# Patient Record
Sex: MALE | Race: WHITE | NOT HISPANIC OR LATINO | URBAN - METROPOLITAN AREA
[De-identification: names, ages, dates, MRNs, and addresses within clinical notes are randomized per-mention and may not be internally consistent; named-entity substitution may affect disease eponyms.]

---

## 2018-07-24 ENCOUNTER — INPATIENT (INPATIENT)
Facility: HOSPITAL | Age: 68
LOS: 1 days | Discharge: HOME | DRG: 303 | End: 2018-07-26
Attending: INTERNAL MEDICINE | Admitting: INTERNAL MEDICINE
Payer: MEDICARE

## 2018-07-24 VITALS
RESPIRATION RATE: 20 BRPM | TEMPERATURE: 99 F | SYSTOLIC BLOOD PRESSURE: 143 MMHG | OXYGEN SATURATION: 95 % | DIASTOLIC BLOOD PRESSURE: 55 MMHG | WEIGHT: 210.1 LBS | HEART RATE: 60 BPM

## 2018-07-24 LAB
ALBUMIN SERPL ELPH-MCNC: 4.3 G/DL — SIGNIFICANT CHANGE UP (ref 3.3–5)
ALP SERPL-CCNC: 50 U/L — SIGNIFICANT CHANGE UP (ref 40–120)
ALT FLD-CCNC: 14 U/L — SIGNIFICANT CHANGE UP (ref 10–45)
ANION GAP SERPL CALC-SCNC: 11 MMOL/L — SIGNIFICANT CHANGE UP (ref 5–17)
APTT BLD: 33.8 SEC — SIGNIFICANT CHANGE UP (ref 27.5–37.4)
AST SERPL-CCNC: 22 U/L — SIGNIFICANT CHANGE UP (ref 10–40)
BASOPHILS NFR BLD AUTO: 0.3 % — SIGNIFICANT CHANGE UP (ref 0–2)
BILIRUB SERPL-MCNC: 0.2 MG/DL — SIGNIFICANT CHANGE UP (ref 0.2–1.2)
BUN SERPL-MCNC: 13 MG/DL — SIGNIFICANT CHANGE UP (ref 7–23)
CALCIUM SERPL-MCNC: 9.2 MG/DL — SIGNIFICANT CHANGE UP (ref 8.4–10.5)
CHLORIDE SERPL-SCNC: 102 MMOL/L — SIGNIFICANT CHANGE UP (ref 96–108)
CK MB CFR SERPL CALC: 1.8 NG/ML — SIGNIFICANT CHANGE UP (ref 0–6.7)
CK SERPL-CCNC: 91 U/L — SIGNIFICANT CHANGE UP (ref 30–200)
CO2 SERPL-SCNC: 27 MMOL/L — SIGNIFICANT CHANGE UP (ref 22–31)
CREAT SERPL-MCNC: 0.87 MG/DL — SIGNIFICANT CHANGE UP (ref 0.5–1.3)
EOSINOPHIL NFR BLD AUTO: 2 % — SIGNIFICANT CHANGE UP (ref 0–6)
GLUCOSE SERPL-MCNC: 173 MG/DL — HIGH (ref 70–99)
HCT VFR BLD CALC: 39.5 % — SIGNIFICANT CHANGE UP (ref 39–50)
HGB BLD-MCNC: 12.8 G/DL — LOW (ref 13–17)
INR BLD: 0.97 — SIGNIFICANT CHANGE UP (ref 0.88–1.16)
LIDOCAIN IGE QN: 32 U/L — SIGNIFICANT CHANGE UP (ref 7–60)
LYMPHOCYTES # BLD AUTO: 45.3 % — HIGH (ref 13–44)
MCHC RBC-ENTMCNC: 29.8 PG — SIGNIFICANT CHANGE UP (ref 27–34)
MCHC RBC-ENTMCNC: 32.4 G/DL — SIGNIFICANT CHANGE UP (ref 32–36)
MCV RBC AUTO: 91.9 FL — SIGNIFICANT CHANGE UP (ref 80–100)
MONOCYTES NFR BLD AUTO: 7.3 % — SIGNIFICANT CHANGE UP (ref 2–14)
NEUTROPHILS NFR BLD AUTO: 45.1 % — SIGNIFICANT CHANGE UP (ref 43–77)
NT-PROBNP SERPL-SCNC: 73 PG/ML — SIGNIFICANT CHANGE UP (ref 0–300)
PLATELET # BLD AUTO: 217 K/UL — SIGNIFICANT CHANGE UP (ref 150–400)
POTASSIUM SERPL-MCNC: 4.1 MMOL/L — SIGNIFICANT CHANGE UP (ref 3.5–5.3)
POTASSIUM SERPL-SCNC: 4.1 MMOL/L — SIGNIFICANT CHANGE UP (ref 3.5–5.3)
PROT SERPL-MCNC: 6.6 G/DL — SIGNIFICANT CHANGE UP (ref 6–8.3)
PROTHROM AB SERPL-ACNC: 10.8 SEC — SIGNIFICANT CHANGE UP (ref 9.8–12.7)
RBC # BLD: 4.3 M/UL — SIGNIFICANT CHANGE UP (ref 4.2–5.8)
RBC # FLD: 13.2 % — SIGNIFICANT CHANGE UP (ref 10.3–16.9)
SODIUM SERPL-SCNC: 140 MMOL/L — SIGNIFICANT CHANGE UP (ref 135–145)
TROPONIN T SERPL-MCNC: <0.01 NG/ML — SIGNIFICANT CHANGE UP (ref 0–0.01)
WBC # BLD: 7.9 K/UL — SIGNIFICANT CHANGE UP (ref 3.8–10.5)
WBC # FLD AUTO: 7.9 K/UL — SIGNIFICANT CHANGE UP (ref 3.8–10.5)

## 2018-07-24 PROCEDURE — 99291 CRITICAL CARE FIRST HOUR: CPT

## 2018-07-24 PROCEDURE — 99222 1ST HOSP IP/OBS MODERATE 55: CPT | Mod: AI

## 2018-07-24 PROCEDURE — 93010 ELECTROCARDIOGRAM REPORT: CPT

## 2018-07-24 PROCEDURE — 71045 X-RAY EXAM CHEST 1 VIEW: CPT | Mod: 26

## 2018-07-24 RX ORDER — ASPIRIN/CALCIUM CARB/MAGNESIUM 324 MG
162 TABLET ORAL DAILY
Qty: 0 | Refills: 0 | Status: DISCONTINUED | OUTPATIENT
Start: 2018-07-24 | End: 2018-07-26

## 2018-07-24 RX ORDER — CLONAZEPAM 1 MG
1 TABLET ORAL ONCE
Qty: 0 | Refills: 0 | Status: DISCONTINUED | OUTPATIENT
Start: 2018-07-24 | End: 2018-07-24

## 2018-07-24 RX ORDER — CLONAZEPAM 1 MG
0.5 TABLET ORAL ONCE
Qty: 0 | Refills: 0 | Status: DISCONTINUED | OUTPATIENT
Start: 2018-07-24 | End: 2018-07-24

## 2018-07-24 RX ORDER — SODIUM CHLORIDE 9 MG/ML
3 INJECTION INTRAMUSCULAR; INTRAVENOUS; SUBCUTANEOUS ONCE
Qty: 0 | Refills: 0 | Status: COMPLETED | OUTPATIENT
Start: 2018-07-24 | End: 2018-07-24

## 2018-07-24 RX ADMIN — Medication 0.5 MILLIGRAM(S): at 22:27

## 2018-07-24 RX ADMIN — SODIUM CHLORIDE 3 MILLILITER(S): 9 INJECTION INTRAMUSCULAR; INTRAVENOUS; SUBCUTANEOUS at 22:17

## 2018-07-24 NOTE — ED PROVIDER NOTE - OBJECTIVE STATEMENT
68M with DM, HTN presenting with chest pressure, anxiety (on klonopin) presenting with chest pressure, some shortness of breath. no radiation of pain, no nausea, no vomiting, no diaphoresis. Pt prefers family member for translation. No leg swelling, no pe/dvt risk factors. Last stress test in [place] 3 years ago. Pt does not recall results.

## 2018-07-24 NOTE — ED ADULT NURSE NOTE - OBJECTIVE STATEMENT
69 y/o male was BIBA for chest pain since 3 pm today. Pt verbalized that he took 4 aspirin and 0.5mg of Klonopin prior to arriving to ER. Upon assessment, 18G IV heplock noted to left ac, abdomen soft, lung fields WNL, breathing is equal and unlabored, pulses palpable, no visible injuries noted. Pt denies dizziness, blurred vision, slurred speech, nausea, vomiting, diarrhea, fever, chills, LOC, SOB, weakness, fatigue, recent travel, recent injury, and palpitations. EKG performed. Care in progress

## 2018-07-24 NOTE — ED PROVIDER NOTE - MEDICAL DECISION MAKING DETAILS
68M with DM, HtN, presenting with chest pain started today substernal pressure nonradiating, no n/v/diaphoresis. Some sob. no pe/dvt risk factors, pt has pmd in [place], last stress test 3 years ago. doubt dissection no cp to back, doubt ptx equal bs bl. doubt pna no fever/cough, no pe/dvt risk factors. plan to admit to cards given ekg changes, no old for comparison.

## 2018-07-24 NOTE — ED PROVIDER NOTE - PHYSICAL EXAMINATION
gen: no acute distress, comfortable, conversant  HEENT: oropharynx clear  Neck: supple, no meningismus, no bruit noted bl carotid  CV: rrr no m/r/g 2+ radial pulse  Resp: ctab, no w/c/r  Abd: nontender, no rebound/guarding  Ext: no edema, pedal pulses 2+  Neuro: alert and oriented, cn grossly intact, strength equal in all 4 ext, sensation intact to light touch f/a/l, nl coordination, gait steady

## 2018-07-25 DIAGNOSIS — D64.9 ANEMIA, UNSPECIFIED: ICD-10-CM

## 2018-07-25 DIAGNOSIS — R63.8 OTHER SYMPTOMS AND SIGNS CONCERNING FOOD AND FLUID INTAKE: ICD-10-CM

## 2018-07-25 DIAGNOSIS — Z71.89 OTHER SPECIFIED COUNSELING: ICD-10-CM

## 2018-07-25 DIAGNOSIS — Z29.9 ENCOUNTER FOR PROPHYLACTIC MEASURES, UNSPECIFIED: ICD-10-CM

## 2018-07-25 DIAGNOSIS — F32.9 MAJOR DEPRESSIVE DISORDER, SINGLE EPISODE, UNSPECIFIED: ICD-10-CM

## 2018-07-25 DIAGNOSIS — E11.9 TYPE 2 DIABETES MELLITUS WITHOUT COMPLICATIONS: ICD-10-CM

## 2018-07-25 LAB
ANION GAP SERPL CALC-SCNC: 10 MMOL/L — SIGNIFICANT CHANGE UP (ref 5–17)
APTT BLD: 33.3 SEC — SIGNIFICANT CHANGE UP (ref 27.5–37.4)
BUN SERPL-MCNC: 15 MG/DL — SIGNIFICANT CHANGE UP (ref 7–23)
CALCIUM SERPL-MCNC: 9 MG/DL — SIGNIFICANT CHANGE UP (ref 8.4–10.5)
CHLORIDE SERPL-SCNC: 102 MMOL/L — SIGNIFICANT CHANGE UP (ref 96–108)
CHOLEST SERPL-MCNC: 148 MG/DL — SIGNIFICANT CHANGE UP (ref 10–199)
CO2 SERPL-SCNC: 28 MMOL/L — SIGNIFICANT CHANGE UP (ref 22–31)
CREAT SERPL-MCNC: 0.78 MG/DL — SIGNIFICANT CHANGE UP (ref 0.5–1.3)
GLUCOSE BLDC GLUCOMTR-MCNC: 112 MG/DL — HIGH (ref 70–99)
GLUCOSE BLDC GLUCOMTR-MCNC: 123 MG/DL — HIGH (ref 70–99)
GLUCOSE BLDC GLUCOMTR-MCNC: 156 MG/DL — HIGH (ref 70–99)
GLUCOSE BLDC GLUCOMTR-MCNC: 164 MG/DL — HIGH (ref 70–99)
GLUCOSE SERPL-MCNC: 187 MG/DL — HIGH (ref 70–99)
HBA1C BLD-MCNC: 7 % — HIGH (ref 4–5.6)
HCT VFR BLD CALC: 37.8 % — LOW (ref 39–50)
HDLC SERPL-MCNC: 48 MG/DL — SIGNIFICANT CHANGE UP (ref 40–125)
HGB BLD-MCNC: 12.4 G/DL — LOW (ref 13–17)
INR BLD: 1 — SIGNIFICANT CHANGE UP (ref 0.88–1.16)
LIPID PNL WITH DIRECT LDL SERPL: 86 MG/DL — SIGNIFICANT CHANGE UP
MAGNESIUM SERPL-MCNC: 2 MG/DL — SIGNIFICANT CHANGE UP (ref 1.6–2.6)
MCHC RBC-ENTMCNC: 30.1 PG — SIGNIFICANT CHANGE UP (ref 27–34)
MCHC RBC-ENTMCNC: 32.8 G/DL — SIGNIFICANT CHANGE UP (ref 32–36)
MCV RBC AUTO: 91.7 FL — SIGNIFICANT CHANGE UP (ref 80–100)
PLATELET # BLD AUTO: 214 K/UL — SIGNIFICANT CHANGE UP (ref 150–400)
POTASSIUM SERPL-MCNC: 4.1 MMOL/L — SIGNIFICANT CHANGE UP (ref 3.5–5.3)
POTASSIUM SERPL-SCNC: 4.1 MMOL/L — SIGNIFICANT CHANGE UP (ref 3.5–5.3)
PROTHROM AB SERPL-ACNC: 11.1 SEC — SIGNIFICANT CHANGE UP (ref 9.8–12.7)
RBC # BLD: 4.12 M/UL — LOW (ref 4.2–5.8)
RBC # FLD: 13.1 % — SIGNIFICANT CHANGE UP (ref 10.3–16.9)
SODIUM SERPL-SCNC: 140 MMOL/L — SIGNIFICANT CHANGE UP (ref 135–145)
TOTAL CHOLESTEROL/HDL RATIO MEASUREMENT: 3.1 RATIO — LOW (ref 3.4–9.6)
TRIGL SERPL-MCNC: 72 MG/DL — SIGNIFICANT CHANGE UP (ref 10–149)
TROPONIN T SERPL-MCNC: <0.01 NG/ML — SIGNIFICANT CHANGE UP (ref 0–0.01)
WBC # BLD: 8.6 K/UL — SIGNIFICANT CHANGE UP (ref 3.8–10.5)
WBC # FLD AUTO: 8.6 K/UL — SIGNIFICANT CHANGE UP (ref 3.8–10.5)

## 2018-07-25 PROCEDURE — 93306 TTE W/DOPPLER COMPLETE: CPT | Mod: 26

## 2018-07-25 PROCEDURE — 99232 SBSQ HOSP IP/OBS MODERATE 35: CPT

## 2018-07-25 PROCEDURE — 93010 ELECTROCARDIOGRAM REPORT: CPT

## 2018-07-25 PROCEDURE — 75574 CT ANGIO HRT W/3D IMAGE: CPT | Mod: 26

## 2018-07-25 RX ORDER — DIVALPROEX SODIUM 500 MG/1
2 TABLET, DELAYED RELEASE ORAL
Qty: 0 | Refills: 0 | COMMUNITY

## 2018-07-25 RX ORDER — DEXTROSE 50 % IN WATER 50 %
25 SYRINGE (ML) INTRAVENOUS ONCE
Qty: 0 | Refills: 0 | Status: DISCONTINUED | OUTPATIENT
Start: 2018-07-25 | End: 2018-07-26

## 2018-07-25 RX ORDER — DEXTROSE 50 % IN WATER 50 %
12.5 SYRINGE (ML) INTRAVENOUS ONCE
Qty: 0 | Refills: 0 | Status: DISCONTINUED | OUTPATIENT
Start: 2018-07-25 | End: 2018-07-26

## 2018-07-25 RX ORDER — LITHIUM CARBONATE 300 MG/1
1 TABLET, EXTENDED RELEASE ORAL
Qty: 0 | Refills: 0 | COMMUNITY

## 2018-07-25 RX ORDER — SERTRALINE 25 MG/1
25 TABLET, FILM COATED ORAL DAILY
Qty: 0 | Refills: 0 | Status: DISCONTINUED | OUTPATIENT
Start: 2018-07-25 | End: 2018-07-26

## 2018-07-25 RX ORDER — SERTRALINE 25 MG/1
1 TABLET, FILM COATED ORAL
Qty: 0 | Refills: 0 | COMMUNITY

## 2018-07-25 RX ORDER — LAMOTRIGINE 25 MG/1
250 TABLET, ORALLY DISINTEGRATING ORAL
Qty: 0 | Refills: 0 | COMMUNITY

## 2018-07-25 RX ORDER — SITAGLIPTIN 50 MG/1
1 TABLET, FILM COATED ORAL
Qty: 0 | Refills: 0 | COMMUNITY

## 2018-07-25 RX ORDER — LAMOTRIGINE 25 MG/1
250 TABLET, ORALLY DISINTEGRATING ORAL ONCE
Qty: 0 | Refills: 0 | Status: COMPLETED | OUTPATIENT
Start: 2018-07-25 | End: 2018-07-25

## 2018-07-25 RX ORDER — GLUCAGON INJECTION, SOLUTION 0.5 MG/.1ML
1 INJECTION, SOLUTION SUBCUTANEOUS ONCE
Qty: 0 | Refills: 0 | Status: DISCONTINUED | OUTPATIENT
Start: 2018-07-25 | End: 2018-07-26

## 2018-07-25 RX ORDER — SODIUM CHLORIDE 9 MG/ML
1000 INJECTION, SOLUTION INTRAVENOUS
Qty: 0 | Refills: 0 | Status: DISCONTINUED | OUTPATIENT
Start: 2018-07-25 | End: 2018-07-26

## 2018-07-25 RX ORDER — DEXTROSE 50 % IN WATER 50 %
15 SYRINGE (ML) INTRAVENOUS ONCE
Qty: 0 | Refills: 0 | Status: DISCONTINUED | OUTPATIENT
Start: 2018-07-25 | End: 2018-07-26

## 2018-07-25 RX ORDER — DIVALPROEX SODIUM 500 MG/1
1000 TABLET, DELAYED RELEASE ORAL DAILY
Qty: 0 | Refills: 0 | Status: DISCONTINUED | OUTPATIENT
Start: 2018-07-25 | End: 2018-07-26

## 2018-07-25 RX ORDER — INSULIN LISPRO 100/ML
VIAL (ML) SUBCUTANEOUS
Qty: 0 | Refills: 0 | Status: DISCONTINUED | OUTPATIENT
Start: 2018-07-25 | End: 2018-07-26

## 2018-07-25 RX ORDER — METFORMIN HYDROCHLORIDE 850 MG/1
1 TABLET ORAL
Qty: 0 | Refills: 0 | COMMUNITY

## 2018-07-25 RX ORDER — HEPARIN SODIUM 5000 [USP'U]/ML
5000 INJECTION INTRAVENOUS; SUBCUTANEOUS EVERY 8 HOURS
Qty: 0 | Refills: 0 | Status: DISCONTINUED | OUTPATIENT
Start: 2018-07-25 | End: 2018-07-26

## 2018-07-25 RX ORDER — CLONAZEPAM 1 MG
1 TABLET ORAL
Qty: 0 | Refills: 0 | COMMUNITY

## 2018-07-25 RX ORDER — LITHIUM CARBONATE 300 MG/1
600 TABLET, EXTENDED RELEASE ORAL DAILY
Qty: 0 | Refills: 0 | Status: DISCONTINUED | OUTPATIENT
Start: 2018-07-25 | End: 2018-07-26

## 2018-07-25 RX ORDER — ASPIRIN/CALCIUM CARB/MAGNESIUM 324 MG
1 TABLET ORAL
Qty: 0 | Refills: 0 | COMMUNITY

## 2018-07-25 RX ADMIN — HEPARIN SODIUM 5000 UNIT(S): 5000 INJECTION INTRAVENOUS; SUBCUTANEOUS at 06:46

## 2018-07-25 RX ADMIN — Medication 2: at 21:59

## 2018-07-25 RX ADMIN — Medication 2: at 07:52

## 2018-07-25 RX ADMIN — LITHIUM CARBONATE 600 MILLIGRAM(S): 300 TABLET, EXTENDED RELEASE ORAL at 11:20

## 2018-07-25 RX ADMIN — LAMOTRIGINE 250 MILLIGRAM(S): 25 TABLET, ORALLY DISINTEGRATING ORAL at 06:46

## 2018-07-25 RX ADMIN — Medication 162 MILLIGRAM(S): at 11:02

## 2018-07-25 RX ADMIN — SERTRALINE 25 MILLIGRAM(S): 25 TABLET, FILM COATED ORAL at 11:02

## 2018-07-25 RX ADMIN — DIVALPROEX SODIUM 1000 MILLIGRAM(S): 500 TABLET, DELAYED RELEASE ORAL at 15:19

## 2018-07-25 NOTE — H&P ADULT - PMH
Depression    Type 2 diabetes mellitus without complication, without long-term current use of insulin

## 2018-07-25 NOTE — PROGRESS NOTE ADULT - PROBLEM SELECTOR PLAN 3
Hx of depression and reports being on depakote 1000mg qHS, Lithium 600mg qHS, Zoloft 25mg qHS, Lamictal 250mg qD. Patient also reports use of klonopin 0.5mg BID PRN for anxiety- reports all these medications are for mood stabilization.   -Will likely need further reconciliation/confirmation of doses, but have continued reported home medications at this time.

## 2018-07-25 NOTE — H&P ADULT - PROBLEM SELECTOR PLAN 3
Hx of depression and reports being on depakote 1000mg qHS, Lithium 600mg qHS, Zoloft 25mg qHS, Lamictal 250mg qD. Patient also reports use of klonopin 0.5mg BID PRN for anxiety- reports all these medications are for mood stabilization.   -Will likely need further reconciliation and will continue. Hx of depression and reports being on depakote 1000mg qHS, Lithium 600mg qHS, Zoloft 25mg qHS, Lamictal 250mg qD. Patient also reports use of klonopin 0.5mg BID PRN for anxiety- reports all these medications are for mood stabilization.   -Will likely need further reconciliation/confirmation of doses, but have continued reported home medications at this time.

## 2018-07-25 NOTE — H&P ADULT - PROBLEM SELECTOR PLAN 2
Hx of DM2 reports Metformin 1000mg qD (reports that his primary doctor told him to increase to BID but has not been taking it twice daily) and Januvia 100mg qD.   - A1c in AM  - Mod ISS

## 2018-07-25 NOTE — H&P ADULT - NSHPPHYSICALEXAM_GEN_ALL_CORE
Patient resistant to a significant portion of exam.   General: Lying comfortably in bed, no acute distress or respiratory accessory muscle use.   HEENT: No pallor noted. Oral mucosa moist.   Resp: Clear to auscultation bilaterally  Cardiac: S1, S2 appreciated, No murmurs, rubs or gallops. Regular Rate and Rhythm.  GI: Soft, nontender, mild distension.   Neuro: AAOx3, following commands, Moving all extremities.   Extremities: Trace edema. DP present.

## 2018-07-25 NOTE — H&P ADULT - NSHPLABSRESULTS_GEN_ALL_CORE
LABS:                        12.8   7.9   )-----------( 217      ( 24 Jul 2018 22:23 )             39.5     07-24    140  |  102  |  13  ----------------------------<  173<H>  4.1   |  27  |  0.87    Ca    9.2      24 Jul 2018 22:23    TPro  6.6  /  Alb  4.3  /  TBili  0.2  /  DBili  x   /  AST  22  /  ALT  14  /  AlkPhos  50  07-24    PT/INR - ( 24 Jul 2018 22:23 )   PT: 10.8 sec;   INR: 0.97          PTT - ( 24 Jul 2018 22:23 )  PTT:33.8 sec

## 2018-07-25 NOTE — H&P ADULT - ATTENDING COMMENTS
Assessment: Patient personally seen and examined myself during rounds with the Physician Assistant/House Staff/Nurse Practitioner   ON DATE 7/25/18  Physician Assistant/House Staff/Nurse Practitioner note read, including vitals, physical findings, laboratory data, and radiological reports.   Revisions included below.   Direct personal management at bed side and extensive interpretation of the data.    Plan was outlined and discussed in details with the Physician Assistant/House Staff/Nurse Practitioner.    Decision making of high complexity   Risk high of complications, morbidity, and/or mortality  Assessment and Action taken for acute disease activity to reflect the level of care provided:  -Hemodynamic evaluation and support  -ACS assessment and treatment as applicable  -Heart failure assessment and treatment as applicable  -Cardiac Telemetry reviewed  -Medication reconciliation  -Review laboratory data  -EKG reviewed - no stemi  -Echo ordered  -Interdisciplinary discussion with IC / EP / HF / CTS teams as needed  TIME SPENT in evaluation and management, reassessments, review and interpretation of labs and x-rays, and hemodynamic management, formulating a plan and coordinating care: ___70____ MIN.  Time does not include procedural time.    Keven Agudelo MD  Cardiology    Mobile: [phone]  Office: [phone]  [address] [place], [place] [address]

## 2018-07-25 NOTE — PROGRESS NOTE ADULT - SUBJECTIVE AND OBJECTIVE BOX
Overnight: NAEO, patient resting comfortably.   Denies chest pain, sob, nausea, vomiting, diarrhea.    Vital Signs Last 24 Hrs  T(C): 36.7 (25 Jul 2018 14:10), Max: 37 (24 Jul 2018 21:50)  T(F): 98 (25 Jul 2018 14:10), Max: 98.6 (24 Jul 2018 21:50)  HR: 56 (25 Jul 2018 13:56) (56 - 60)  BP: 110/54 (25 Jul 2018 13:56) (101/57 - 143/55)  BP(mean): 73 (25 Jul 2018 13:56) (73 - 87)  RR: 18 (25 Jul 2018 13:56) (18 - 20)  SpO2: 97% (25 Jul 2018 13:56) (95% - 98%)    Physical Exam: Patient resistant to a significant portion of exam.   	General: Lying comfortably in bed, no acute distress or respiratory accessory muscle use.   	HEENT: No pallor noted. Oral mucosa moist.   	Resp: Clear to auscultation bilaterally  	Cardiac: S1, S2 appreciated, No murmurs, rubs or gallops. Regular Rate and Rhythm.  	GI: Soft, nontender, mild distension.   	Neuro: AAOx3, following commands, Moving all extremities.   Extremities: Trace edema. DP present.      .  LABS:                         12.4   8.6   )-----------( 214      ( 25 Jul 2018 06:21 )             37.8     07-25    140  |  102  |  15  ----------------------------<  187<H>  4.1   |  28  |  0.78    Ca    9.0      25 Jul 2018 06:21  Mg     2.0     07-25    TPro  6.6  /  Alb  4.3  /  TBili  0.2  /  DBili  x   /  AST  22  /  ALT  14  /  AlkPhos  50  07-24    PT/INR - ( 25 Jul 2018 06:21 )   PT: 11.1 sec;   INR: 1.00          PTT - ( 25 Jul 2018 06:21 )  PTT:33.3 sec    CARDIAC MARKERS ( 25 Jul 2018 06:21 )  x     / <0.01 ng/mL / x     / x     / x      CARDIAC MARKERS ( 24 Jul 2018 22:23 )  x     / <0.01 ng/mL / 91 U/L / x     / 1.8 ng/mL            RADIOLOGY, EKG & ADDITIONAL TESTS: Reviewed.

## 2018-07-25 NOTE — H&P ADULT - PROBLEM SELECTOR PLAN 4
Mild Anemia- Hgb 12.8. Reports no bloody output. No hx of anemia but patient also poorly compliant with exam/hx. Otherwise asymptomatic.   - C/w monitoring H/H, Hgb goal>7.   - Monitor for signs and symptoms of bleeding  - If persistently low can work up with Fe studies.

## 2018-07-25 NOTE — H&P ADULT - PROBLEM SELECTOR PLAN 1
p/w 3 episodes of chest pain. Trop negative x1. EKG noted to have NSR, poor R wave progression, TW flattening of precordial leads. s/p aspirin  -c/w aspirin p/w 3 episodes of chest pain. Trop negative x1. EKG noted to have NSR, poor R wave progression, TW flattening of V5-6. s/p aspirin in ambulance  -c/w aspirin  -echocardiogram  -Repeat Trop in AM

## 2018-07-25 NOTE — H&P ADULT - HISTORY OF PRESENT ILLNESS
68M with PMH of depression, anxiety, and Diabetes presenting for chest pain. Patient states that he had an episode of chest pain with sudden onset that lasted <1minute. Patient states he was having an emotional conversation at the time that he had this episode. Patient states he had 2 additional episodes of chest pain today that were similar to the initial episode. Patient states he felt stressed during both additional episodes (both lasting only a few minutes) prior to resolving. Patient states initial episode the worst episode- rating 10/10- located substernal without radiation of pain. 68M with PMH of depression, anxiety, and Diabetes presenting for chest pain. Patient states that he had 3 episodes of chest pain. Patient states initial episode at 3PM today lasted <1 min and was worst of all 3 episodes. States episode was during an emotional conversation. Patient states the 2 additional episodes of chest pain today that were similar to the initial episode but less severe. Initial episode was 10/10 pain in mid to right sided chest pain that he describes as "someone pushing a rock into his chest". Patient denies any radiation to neck/jaw, back or b/l arms. Patient denies associated nausea, vomiting or diaphoresis. Patient states first episode lasted <1 min. The second 2 episodes were 2/10 pain of similar quality lasting 1-2 minutes- also while stressed/emotional conversations. Patient unable to appreciate any palliative or provoking factors- as pain is self resolving.     In ED, VS notable for Temp 98.6F, HR 60, /55, RR 20, O2 sat 95% on RA. EKG with some TW flattening in precordial leads and poor R wave progression. Trop neg x1. Mild anemia Hgb at 12.8.  Patient without current complaints- no active chest pain or additional episodes- last being in the ambulance- given aspirin and klonopin 0.5mg. Denies shortness of breath. Denies orthopnea.    Past work up of stress test in past- 3 years ago- unsure of results.   ROS: Denies headaches, significant unexplained weight changes, no dizziness, no fevers, chills or cough. Denies shortness of breath, denies dyspnea or orthopnea. Denies abdominal pain, n/v. No changes in bowel habits. No blood in stool or urine. 68M with PMH of depression, anxiety, and Diabetes presenting for chest pain. Patient states that he had 3 episodes of chest pain. Patient states initial episode at 3PM today lasted <1 min and was worst of all 3 episodes. States episode was during an emotional conversation. Patient states the 2 additional episodes of chest pain today that were similar to the initial episode but less severe. Initial episode was 10/10 pain in mid to right sided chest pain that he describes as "someone pushing a rock into his chest". Patient denies any radiation to neck/jaw, back or b/l arms. Patient denies associated nausea, vomiting or diaphoresis. Patient states first episode lasted <1 min. The second 2 episodes were 2/10 pain of similar quality lasting 1-2 minutes- also while stressed/emotional conversations. Patient unable to appreciate any palliative or provoking factors- as pain is self resolving.     In ED, VS notable for Temp 98.6F, HR 60, /55, RR 20, O2 sat 95% on RA. EKG with some TW flattening in V5-6 and poor R wave progression. Trop neg x1. Mild anemia Hgb at 12.8.  Patient without current complaints- no active chest pain or additional episodes- last being in the ambulance- given aspirin and klonopin 0.5mg. Denies shortness of breath. Denies orthopnea.    Past work up of stress test in past- 3 years ago- unsure of results.   ROS: Denies headaches, significant unexplained weight changes, no dizziness, no fevers, chills or cough. Denies shortness of breath, denies dyspnea or orthopnea. Denies abdominal pain, n/v. No changes in bowel habits. No blood in stool or urine.

## 2018-07-25 NOTE — H&P ADULT - ASSESSMENT
68M with PMH of depression, DM presenting with episode of chest pain today and admitted to [facility] for ACS rule out.

## 2018-07-25 NOTE — PROGRESS NOTE ADULT - PROBLEM SELECTOR PLAN 1
p/w 3 episodes of chest pain. Trop negative x2. EKG noted to have NSR, poor R wave progression, TW flattening of V5-6. s/p aspirin in ambulance  -c/w aspirin 81 mg  -echocardiogram  55-60% with no obvious other issues with ventricle and atrium size.  -f/u with CT chest angiogram

## 2018-07-25 NOTE — PROGRESS NOTE ADULT - ASSESSMENT
68M with PMH of depression, DM, and HTN presenting with episode of chest pain today and admitted to [facility] for ACS rule out.

## 2018-07-25 NOTE — H&P ADULT - FAMILY HISTORY
No pertinent family history in first degree relatives No pertinent family history in first degree relatives, cad

## 2018-07-25 NOTE — PROGRESS NOTE ADULT - ATTENDING COMMENTS
Assessment: Patient personally seen and examined myself during rounds with the Physician Assistant/House Staff/Nurse Practitioner  ON DATE  7/26/18  Physician Assistant/House Staff/Nurse Practitioner note read, including vitals, physical findings, laboratory data, and radiological reports.   Revisions included below.   Direct personal management at bed side and extensive interpretation of the data.    Plan was outlined and discussed in details with the Physician Assistant/House Staff/Nurse Practitioner.    Decision making of high complexity   Risk high of complications, morbidity, and/or mortality  Assessment and Action taken for acute disease activity to reflect the level of care provided:  -Hemodynamic evaluation and support  -Medication reconciliation  -Review laboratory data  -EKG reviewed   -    TIME SPENT in evaluation and management, reassessments, review and interpretation of labs and x-rays, and hemodynamic management, formulating a plan and coordinating care: ___25____ MIN.  Time does not include procedural time.    Keven Agudelo MD  Cardiology    Mobile: [phone]  Office: [phone]  [address] [place] [address], [id number]

## 2018-07-26 ENCOUNTER — TRANSCRIPTION ENCOUNTER (OUTPATIENT)
Age: 68
End: 2018-07-26

## 2018-07-26 ENCOUNTER — INBOUND DOCUMENT (OUTPATIENT)
Age: 68
End: 2018-07-26

## 2018-07-26 VITALS
DIASTOLIC BLOOD PRESSURE: 76 MMHG | SYSTOLIC BLOOD PRESSURE: 125 MMHG | RESPIRATION RATE: 18 BRPM | OXYGEN SATURATION: 98 %

## 2018-07-26 PROBLEM — F32.9 MAJOR DEPRESSIVE DISORDER, SINGLE EPISODE, UNSPECIFIED: Chronic | Status: ACTIVE | Noted: 2018-07-25

## 2018-07-26 PROBLEM — E11.9 TYPE 2 DIABETES MELLITUS WITHOUT COMPLICATIONS: Chronic | Status: ACTIVE | Noted: 2018-07-25

## 2018-07-26 PROBLEM — Z00.00 ENCOUNTER FOR PREVENTIVE HEALTH EXAMINATION: Status: ACTIVE | Noted: 2018-07-26

## 2018-07-26 LAB
ANION GAP SERPL CALC-SCNC: 11 MMOL/L — SIGNIFICANT CHANGE UP (ref 5–17)
BUN SERPL-MCNC: 13 MG/DL — SIGNIFICANT CHANGE UP (ref 7–23)
CALCIUM SERPL-MCNC: 8.7 MG/DL — SIGNIFICANT CHANGE UP (ref 8.4–10.5)
CHLORIDE SERPL-SCNC: 103 MMOL/L — SIGNIFICANT CHANGE UP (ref 96–108)
CO2 SERPL-SCNC: 26 MMOL/L — SIGNIFICANT CHANGE UP (ref 22–31)
CREAT SERPL-MCNC: 0.81 MG/DL — SIGNIFICANT CHANGE UP (ref 0.5–1.3)
GLUCOSE BLDC GLUCOMTR-MCNC: 139 MG/DL — HIGH (ref 70–99)
GLUCOSE BLDC GLUCOMTR-MCNC: 204 MG/DL — HIGH (ref 70–99)
GLUCOSE SERPL-MCNC: 219 MG/DL — HIGH (ref 70–99)
HCT VFR BLD CALC: 38.9 % — LOW (ref 39–50)
HGB BLD-MCNC: 12.5 G/DL — LOW (ref 13–17)
MAGNESIUM SERPL-MCNC: 2.1 MG/DL — SIGNIFICANT CHANGE UP (ref 1.6–2.6)
MCHC RBC-ENTMCNC: 30 PG — SIGNIFICANT CHANGE UP (ref 27–34)
MCHC RBC-ENTMCNC: 32.1 G/DL — SIGNIFICANT CHANGE UP (ref 32–36)
MCV RBC AUTO: 93.5 FL — SIGNIFICANT CHANGE UP (ref 80–100)
PLATELET # BLD AUTO: 214 K/UL — SIGNIFICANT CHANGE UP (ref 150–400)
POTASSIUM SERPL-MCNC: 4.2 MMOL/L — SIGNIFICANT CHANGE UP (ref 3.5–5.3)
POTASSIUM SERPL-SCNC: 4.2 MMOL/L — SIGNIFICANT CHANGE UP (ref 3.5–5.3)
RBC # BLD: 4.16 M/UL — LOW (ref 4.2–5.8)
RBC # FLD: 13.5 % — SIGNIFICANT CHANGE UP (ref 10.3–16.9)
SODIUM SERPL-SCNC: 140 MMOL/L — SIGNIFICANT CHANGE UP (ref 135–145)
WBC # BLD: 7.5 K/UL — SIGNIFICANT CHANGE UP (ref 3.8–10.5)
WBC # FLD AUTO: 7.5 K/UL — SIGNIFICANT CHANGE UP (ref 3.8–10.5)

## 2018-07-26 PROCEDURE — 84484 ASSAY OF TROPONIN QUANT: CPT

## 2018-07-26 PROCEDURE — 82550 ASSAY OF CK (CPK): CPT

## 2018-07-26 PROCEDURE — 80061 LIPID PANEL: CPT

## 2018-07-26 PROCEDURE — 83880 ASSAY OF NATRIURETIC PEPTIDE: CPT

## 2018-07-26 PROCEDURE — 82962 GLUCOSE BLOOD TEST: CPT

## 2018-07-26 PROCEDURE — 85730 THROMBOPLASTIN TIME PARTIAL: CPT

## 2018-07-26 PROCEDURE — 99238 HOSP IP/OBS DSCHRG MGMT 30/<: CPT

## 2018-07-26 PROCEDURE — 83735 ASSAY OF MAGNESIUM: CPT

## 2018-07-26 PROCEDURE — 93010 ELECTROCARDIOGRAM REPORT: CPT

## 2018-07-26 PROCEDURE — 36415 COLL VENOUS BLD VENIPUNCTURE: CPT

## 2018-07-26 PROCEDURE — 99285 EMERGENCY DEPT VISIT HI MDM: CPT | Mod: 25

## 2018-07-26 PROCEDURE — 83690 ASSAY OF LIPASE: CPT

## 2018-07-26 PROCEDURE — C8929: CPT

## 2018-07-26 PROCEDURE — 82553 CREATINE MB FRACTION: CPT

## 2018-07-26 PROCEDURE — 85025 COMPLETE CBC W/AUTO DIFF WBC: CPT

## 2018-07-26 PROCEDURE — 71045 X-RAY EXAM CHEST 1 VIEW: CPT

## 2018-07-26 PROCEDURE — 80048 BASIC METABOLIC PNL TOTAL CA: CPT

## 2018-07-26 PROCEDURE — 80053 COMPREHEN METABOLIC PANEL: CPT

## 2018-07-26 PROCEDURE — 75574 CT ANGIO HRT W/3D IMAGE: CPT

## 2018-07-26 PROCEDURE — 85610 PROTHROMBIN TIME: CPT

## 2018-07-26 PROCEDURE — 85027 COMPLETE CBC AUTOMATED: CPT

## 2018-07-26 PROCEDURE — 83036 HEMOGLOBIN GLYCOSYLATED A1C: CPT

## 2018-07-26 PROCEDURE — 93005 ELECTROCARDIOGRAM TRACING: CPT

## 2018-07-26 RX ORDER — ATORVASTATIN CALCIUM 80 MG/1
40 TABLET, FILM COATED ORAL AT BEDTIME
Qty: 0 | Refills: 0 | Status: DISCONTINUED | OUTPATIENT
Start: 2018-07-26 | End: 2018-07-26

## 2018-07-26 RX ORDER — ATORVASTATIN CALCIUM 80 MG/1
1 TABLET, FILM COATED ORAL
Qty: 30 | Refills: 0 | OUTPATIENT
Start: 2018-07-26 | End: 2018-08-24

## 2018-07-26 RX ADMIN — DIVALPROEX SODIUM 1000 MILLIGRAM(S): 500 TABLET, DELAYED RELEASE ORAL at 05:17

## 2018-07-26 RX ADMIN — Medication 4: at 07:17

## 2018-07-26 NOTE — DISCHARGE NOTE ADULT - SECONDARY DIAGNOSIS.
Type 2 diabetes mellitus without complication, without long-term current use of insulin Depression, unspecified depression type Nutrition, metabolism, and development symptoms

## 2018-07-26 NOTE — DISCHARGE NOTE ADULT - MEDICATION SUMMARY - MEDICATIONS TO TAKE
I will START or STAY ON the medications listed below when I get home from the hospital:    aspirin 81 mg oral tablet  -- 1 tab(s) by mouth once a day  -- Indication: For Nutrition, metabolism, and development symptoms    Depakote  mg oral tablet, extended release  -- 2 tab(s) by mouth once a day  -- Indication: For Depression    KlonoPIN 0.5 mg oral tablet  -- 1 tab(s) by mouth 2 times a day, As Needed  -- Indication: For Depression    LaMICtal  -- 250 milligram(s) by mouth once a day  -- Indication: For Depression    Zoloft 25 mg oral tablet  -- 1 tab(s) by mouth once a day  -- Indication: For Depression    Januvia 100 mg oral tablet  -- 1 tab(s) by mouth once a day  -- Indication: For Type 2 diabetes mellitus without complication, without long-term current use of insulin    metFORMIN 1000 mg oral tablet  -- 1 tab(s) by mouth 2 times a day  -- Indication: For Type 2 diabetes mellitus without complication, without long-term current use of insulin    lithium 600 mg oral capsule  -- 1 cap(s) by mouth once a day  -- Indication: For Depression

## 2018-07-26 NOTE — DISCHARGE NOTE ADULT - PATIENT PORTAL LINK FT
You can access the [facility] Patient Portal, offered by [facility], by registering with the following website: [email or web address]

## 2018-07-26 NOTE — DISCHARGE NOTE ADULT - HOSPITAL COURSE
Patient is a 68 year old male with past medical hx of dm2, depression, and chest pain that occurred for the past few years for 1-2 minutes. It occurred with emotional distress and primary care provided nitrate medications that occasionally improved the pain. He presented to the ED due to pain being 10/10 in intensity and lasted for 1-2 minutes. He denied cough, nausea, vomiting, or diarrhea. Denied fevers and still able to ambulate at baseline. During the hospital course, EF showed 55-60% without valve abnormalities. CT angiogram showed ca score 170, minimal stenosis of mLAD, OM1/2, pRCA, dRCA, normal VL function. Patient is a 68 year old male with past medical hx of dm2, depression, and chest pain that occurred for the past few years for 1-2 minutes. It occurred with emotional distress and primary care provided nitrate medications that occasionally improved the pain. He presented to the ED due to pain being 10/10 in intensity and lasted for 1-2 minutes. He denied cough, nausea, vomiting, or diarrhea. Denied fevers and still able to ambulate at baseline. During the hospital course, EF showed 55-60% without valve abnormalities. CT angiogram showed ca score 170, minimal stenosis of mLAD, OM1/2, pRCA, dRCA, normal VL function. Lipitor started for CAD. Appointment made for f/u within 2 weeks with cardiologist Dr. Agudelo, remained HD stable throughout hospitalization and stable for discharge home.

## 2018-07-26 NOTE — DISCHARGE NOTE ADULT - CARE PROVIDER_API CALL
Keven Agudelo), Internal Medicine  [address] [id number]  Phone: [phone]  Fax: [phone]

## 2018-07-26 NOTE — DISCHARGE NOTE ADULT - PLAN OF CARE
Maintain close cardiology follow-up, take medications as prescribed You came to the hospital with chest pain, and we ran tests on your heart. We did an echocardiogram  which showed normal heart functioning. We also did a CT scan to look at your heart vessels which showed mild plaque that was described as "average" for your age, but this means you have coronary artery disease. You did NOT have a heart attack. You should follow-up with a cardiologist within 2 weeks of discharge and we will help arrange for one for you to see close to your home. Please take aspirin 81mg once a day, and start taking atorvastatin 40mg once a day as well to help lower your risk of heart attack in the future. You have type 2 diabetes which also puts you at risk for heart problems later in life if not controlled. Please continue to take your home medications metformin and januvia as directed by your primary doctor. Please follow-up with your primary care doctor within 2 weeks of discharge from the hospital. Please resume your home medications depakote, klonopin, lamictal, zoloft, and lithium as directed by your doctor. Please follow up with Dr. Agudelo August 2nd at 11 am, at [address]   You came to the hospital with chest pain, and we ran tests on your heart. We did an echocardiogram  which showed normal heart functioning. We also did a CT scan to look at your heart vessels which showed mild plaque that was described as "average" for your age, but this means you have coronary artery disease. You did NOT have a heart attack. You should follow-up with a cardiologist within 2 weeks of discharge and we will help arrange for one for you to see close to your home. Please take aspirin 81mg once a day, and start taking atorvastatin 40mg once a day as well to help lower your risk of heart attack in the future.

## 2018-07-26 NOTE — DISCHARGE NOTE ADULT - CARE PLAN
Principal Discharge DX:	Chest pain, unspecified type  Secondary Diagnosis:	Type 2 diabetes mellitus without complication, without long-term current use of insulin  Secondary Diagnosis:	Depression, unspecified depression type  Secondary Diagnosis:	Nutrition, metabolism, and development symptoms Principal Discharge DX:	CAD (coronary artery disease)  Goal:	Maintain close cardiology follow-up, take medications as prescribed  Assessment and plan of treatment:	You came to the hospital with chest pain, and we ran tests on your heart. We did an echocardiogram  which showed normal heart functioning. We also did a CT scan to look at your heart vessels which showed mild plaque that was described as "average" for your age, but this means you have coronary artery disease. You did NOT have a heart attack. You should follow-up with a cardiologist within 2 weeks of discharge and we will help arrange for one for you to see close to your home. Please take aspirin 81mg once a day, and start taking atorvastatin 40mg once a day as well to help lower your risk of heart attack in the future.  Secondary Diagnosis:	Type 2 diabetes mellitus without complication, without long-term current use of insulin  Assessment and plan of treatment:	You have type 2 diabetes which also puts you at risk for heart problems later in life if not controlled. Please continue to take your home medications metformin and januvia as directed by your primary doctor. Please follow-up with your primary care doctor within 2 weeks of discharge from the hospital.  Secondary Diagnosis:	Depression, unspecified depression type  Assessment and plan of treatment:	Please resume your home medications depakote, klonopin, lamictal, zoloft, and lithium as directed by your doctor. Principal Discharge DX:	CAD (coronary artery disease)  Goal:	Maintain close cardiology follow-up, take medications as prescribed  Assessment and plan of treatment:	Please follow up with Dr. Agudelo August 2nd at 11 am, at [address]   You came to the hospital with chest pain, and we ran tests on your heart. We did an echocardiogram  which showed normal heart functioning. We also did a CT scan to look at your heart vessels which showed mild plaque that was described as "average" for your age, but this means you have coronary artery disease. You did NOT have a heart attack. You should follow-up with a cardiologist within 2 weeks of discharge and we will help arrange for one for you to see close to your home. Please take aspirin 81mg once a day, and start taking atorvastatin 40mg once a day as well to help lower your risk of heart attack in the future.  Secondary Diagnosis:	Type 2 diabetes mellitus without complication, without long-term current use of insulin  Assessment and plan of treatment:	You have type 2 diabetes which also puts you at risk for heart problems later in life if not controlled. Please continue to take your home medications metformin and januvia as directed by your primary doctor. Please follow-up with your primary care doctor within 2 weeks of discharge from the hospital.  Secondary Diagnosis:	Depression, unspecified depression type  Assessment and plan of treatment:	Please resume your home medications depakote, klonopin, lamictal, zoloft, and lithium as directed by your doctor.

## 2018-07-26 NOTE — DISCHARGE NOTE ADULT - OTHER SIGNIFICANT FINDINGS
EXAM:  CT ANGIO HEART W CORONARIES IC                          PROCEDURE DATE:  07/25/2018      IMPRESSION:   1.  The calcium score is moderate at 170 Agatston units, which is at the   59th percentile, adjusted for age, gender and race.  2.  Minimal stenosis due to calcific plaque in the proximal and mid LAD.  3.  Minimal stenosis due to calcific plaque in OM1 and OM2.  4.  Minimal stenosis due to calcific plaque in proximal and distal RCA.   5.  Normal left ventricular systolic function.  ------------------------------------------------------------------------    EXAM:  ECHOCARDIOGRAM W CONTRAST                          PROCEDURE DATE:  07/25/2018      INTERPRETATION:  Patient Height: 175.0 cm  Patient Weight: 122.0 kg  Systolic Pressure: 149 mmHg  Diastolic Pressure: 84 mmHg  BSA: 2.3 m^2  Interpretation Summary  Normal left ventricular size and wall thickness.The left ventricular wall   motion is normal.The left ventricular ejection fraction is estimated to   be 55-60%The left atrial size is normal.Right atrial size is normal.The   right  ventricle is normal in size and function.Structurally normal aortic   valve. No aortic regurgitation noted.Structurally normal mitral valve.No mitral   regurgitation noted.Structurally normal tricuspid valve.No tricuspid   regurgitation noted.Structurally normal pulmonic valve.No aortic root   dilatation.There is no pericardial effusion.

## 2018-08-01 DIAGNOSIS — Z79.82 LONG TERM (CURRENT) USE OF ASPIRIN: ICD-10-CM

## 2018-08-01 DIAGNOSIS — Z87.891 PERSONAL HISTORY OF NICOTINE DEPENDENCE: ICD-10-CM

## 2018-08-01 DIAGNOSIS — I25.84 CORONARY ATHEROSCLEROSIS DUE TO CALCIFIED CORONARY LESION: ICD-10-CM

## 2018-08-01 DIAGNOSIS — R07.9 CHEST PAIN, UNSPECIFIED: ICD-10-CM

## 2018-08-01 DIAGNOSIS — I25.10 ATHEROSCLEROTIC HEART DISEASE OF NATIVE CORONARY ARTERY WITHOUT ANGINA PECTORIS: ICD-10-CM

## 2018-08-01 DIAGNOSIS — I10 ESSENTIAL (PRIMARY) HYPERTENSION: ICD-10-CM

## 2018-08-01 DIAGNOSIS — E11.9 TYPE 2 DIABETES MELLITUS WITHOUT COMPLICATIONS: ICD-10-CM

## 2018-08-01 DIAGNOSIS — D64.9 ANEMIA, UNSPECIFIED: ICD-10-CM

## 2018-08-01 DIAGNOSIS — F41.9 ANXIETY DISORDER, UNSPECIFIED: ICD-10-CM

## 2018-08-01 DIAGNOSIS — F32.9 MAJOR DEPRESSIVE DISORDER, SINGLE EPISODE, UNSPECIFIED: ICD-10-CM

## 2018-08-01 DIAGNOSIS — Z79.84 LONG TERM (CURRENT) USE OF ORAL HYPOGLYCEMIC DRUGS: ICD-10-CM

## 2018-08-02 ENCOUNTER — APPOINTMENT (OUTPATIENT)
Dept: HEART AND VASCULAR | Facility: CLINIC | Age: 68
End: 2018-08-02

## 2018-08-08 ENCOUNTER — MOBILE ON CALL (OUTPATIENT)
Age: 68
End: 2018-08-08

## 2020-07-15 ENCOUNTER — INPATIENT (INPATIENT)
Facility: HOSPITAL | Age: 70
LOS: 1 days | Discharge: HOME | DRG: 563 | End: 2020-07-17
Payer: MEDICARE

## 2020-07-15 VITALS
SYSTOLIC BLOOD PRESSURE: 145 MMHG | TEMPERATURE: 98 F | DIASTOLIC BLOOD PRESSURE: 68 MMHG | OXYGEN SATURATION: 97 % | HEART RATE: 60 BPM | RESPIRATION RATE: 16 BRPM

## 2020-07-15 DIAGNOSIS — E11.9 TYPE 2 DIABETES MELLITUS WITHOUT COMPLICATIONS: ICD-10-CM

## 2020-07-15 DIAGNOSIS — R29.6 REPEATED FALLS: ICD-10-CM

## 2020-07-15 DIAGNOSIS — S69.91XA UNSPECIFIED INJURY OF RIGHT WRIST, HAND AND FINGER(S), INITIAL ENCOUNTER: ICD-10-CM

## 2020-07-15 DIAGNOSIS — R63.8 OTHER SYMPTOMS AND SIGNS CONCERNING FOOD AND FLUID INTAKE: ICD-10-CM

## 2020-07-15 DIAGNOSIS — D72.829 ELEVATED WHITE BLOOD CELL COUNT, UNSPECIFIED: ICD-10-CM

## 2020-07-15 DIAGNOSIS — R25.1 TREMOR, UNSPECIFIED: ICD-10-CM

## 2020-07-15 LAB
ALBUMIN SERPL ELPH-MCNC: 4.2 G/DL — SIGNIFICANT CHANGE UP (ref 3.3–5)
ALP SERPL-CCNC: 66 U/L — SIGNIFICANT CHANGE UP (ref 40–120)
ALT FLD-CCNC: 13 U/L — SIGNIFICANT CHANGE UP (ref 10–45)
ANION GAP SERPL CALC-SCNC: 13 MMOL/L — SIGNIFICANT CHANGE UP (ref 5–17)
APTT BLD: 39.3 SEC — HIGH (ref 27.5–35.5)
AST SERPL-CCNC: 19 U/L — SIGNIFICANT CHANGE UP (ref 10–40)
BASOPHILS # BLD AUTO: 0.06 K/UL — SIGNIFICANT CHANGE UP (ref 0–0.2)
BASOPHILS NFR BLD AUTO: 0.5 % — SIGNIFICANT CHANGE UP (ref 0–2)
BILIRUB SERPL-MCNC: 0.4 MG/DL — SIGNIFICANT CHANGE UP (ref 0.2–1.2)
BUN SERPL-MCNC: 18 MG/DL — SIGNIFICANT CHANGE UP (ref 7–23)
CALCIUM SERPL-MCNC: 9.4 MG/DL — SIGNIFICANT CHANGE UP (ref 8.4–10.5)
CHLORIDE SERPL-SCNC: 104 MMOL/L — SIGNIFICANT CHANGE UP (ref 96–108)
CO2 SERPL-SCNC: 23 MMOL/L — SIGNIFICANT CHANGE UP (ref 22–31)
CREAT SERPL-MCNC: 0.98 MG/DL — SIGNIFICANT CHANGE UP (ref 0.5–1.3)
EOSINOPHIL # BLD AUTO: 0.13 K/UL — SIGNIFICANT CHANGE UP (ref 0–0.5)
EOSINOPHIL NFR BLD AUTO: 1.1 % — SIGNIFICANT CHANGE UP (ref 0–6)
GLUCOSE SERPL-MCNC: 153 MG/DL — HIGH (ref 70–99)
HCT VFR BLD CALC: 42.1 % — SIGNIFICANT CHANGE UP (ref 39–50)
HGB BLD-MCNC: 13.6 G/DL — SIGNIFICANT CHANGE UP (ref 13–17)
IMM GRANULOCYTES NFR BLD AUTO: 0.2 % — SIGNIFICANT CHANGE UP (ref 0–1.5)
INR BLD: 1.04 — SIGNIFICANT CHANGE UP (ref 0.88–1.16)
LYMPHOCYTES # BLD AUTO: 29.1 % — SIGNIFICANT CHANGE UP (ref 13–44)
LYMPHOCYTES # BLD AUTO: 3.35 K/UL — HIGH (ref 1–3.3)
MCHC RBC-ENTMCNC: 30 PG — SIGNIFICANT CHANGE UP (ref 27–34)
MCHC RBC-ENTMCNC: 32.3 GM/DL — SIGNIFICANT CHANGE UP (ref 32–36)
MCV RBC AUTO: 92.9 FL — SIGNIFICANT CHANGE UP (ref 80–100)
MONOCYTES # BLD AUTO: 0.94 K/UL — HIGH (ref 0–0.9)
MONOCYTES NFR BLD AUTO: 8.2 % — SIGNIFICANT CHANGE UP (ref 2–14)
NEUTROPHILS # BLD AUTO: 7.02 K/UL — SIGNIFICANT CHANGE UP (ref 1.8–7.4)
NEUTROPHILS NFR BLD AUTO: 60.9 % — SIGNIFICANT CHANGE UP (ref 43–77)
NRBC # BLD: 0 /100 WBCS — SIGNIFICANT CHANGE UP (ref 0–0)
PLATELET # BLD AUTO: 236 K/UL — SIGNIFICANT CHANGE UP (ref 150–400)
POTASSIUM SERPL-MCNC: 3.9 MMOL/L — SIGNIFICANT CHANGE UP (ref 3.5–5.3)
POTASSIUM SERPL-SCNC: 3.9 MMOL/L — SIGNIFICANT CHANGE UP (ref 3.5–5.3)
PROT SERPL-MCNC: 7 G/DL — SIGNIFICANT CHANGE UP (ref 6–8.3)
PROTHROM AB SERPL-ACNC: 12.5 SEC — SIGNIFICANT CHANGE UP (ref 10.6–13.6)
RBC # BLD: 4.53 M/UL — SIGNIFICANT CHANGE UP (ref 4.2–5.8)
RBC # FLD: 13.7 % — SIGNIFICANT CHANGE UP (ref 10.3–14.5)
SARS-COV-2 RNA SPEC QL NAA+PROBE: SIGNIFICANT CHANGE UP
SODIUM SERPL-SCNC: 140 MMOL/L — SIGNIFICANT CHANGE UP (ref 135–145)
WBC # BLD: 11.52 K/UL — HIGH (ref 3.8–10.5)
WBC # FLD AUTO: 11.52 K/UL — HIGH (ref 3.8–10.5)

## 2020-07-15 PROCEDURE — 70450 CT HEAD/BRAIN W/O DYE: CPT | Mod: 26

## 2020-07-15 PROCEDURE — 99285 EMERGENCY DEPT VISIT HI MDM: CPT | Mod: CS,25

## 2020-07-15 PROCEDURE — 73130 X-RAY EXAM OF HAND: CPT | Mod: 26,RT

## 2020-07-15 PROCEDURE — 93010 ELECTROCARDIOGRAM REPORT: CPT | Mod: 59

## 2020-07-15 PROCEDURE — 73030 X-RAY EXAM OF SHOULDER: CPT | Mod: 26,RT

## 2020-07-15 PROCEDURE — 73110 X-RAY EXAM OF WRIST: CPT | Mod: 26,RT

## 2020-07-15 PROCEDURE — 29125 APPL SHORT ARM SPLINT STATIC: CPT

## 2020-07-15 PROCEDURE — 71046 X-RAY EXAM CHEST 2 VIEWS: CPT | Mod: 26

## 2020-07-15 RX ORDER — GLUCAGON INJECTION, SOLUTION 0.5 MG/.1ML
1 INJECTION, SOLUTION SUBCUTANEOUS ONCE
Refills: 0 | Status: DISCONTINUED | OUTPATIENT
Start: 2020-07-15 | End: 2020-07-17

## 2020-07-15 RX ORDER — DEXTROSE 50 % IN WATER 50 %
12.5 SYRINGE (ML) INTRAVENOUS ONCE
Refills: 0 | Status: DISCONTINUED | OUTPATIENT
Start: 2020-07-15 | End: 2020-07-17

## 2020-07-15 RX ORDER — DEXTROSE 50 % IN WATER 50 %
25 SYRINGE (ML) INTRAVENOUS ONCE
Refills: 0 | Status: DISCONTINUED | OUTPATIENT
Start: 2020-07-15 | End: 2020-07-17

## 2020-07-15 RX ORDER — DEXTROSE 50 % IN WATER 50 %
15 SYRINGE (ML) INTRAVENOUS ONCE
Refills: 0 | Status: DISCONTINUED | OUTPATIENT
Start: 2020-07-15 | End: 2020-07-17

## 2020-07-15 RX ORDER — INSULIN LISPRO 100/ML
VIAL (ML) SUBCUTANEOUS
Refills: 0 | Status: DISCONTINUED | OUTPATIENT
Start: 2020-07-15 | End: 2020-07-17

## 2020-07-15 RX ORDER — SODIUM CHLORIDE 9 MG/ML
1000 INJECTION, SOLUTION INTRAVENOUS
Refills: 0 | Status: DISCONTINUED | OUTPATIENT
Start: 2020-07-15 | End: 2020-07-17

## 2020-07-15 NOTE — ED PROVIDER NOTE - MUSCULOSKELETAL, MLM
Spine appears normal, range of motion is not limited, swelling of right hand with tenderness dorsally.  skin intact.  normal rom of elbow/shoulder without focal tenderness

## 2020-07-15 NOTE — H&P ADULT - NSHPSOCIALHISTORY_GEN_ALL_CORE
Denies alcohol, tobacco, illicit drug use.    Lives at home with wife and some of his children. States "things could be better at home."

## 2020-07-15 NOTE — H&P ADULT - NSHPLABSRESULTS_GEN_ALL_CORE
LABS:                         13.6   11.52 )-----------( 236      ( 15 Jul 2020 18:29 )             42.1     07-15    140  |  104  |  18  ----------------------------<  153<H>  3.9   |  23  |  0.98    Ca    9.4      15 Jul 2020 18:29    TPro  7.0  /  Alb  4.2  /  TBili  0.4  /  DBili  x   /  AST  19  /  ALT  13  /  AlkPhos  66  07-15    PT/INR - ( 15 Jul 2020 18:29 )   PT: 12.5 sec;   INR: 1.04          PTT - ( 15 Jul 2020 18:29 )  PTT:39.3 sec              RADIOLOGY, EKG & ADDITIONAL TESTS: Reviewed. LABS:                         13.6   11.52 )-----------( 236      ( 15 Jul 2020 18:29 )             42.1     07-15    140  |  104  |  18  ----------------------------<  153<H>  3.9   |  23  |  0.98    Ca    9.4      15 Jul 2020 18:29    TPro  7.0  /  Alb  4.2  /  TBili  0.4  /  DBili  x   /  AST  19  /  ALT  13  /  AlkPhos  66  07-15    PT/INR - ( 15 Jul 2020 18:29 )   PT: 12.5 sec;   INR: 1.04          PTT - ( 15 Jul 2020 18:29 )  PTT:39.3 sec      RADIOLOGY, EKG & ADDITIONAL TESTS:    < from: CT Head No Cont (07.15.20 @ 19:02) >    Impression: No acute intracranial injury.    < end of copied text >    < from: Xray Wrist 3 Views, Right (07.15.20 @ 19:00) >    IMPRESSION:  No acute findings.    < end of copied text >

## 2020-07-15 NOTE — H&P ADULT - ASSESSMENT
Mr. Felicity Ford is a 69M with a PMHx of DM, tremor who presents after a fall. Mr. Felicity Ford is a 69M with a PMHx of Bipolar disorder and T2DM who presents after a fall.

## 2020-07-15 NOTE — ED PROVIDER NOTE - DIAGNOSTIC INTERPRETATION
hand xray: non displaced triquentral fracture without dislocation, soft tissues swelling read by Dr. Najera  wrist xray: non displaced triquentral fracture without dislocation, soft tissues swelling read by Dr. Najera  CXR: no focal consolidation, normal bones, normal cardiac contour.  read by Dr. Najera

## 2020-07-15 NOTE — ED PROVIDER NOTE - OBJECTIVE STATEMENT
patient with history of dm, tremor, on asa 81mg daily, here after fall.  Reportedly has had 4 falls in past few weeks.  Says he was out walking and his legs just started to go fast and he wasn't able to control them like he was being pushed from behind/ fell down.  Notes swelling/pain to right hand, some discomfort in shoulder.  Denies chest pain, sob, dizziness.  Unsure if he hit his head but thinks he might have.  No loc.  Says he has a problem with his balance, has an appointment with a neurologist scheduled next week.

## 2020-07-15 NOTE — H&P ADULT - PROBLEM SELECTOR PLAN 1
Pt with multiple falls in the past month. States he feels unsteady on his feet. No prodromal symptoms. Falls likely related to some underlying neurological process. He has an appointment with a neurologist upcoming. Less likely cardiogenic or neurologic syncope Pt with multiple falls in the past month. States he feels unsteady on his feet. No prodromal symptoms. Falls likely related to some underlying neurological process. He has an appointment with a neurologist upcoming. Less likely cardiogenic or neurogenic syncope. Per neuro provider shuffling gait.  - PT eval  - TSH  - neuro following, appreciate recs - will likely need more extensive workup as outpatient Pt with multiple falls in the past month. States he feels unsteady on his feet. No prodromal symptoms. Falls likely mechanical related to some underlying neurological process such as Parkinson disease. Less likely cardiogenic or neurogenic syncope.  - PT eval  - TSH  - neuro following, appreciate recs - will likely need more extensive workup as outpatient

## 2020-07-15 NOTE — H&P ADULT - PROBLEM SELECTOR PLAN 7
F: Not indicated  E: Replete PRN, goal K>4, Mg>2  N: Regular, kosher  DVT PPx: Lovenox  FULL CODE  Dispo: [facility] F: Not indicated  E: Replete PRN, goal K>4, Mg>2  N: Consistent carb, kosher  DVT PPx: Lovenox  FULL CODE  Dispo: JENNIE

## 2020-07-15 NOTE — CONSULT NOTE ADULT - SUBJECTIVE AND OBJECTIVE BOX
**STROKE CODE CONSULT NOTE**    HPI: 69y Male with PMHx of DM  who presented to the ED today after a fall. The patient states he has had four falls in the last month. Dr. Stern was consulted in the ED. Patient states his falls are related to feeling unsteady, he states he feels like his feet her "speeding up" leading him to trip and fall. During fall, patient is unsure if he endorsed trama to the head. HCT was negative for bleed. When asked about his medical history patient states he takes lithium and Depakote however, unable to provider further PMH. He denied LOC,  bowel or bladder incontinence, or tongue biting. Denied dizziness, chest pain, SOB prior to fall. Denies acute onset of numbness, weakness, tingling, slurred speech, blurry vision, double vision, dizziness, headache.    T(C): 36.4 (07-15-20 @ 21:24), Max: 36.9 (07-15-20 @ 17:49)  HR: 59 (07-15-20 @ 21:24) (59 - 60)  BP: 132/63 (07-15-20 @ 21:24) (132/63 - 145/68)  RR: 18 (07-15-20 @ 21:24) (16 - 18)  SpO2: 97% (07-15-20 @ 21:24) (97% - 97%)    PAST MEDICAL & SURGICAL HISTORY:  Tremor  Diabetes  No significant past surgical history    FAMILY HISTORY:      SOCIAL HISTORY:  Unknown    ROS:   as per HPI    MEDICATIONS  (STANDING):    MEDICATIONS  (PRN):    Allergies    No Known Allergies    Intolerances      Vital Signs Last 24 Hrs  T(C): 36.4 (15 Jul 2020 21:24), Max: 36.9 (15 Jul 2020 17:49)  T(F): 97.6 (15 Jul 2020 21:24), Max: 98.4 (15 Jul 2020 17:49)  HR: 59 (15 Jul 2020 21:24) (59 - 60)  BP: 132/63 (15 Jul 2020 21:24) (132/63 - 145/68)  BP(mean): --  RR: 18 (15 Jul 2020 21:24) (16 - 18)  SpO2: 97% (15 Jul 2020 21:24) (97% - 97%)    Physical exam:  General: No acute distress, awake and alert  Cardiovascular: Regular rate and rhythm, no murmurs, rubs, or gallops. No bruits  Pulmonary: Anterior breath sounds clear bilaterally, no crackles or wheezing. No use of accessory muscles  Extremities: Radial and DP pulses +2, no edema    Neurologic:  - limited, patient did not want to be examined by female provider due to [employer] reasons  -Mental status: Awake, alert, oriented to person, age, [place]. Disorientated to year - states he does not know how to say in English.  Speech is fluent with intact naming, repetition, and comprehension, no dysarthria. Recent and remote memory intact. Follows commands. Attention/concentration intact. Fund of knowledge appropriate.  -Cranial nerves:   II: Visual fields are full to confrontation.  III, IV, VI: Extraocular movements are intact without nystagmus. Pupils equally round and reactive to light  V:  Facial sensation V1-V3 equal and intact   VII: Face is symmetric with normal eye closure and smile  Motor: Normal bulk and tone. No pronator drift. Left upper extremity 5/5. Right upper extremity in split, some drift however holds antigravity for 10 seconds. Bilateral lower extremities antigravity for 5 seconds.  Sensation: Intact to light touch bilaterally. No neglect or extinction on double simultaneous testing.  Coordination: No dysmetria on finger-to-nose, tremor noticed bilaterally on finger to nose, however, no resting tremor appreciated.  Gait: deferred    NIHSS: 1    Fingerstick Blood Glucose: CAPILLARY BLOOD GLUCOSE        LABS:                        13.6   11.52 )-----------( 236      ( 15 Jul 2020 18:29 )             42.1     07-15    140  |  104  |  18  ----------------------------<  153<H>  3.9   |  23  |  0.98    Ca    9.4      15 Jul 2020 18:29    TPro  7.0  /  Alb  4.2  /  TBili  0.4  /  DBili  x   /  AST  19  /  ALT  13  /  AlkPhos  66  07-15    PT/INR - ( 15 Jul 2020 18:29 )   PT: 12.5 sec;   INR: 1.04          PTT - ( 15 Jul 2020 18:29 )  PTT:39.3 sec    RADIOLOGY & ADDITIONAL STUDIES:    HCT:  CT Head No Cont (07.15.20 @ 19:02) >  Impression: No acute intracranial injury.    -----------------------------------------------------------------------------------------------------------------  IV-tPA (Y/N):    No                          Reason IV-tPA not given: out of the window    ASSESSMENT/PLAN:    69y Male with PMHx of DM  who presented to the ED today after a fall. CTH negative. Patient has intentional tremor on exam and describes shuffling gait, no focal neurological deficits. Unlikely stroke, however, may have underlying neurologic condition such as parkinson's disease. Neurology will continue to follow. **STROKE CODE CONSULT NOTE**    HPI: 69y Male with PMHx of DM  who presented to the ED today after a fall. The patient states he has had four falls in the last month. Dr. Stern was consulted in the ED. Patient states his falls are related to feeling unsteady, he states he feels like his feet her "speeding up" leading him to trip and fall. During fall, patient is unsure if he endorsed trama to the head. HCT was negative for bleed. When asked about his medical history patient states he takes lithium and Depakote however, unable to provider further PMH. He denied LOC,  bowel or bladder incontinence, or tongue biting. Denied dizziness, chest pain, SOB prior to fall. Denies acute onset of numbness, weakness, tingling, slurred speech, blurry vision, double vision, dizziness, headache.    T(C): 36.4 (07-15-20 @ 21:24), Max: 36.9 (07-15-20 @ 17:49)  HR: 59 (07-15-20 @ 21:24) (59 - 60)  BP: 132/63 (07-15-20 @ 21:24) (132/63 - 145/68)  RR: 18 (07-15-20 @ 21:24) (16 - 18)  SpO2: 97% (07-15-20 @ 21:24) (97% - 97%)    PAST MEDICAL & SURGICAL HISTORY:  Tremor  Diabetes  No significant past surgical history    FAMILY HISTORY:      SOCIAL HISTORY:  Unknown    ROS:   as per HPI    MEDICATIONS  (STANDING):    MEDICATIONS  (PRN):    Allergies  No Known Allergies    Vital Signs Last 24 Hrs  T(C): 36.4 (15 Jul 2020 21:24), Max: 36.9 (15 Jul 2020 17:49)  T(F): 97.6 (15 Jul 2020 21:24), Max: 98.4 (15 Jul 2020 17:49)  HR: 59 (15 Jul 2020 21:24) (59 - 60)  BP: 132/63 (15 Jul 2020 21:24) (132/63 - 145/68)  RR: 18 (15 Jul 2020 21:24) (16 - 18)  SpO2: 97% (15 Jul 2020 21:24) (97% - 97%)    Physical exam:  General: No acute distress, awake and alert  Cardiovascular: Regular rate and rhythm, no murmurs, rubs, or gallops. No bruits  Pulmonary: Anterior breath sounds clear bilaterally, no crackles or wheezing. No use of accessory muscles  Extremities: Radial and DP pulses +2, no edema    Neurologic:  - limited, patient did not want to be examined by female provider due to [employer] reasons  -Mental status: Awake, alert, oriented to person, age, [place]. Disorientated to year - states he does not know how to say in English.  Speech is fluent with intact naming, repetition, and comprehension, no dysarthria. Recent and remote memory intact. Follows commands. Attention/concentration intact. Fund of knowledge appropriate.  -Cranial nerves:   II: Visual fields are full to confrontation.  III, IV, VI: Extraocular movements are intact without nystagmus. Pupils equally round and reactive to light  V:  Facial sensation V1-V3 equal and intact   VII: Face is symmetric with normal eye closure and smile  Motor: Normal bulk and tone. No pronator drift. Left upper extremity 5/5. Right upper extremity in split, some drift however holds antigravity for 10 seconds. Bilateral lower extremities antigravity for 5 seconds.  Sensation: Intact to light touch bilaterally. No neglect or extinction on double simultaneous testing.  Coordination: No dysmetria on finger-to-nose, tremor noticed bilaterally on finger to nose, however, no resting tremor appreciated.  Gait: deferred    NIHSS: 1    Fingerstick Blood Glucose: CAPILLARY BLOOD GLUCOSE        LABS:                        13.6   11.52 )-----------( 236      ( 15 Jul 2020 18:29 )             42.1     07-15    140  |  104  |  18  ----------------------------<  153<H>  3.9   |  23  |  0.98    Ca    9.4      15 Jul 2020 18:29    TPro  7.0  /  Alb  4.2  /  TBili  0.4  /  DBili  x   /  AST  19  /  ALT  13  /  AlkPhos  66  07-15    PT/INR - ( 15 Jul 2020 18:29 )   PT: 12.5 sec;   INR: 1.04          PTT - ( 15 Jul 2020 18:29 )  PTT:39.3 sec    RADIOLOGY & ADDITIONAL STUDIES:    HCT:  CT Head No Cont (07.15.20 @ 19:02) >  Impression: No acute intracranial injury.    -----------------------------------------------------------------------------------------------------------------  IV-tPA (Y/N):    No                          Reason IV-tPA not given: out of the window    ASSESSMENT/PLAN:    69y Male with PMHx of DM  who presented to the ED today after a fall. CTH negative. Patient has intentional tremor on exam and describes shuffling gait, no focal neurological deficits. Unlikely stroke, however, may have underlying neurologic condition such as parkinson's disease. Neurology will continue to follow.

## 2020-07-15 NOTE — H&P ADULT - PROBLEM SELECTOR PLAN 3
Prelim read without fracture, but ED provider was suspicious for fracture, so placed RUE in splint  - f/u hand XR official read  - consider hand surgery/ortho consult

## 2020-07-15 NOTE — H&P ADULT - PROBLEM SELECTOR PROBLEM 6
Nutrition, metabolism, and development symptoms Bipolar affective disorder, remission status unspecified

## 2020-07-15 NOTE — PATIENT PROFILE ADULT - NSPROHMDIABETBLDGLCUSUAL_GEN_A_NUR
Oral Chemotherapy Monitoring Program   Placed call to patient in follow up of Xeloda (capecitabine) therapy.   Left message requesting call back.   No drug names were mentioned.    Didier Buckner, PharmD  Therapy Management Oncology Pharmacist  [facility]   Phone: [phone]     unknown

## 2020-07-15 NOTE — ED ADULT NURSE NOTE - OBJECTIVE STATEMENT
Patient arrive awake, alert, and oriented to location (unsure of date)---  hx of tremors and multiple falls over last few days. Patient reports hitting head, denies LOC, +abrasion noted to forehead. No active bleeding noted. Patient +FERNANDEZ, reports pain to R wrist

## 2020-07-15 NOTE — H&P ADULT - NSICDXPASTMEDICALHX_GEN_ALL_CORE_FT
PAST MEDICAL HISTORY:  Diabetes     Tremor PAST MEDICAL HISTORY:  Bipolar disorder     Diabetes     Tremor

## 2020-07-15 NOTE — H&P ADULT - HISTORY OF PRESENT ILLNESS
Mr. Felicity Ford is a 69M with a PMHx of DM, tremor who presents after a fall. Per the patient, he has had four falls in the past month. He states his falls are related to a gait instability, during which his feet begin to move quickly and he loses control over them. During this fall, he thinks he may have experienced head trauma. He denies LOC, bowel or bladder incontinence, or tongue biting. Denies preceding chest pain, SOB, palpitations, nausea, or vomiting. During the fall, he experienced trauma to the R hand, and endorses pain and swelling of the R hand, as well as some discomfort of the R shoulder. A splint was placed on the R hand by the ED provider. Otherwise denies recent fever, chills, abdominal pain, n/v/d/c, weakness, sick contacts, recent travel.    On arrival to ED, VS: T 98.4, /68, HR 60, RR 16, SpO2 97% on room air    Labs significant for: WBC 11.52, glucose 153, covid negative.    CT head negative for acute intracranial injury. Preliminary read of hand XR without acute finding.    Neurology consulted in ED, will follow. Mr. Felicity Ford is a 69M with a PMHx of T2DM who presents after a fall. Per the patient, he has had four falls in the past month. He states his falls are related to a gait instability, during which his feet begin to move quickly and he loses control over them. During this fall, he thinks he may have experienced head trauma. He denies LOC, bowel or bladder incontinence, or tongue biting. Denies preceding chest pain, SOB, palpitations, nausea, or vomiting. During the fall, he experienced trauma to the R hand, and endorses pain and swelling of the R hand, as well as some discomfort of the R shoulder. A splint was placed on the R hand by the ED provider. Otherwise denies recent fever, chills, abdominal pain, n/v/d/c, weakness, sick contacts, recent travel.    On arrival to ED, VS: T 98.4, /68, HR 60, RR 16, SpO2 97% on room air    Labs significant for: WBC 11.52, glucose 153, covid negative.    CT head negative for acute intracranial injury. Preliminary read of hand XR without acute finding.    Neurology consulted in ED, will follow. Mr. Felicity Ford is a 69M with a PMHx of Bipolar disorder and T2DM who presents after a fall. Per the patient, he has had four falls in the past month. He states his falls are related to a gait instability, during which his feet begin to move quickly and he loses control over them. Sometimes, he is able to slow himself down and stop. During this fall, he was able to slow himself down, but then fell and experienced head trauma. He denies LOC, bowel or bladder incontinence, or tongue biting. Denies preceding chest pain, SOB, palpitations, nausea, or vomiting. During the fall, he experienced trauma to the R hand, and endorses pain and swelling of the R hand, as well as some discomfort of the R shoulder. A splint was placed on the R hand by the ED provider. Otherwise denies recent fever, chills, abdominal pain, n/v/d/c, weakness, sick contacts, recent travel.    On arrival to ED, VS: T 98.4, /68, HR 60, RR 16, SpO2 97% on room air    Labs significant for: WBC 11.52, glucose 153, covid negative.    CT head negative for acute intracranial injury. Preliminary read of hand XR without acute finding.    Neurology consulted in ED, will follow.

## 2020-07-15 NOTE — H&P ADULT - PROBLEM SELECTOR PLAN 6
F: Not indicated  E: Replete PRN, goal K>4, Mg>2  N: Regular, kosher  DVT PPx: Lovenox  FULL CODE  Dispo: [facility] Pt with vague history of Bipolar disorder. Current treatment regimen is lithium 300mg, lamotrigine 150mg, divalproate 500mg, though frequencies unclear.   - med rec and collateral in AM  - SW consult Pt with vague history of Bipolar disorder. Current treatment regimen is lithium 300mg BID, lamotrigine 250mg QD, divalproate 500mg BID.  - med rec to confirm and collateral in AM  - SW consult

## 2020-07-15 NOTE — H&P ADULT - NSHPPHYSICALEXAM_GEN_ALL_CORE
VITAL SIGNS:  T(C): 36.4 (07-15-20 @ 21:24), Max: 36.9 (07-15-20 @ 17:49)  T(F): 97.6 (07-15-20 @ 21:24), Max: 98.4 (07-15-20 @ 17:49)  HR: 59 (07-15-20 @ 21:24) (59 - 60)  BP: 132/63 (07-15-20 @ 21:24) (132/63 - 145/68)  BP(mean): --  RR: 18 (07-15-20 @ 21:24) (16 - 18)  SpO2: 97% (07-15-20 @ 21:24) (97% - 97%)  Wt(kg): --    PHYSICAL EXAM:    Constitutional: WDWN resting comfortably in bed; NAD  Head: NC/AT  Eyes: PERRL, EOMI, anicteric sclera  ENT: no nasal discharge; uvula midline, no oropharyngeal erythema or exudates; MMM  Neck: supple; no JVD or thyromegaly  Respiratory: CTA B/L; no W/R/R, no retractions  Cardiac: +S1/S2; RRR; no M/R/G; PMI non-displaced  Gastrointestinal: abdomen soft, NT/ND; no rebound or guarding; +BSx4  Genitourinary: normal external genitalia  Back: spine midline, no bony tenderness or step-offs; no CVAT B/L  Extremities: WWP, no clubbing or cyanosis; no peripheral edema  Musculoskeletal: NROM x4; no joint swelling, tenderness or erythema  Vascular: 2+ radial, femoral, DP/PT pulses B/L  Dermatologic: skin warm, dry and intact; no rashes, wounds, or scars  Lymphatic: no submandibular or cervical LAD  Neurologic: AAOx3; CNII-XII grossly intact; no focal deficits  Psychiatric: affect and characteristics of appearance, verbalizations, behaviors are appropriate VITAL SIGNS:  T(C): 36.4 (07-15-20 @ 21:24), Max: 36.9 (07-15-20 @ 17:49)  T(F): 97.6 (07-15-20 @ 21:24), Max: 98.4 (07-15-20 @ 17:49)  HR: 59 (07-15-20 @ 21:24) (59 - 60)  BP: 132/63 (07-15-20 @ 21:24) (132/63 - 145/68)  BP(mean): --  RR: 18 (07-15-20 @ 21:24) (16 - 18)  SpO2: 97% (07-15-20 @ 21:24) (97% - 97%)  Wt(kg): --    PHYSICAL EXAM:    Constitutional: Adult Male, overweight, resting comfortably in bed; NAD  Head: NC/AT  Eyes: PERRL, EOMI, anicteric sclera  ENT: no nasal discharge; uvula midline, no oropharyngeal erythema or exudates; dry MM  Neck: supple; no JVD or thyromegaly  Respiratory: CTA B/L; no W/R/R, no retractions  Cardiac: +S1/S2; RRR; no M/R/G; PMI non-displaced  Gastrointestinal: abdomen soft, NT/ND; no rebound or guarding; +BSx4  Genitourinary: normal external genitalia  Extremities: WWP, no clubbing or cyanosis; no peripheral edema. R hand/wrist in bandaged splint  Musculoskeletal: NROM x4; no joint swelling, tenderness or erythema  Vascular: 2+ radial, femoral, DP/PT pulses B/L  Dermatologic: skin warm, dry and intact; no rashes, wounds, or scars  Lymphatic: no submandibular or cervical LAD  Neurologic: AAOx3; CNII-XII grossly intact; no focal deficits. B/l tremor noted in hands, mild at rest, increases in intensity with intention such as finger to nose  Psychiatric: affect and characteristics of appearance, verbalizations, behaviors are appropriate VITAL SIGNS:  T(C): 36.4 (07-15-20 @ 21:24), Max: 36.9 (07-15-20 @ 17:49)  T(F): 97.6 (07-15-20 @ 21:24), Max: 98.4 (07-15-20 @ 17:49)  HR: 59 (07-15-20 @ 21:24) (59 - 60)  BP: 132/63 (07-15-20 @ 21:24) (132/63 - 145/68)  BP(mean): --  RR: 18 (07-15-20 @ 21:24) (16 - 18)  SpO2: 97% (07-15-20 @ 21:24) (97% - 97%)  Wt(kg): --    PHYSICAL EXAM:    Constitutional: Adult Male, overweight, resting comfortably in bed; NAD  Head: NC/AT  Eyes: PERRL, EOMI, anicteric sclera  ENT: no nasal discharge; uvula midline, no oropharyngeal erythema or exudates; dry MM  Neck: supple; no JVD or thyromegaly  Respiratory: CTA B/L; no W/R/R, no retractions  Cardiac: +S1/S2; RRR; no M/R/G; PMI non-displaced  Gastrointestinal: abdomen soft, NT/ND; no rebound or guarding; +BSx4  Extremities: WWP, no clubbing or cyanosis; no peripheral edema. R hand/wrist in bandaged splint  Musculoskeletal: NROM x4; no joint swelling, tenderness or erythema  Vascular: 2+ radial, femoral, DP/PT pulses B/L  Dermatologic: skin warm, dry and intact; no rashes, wounds, or scars  Lymphatic: no submandibular or cervical LAD  Neurologic: AAOx3; CNII-XII grossly intact; no focal deficits. B/l tremor noted in hands, mild at rest, increases in intensity with intention such as finger to nose  Psychiatric: affect and characteristics of appearance, verbalizations, behaviors are appropriate

## 2020-07-15 NOTE — H&P ADULT - PROBLEM SELECTOR PLAN 5
Intentional tremor noted with finger to nose. Likely related to underlying neurological disease.  - neuro recs appreciated Not on any medication  - mISS  - A1c in AM

## 2020-07-15 NOTE — H&P ADULT - PROBLEM SELECTOR PLAN 4
Not on any medication  - mISS  - A1c in AM Mild rest tremor. Increased tremor intensity with intention noted with finger to nose. Given mixture of rest and action tremors in addition to gait instability, likely related to underlying neurological disease.  - neuro recs appreciated

## 2020-07-15 NOTE — ED PROVIDER NOTE - CLINICAL SUMMARY MEDICAL DECISION MAKING FREE TEXT BOX
here with frequent falls related to unsteady gait.  fall today with hand injury, hit head.  labs/ct/xrays ordered.  xray of hand/wrist concerning for triquentral fracture.  reviewed with radiology resident who noted abnormality but sent to [facility] for prelim read that was neg for fracture.  high suspicion clincally though and upon ed review of films so placed in volar splint.  needs hand followup.  ct head neg for bleed.  case discussed with Dr. Stern/ neuro eval in the ED. here with frequent falls related to unsteady gait.  fall today with hand injury, hit head.  labs/ct/xrays ordered.  xray of hand/wrist concerning for triquentral fracture.  reviewed with radiology resident who noted abnormality but sent to [facility] for prelim read that was neg for fracture.  high suspicion clincally though and upon ed review of films so placed in volar splint.  needs hand followup.  ct head neg for bleed.  case discussed with Dr. Stern/ neuro eval in the ED.  plan admit to medicine for pt eval, neuro will follow here with frequent falls related to unsteady gait.  fall today with hand injury, hit head.  labs/ct/xrays ordered.  xray of hand/wrist concerning for triquentral fracture.  reviewed with radiology resident who noted abnormality but sent to [facility] for prelim read that was neg for fracture.  high suspicion clincally though and upon ed review of films so placed in volar splint.  needs hand followup.  ct head neg for bleed.  case discussed with Dr. Stern/ neuro eval in the ED.  plan admit to medicine for pt eval, neuro will follow.  requested admission to Dr. Thomas.  Dr. Thomas contacted and agreeable

## 2020-07-15 NOTE — ED ADULT NURSE NOTE - INTERVENTIONS DEFINITIONS
[place] to call system/Call bell, personal items and telephone within reach/Instruct patient to call for assistance/Room bathroom lighting operational/Non-slip footwear when patient is off stretcher/Physically safe environment: no spills, clutter or unnecessary equipment/Stretcher in lowest position, wheels locked, appropriate side rails in place/Provide visual cue, wrist band, yellow gown, etc./Monitor gait and stability/Monitor for mental status changes and reorient to person, place, and time/Review medications for side effects contributing to fall risk/Reinforce activity limits and safety measures with patient and family/Provide visual clues: red socks

## 2020-07-15 NOTE — ED ADULT TRIAGE NOTE - CHIEF COMPLAINT QUOTE
Pt BIBA s/p unwitnessed Fall today and per EMS CO multiple falls and minor confusion.  Upon arrival pt noted with abrasion to Right side of forehead and RUE placed in sling.  Pt denies loc, dizziness, n/V/D, SOB, fevers and CP.  Pt on daily ASA.  FSBG in field: 140

## 2020-07-16 ENCOUNTER — TRANSCRIPTION ENCOUNTER (OUTPATIENT)
Age: 70
End: 2020-07-16

## 2020-07-16 DIAGNOSIS — F31.9 BIPOLAR DISORDER, UNSPECIFIED: ICD-10-CM

## 2020-07-16 DIAGNOSIS — S69.90XA UNSPECIFIED INJURY OF UNSPECIFIED WRIST, HAND AND FINGER(S), INITIAL ENCOUNTER: ICD-10-CM

## 2020-07-16 DIAGNOSIS — E11.9 TYPE 2 DIABETES MELLITUS WITHOUT COMPLICATIONS: ICD-10-CM

## 2020-07-16 DIAGNOSIS — I10 ESSENTIAL (PRIMARY) HYPERTENSION: ICD-10-CM

## 2020-07-16 LAB
A1C WITH ESTIMATED AVERAGE GLUCOSE RESULT: 7.2 % — HIGH (ref 4–5.6)
ANION GAP SERPL CALC-SCNC: 11 MMOL/L — SIGNIFICANT CHANGE UP (ref 5–17)
APTT BLD: 36.4 SEC — HIGH (ref 27.5–35.5)
BASOPHILS # BLD AUTO: 0.05 K/UL — SIGNIFICANT CHANGE UP (ref 0–0.2)
BASOPHILS NFR BLD AUTO: 0.6 % — SIGNIFICANT CHANGE UP (ref 0–2)
BUN SERPL-MCNC: 13 MG/DL — SIGNIFICANT CHANGE UP (ref 7–23)
CALCIUM SERPL-MCNC: 9.1 MG/DL — SIGNIFICANT CHANGE UP (ref 8.4–10.5)
CHLORIDE SERPL-SCNC: 105 MMOL/L — SIGNIFICANT CHANGE UP (ref 96–108)
CO2 SERPL-SCNC: 26 MMOL/L — SIGNIFICANT CHANGE UP (ref 22–31)
CREAT SERPL-MCNC: 0.74 MG/DL — SIGNIFICANT CHANGE UP (ref 0.5–1.3)
EOSINOPHIL # BLD AUTO: 0.12 K/UL — SIGNIFICANT CHANGE UP (ref 0–0.5)
EOSINOPHIL NFR BLD AUTO: 1.5 % — SIGNIFICANT CHANGE UP (ref 0–6)
ESTIMATED AVERAGE GLUCOSE: 160 MG/DL — HIGH (ref 68–114)
GLUCOSE BLDC GLUCOMTR-MCNC: 117 MG/DL — HIGH (ref 70–99)
GLUCOSE BLDC GLUCOMTR-MCNC: 134 MG/DL — HIGH (ref 70–99)
GLUCOSE BLDC GLUCOMTR-MCNC: 196 MG/DL — HIGH (ref 70–99)
GLUCOSE BLDC GLUCOMTR-MCNC: 92 MG/DL — SIGNIFICANT CHANGE UP (ref 70–99)
GLUCOSE SERPL-MCNC: 97 MG/DL — SIGNIFICANT CHANGE UP (ref 70–99)
HCT VFR BLD CALC: 39.1 % — SIGNIFICANT CHANGE UP (ref 39–50)
HCV AB S/CO SERPL IA: 0.08 S/CO — SIGNIFICANT CHANGE UP
HCV AB SERPL-IMP: SIGNIFICANT CHANGE UP
HGB BLD-MCNC: 12.6 G/DL — LOW (ref 13–17)
IMM GRANULOCYTES NFR BLD AUTO: 0.4 % — SIGNIFICANT CHANGE UP (ref 0–1.5)
INR BLD: 1.06 — SIGNIFICANT CHANGE UP (ref 0.88–1.16)
LITHIUM SERPL-MCNC: 0.6 MMOL/L — SIGNIFICANT CHANGE UP (ref 0.6–1.2)
LYMPHOCYTES # BLD AUTO: 2.86 K/UL — SIGNIFICANT CHANGE UP (ref 1–3.3)
LYMPHOCYTES # BLD AUTO: 36.2 % — SIGNIFICANT CHANGE UP (ref 13–44)
MAGNESIUM SERPL-MCNC: 2.1 MG/DL — SIGNIFICANT CHANGE UP (ref 1.6–2.6)
MCHC RBC-ENTMCNC: 30.1 PG — SIGNIFICANT CHANGE UP (ref 27–34)
MCHC RBC-ENTMCNC: 32.2 GM/DL — SIGNIFICANT CHANGE UP (ref 32–36)
MCV RBC AUTO: 93.5 FL — SIGNIFICANT CHANGE UP (ref 80–100)
MONOCYTES # BLD AUTO: 0.82 K/UL — SIGNIFICANT CHANGE UP (ref 0–0.9)
MONOCYTES NFR BLD AUTO: 10.4 % — SIGNIFICANT CHANGE UP (ref 2–14)
NEUTROPHILS # BLD AUTO: 4.01 K/UL — SIGNIFICANT CHANGE UP (ref 1.8–7.4)
NEUTROPHILS NFR BLD AUTO: 50.9 % — SIGNIFICANT CHANGE UP (ref 43–77)
NRBC # BLD: 0 /100 WBCS — SIGNIFICANT CHANGE UP (ref 0–0)
PHOSPHATE SERPL-MCNC: 3.7 MG/DL — SIGNIFICANT CHANGE UP (ref 2.5–4.5)
PLATELET # BLD AUTO: 206 K/UL — SIGNIFICANT CHANGE UP (ref 150–400)
POTASSIUM SERPL-MCNC: 4.1 MMOL/L — SIGNIFICANT CHANGE UP (ref 3.5–5.3)
POTASSIUM SERPL-SCNC: 4.1 MMOL/L — SIGNIFICANT CHANGE UP (ref 3.5–5.3)
PROTHROM AB SERPL-ACNC: 12.7 SEC — SIGNIFICANT CHANGE UP (ref 10.6–13.6)
RBC # BLD: 4.18 M/UL — LOW (ref 4.2–5.8)
RBC # FLD: 13.8 % — SIGNIFICANT CHANGE UP (ref 10.3–14.5)
SODIUM SERPL-SCNC: 142 MMOL/L — SIGNIFICANT CHANGE UP (ref 135–145)
TSH SERPL-MCNC: 1.87 UIU/ML — SIGNIFICANT CHANGE UP (ref 0.35–4.94)
VALPROATE SERPL-MCNC: 52.7 UG/ML — SIGNIFICANT CHANGE UP (ref 50–100)
VIT B12 SERPL-MCNC: 674 PG/ML — SIGNIFICANT CHANGE UP (ref 232–1245)
WBC # BLD: 7.89 K/UL — SIGNIFICANT CHANGE UP (ref 3.8–10.5)
WBC # FLD AUTO: 7.89 K/UL — SIGNIFICANT CHANGE UP (ref 3.8–10.5)

## 2020-07-16 PROCEDURE — 99223 1ST HOSP IP/OBS HIGH 75: CPT

## 2020-07-16 PROCEDURE — 93306 TTE W/DOPPLER COMPLETE: CPT | Mod: 26

## 2020-07-16 PROCEDURE — 70553 MRI BRAIN STEM W/O & W/DYE: CPT | Mod: 26

## 2020-07-16 PROCEDURE — 99233 SBSQ HOSP IP/OBS HIGH 50: CPT | Mod: GC

## 2020-07-16 RX ORDER — DIVALPROEX SODIUM 500 MG/1
500 TABLET, DELAYED RELEASE ORAL
Refills: 0 | Status: DISCONTINUED | OUTPATIENT
Start: 2020-07-16 | End: 2020-07-17

## 2020-07-16 RX ORDER — IBUPROFEN 200 MG
600 TABLET ORAL ONCE
Refills: 0 | Status: COMPLETED | OUTPATIENT
Start: 2020-07-16 | End: 2020-07-16

## 2020-07-16 RX ORDER — ACETAMINOPHEN 500 MG
650 TABLET ORAL ONCE
Refills: 0 | Status: COMPLETED | OUTPATIENT
Start: 2020-07-16 | End: 2020-07-16

## 2020-07-16 RX ORDER — ENOXAPARIN SODIUM 100 MG/ML
40 INJECTION SUBCUTANEOUS ONCE
Refills: 0 | Status: COMPLETED | OUTPATIENT
Start: 2020-07-16 | End: 2020-07-17

## 2020-07-16 RX ORDER — LITHIUM CARBONATE 300 MG/1
300 TABLET, EXTENDED RELEASE ORAL
Refills: 0 | Status: DISCONTINUED | OUTPATIENT
Start: 2020-07-16 | End: 2020-07-17

## 2020-07-16 RX ORDER — DIVALPROEX SODIUM 500 MG/1
500 TABLET, DELAYED RELEASE ORAL THREE TIMES A DAY
Refills: 0 | Status: DISCONTINUED | OUTPATIENT
Start: 2020-07-16 | End: 2020-07-16

## 2020-07-16 RX ORDER — LAMOTRIGINE 25 MG/1
250 TABLET, ORALLY DISINTEGRATING ORAL DAILY
Refills: 0 | Status: DISCONTINUED | OUTPATIENT
Start: 2020-07-16 | End: 2020-07-17

## 2020-07-16 RX ORDER — LAMOTRIGINE 25 MG/1
150 TABLET, ORALLY DISINTEGRATING ORAL
Refills: 0 | Status: DISCONTINUED | OUTPATIENT
Start: 2020-07-16 | End: 2020-07-16

## 2020-07-16 RX ADMIN — LITHIUM CARBONATE 300 MILLIGRAM(S): 300 TABLET, EXTENDED RELEASE ORAL at 02:00

## 2020-07-16 RX ADMIN — DIVALPROEX SODIUM 500 MILLIGRAM(S): 500 TABLET, DELAYED RELEASE ORAL at 02:00

## 2020-07-16 RX ADMIN — DIVALPROEX SODIUM 500 MILLIGRAM(S): 500 TABLET, DELAYED RELEASE ORAL at 17:53

## 2020-07-16 RX ADMIN — Medication 600 MILLIGRAM(S): at 04:49

## 2020-07-16 RX ADMIN — LAMOTRIGINE 250 MILLIGRAM(S): 25 TABLET, ORALLY DISINTEGRATING ORAL at 11:05

## 2020-07-16 RX ADMIN — DIVALPROEX SODIUM 500 MILLIGRAM(S): 500 TABLET, DELAYED RELEASE ORAL at 11:05

## 2020-07-16 RX ADMIN — LITHIUM CARBONATE 300 MILLIGRAM(S): 300 TABLET, EXTENDED RELEASE ORAL at 17:53

## 2020-07-16 RX ADMIN — Medication 2: at 17:51

## 2020-07-16 NOTE — PROGRESS NOTE ADULT - PROBLEM SELECTOR PLAN 1
Pt with 4 falls in the past month Pt with 4 falls in the past month due to feet unsteadiness. Patient without prodromal symptoms.   -Falls likely mechanical and could be due to neurological process (such as Parkinson's) and/or drug induced from psych meds  -PT recs: recommends K[facility]URRAM, therapy 2-3x/week  -TSH, B12 wnl   -TTE: EF 60-65%, no regional wall motion abnormalities, trace TR & MR, no pulm HTN, no pericardial effusion  -f/u serum depakote levels

## 2020-07-16 NOTE — PROGRESS NOTE ADULT - PROBLEM SELECTOR PLAN 4
History of bipolar disease  - c/w lithium 300 mg BID  - c/w lamotrigine 250 mg PO daily  - c/w depakote 500 mg PO BID

## 2020-07-16 NOTE — CONSULT NOTE ADULT - SUBJECTIVE AND OBJECTIVE BOX
REASON FOR CONSULT:    HISTORY OF PRESENT ILLNESS:  HPI:  Mr. Felicity Ford is a 69M with a PMHx of Bipolar disorder and T2DM who presents after a fall. Per the patient, he has had four falls in the past month. He states his falls are related to a gait instability, during which his feet begin to move quickly and he loses control over them. Sometimes, he is able to slow himself down and stop. During this fall, he was able to slow himself down, but then fell and experienced head trauma. He denies LOC, bowel or bladder incontinence, or tongue biting. Denies preceding chest pain, SOB, palpitations, nausea, or vomiting. During the fall, he experienced trauma to the R hand, and endorses pain and swelling of the R hand, as well as some discomfort of the R shoulder. A splint was placed on the R hand by the ED provider. Otherwise denies recent fever, chills, abdominal pain, n/v/d/c, weakness, sick contacts, recent travel.    On arrival to ED, VS: T 98.4, /68, HR 60, RR 16, SpO2 97% on room air    Labs significant for: WBC 11.52, glucose 153, covid negative.    CT head negative for acute intracranial injury. Preliminary read of hand XR without acute finding.    Neurology consulted in ED, will follow. (15 Jul 2020 21:41)  As above- no cp,sob- states has long hx of gait instability- denies palpitations,cp,sob,dizziness    PAST MEDICAL & SURGICAL HISTORY:  Bipolar disorder  Tremor  Diabetes  No significant past surgical history      [ ] Diabetes   [ ] Hypertension  [ ] Hyperlipidemia  [ ] CAD  [ ] PCI  [ ] CABG    PREVIOUS DIAGNOSTIC TESTING:    [ ] Echocardiogram:  [ ]  Catheterization:  [ ] Stress Test:  	    MEDICATIONS:  propranolol 40 milliGRAM(s) Oral two times a day        diVALproex  milliGRAM(s) Oral two times a day  lamoTRIgine 250 milliGRAM(s) Oral daily  lithium 300 milliGRAM(s) Oral two times a day      dextrose 40% Gel 15 Gram(s) Oral once PRN  dextrose 50% Injectable 12.5 Gram(s) IV Push once  dextrose 50% Injectable 25 Gram(s) IV Push once  dextrose 50% Injectable 25 Gram(s) IV Push once  glucagon  Injectable 1 milliGRAM(s) IntraMuscular once PRN  insulin lispro (HumaLOG) corrective regimen sliding scale   SubCutaneous three times a day before meals    dextrose 5%. 1000 milliLiter(s) IV Continuous <Continuous>      FAMILY HISTORY:      SOCIAL HISTORY:    [ ] Non-smoker  [ ] Smoker  [ ] Alcohol    Allergies    No Known Allergies    Intolerances    	    REVIEW OF SYSTEMS:    [x] as per HPI    [ ] Unable to obtain    PHYSICAL EXAM:  T(C): 36.4 (07-16-20 @ 05:13), Max: 36.9 (07-15-20 @ 17:49)  HR: 55 (07-16-20 @ 05:13) (54 - 60)  BP: 114/65 (07-16-20 @ 05:13) (108/57 - 145/68)  RR: 18 (07-16-20 @ 05:13) (16 - 18)  SpO2: 98% (07-16-20 @ 05:13) (96% - 98%)  Wt(kg): --  I&O's Summary      Appearance: Normal	  HEENT:   Normal oral mucosa, PERRL, EOMI	  Lymphatic: No lymphadenopathy  Cardiovascular: Normal S1 S2, No JVD, No murmurs, No edema  Respiratory: Lungs clear to auscultation	  Psychiatry: A & O x 3, Mood & affect appropriate  Gastrointestinal:  Soft, Non-tender, + BS	  Skin: No rashes, No ecchymoses, No cyanosis	  Neurologic: Non-focal  Extremities: Normal range of motion, No clubbing, cyanosis or edema  Vascular: Peripheral pulses palpable bilaterally    TELEMETRY: 	    ECG:  NSR,LAHB  ECHO:  STRESS:  CATH:  	  RADIOLOGY:  CXR:  CT:  US:   	  	  LABS:	 	    CARDIAC MARKERS:                                  13.6   11.52 )-----------( 236      ( 15 Jul 2020 18:29 )             42.1     07-15    140  |  104  |  18  ----------------------------<  153<H>  3.9   |  23  |  0.98    Ca    9.4      15 Jul 2020 18:29    TPro  7.0  /  Alb  4.2  /  TBili  0.4  /  DBili  x   /  AST  19  /  ALT  13  /  AlkPhos  66  07-15    proBNP:   Lipid Profile:   HgA1c:   TSH:     ASSESSMENT/PLAN: 	    pt with DM and falls- admits to DM not being controlled- His sx do not appear to be cardiac but would get echo to R/O valvular dysfunction as etiology of falls- NO SYNCOPE

## 2020-07-16 NOTE — PROGRESS NOTE ADULT - SUBJECTIVE AND OBJECTIVE BOX
Neurology Stroke Follow Up     Interval History:  Patient seen and examined.  No falls since admission but still c/o feeling like he's going to be pulled forward when he's walking.    No specific weakness or numbness.    Seen by Cardiologist, Dr. Nascimento, this morning - doesn't think that cardiac reason for patient's symptoms.  His blood pressure is stable.    He thinks that he's memory is decreased in terms of being able to juggle multiple tasks.  Note: Those around him have reportedly noticed him having more memory problems more recently.   He's been on the same Psych meds for years.  Prescribed by his Psychiatrist.      Medications:  MEDICATIONS  (STANDING):  dextrose 5%. 1000 milliLiter(s) (50 mL/Hr) IV Continuous <Continuous>  dextrose 50% Injectable 12.5 Gram(s) IV Push once  dextrose 50% Injectable 25 Gram(s) IV Push once  dextrose 50% Injectable 25 Gram(s) IV Push once  diVALproex  milliGRAM(s) Oral two times a day  insulin lispro (HumaLOG) corrective regimen sliding scale   SubCutaneous three times a day before meals  lamoTRIgine 250 milliGRAM(s) Oral daily  lithium 300 milliGRAM(s) Oral two times a day  propranolol 40 milliGRAM(s) Oral two times a day    MEDICATIONS  (PRN):  dextrose 40% Gel 15 Gram(s) Oral once PRN Blood Glucose LESS THAN 70 milliGRAM(s)/deciliter  glucagon  Injectable 1 milliGRAM(s) IntraMuscular once PRN Glucose LESS THAN 70 milligrams/deciliter    Allergies  No Known Allergies    Exam:  Vital Signs Last 24 Hrs  T(C): 36.5 (16 Jul 2020 09:36), Max: 36.9 (15 Jul 2020 17:49)  T(F): 97.7 (16 Jul 2020 09:36), Max: 98.4 (15 Jul 2020 17:49)  HR: 49 (16 Jul 2020 09:36) (49 - 60)  BP: 102/63 (16 Jul 2020 09:36) (102/63 - 145/68)  RR: 16 (16 Jul 2020 09:36) (16 - 18)  SpO2: 95% (16 Jul 2020 09:36) (95% - 98%)    Gen: Lying in bed, calm, cooperative, in NAD  Eyes: anicteric sclera  CV: RRR  Extremities: 2+ radial and dorsalis pedis pulses bilaterally, right arm wrapped in bandage with pain for possible fracture post fall with swelling    Neurological exam:  Mental status: Awake, alert and oriented x3, fluent speech - spontaneous, able name, repeat, no dysarthria, follows 2-step commands, memory 3/3 at 3 minutes, attention seems intact, fund of knowledge appropriate (knew Presidents)   Cranial nerves: Pupils equally round and reactive to light, visual fields full, no nystagmus, extraocular muscles intact, V1 through V3 intact bilaterally and symmetric, no facial droop, hearing intact to finger rub, palate elevation symmetric, tongue was midline, sternocleidomastoid/shoulder shrug strength bilaterally 5/5.    Motor:  No pronator drift. Normal bulk and tone, strength 5/5 in bilateral upper and lower extremities, except hard to fully assess his right arm since bandaged.  NO detectable tremor at rest but low level tremor of left hand when hand is outstretched  Sensation: Question if decreased light touch small amount on right compared to left but variable.  No neglect on DSS.   Coordination: No dysmetria on finger-to-nose and heel-to-shin bilaterally   Reflexes: absent Babinski bilaterally  Gait: some imbalance when walking with PT but caught himself, reportedly not shuffling, starts walking quickly once starts walking, able climb stairs    Labs:                        12.6   7.89  )-----------( 206      ( 16 Jul 2020 08:01 )             39.1     07-16    142  |  105  |  13  ----------------------------<  97  4.1   |  26  |  0.74    Ca    9.1      16 Jul 2020 08:01  Phos  3.7     07-16  Mg     2.1     07-16    LIVER FUNCTIONS - ( 15 Jul 2020 18:29 )  Alb: 4.2 g/dL / Pro: 7.0 g/dL / ALK PHOS: 66 U/L / ALT: 13 U/L / AST: 19 U/L / GGT: x           PT/INR - ( 16 Jul 2020 08:01 )   PT: 12.7 sec;   INR: 1.06     PTT - ( 16 Jul 2020 08:01 )  PTT:36.4 sec    Vitamin B12, Serum (07.16.20 @ 04:01)    Vitamin B12, Serum: 674 pg/mL    Lithium Level, Serum (07.16.20 @ 08:01)    Lithium Level, Serum: .60 mmoL/L    Radiology:  CT Head without contrast (7/15/2020): No acute intracranial injury.    69 year-old male with PMHx DM, bipolar disease presents with imbalance and increased falls over the past two weeks.  His description seems almost like Parkinsonism so question if related to drug induced process given long term Depakote,, among other meds.  Therefore, would appreciate Depakote level  He might be candidate for Sinemet trial.  Please follow up with PT for recs regarding additional therapy.    His memory seems reasonable so will attempt to obtain collateral information from his wife and others.  He would benefit from Neuropsychological evaluation but can be done as outpatient.      Would also appreciate MRI Brain without and with alexander with further recommendations post results.

## 2020-07-16 NOTE — DISCHARGE NOTE PROVIDER - HOSPITAL COURSE
#Discharge: do not delete        Patient is 68 yo M with past medical history of bipolar disorder and T2DM    Presented with multiple falls, found to have wrist fracture and ____.     Problem List/Main Diagnoses (system-based):     1. Frequent falls: sub-acute rehab        2. Hand trauma: hand/wrist splint        3. Tremor: MRI Head w/wo contrast        4. Bipolar disorder: lithium 300 mg BID, lamotrigine 250 mg daily, depakote 500 mg BID        5. Hypertension: propranolol 40 mg PO BID        6. Type 2 diabetes mellitus: mISS            Inpatient treatment course:             New medications:     Labs to be followed outpatient:     Exam to be followed outpatient: #Discharge: do not delete        Patient is 70 yo M with past medical history of bipolar disorder and T2DM    Presented with multiple falls, found to have wrist fracture and ____.     Problem List/Main Diagnoses (system-based):     1. Frequent falls: sub-acute rehab        2. Hand trauma: hand/wrist splint        3. Tremor: MRI Head w/wo contrast        4. Bipolar disorder: lithium 300 mg BID, lamotrigine 250 mg daily, depakote 500 mg BID        5. Hypertension: propranolol 40 mg PO BID        6. Type 2 diabetes mellitus: mISS            Inpatient treatment course:     -7/15: wrist splint, hand and wrist XR showed oft tissue swelling over the dorsum of the hand and triquetrum fracture.    -7/16: TTE did not reveal any valvular abnormalities thus ruling out structural causes for fall. TSH and B12 wnl. Serum depakote and lithium wnl thereby likely ruling out medication induced parkinsonian-like symptoms.     -7/16: MRI head w/wo contrast showed ______.    -7/17: neuro recs        New medications: None    Labs to be followed outpatient: None    Exam to be followed outpatient: None #Discharge: do not delete        Patient is 70 yo M with past medical history of bipolar disorder and T2DM    Presented with multiple falls, found to have wrist fracture and ____.     Problem List/Main Diagnoses (system-based):     1. Frequent falls: sub-acute rehab        2. Hand trauma: hand/wrist splint        3. Tremor: MRI Head w/wo contrast        4. Bipolar disorder: lithium 300 mg BID, lamotrigine 250 mg daily, depakote 500 mg BID        5. Hypertension: propranolol 40 mg PO BID        6. Type 2 diabetes mellitus: mISS            Inpatient treatment course:     -7/15: wrist splint, hand and wrist XR showed oft tissue swelling over the dorsum of the hand and triquetrum fracture.    -7/16: TTE did not reveal any valvular abnormalities thus ruling out structural causes for fall. TSH and B12 wnl. Serum depakote and lithium wnl thereby likely ruling out medication induced parkinsonian-like symptoms.     -7/16: MRI head w/wo contrast - motion degraded exam, but w/o acute abnormality. No evidence of infarction or masslike enhancement.     -7/17: Per juanito Galvez to start sinemet 25/100 PO TID trial for one month and follow with her outpatient in 4 weeks.         New medications: Sinemet 25/100 PO TID (one month trial)    Labs to be followed outpatient: None    Exam to be followed outpatient: None #Discharge: do not delete        Patient is 70 yo M with past medical history of bipolar disorder and T2DM    Presented with multiple falls, found to have wrist fracture and intention tremors.     Problem List/Main Diagnoses (system-based):     1. Frequent falls: no medication indicated.         2. Hand trauma: hand/wrist splint        3. Tremor: Sinemet 25/100 PO TID        4. Bipolar disorder: lithium 300 mg BID, lamotrigine 250 mg daily, depakote 500 mg BID        5. Hypertension: propranolol 40 mg PO BID        6. Type 2 diabetes mellitus: mISS            Inpatient treatment course:     -7/15: wrist splint, hand and wrist XR showed oft tissue swelling over the dorsum of the hand and triquetrum fracture.    -7/16: TTE did not reveal any valvular abnormalities thus ruling out structural causes for fall. TSH and B12 wnl. Serum depakote and lithium wnl thereby likely ruling out medication induced parkinsonian-like symptoms.     -7/16: MRI head w/wo contrast - motion degraded exam, but w/o acute abnormality. No evidence of infarction or masslike enhancement.     -7/17: Per juanito Galvez to start sinemet 25/100 PO TID trial for one month and follow with her outpatient in 4 weeks.         New medications: Sinemet 25/100 PO TID (one month trial)    Labs to be followed outpatient: None    Exam to be followed outpatient: None

## 2020-07-16 NOTE — DISCHARGE NOTE PROVIDER - NSDCMRMEDTOKEN_GEN_ALL_CORE_FT
aspirin 81 mg oral tablet: 1 tab(s) orally once a day  divalproex sodium 500 mg oral delayed release tablet: 1 tab(s) orally 3 times a day  lamoTRIgine 150 mg oral tablet: 1 tab(s) orally 2 times a day  lithium 300 mg oral capsule: 1 cap(s) orally 3 times a day  propranolol 40 mg oral tablet: 1 tab(s) orally 2 times a day  sitagliptin-metformin 50 mg-500 mg oral tablet: 1 tab(s) orally 2 times a day  Zoloft 25 mg oral tablet: 1 tab(s) orally once a day aspirin 81 mg oral tablet: 1 tab(s) orally once a day  carbidopa-levodopa 25 mg-100 mg oral tablet: 1 tab(s) orally 3 times a day  divalproex sodium 500 mg oral delayed release tablet: 1 tab(s) orally 3 times a day  lamoTRIgine 150 mg oral tablet: 1 tab(s) orally 2 times a day  lithium 300 mg oral capsule: 1 cap(s) orally 3 times a day  propranolol 40 mg oral tablet: 1 tab(s) orally 2 times a day  sitagliptin-metformin 50 mg-500 mg oral tablet: 1 tab(s) orally 2 times a day  Zoloft 25 mg oral tablet: 1 tab(s) orally once a day

## 2020-07-16 NOTE — CONSULT NOTE ADULT - CONSULT REQUESTED DATE/TIME
16-Jul-2020 07:23 [___ Month(s) Ago] : [unfilled] month(s) ago [Good] : being in good health [Last Pap ___] : Last cervical pap smear was [unfilled] [Perimenopausal] : is perimenopausal [Definite:  ___ (Date)] : the last menstrual period was [unfilled] [Menarche Age: ____] : age at menarche was [unfilled] [Sexually Active] : is sexually active [Monogamous] : is monogamous [Male ___] : [unfilled] male [Contraception] : does not use contraception

## 2020-07-16 NOTE — CONSULT NOTE ADULT - ASSESSMENT
per Internal Medicine    69M with a PMHx of Bipolar disorder and T2DM who presents after a fall.    Problem/Plan - 1:  ·  Problem: Frequent falls.  Plan: Pt with multiple falls in the past month. States he feels unsteady on his feet. No prodromal symptoms. Falls likely mechanical related to some underlying neurological process such as Parkinson disease. Less likely cardiogenic or neurogenic syncope.  - PT eval  - TSH  - neuro following, appreciate recs - will likely need more extensive workup as outpatient.     Problem/Plan - 2:  ·  Problem: Leukocytosis, unspecified type.  Plan: WBC 11.52. Afebrile. Non-toxic. Likely reactive 2/2 fall  - trend CBC.     Problem/Plan - 3:  ·  Problem: Hand trauma, right, initial encounter.  Plan: Prelim read without fracture, but ED provider was suspicious for fracture, so placed RUE in splint  - f/u hand XR official read  - consider hand surgery/ortho consult.     Problem/Plan - 4:  ·  Problem: Tremor.  Plan: Mild rest tremor. Increased tremor intensity with intention noted with finger to nose. Given mixture of rest and action tremors in addition to gait instability, likely related to underlying neurological disease.  - neuro recs appreciated.     Problem/Plan - 5:  ·  Problem: Type 2 diabetes mellitus without complication, without long-term current use of insulin.  Plan: Not on any medication  - mISS  - A1c in AM.     Problem/Plan - 6:  Problem: Bipolar affective disorder, remission status unspecified. Plan: Pt with vague history of Bipolar disorder. Current treatment regimen is lithium 300mg BID, lamotrigine 250mg QD, divalproate 500mg BID.  - med rec to confirm and collateral in AM  - SW consult.    Problem/Plan - 7:  ·  Problem: Nutrition, metabolism, and development symptoms.  Plan: F: Not indicated  E: Replete PRN, goal K>4, Mg>2  N: Consistent carb, kosher  DVT PPx: Lovenox  FULL CODE  Dispo: [facility].

## 2020-07-16 NOTE — DISCHARGE NOTE PROVIDER - NSDCFUADDAPPT_GEN_ALL_CORE_FT
Please do not forget your appointment with Dr. Cris Stern on August 17th, 2020. You will first be meeting up with her nurse practitioner (Humera). This visit will be to discuss the Sinemet 25/100 medication you were prescribed on July 17th.

## 2020-07-16 NOTE — PHYSICAL THERAPY INITIAL EVALUATION ADULT - IMPAIRMENTS CONTRIBUTING TO GAIT DEVIATIONS, PT EVAL
forward leaning, increasing gait speed/impaired balance/impaired coordination/impaired postural control/decreased strength

## 2020-07-16 NOTE — CONSULT NOTE ADULT - SUBJECTIVE AND OBJECTIVE BOX
Patient is a 69y old  Male who presents with a chief complaint of Fall (16 Jul 2020 12:52)       HPI:  Mr. Felicity Ford is a 69M with a PMHx of Bipolar disorder and T2DM who presents after a fall. Per the patient, he has had four falls in the past month. He states his falls are related to a gait instability, during which his feet begin to move quickly and he loses control over them. Sometimes, he is able to slow himself down and stop. During this fall, he was able to slow himself down, but then fell and experienced head trauma. He denies LOC, bowel or bladder incontinence, or tongue biting. Denies preceding chest pain, SOB, palpitations, nausea, or vomiting. During the fall, he experienced trauma to the R hand, and endorses pain and swelling of the R hand, as well as some discomfort of the R shoulder. A splint was placed on the R hand by the ED provider. Otherwise denies recent fever, chills, abdominal pain, n/v/d/c, weakness, sick contacts, recent travel.    On arrival to ED, VS: T 98.4, /68, HR 60, RR 16, SpO2 97% on room air    Labs significant for: WBC 11.52, glucose 153, covid negative.    CT head negative for acute intracranial injury. Preliminary read of hand XR without acute finding.    Neurology consulted in ED, will follow. (15 Jul 2020 21:41)      PAST MEDICAL & SURGICAL HISTORY:  Bipolar disorder  Tremor  Diabetes  No significant past surgical history      MEDICATIONS  (STANDING):  dextrose 5%. 1000 milliLiter(s) (50 mL/Hr) IV Continuous <Continuous>  dextrose 50% Injectable 12.5 Gram(s) IV Push once  dextrose 50% Injectable 25 Gram(s) IV Push once  dextrose 50% Injectable 25 Gram(s) IV Push once  diVALproex  milliGRAM(s) Oral two times a day  insulin lispro (HumaLOG) corrective regimen sliding scale   SubCutaneous three times a day before meals  lamoTRIgine 250 milliGRAM(s) Oral daily  lithium 300 milliGRAM(s) Oral two times a day  propranolol 40 milliGRAM(s) Oral two times a day    MEDICATIONS  (PRN):  dextrose 40% Gel 15 Gram(s) Oral once PRN Blood Glucose LESS THAN 70 milliGRAM(s)/deciliter  glucagon  Injectable 1 milliGRAM(s) IntraMuscular once PRN Glucose LESS THAN 70 milligrams/deciliter    FAMILY HISTORY:      CBC Full  -  ( 16 Jul 2020 08:01 )  WBC Count : 7.89 K/uL  RBC Count : 4.18 M/uL  Hemoglobin : 12.6 g/dL  Hematocrit : 39.1 %  Platelet Count - Automated : 206 K/uL  Mean Cell Volume : 93.5 fl  Mean Cell Hemoglobin : 30.1 pg  Mean Cell Hemoglobin Concentration : 32.2 gm/dL  Auto Neutrophil # : 4.01 K/uL  Auto Lymphocyte # : 2.86 K/uL  Auto Monocyte # : 0.82 K/uL  Auto Eosinophil # : 0.12 K/uL  Auto Basophil # : 0.05 K/uL  Auto Neutrophil % : 50.9 %  Auto Lymphocyte % : 36.2 %  Auto Monocyte % : 10.4 %  Auto Eosinophil % : 1.5 %  Auto Basophil % : 0.6 %      07-16    142  |  105  |  13  ----------------------------<  97  4.1   |  26  |  0.74    Ca    9.1      16 Jul 2020 08:01  Phos  3.7     07-16  Mg     2.1     07-16    TPro  7.0  /  Alb  4.2  /  TBili  0.4  /  DBili  x   /  AST  19  /  ALT  13  /  AlkPhos  66  07-15            Radiology:    < from: Xray Chest 2 Views PA/Lat (07.15.20 @ 19:00) >    EXAM:  XR CHEST PA LAT 2V                          PROCEDURE DATE:  07/15/2020          INTERPRETATION:  XR CHEST PA AND LATERAL dated 7/15/2020 7:00 PM.    INDICATION: Fall    COMPARISON: None    FINDINGS:  PA and lateral views of the chest are submitted. The cardia mediastinal silhouette is within normal limits. No pleural effusion. No pneumothorax. No consolidation. Bridging osteophytes are present in the thoracic spine.      IMPRESSION:  Clear lungs.        < from: Xray Wrist 3 Views, Right (07.15.20 @ 19:00) >    INTERPRETATION:  PROCEDURE INFORMATION:    Exam: XR Right Wrist    Exam date and time: 7/15/2020 6:37 PM    Age: 69 years old    Clinical indication: Injury or trauma; Fall; Initial encounter; Swelling (edema); Wrist; Right    TECHNIQUE:  Imaging protocol: XR Right wrist.    Views: 3 or more views.    COMPARISON:  No relevant prior studies available.    FINDINGS:    Bones/joints: Normal.  Soft tissues: Normal.    IMPRESSION:  No acute findings.        < from: CT Head No Cont (07.15.20 @ 19:02) >  EXAM:  CT BRAIN                          PROCEDURE DATE:  07/15/2020          INTERPRETATION:  Fall.    Technique: CT head was performed utilizing axial images from the base of the skull through the vertex without the administration of intravenous contrast. Images were reviewed in the bone, brain and subdural windows.    Findings: There are no prior studies available for comparison.    There is no evidence of acute intracranial hemorrhage or acute transcortical infarct.  The ventricles are normal in size and caliber.  There is no evidence of mass-effect or midline shift. There is no evidence of an intra or extra-axial fluid collection.    Visualized paranasal sinuses and bilateral mastoid air cells are clear.    Impression: No acute intracranial injury.            Vital Signs Last 24 Hrs  T(C): 36.5 (16 Jul 2020 09:36), Max: 36.9 (15 Jul 2020 17:49)  T(F): 97.7 (16 Jul 2020 09:36), Max: 98.4 (15 Jul 2020 17:49)  HR: 49 (16 Jul 2020 09:36) (49 - 60)  BP: 102/63 (16 Jul 2020 09:36) (102/63 - 145/68)  BP(mean): --  RR: 16 (16 Jul 2020 09:36) (16 - 18)  SpO2: 95% (16 Jul 2020 09:36) (95% - 98%)    REVIEW OF SYSTEMS: per HPI          Physical Exam:  WDWN 70 yo  gentleman lying in semi Hernandez's position, no acute complaints    Head: normocephalic, atraumatic    Eyes: PERRLA, EOMI, no nystagmus, sclera anicteric    ENT: nasal discharge, uvula midline, no oropharyngeal erythema/exudate    Neck: supple, negative JVD, negative carotid bruits, no thyromegaly    Chest: CTA bilaterally, neg wheeze/ rhonchi/ rales/ crackles/ egophany    Cardiovascular: regular rate and rhythm, neg murmurs/rubs/gallops    Abdomen: soft, non distended, non tender to palpation in all 4 quadrants, negative rebound/guarding, normal bowel sounds    Extremities: WWP, neg cyanosis/clubbing/edema, negative calf tenderness to palpation, negative Ze's sign    Musculoskeletal: R wrist ace/splint      :     Neurologic Exam:    Alert and oriented to person, place, date/year, speech fluent w/o dysarthria, recent and remote memory intact, repetition intact, comprehension intact    Cranial Nerves:     II:                       pupils equal, round and reactive to light, visual fields intact   III/ IV/VI:            extraocular movements intact, neg nystagmus, neg ptosis  V:                       facial sensation intact, V1-3 normal  VII:                     face symmetric, no droop, normal eye closure and smile  VIII:                    hearing intact to finger rub bilaterally  IX/ X:                 soft palate rise symmetrical  XI:                      head turning, shoulder shrug normal  XII:                     tongue midline    Motor Exam:    Upper Extremities:     Right:    : 3/5               wrist extensors/ flexors: not tested secondary to splint              biceps:  4/5              triceps: 4/5              deltoid:  4-/5              pronator drift: neg    Left:     : 4/5               wrist extensors/ flexors: 4/5              biceps:  4/5              triceps: 4/5              deltoid:  4-/5              pronator drift: neg               Lower Extremities:    Right:    4/5    Left :    4/5    Sensory:    dec dista LE's                     DTR:            = biceps/     triceps/     brachioradialis                      = patella/   medial hamstring/ankle                      neg clonus                      neg Babinski                        Gait:  not tested        PM&R Impression:    1) s/p fall  2) deconditioned  3) no focal weakness  4) Possible Parkinsons    Plan:    1) Physical therapy focusing on therapeutic exercises, bed mobility/transfer out of bed evaluation, progressive ambulation with assistive devices prn.    2) Anticipated Disposition Plan/Recs:    pending functional progress

## 2020-07-16 NOTE — PHYSICAL THERAPY INITIAL EVALUATION ADULT - IMPAIRMENTS FOUND, PT EVAL
aerobic capacity/endurance/decreased midline orientation/fine motor/gait, locomotion, and balance/muscle strength/posture/sensory integrity

## 2020-07-16 NOTE — PHYSICAL THERAPY INITIAL EVALUATION ADULT - CRITERIA FOR SKILLED THERAPEUTIC INTERVENTIONS
impairments found/functional limitations in following categories/risk reduction/prevention/rehab potential/therapy frequency/anticipated equipment needs at discharge/anticipated discharge recommendation

## 2020-07-16 NOTE — PROGRESS NOTE ADULT - PROBLEM SELECTOR PLAN 2
Patient placed in hand/wrist splint.  - Hand & Wrist XR: soft tissue swelling over the dorsum of the hand. Triquetrum fracture.   - Shoulder XR: no fracture or dislocation  - recommend non-weight bearing on right hand  - OT consulted appreciate recs

## 2020-07-16 NOTE — PROGRESS NOTE ADULT - PROBLEM SELECTOR PLAN 3
Mild resting tremor increased with intention. No dysmetria, no dysdiadochokinesia. Could be medication induced or underlying neurological process.   - f/u MRI head w/wo contrast   - serum lithium 0.6 (within nl range)  - f/u serum depakote levels  - Neuro recs: potential candidate for Sinemet trial. Neuro will attempt to obtain collateral information from pt wife and others. Neuropsychological evaluation but can be done as outpatient.

## 2020-07-16 NOTE — PHYSICAL THERAPY INITIAL EVALUATION ADULT - PATIENT/FAMILY/SIGNIFICANT OTHER GOALS STATEMENT, PT EVAL
Health Maintenance Due   Topic Date Due   • Shingles Vaccine (2 of 3) 04/02/2013       Patient is due for topics as listed above but is not proceeding with Immunization(s) Shingles at this time. Education provided for Immunization(s) Shingles.         To get stronger

## 2020-07-16 NOTE — PHYSICAL THERAPY INITIAL EVALUATION ADULT - MANUAL MUSCLE TESTING RESULTS, REHAB EVAL
grossly >3+/5 t/o b/l UE's/LE's based on functional mobility against gravity except R UE NT secondary to fx

## 2020-07-16 NOTE — DISCHARGE NOTE PROVIDER - CARE PROVIDERS DIRECT ADDRESSES
,[email or web address],DirectAddress_Unknown,DirectAddress_Unknown ,DirectAddress_Unknown,DirectAddress_Unknown,[email or web address]

## 2020-07-16 NOTE — PROGRESS NOTE ADULT - PROBLEM SELECTOR PLAN 7
F: none  E: Replete K<4, Mg<2  N: consistent carb, Kosher    DVT ppx: F: none  E: Replete K<4, Mg<2  N: consistent carb, Kosher    DVT ppx: Lovenox F: none  E: Replete K<4, Mg<2  N: consistent carb, Kosher    DVT ppx: Lovenox  FULL CODE

## 2020-07-16 NOTE — DISCHARGE NOTE PROVIDER - NSDCCPCAREPLAN_GEN_ALL_CORE_FT
PRINCIPAL DISCHARGE DIAGNOSIS  Diagnosis: Frequent falls  Assessment and Plan of Treatment: You had 4 falls in the past month due to feet unsteadiness. You did not lose consciousness during these episodes and describe your feet as "getting ahead" of your pace and thereby losing balance. Your falls are likely mechanical and could be either drug induced or neurological (such as potential parkinson's disease). We ruled out cardiac causes by getting an echocardiogram of your heart, which did not show any problems with your heart valves. Your thyroid and vitamin B12 levels came back normal. Your serum depakote and lithium levels were in the lower normal ranges, but we do not know if you have been compliant with your medications.      SECONDARY DISCHARGE DIAGNOSES  Diagnosis: Carpal fracture  Assessment and Plan of Treatment:     Diagnosis: Tremor  Assessment and Plan of Treatment:     Diagnosis: Bipolar disorder  Assessment and Plan of Treatment:     Diagnosis: Type 2 diabetes mellitus  Assessment and Plan of Treatment:     Diagnosis: Hypertension  Assessment and Plan of Treatment: PRINCIPAL DISCHARGE DIAGNOSIS  Diagnosis: Frequent falls  Assessment and Plan of Treatment: You had 4 falls in the past month due to feet unsteadiness. You did not lose consciousness during these episodes and describe your feet as "getting ahead" of your pace and thereby losing balance. Your falls are likely mechanical and could be either drug induced or neurological (such as potential parkinson's disease). We ruled out cardiac causes by getting an echocardiogram of your heart, which did not show any problems with your heart valves. Your thyroid and vitamin B12 levels came back normal. Your serum depakote and lithium levels were in the lower normal ranges, but we do not know if you have been compliant with your medications.      SECONDARY DISCHARGE DIAGNOSES  Diagnosis: Carpal fracture  Assessment and Plan of Treatment: You had a fracture of your right wrist bone (triquetrium) after falling 2 days ago. We put your hand in a splint. Please do not perform weight bearing exercises on this hand and discuss with your primary care doctor on when to remove the splint.    Diagnosis: Tremor  Assessment and Plan of Treatment: You have a slight resting tremor of both hands that gets worse with movement. This could be either from your psychiatric medications or neurological disease such as parkinson. We did not find anything concerning in your MRI scan of the head. Dr. Stern wants to try you on a one month course of Sinemet 25/100. Please take this drug 3 times a day. You are scheduled to see Dr. Stern in her office on August 3, 2020. Please be on time for your appointment.    Diagnosis: Bipolar disorder  Assessment and Plan of Treatment: You take lithium, lamotrigine, and depakote for your bipolar disorder. Dr. Stern contacted your psychiatrist (Dr. Hernandez) who says you are not compliant with your medications. As a result, we are unsure if your balance and hand shakiness could be due to poor medication control for bipoplar disorder. Please follow-up with your psychiatrist as an outpatient.    Diagnosis: Type 2 diabetes mellitus  Assessment and Plan of Treatment: You have a history of diabetes. Please avoid high carbohydrate and sugary foods. Continue with your home medication of metformin 500 mg once a day and Janumet.    Diagnosis: Hypertension  Assessment and Plan of Treatment: You have a history of high blood pressure. You take propranolol at home. We did not give you this medication a few times in the hospital because your heart rate was slightly less than 60. Please confirm with your primary care doctor if you need to continue taking this medication. PRINCIPAL DISCHARGE DIAGNOSIS  Diagnosis: Frequent falls  Assessment and Plan of Treatment: You had 4 falls in the past month due to feet unsteadiness. You did not lose consciousness during these episodes and describe your feet as "getting ahead" of your pace and thereby losing balance. Your falls are likely mechanical and could be either drug induced or neurological (such as potential parkinson's disease). We ruled out cardiac causes by getting an echocardiogram of your heart, which did not show any problems with your heart valves. The MRI of your head did not show any acute pathology, your cerebellum looked normal. Your thyroid and vitamin B12 levels came back normal. Your serum depakote and lithium levels were in the lower normal ranges, but we do not know if you have been compliant with your medications. Please review your medication list with your primary care doctor. Physical therapy also recommended sub acute rehab due to these falls, but you refused during this hospitalization. The medical staff feels you should go to [facility], but you seemed firm on wanting to go home.      SECONDARY DISCHARGE DIAGNOSES  Diagnosis: Carpal fracture  Assessment and Plan of Treatment: You had a fracture of your right wrist bone (triquetrium) after falling 2 days ago. We put your hand in a splint. Please do not perform weight bearing exercises on this hand and discuss with your primary care doctor on when to remove the splint.    Diagnosis: Tremor  Assessment and Plan of Treatment: You have a slight resting tremor of both hands that gets worse with movement. This could be either from your psychiatric medications or neurological disease such as parkinson. We did not find anything concerning in your MRI scan of the head. Dr. Stern wants to try you on a one month course of Sinemet 25/100. Please take this drug 3 times a day. You are scheduled to see Dr. Stern in her office on August 3, 2020. Please be on time for your appointment.    Diagnosis: Bipolar disorder  Assessment and Plan of Treatment: You take lithium, lamotrigine, and depakote for your bipolar disorder. Dr. Stern contacted your psychiatrist (Dr. Hernandez) who says you are not compliant with your medications. As a result, we are unsure if your balance and hand shakiness could be due to poor medication control for bipoplar disorder. Please follow-up with your psychiatrist as an outpatient.    Diagnosis: Type 2 diabetes mellitus  Assessment and Plan of Treatment: You have a history of diabetes. Please avoid high carbohydrate and sugary foods. Continue with your home medication of metformin 500 mg once a day and Janumet.    Diagnosis: Hypertension  Assessment and Plan of Treatment: You have a history of high blood pressure. You take propranolol at home. We did not give you this medication a few times in the hospital because your heart rate was slightly less than 60. Please confirm with your primary care doctor if you need to continue taking this medication.

## 2020-07-16 NOTE — PROGRESS NOTE ADULT - PROBLEM SELECTOR PLAN 6
F: none  E: Replete K<4, Mg<2  N: consistent carb, Kosher    DVT ppx: Patient on home Janumet 50/500 BID and Metformin 500 mg daily  -Hgb A1c 7.2%  -mISS

## 2020-07-16 NOTE — DISCHARGE NOTE PROVIDER - PROVIDER TOKENS
PROVIDER:[TOKEN:[[id number]],SCHEDULEDAPPT:[08/03/2020],SCHEDULEDAPPTTIME:[10:30 AM]],PROVIDER:[TOKEN:[[id number]],SCHEDULEDAPPT:[07/27/2020],SCHEDULEDAPPTTIME:[10:00 AM],ESTABLISHEDPATIENT:[T]],FREE:[LAST:[Mary],FIRST:[Navin],PHONE:[phone]],FAX:[(   )    -],ADDRESS:[Provider to reach out on visit time],ESTABLISHEDPATIENT:[T]] PROVIDER:[TOKEN:[[id number]],SCHEDULEDAPPT:[07/27/2020],SCHEDULEDAPPTTIME:[10:00 AM],ESTABLISHEDPATIENT:[T]],FREE:[LAST:[Mary],FIRST:[Navin],PHONE:[phone]],FAX:[(   )    -],ADDRESS:[Provider to reach out on visit time],ESTABLISHEDPATIENT:[T]],PROVIDER:[TOKEN:[[id number]],SCHEDULEDAPPT:[08/17/2020],SCHEDULEDAPPTTIME:[10:40 AM]]

## 2020-07-16 NOTE — DISCHARGE NOTE PROVIDER - CARE PROVIDER_API CALL
Cris Stern)  Neurology; Vascular Neurology  [address], [address] [place] [address]  Phone: [phone]  Fax: [phone]  Scheduled Appointment: 08/03/2020 10:30 AM    HIGINIO SARGENT  [id number]  [address] [address]  Phone: [phone]  Fax: ()-  Established Patient  Scheduled Appointment: 07/27/2020 10:00 AM    Navin Hernandez  Provider to reach out on visit time  Phone: [phone]  Fax: (   )    -  Established Patient  Follow Up Time: HIGINIO SARGENT  [id number]  [address] [address]  Phone: [phone]  Fax: ()-  Established Patient  Scheduled Appointment: 07/27/2020 10:00 AM    Navin Hernandez  Provider to reach out on visit time  Phone: [phone]  Fax: (   )    -  Established Patient  Follow Up Time:     Cris Stern)  Neurology; Vascular Neurology  [address], [address] [place] [address]  Phone: [phone]  Fax: [phone]  Scheduled Appointment: 08/17/2020 10:40 AM

## 2020-07-16 NOTE — PROGRESS NOTE ADULT - SUBJECTIVE AND OBJECTIVE BOX
O/N Events:  Subjective/ROS: Patient seen and examined at bedside. Denies HA, CP, SOB, n/v, changes in bowel/urinary habits.  12pt ROS otherwise negative.    VITALS  Vital Signs Last 24 Hrs  T(C): 36.5 (16 Jul 2020 09:36), Max: 36.9 (15 Jul 2020 17:49)  T(F): 97.7 (16 Jul 2020 09:36), Max: 98.4 (15 Jul 2020 17:49)  HR: 49 (16 Jul 2020 09:36) (49 - 60)  BP: 102/63 (16 Jul 2020 09:36) (102/63 - 145/68)  BP(mean): --  RR: 16 (16 Jul 2020 09:36) (16 - 18)  SpO2: 95% (16 Jul 2020 09:36) (95% - 98%)    CAPILLARY BLOOD GLUCOSE      POCT Blood Glucose.: 92 mg/dL (16 Jul 2020 08:43)      PHYSICAL EXAM  General: A&Ox3, NAD, comfortable in bed   HEENT: NC/AT, PERRL/ EOMI, no scleral icterus, MMM  Neck: Supple; no JVD  Respiratory: CTA B/L, no wheezes/crackles   Cardiovascular: Regular rhythm/rate; +S1 +S2  Gastrointestinal: Soft, NTND, normoactive BS, no rebound, no guarding, no suprapubic tenderness  Extremities: Right hand and wrist in splint (cannot assess for erythema) tender to palpation. No peripheral edema, no clubbing, no cyanosis.   Musculoskeletal: Right forearm tender to palpation. Right shoulder non tender to palpation, non erythematous, no gross anatomical defect.   Lymphatic: No cervical/supraclavicular LAD  Neurological:  CNII-XII grossly intact, no obvious focal deficits, follows commands; resting hand tremors b/l worse with intention. No dysmetria, no dysdiadochokinesia.     MEDICATIONS  (STANDING):  dextrose 5%. 1000 milliLiter(s) (50 mL/Hr) IV Continuous <Continuous>  dextrose 50% Injectable 12.5 Gram(s) IV Push once  dextrose 50% Injectable 25 Gram(s) IV Push once  dextrose 50% Injectable 25 Gram(s) IV Push once  diVALproex  milliGRAM(s) Oral two times a day  insulin lispro (HumaLOG) corrective regimen sliding scale   SubCutaneous three times a day before meals  lamoTRIgine 250 milliGRAM(s) Oral daily  lithium 300 milliGRAM(s) Oral two times a day  propranolol 40 milliGRAM(s) Oral two times a day    MEDICATIONS  (PRN):  dextrose 40% Gel 15 Gram(s) Oral once PRN Blood Glucose LESS THAN 70 milliGRAM(s)/deciliter  glucagon  Injectable 1 milliGRAM(s) IntraMuscular once PRN Glucose LESS THAN 70 milligrams/deciliter      No Known Allergies      LABS                        12.6   7.89  )-----------( 206      ( 16 Jul 2020 08:01 )             39.1     07-16    142  |  105  |  13  ----------------------------<  97  4.1   |  26  |  0.74    Ca    9.1      16 Jul 2020 08:01  Phos  3.7     07-16  Mg     2.1     07-16    TPro  7.0  /  Alb  4.2  /  TBili  0.4  /  DBili  x   /  AST  19  /  ALT  13  /  AlkPhos  66  07-15    PT/INR - ( 16 Jul 2020 08:01 )   PT: 12.7 sec;   INR: 1.06          PTT - ( 16 Jul 2020 08:01 )  PTT:36.4 sec      Radiologic Studies:   XR right Wrist - no acute fracture O/N Events: No acute overnight events.   Subjective/ROS: Patient seen and examined at bedside. States he noticed instability has become an issue especially in the last 3 weeks. Patient says he cannot stop appropriately when ambulating and hence falls. Most recent fall was yesterday while he was walking on a bike path in [place]. Pt reportedly feel forward on his right side, hit his right forehead, shoulder, and arm against the bike path. Admits to having pain in his right hand this morning. On further questioning, patient claims his PCP recommended he see a neurologist. Denies HA, CP, SOB, n/v, changes in bowel/urinary habits.  12pt ROS otherwise negative.    VITALS  Vital Signs Last 24 Hrs  T(C): 36.5 (16 Jul 2020 09:36), Max: 36.9 (15 Jul 2020 17:49)  T(F): 97.7 (16 Jul 2020 09:36), Max: 98.4 (15 Jul 2020 17:49)  HR: 49 (16 Jul 2020 09:36) (49 - 60)  BP: 102/63 (16 Jul 2020 09:36) (102/63 - 145/68)  BP(mean): --  RR: 16 (16 Jul 2020 09:36) (16 - 18)  SpO2: 95% (16 Jul 2020 09:36) (95% - 98%)    CAPILLARY BLOOD GLUCOSE      POCT Blood Glucose.: 92 mg/dL (16 Jul 2020 08:43)      PHYSICAL EXAM  General: A&Ox3, NAD, comfortable in bed   HEENT: NC/AT, PERRL/ EOMI, no scleral icterus, MMM  Neck: Supple; no JVD  Respiratory: CTA B/L, no wheezes/crackles   Cardiovascular: Regular rhythm/rate; +S1 +S2  Gastrointestinal: Soft, NTND, normoactive BS, no rebound, no guarding, no suprapubic tenderness  Extremities: Right hand and wrist in splint (cannot assess for erythema) tender to palpation. No peripheral edema, no clubbing, no cyanosis.   Musculoskeletal: Right forearm tender to palpation. Right shoulder non tender to palpation, non erythematous, no gross anatomical defect.   Lymphatic: No cervical/supraclavicular LAD  Neurological:  CNII-XII grossly intact, no obvious focal deficits, follows commands; resting hand tremors b/l worse with intention. No dysmetria, no dysdiadochokinesia.     MEDICATIONS  (STANDING):  dextrose 5%. 1000 milliLiter(s) (50 mL/Hr) IV Continuous <Continuous>  dextrose 50% Injectable 12.5 Gram(s) IV Push once  dextrose 50% Injectable 25 Gram(s) IV Push once  dextrose 50% Injectable 25 Gram(s) IV Push once  diVALproex  milliGRAM(s) Oral two times a day  insulin lispro (HumaLOG) corrective regimen sliding scale   SubCutaneous three times a day before meals  lamoTRIgine 250 milliGRAM(s) Oral daily  lithium 300 milliGRAM(s) Oral two times a day  propranolol 40 milliGRAM(s) Oral two times a day    MEDICATIONS  (PRN):  dextrose 40% Gel 15 Gram(s) Oral once PRN Blood Glucose LESS THAN 70 milliGRAM(s)/deciliter  glucagon  Injectable 1 milliGRAM(s) IntraMuscular once PRN Glucose LESS THAN 70 milligrams/deciliter      No Known Allergies      LABS                        12.6   7.89  )-----------( 206      ( 16 Jul 2020 08:01 )             39.1     07-16    142  |  105  |  13  ----------------------------<  97  4.1   |  26  |  0.74    Ca    9.1      16 Jul 2020 08:01  Phos  3.7     07-16  Mg     2.1     07-16    TPro  7.0  /  Alb  4.2  /  TBili  0.4  /  DBili  x   /  AST  19  /  ALT  13  /  AlkPhos  66  07-15    PT/INR - ( 16 Jul 2020 08:01 )   PT: 12.7 sec;   INR: 1.06          PTT - ( 16 Jul 2020 08:01 )  PTT:36.4 sec      Radiologic Studies:   XR right Wrist - no acute fracture O/N Events: No acute overnight events.   Subjective/ROS: Patient seen and examined at bedside. States he noticed instability has become an issue especially in the last 3 weeks. Patient says he cannot stop appropriately when ambulating and hence falls. Most recent fall was yesterday while he was walking on a bike path in [place]. Pt reportedly fell forward on his right side, hit his right forehead, shoulder, and arm against the bike path. Says he did not lose consciousness. Admits to having pain in his right hand this morning. On further questioning, patient claims his PCP recommended he see a neurologist. Denies HA, CP, SOB, n/v, changes in bowel/urinary habits.  12pt ROS otherwise negative.    VITALS  Vital Signs Last 24 Hrs  T(C): 36.5 (16 Jul 2020 09:36), Max: 36.9 (15 Jul 2020 17:49)  T(F): 97.7 (16 Jul 2020 09:36), Max: 98.4 (15 Jul 2020 17:49)  HR: 49 (16 Jul 2020 09:36) (49 - 60)  BP: 102/63 (16 Jul 2020 09:36) (102/63 - 145/68)  BP(mean): --  RR: 16 (16 Jul 2020 09:36) (16 - 18)  SpO2: 95% (16 Jul 2020 09:36) (95% - 98%)    CAPILLARY BLOOD GLUCOSE      POCT Blood Glucose.: 92 mg/dL (16 Jul 2020 08:43)      PHYSICAL EXAM  General: A&Ox3, NAD, comfortable in bed   HEENT: NC/AT, PERRL/ EOMI, no scleral icterus, MMM  Neck: Supple; no JVD  Respiratory: CTA B/L, no wheezes/crackles   Cardiovascular: Regular rhythm/rate; +S1 +S2  Gastrointestinal: Soft, NTND, normoactive BS, no rebound, no guarding, no suprapubic tenderness  Extremities: Right hand and wrist in splint (cannot assess for erythema) tender to palpation. No peripheral edema, no clubbing, no cyanosis.   Musculoskeletal: Right forearm tender to palpation. Right shoulder non tender to palpation, non erythematous, no gross anatomical defect.   Lymphatic: No cervical/supraclavicular LAD  Neurological:  CNII-XII grossly intact, no obvious focal deficits, follows commands; resting hand tremors b/l worse with intention. No dysmetria, no dysdiadochokinesia.     MEDICATIONS  (STANDING):  dextrose 5%. 1000 milliLiter(s) (50 mL/Hr) IV Continuous <Continuous>  dextrose 50% Injectable 12.5 Gram(s) IV Push once  dextrose 50% Injectable 25 Gram(s) IV Push once  dextrose 50% Injectable 25 Gram(s) IV Push once  diVALproex  milliGRAM(s) Oral two times a day  insulin lispro (HumaLOG) corrective regimen sliding scale   SubCutaneous three times a day before meals  lamoTRIgine 250 milliGRAM(s) Oral daily  lithium 300 milliGRAM(s) Oral two times a day  propranolol 40 milliGRAM(s) Oral two times a day    MEDICATIONS  (PRN):  dextrose 40% Gel 15 Gram(s) Oral once PRN Blood Glucose LESS THAN 70 milliGRAM(s)/deciliter  glucagon  Injectable 1 milliGRAM(s) IntraMuscular once PRN Glucose LESS THAN 70 milligrams/deciliter      No Known Allergies      LABS                        12.6   7.89  )-----------( 206      ( 16 Jul 2020 08:01 )             39.1     07-16    142  |  105  |  13  ----------------------------<  97  4.1   |  26  |  0.74    Ca    9.1      16 Jul 2020 08:01  Phos  3.7     07-16  Mg     2.1     07-16    TPro  7.0  /  Alb  4.2  /  TBili  0.4  /  DBili  x   /  AST  19  /  ALT  13  /  AlkPhos  66  07-15    PT/INR - ( 16 Jul 2020 08:01 )   PT: 12.7 sec;   INR: 1.06          PTT - ( 16 Jul 2020 08:01 )  PTT:36.4 sec      Radiologic Studies:   XR right Wrist - no acute fracture

## 2020-07-16 NOTE — PHYSICAL THERAPY INITIAL EVALUATION ADULT - ADDITIONAL COMMENTS
Pt reports living with family Pt reports living in a  with [address], with his spouse and children. Pt reports being independent with all ADLs and functional mobility without use of an assistive device. Of note, pt has had "multiple" falls within the last 6 months, other than the most recent one requiring this hospital admission. Pt is also unclear in family can formally assist pt upon D/C to home.

## 2020-07-16 NOTE — PHYSICAL THERAPY INITIAL EVALUATION ADULT - GENERAL OBSERVATIONS, REHAB EVAL
Pt found semi supine in bed in NAD, +R UE ACE wrap, +hep lock, agreeable to PT Eval and cleared by MARIANA Darnell.

## 2020-07-17 ENCOUNTER — TRANSCRIPTION ENCOUNTER (OUTPATIENT)
Age: 70
End: 2020-07-17

## 2020-07-17 VITALS
SYSTOLIC BLOOD PRESSURE: 147 MMHG | DIASTOLIC BLOOD PRESSURE: 70 MMHG | OXYGEN SATURATION: 96 % | HEART RATE: 68 BPM | TEMPERATURE: 98 F | RESPIRATION RATE: 16 BRPM

## 2020-07-17 DIAGNOSIS — Z29.9 ENCOUNTER FOR PROPHYLACTIC MEASURES, UNSPECIFIED: ICD-10-CM

## 2020-07-17 PROBLEM — Z00.00 ENCOUNTER FOR PREVENTIVE HEALTH EXAMINATION: Status: ACTIVE | Noted: 2020-07-17

## 2020-07-17 LAB
ANION GAP SERPL CALC-SCNC: 10 MMOL/L — SIGNIFICANT CHANGE UP (ref 5–17)
BUN SERPL-MCNC: 8 MG/DL — SIGNIFICANT CHANGE UP (ref 7–23)
CALCIUM SERPL-MCNC: 9.2 MG/DL — SIGNIFICANT CHANGE UP (ref 8.4–10.5)
CHLORIDE SERPL-SCNC: 107 MMOL/L — SIGNIFICANT CHANGE UP (ref 96–108)
CO2 SERPL-SCNC: 26 MMOL/L — SIGNIFICANT CHANGE UP (ref 22–31)
CREAT SERPL-MCNC: 0.66 MG/DL — SIGNIFICANT CHANGE UP (ref 0.5–1.3)
GLUCOSE BLDC GLUCOMTR-MCNC: 143 MG/DL — HIGH (ref 70–99)
GLUCOSE BLDC GLUCOMTR-MCNC: 205 MG/DL — HIGH (ref 70–99)
GLUCOSE SERPL-MCNC: 150 MG/DL — HIGH (ref 70–99)
HCT VFR BLD CALC: 42.5 % — SIGNIFICANT CHANGE UP (ref 39–50)
HGB BLD-MCNC: 13.5 G/DL — SIGNIFICANT CHANGE UP (ref 13–17)
MAGNESIUM SERPL-MCNC: 2.1 MG/DL — SIGNIFICANT CHANGE UP (ref 1.6–2.6)
MCHC RBC-ENTMCNC: 29.7 PG — SIGNIFICANT CHANGE UP (ref 27–34)
MCHC RBC-ENTMCNC: 31.8 GM/DL — LOW (ref 32–36)
MCV RBC AUTO: 93.4 FL — SIGNIFICANT CHANGE UP (ref 80–100)
NRBC # BLD: 0 /100 WBCS — SIGNIFICANT CHANGE UP (ref 0–0)
PLATELET # BLD AUTO: 226 K/UL — SIGNIFICANT CHANGE UP (ref 150–400)
POTASSIUM SERPL-MCNC: 3.9 MMOL/L — SIGNIFICANT CHANGE UP (ref 3.5–5.3)
POTASSIUM SERPL-SCNC: 3.9 MMOL/L — SIGNIFICANT CHANGE UP (ref 3.5–5.3)
RBC # BLD: 4.55 M/UL — SIGNIFICANT CHANGE UP (ref 4.2–5.8)
RBC # FLD: 13.5 % — SIGNIFICANT CHANGE UP (ref 10.3–14.5)
SODIUM SERPL-SCNC: 143 MMOL/L — SIGNIFICANT CHANGE UP (ref 135–145)
WBC # BLD: 7.98 K/UL — SIGNIFICANT CHANGE UP (ref 3.8–10.5)
WBC # FLD AUTO: 7.98 K/UL — SIGNIFICANT CHANGE UP (ref 3.8–10.5)

## 2020-07-17 PROCEDURE — 99233 SBSQ HOSP IP/OBS HIGH 50: CPT

## 2020-07-17 PROCEDURE — 99239 HOSP IP/OBS DSCHRG MGMT >30: CPT

## 2020-07-17 RX ORDER — CARBIDOPA AND LEVODOPA 25; 100 MG/1; MG/1
1 TABLET ORAL THREE TIMES A DAY
Refills: 0 | Status: DISCONTINUED | OUTPATIENT
Start: 2020-07-17 | End: 2020-07-17

## 2020-07-17 RX ORDER — CARBIDOPA AND LEVODOPA 25; 100 MG/1; MG/1
1 TABLET ORAL
Qty: 90 | Refills: 0
Start: 2020-07-17 | End: 2020-08-15

## 2020-07-17 RX ADMIN — ENOXAPARIN SODIUM 40 MILLIGRAM(S): 100 INJECTION SUBCUTANEOUS at 12:35

## 2020-07-17 RX ADMIN — LAMOTRIGINE 250 MILLIGRAM(S): 25 TABLET, ORALLY DISINTEGRATING ORAL at 12:34

## 2020-07-17 RX ADMIN — Medication 650 MILLIGRAM(S): at 05:48

## 2020-07-17 RX ADMIN — CARBIDOPA AND LEVODOPA 1 TABLET(S): 25; 100 TABLET ORAL at 15:22

## 2020-07-17 RX ADMIN — DIVALPROEX SODIUM 500 MILLIGRAM(S): 500 TABLET, DELAYED RELEASE ORAL at 05:49

## 2020-07-17 RX ADMIN — LITHIUM CARBONATE 300 MILLIGRAM(S): 300 TABLET, EXTENDED RELEASE ORAL at 05:52

## 2020-07-17 NOTE — OCCUPATIONAL THERAPY INITIAL EVALUATION ADULT - MD ORDER
Per chart, 69M with a PMHx of Bipolar disorder and T2DM who presents after a fall. Per the patient, he has had four falls in the past month. He states his falls are related to a gait instability, during which his feet begin to move quickly and he loses control over them. Sometimes, he is able to slow himself down and stop. Patient w/ R triquetrum fx. CTH Unremarkable.

## 2020-07-17 NOTE — PROGRESS NOTE ADULT - SUBJECTIVE AND OBJECTIVE BOX
Physical Medicine and Rehabilitation Progress Note:    Patient is a 69y old  Male who presents with a chief complaint of Fall (17 Jul 2020 06:09)      HPI:  Mr. Felicity Ford is a 69M with a PMHx of Bipolar disorder and T2DM who presents after a fall. Per the patient, he has had four falls in the past month. He states his falls are related to a gait instability, during which his feet begin to move quickly and he loses control over them. Sometimes, he is able to slow himself down and stop. During this fall, he was able to slow himself down, but then fell and experienced head trauma. He denies LOC, bowel or bladder incontinence, or tongue biting. Denies preceding chest pain, SOB, palpitations, nausea, or vomiting. During the fall, he experienced trauma to the R hand, and endorses pain and swelling of the R hand, as well as some discomfort of the R shoulder. A splint was placed on the R hand by the ED provider. Otherwise denies recent fever, chills, abdominal pain, n/v/d/c, weakness, sick contacts, recent travel.    On arrival to ED, VS: T 98.4, /68, HR 60, RR 16, SpO2 97% on room air    Labs significant for: WBC 11.52, glucose 153, covid negative.    CT head negative for acute intracranial injury. Preliminary read of hand XR without acute finding.    Neurology consulted in ED, will follow. (15 Jul 2020 21:41)                            13.5   7.98  )-----------( 226      ( 17 Jul 2020 06:23 )             42.5       07-17    143  |  107  |  8   ----------------------------<  150<H>  3.9   |  26  |  0.66    Ca    9.2      17 Jul 2020 06:23  Phos  3.7     07-16  Mg     2.1     07-17    TPro  7.0  /  Alb  4.2  /  TBili  0.4  /  DBili  x   /  AST  19  /  ALT  13  /  AlkPhos  66  07-15    Vital Signs Last 24 Hrs  T(C): 36.9 (17 Jul 2020 05:16), Max: 36.9 (17 Jul 2020 05:16)  T(F): 98.4 (17 Jul 2020 05:16), Max: 98.4 (17 Jul 2020 05:16)  HR: 63 (17 Jul 2020 05:47) (60 - 64)  BP: 119/67 (17 Jul 2020 05:47) (110/60 - 146/77)  BP(mean): --  RR: 18 (17 Jul 2020 05:16) (18 - 18)  SpO2: 98% (17 Jul 2020 05:16) (96% - 98%)    MEDICATIONS  (STANDING):  dextrose 5%. 1000 milliLiter(s) (50 mL/Hr) IV Continuous <Continuous>  dextrose 50% Injectable 12.5 Gram(s) IV Push once  dextrose 50% Injectable 25 Gram(s) IV Push once  dextrose 50% Injectable 25 Gram(s) IV Push once  diVALproex  milliGRAM(s) Oral two times a day  enoxaparin Injectable 40 milliGRAM(s) SubCutaneous once  insulin lispro (HumaLOG) corrective regimen sliding scale   SubCutaneous Before meals and at bedtime  lamoTRIgine 250 milliGRAM(s) Oral daily  lithium 300 milliGRAM(s) Oral two times a day  propranolol 40 milliGRAM(s) Oral two times a day    MEDICATIONS  (PRN):  dextrose 40% Gel 15 Gram(s) Oral once PRN Blood Glucose LESS THAN 70 milliGRAM(s)/deciliter  glucagon  Injectable 1 milliGRAM(s) IntraMuscular once PRN Glucose LESS THAN 70 milligrams/deciliter    Currently Undergoing Physical/ Occupational Therapy at bedside.    Initial Functional Status Assessment:   7/16/2020    Previous Level of Function:     · Ambulation Skills  independent    · Transfer Skills  independent    · ADL Skills  independent    · Additional Comments  Pt reports living in a  with [address], with his spouse and children. Pt reports being independent with all ADLs and functional mobility without use of an assistive device. Of note, pt has had "multiple" falls within the last 6 months, other than the most recent one requiring this hospital admission. Pt is also unclear in family can formally assist pt upon D/C to home.            Cognitive Status Examination:   · Orientation  oriented to person, place, time and situation    · Level of Consciousness  alert    · Follows Commands and Answers Questions  100% of the time; able to follow single-step instructions    · Personal Safety and Judgment  intact      Range of Motion Exam:   · Active Range of Motion Examination  bilateral  lower extremity Active ROM was WFL (within functional limits)      Manual Muscle Testing:   · Manual Muscle Testing Results  grossly >3+/5 t/o b/l UE's/LE's based on functional mobility against gravity except R UE NT secondary to fx       Bed Mobility: Scooting/Bridging:     · Level of [place]  supervision    · Physical Assist/Nonphysical Assist  supervision; verbal cues; nonverbal cues (demo/gestures)      Bed Mobility: Sit to Supine:     · Level of [place]  supervision    · Physical Assist/Nonphysical Assist  supervision; verbal cues; nonverbal cues (demo/gestures)      Bed Mobility: Supine to Sit:     · Level of [place]  supervision    · Physical Assist/Nonphysical Assist  supervision; verbal cues; nonverbal cues (demo/gestures)      Bed Mobility Analysis:     · Bed Mobility Limitations  decreased ability to use arms for pushing/pulling; decreased ability to use legs for bridging/pushing; impaired ability to control trunk for mobility    · Impairments Contributing to Impaired Bed Mobility  impaired balance; impaired coordination; impaired postural control; decreased strength      Transfer: Sit to Stand:     · Level of [place]  supervision    · Physical Assist/Nonphysical Assist  1 person assist; verbal cues; nonverbal cues (demo/gestures)    · Weight-Bearing Restrictions  nonweight-bearing; R UE    · Assistive Device  straight cane      Transfer: Stand to Sit:     · Level of [place]  supervision; contact guard    · Physical Assist/Nonphysical Assist  verbal cues; nonverbal cues (demo/gestures); 1 person assist    · Weight-Bearing Restrictions  nonweight-bearing; R UE    · Assistive Device  straight cane      Sit/Stand Transfer Safety Analysis:     · Transfer Safety Concerns Noted  decreased safety awareness; losing balance; decreased proprioception    · Impairments Contributing to Impaired Transfers  impaired balance; impaired coordination; impaired postural control; decreased strength      Gait Skills:     · Level of [place]  contact guard    · Physical Assist/Nonphysical Assist  1 person assist; nonverbal cues (demo/gestures); verbal cues    · Weight-Bearing Restrictions  nonweight-bearing; R UE    · Assistive Device  straight cane    · Gait Distance  50 feet      Gait Analysis:     · Gait Pattern Used  3-point gait     · Gait Deviations Noted  increased time in double stance; decreased step length    · Impairments Contributing to Gait Deviations  impaired balance; impaired coordination; impaired postural control; decreased strength; forward leaning, increasing gait speed      Stair Negotiation:     · Level of [place]  contact guard; minimum assist (75% patients effort)    · Physical Assist/Nonphysical Assist  1 person assist; nonverbal cues (demo/gestures); verbal cues    · Weight-Bearing Restrictions  nonweight-bearing; R UE    · Assistive Device  left rail up; left rail down    · Stair Pattern  step to step    · Number of Stairs  12      Balance Skills Assessment:     · Sitting Balance: Static  normal balance     · Sitting Balance: Dynamic  good balance     · Sit-to-Stand Balance  fair balance     · Standing Balance: Static  fair balance     · Standing Balance: Dynamic  fair balance     · Systems Impairment Contributing to Balance Disturbance  musculoskeletal; neuromuscular    · Identified Impairments Contributing to Balance Disturbance  decreased strength; impaired postural control      Sensory Examination:   Sensory Examination:    Light Touch Sensation:   · Left UE  within normal limits     · Right UE  not tested     · Left LE  within normal limits     · Right LE  within normal limits       · Coordination Assessed  finger to nose; heel to shin; (-) dysmetria however intention tremor noted        Proprioception:   · Coordination Assessed  finger to nose; heel to shin; (-) dysmetria however intention tremor noted        Fine Motor Coordination:   Fine Motor Coordination Examination:    Fine Motor Coordination:   · Left Hand Thumb/Finger Opposition Skills  normal performance     · Right Hand Thumb/Finger Opposition Skills  mild impairment  due to pain       Treatment Location:   · Comments  CN II-XII grossly intact      Clinical Impressions:   · Criteria for Skilled Therapeutic Interventions  impairments found; functional limitations in following categories; risk reduction/prevention; rehab potential; therapy frequency; anticipated equipment needs at discharge; anticipated discharge recommendation    · Impairments Found (describe specific impairments)  aerobic capacity/endurance; decreased midline orientation; muscle strength; fine motor; gait, locomotion, and balance; posture; sensory integrity    · Functional Limitations in Following Categories (describe specific limitations)  self-care; home management    · Risk Reduction/Prevention (Describe Specific Areas of risk reduction/prevention)  risk factors    · Risk Areas  fall    · Rehab Potential  good, to achieve stated therapy goals    · Therapy Frequency  2-3x/week          PM&R Impression: as above    Current Disposition Plan Recommendations:    subacute rehab placement

## 2020-07-17 NOTE — OCCUPATIONAL THERAPY INITIAL EVALUATION ADULT - ADDITIONAL COMMENTS
Patient reports being independent PTA w/o use of AD/AE. History of multiple falls in the past month.

## 2020-07-17 NOTE — OCCUPATIONAL THERAPY INITIAL EVALUATION ADULT - STANDING BALANCE: DYNAMIC, REHAB EVAL
Patient ambulated approximately 30 ft w/ SC for functional mobility and balance further deferred as patient just completed PT./fair plus

## 2020-07-17 NOTE — OCCUPATIONAL THERAPY INITIAL EVALUATION ADULT - PLANNED THERAPY INTERVENTIONS, OT EVAL
ADL retraining/balance training/bed mobility training/fine motor coordination training/motor coordination training/neuromuscular re-education/ROM/strengthening/transfer training

## 2020-07-17 NOTE — PROGRESS NOTE ADULT - ATTENDING COMMENTS
Patient seen and examined with PA.  Agree with above.  Patient feels stronger today with ability to walk to bathroom overnight.  Both hands shaking.  MRI Brain performed - negative for stroke.  Medication levels all within low therapeutic levels.  B12 level and TSH - normal.  He doesn't want to go to [facility].    I spoke to his wife who stated that he's not always compliant with his medications.  She thinks that he's on many meds and some of the meds make him crazy.  He only sees his Psychiatrist once a year since refuses otherwise.    - She's not sure about his memory since sleeps a lot and not interactive much at the house.  She admits that not the best.  The patient follows with Dr. Marshall at [facility].  Has Neurology appointment next week with Dr. Nazario.    I spoke to Dr. Hernandez, Psychiatry - very noncompliant patient, severe Bipolar disease on Lithium, Depakote and Lamictal.  He has 5-6 children who have Bipolar as well.  He doesn't know if the patient wasn't taking them previously.  He hasn't seen patient since. October, 2017.  Tremor has been there for years related to Lithium and Depakote.  He thinks that some of the memory deficits are related to long term Depakote.    Differential diagnosis for his falls could be related to his medications.    May have aspect of drug induced Parkinsonism given tremor and gait instability.  Can try Sinemet 25/100 TID and follow up in my office in one month.  No further inpatient neurological workup.
Patient seen and examined with house-staff during bedside rounds.  Resident note read, including vitals, physical findings, laboratory data, and radiological reports.   Revisions included below.  Direct personal management at bed side and extensive interpretation of the data.  Plan was outlined and discussed in details with the housestaff.  Decision making of high complexity  Action taken for acute disease activity to reflect the level of care provided:  - medication reconciliation  - review laboratory data  echo and MRI reviewed  follow with neuro and cardio  follow with PT  will need rehab

## 2020-07-17 NOTE — OCCUPATIONAL THERAPY INITIAL EVALUATION ADULT - GENERAL OBSERVATIONS, REHAB EVAL
L hand dominant, patient seen for OT eval, received semi-supine, NAD, +R wrist brace, +heplock, +bed alarm.

## 2020-07-17 NOTE — PROGRESS NOTE ADULT - ASSESSMENT
per Internal Medicine    68 y/o Gentleman with a PMHx Bipolar Disorder and T2DM with a recurring history of falls, admitted for additional fall workup.     COVID-19 Negative Lab Result:  · COVID-19 Negative Lab Result	COVID-19 ruled out	    Problem/Plan - 1:  ·  Problem: Frequent falls.  Plan: Pt with 4 falls in the past month due to feet unsteadiness. Patient without prodromal symptoms.   -Falls likely mechanical and could be due to neurological process (such as Parkinson's) and/or drug induced from psych meds  -PT recs: recommends [facility]URRAM, therapy 2-3x/week  -TSH, B12 wnl   -TTE: EF 60-65%, no regional wall motion abnormalities, trace TR & MR, no pulm HTN, no pericardial effusion  -f/u serum depakote levels.     Problem/Plan - 2:  ·  Problem: Hand trauma.  Plan: Patient placed in hand/wrist splint.  - Hand & Wrist XR: soft tissue swelling over the dorsum of the hand. Triquetrum fracture.   - Shoulder XR: no fracture or dislocation  - recommend non-weight bearing on right hand  - OT consulted appreciate recs.     Problem/Plan - 3:  ·  Problem: Tremor.  Plan: Mild resting tremor increased with intention. No dysmetria, no dysdiadochokinesia. Could be medication induced or underlying neurological process.   - f/u MRI head w/wo contrast   - serum lithium 0.6 (within nl range)  - f/u serum depakote levels  - Neuro recs: potential candidate for Sinemet trial. Neuro will attempt to obtain collateral information from pt wife and others. Neuropsychological evaluation but can be done as outpatient.     Problem/Plan - 4:  ·  Problem: Bipolar disorder.  Plan: History of bipolar disease  - c/w lithium 300 mg BID  - c/w lamotrigine 250 mg PO daily  - c/w depakote 500 mg PO BID.     Problem/Plan - 5:  ·  Problem: Hypertension.  Plan: - c/w propranolol 40 mg PO BID.     Problem/Plan - 6:  Problem: Type 2 diabetes mellitus. Plan: Patient on home Janumet 50/500 BID and Metformin 500 mg daily  -Hgb A1c 7.2%  -mISS.    Problem/Plan - 7:  ·  Problem: Nutrition, metabolism, and development symptoms.  Plan: F: none  E: Replete K<4, Mg<2  N: consistent carb, Kosher    DVT ppx: Lovenox  FULL CODE.
Mr. Ford is a 68 y/o Gentleman with a PMHx Bipolar Disorder and T2DM with a recurring history of falls, admitted for additional fall workup.

## 2020-07-17 NOTE — PROGRESS NOTE ADULT - SUBJECTIVE AND OBJECTIVE BOX
Neurology Stroke Progress Note    INTERVAL HPI/OVERNIGHT EVENTS:  Patient seen and examined. Patient states he will follow up with his psychiatrist once he leaves. Asking when he can go home.     MEDICATIONS  (STANDING):  carbidopa/levodopa  25/100 1 Tablet(s) Oral three times a day  dextrose 5%. 1000 milliLiter(s) (50 mL/Hr) IV Continuous <Continuous>  dextrose 50% Injectable 12.5 Gram(s) IV Push once  dextrose 50% Injectable 25 Gram(s) IV Push once  dextrose 50% Injectable 25 Gram(s) IV Push once  diVALproex  milliGRAM(s) Oral two times a day  insulin lispro (HumaLOG) corrective regimen sliding scale   SubCutaneous Before meals and at bedtime  lamoTRIgine 250 milliGRAM(s) Oral daily  lithium 300 milliGRAM(s) Oral two times a day  propranolol 40 milliGRAM(s) Oral two times a day    MEDICATIONS  (PRN):  dextrose 40% Gel 15 Gram(s) Oral once PRN Blood Glucose LESS THAN 70 milliGRAM(s)/deciliter  glucagon  Injectable 1 milliGRAM(s) IntraMuscular once PRN Glucose LESS THAN 70 milligrams/deciliter      Allergies    No Known Allergies    Intolerances        ROS: As per HPI, otherwise negative    Vital Signs Last 24 Hrs  T(C): 36.8 (17 Jul 2020 10:41), Max: 36.9 (17 Jul 2020 05:16)  T(F): 98.3 (17 Jul 2020 10:41), Max: 98.4 (17 Jul 2020 05:16)  HR: 68 (17 Jul 2020 10:41) (60 - 68)  BP: 147/70 (17 Jul 2020 10:41) (110/60 - 147/70)  BP(mean): --  RR: 16 (17 Jul 2020 10:41) (16 - 18)  SpO2: 96% (17 Jul 2020 10:41) (96% - 98%)    Physical exam:  General: awake and alert, sitting comfortably, no acute distress    Neurologic:  Mental status: awake, alert, oriented to person, place, and time. Speech is fluent, able to name objects. Follows commands. Attention/concentration intact. No dysarthria, no aphasia.  Cranial nerves:   II: visual fields are full to confrontation. pupils equally round and reactive to light,   III, IV, VI: EOMI without nystagmus  VII: no facial droop,  Motor: Normal bulk and tone, strength 5/5 in b/l UE and LE,  strength 5/5. No pronator drift  Sensation: intact to light touch.  Coordination: No dysmetria on finger-to-nose         LABS:                        13.5   7.98  )-----------( 226      ( 17 Jul 2020 06:23 )             42.5     07-17    143  |  107  |  8   ----------------------------<  150<H>  3.9   |  26  |  0.66    Ca    9.2      17 Jul 2020 06:23  Phos  3.7     07-16  Mg     2.1     07-17    TPro  7.0  /  Alb  4.2  /  TBili  0.4  /  DBili  x   /  AST  19  /  ALT  13  /  AlkPhos  66  07-15    PT/INR - ( 16 Jul 2020 08:01 )   PT: 12.7 sec;   INR: 1.06          PTT - ( 16 Jul 2020 08:01 )  PTT:36.4 sec      RADIOLOGY & ADDITIONAL TESTS:  < from: MR Head w/wo IV Cont (07.16.20 @ 19:14) >  IMPRESSION:    Motion-degraded exam, but without acute abnormality. No evidence of infarction or masslike enhancement.  Mild diffuse volume loss, but otherwise grossly unremarkable exam.     < end of copied text >      Assessment and Plan  69 year-old male with PMHx DM, bipolar disease presents with imbalance and increased falls over the past two weeks. Questionable parkinsonism and possibly from his outpatient medication. Considering Sinemet trial as an outpatient    -MRI +/- negative for acute event  -From neurologic perspective, can be discharged  -Patient will follow up outpatient with neurology JACK Grubbs - appointment is scheduled for 10:40 am on August 17th at [address], 8th floor. Phone: [phone]

## 2020-07-17 NOTE — DISCHARGE NOTE NURSING/CASE MANAGEMENT/SOCIAL WORK - PATIENT PORTAL LINK FT
You can access the FollowMyHealth Patient Portal offered by [facility] by registering at the following website: [email or web address]. By joining [employer]’s FollowMyHealth portal, you will also be able to view your health information using other applications (apps) compatible with our system.

## 2020-07-17 NOTE — OCCUPATIONAL THERAPY INITIAL EVALUATION ADULT - RANGE OF MOTION EXAMINATION, UPPER EXTREMITY
RUE shoulder/elbow WFL, decreased R digits 2/2 swelling, wrist NT 2/2 splint and fx./Left UE Active ROM was WFL (within functional limits)
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
## 2020-07-22 DIAGNOSIS — R29.6 REPEATED FALLS: ICD-10-CM

## 2020-07-22 DIAGNOSIS — S62.114A NONDISPLACED FRACTURE OF TRIQUETRUM [CUNEIFORM] BONE, RIGHT WRIST, INITIAL ENCOUNTER FOR CLOSED FRACTURE: ICD-10-CM

## 2020-07-22 DIAGNOSIS — T50.915A ADVERSE EFFECT OF MULTIPLE UNSPECIFIED DRUGS, MEDICAMENTS AND BIOLOGICAL SUBSTANCES, INITIAL ENCOUNTER: ICD-10-CM

## 2020-07-22 DIAGNOSIS — Z91.14 PATIENT'S OTHER NONCOMPLIANCE WITH MEDICATION REGIMEN: ICD-10-CM

## 2020-07-22 DIAGNOSIS — G20 PARKINSON'S DISEASE: ICD-10-CM

## 2020-07-22 DIAGNOSIS — D72.829 ELEVATED WHITE BLOOD CELL COUNT, UNSPECIFIED: ICD-10-CM

## 2020-07-22 DIAGNOSIS — E66.3 OVERWEIGHT: ICD-10-CM
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
## 2020-07-22 DIAGNOSIS — F31.9 BIPOLAR DISORDER, UNSPECIFIED: ICD-10-CM
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
## 2020-07-22 DIAGNOSIS — Z79.82 LONG TERM (CURRENT) USE OF ASPIRIN: ICD-10-CM

## 2020-07-22 DIAGNOSIS — E11.9 TYPE 2 DIABETES MELLITUS WITHOUT COMPLICATIONS: ICD-10-CM

## 2020-08-03 ENCOUNTER — APPOINTMENT (OUTPATIENT)
Dept: NEUROLOGY | Facility: CLINIC | Age: 70
End: 2020-08-03

## 2021-02-11 NOTE — ED PROCEDURE NOTE - PROCEDURE DATE TIME, MLM
Patient stated she is having problems with her ears and she would like to see an ENT doctor. Patient is hoping that we can place an order for her to see Dr. Griffith. Patient would like to be contacted once this has been taken care of so we can transfer her directly to schedule with Dr. Griffith. Please advise.   15-Jul-2020 20:39

## 2021-04-16 NOTE — DISCHARGE NOTE PROVIDER - DISCHARGE DATE
17-Jul-2020 GOAL: Pt will improved B UE/LE strength to for increased limb stability, to improve gait, and facilitate stair negotiation in 4 weeks

## 2021-09-02 ENCOUNTER — EMERGENCY (EMERGENCY)
Facility: HOSPITAL | Age: 71
LOS: 1 days | Discharge: HOME | End: 2021-09-02
Attending: EMERGENCY MEDICINE | Admitting: EMERGENCY MEDICINE
Payer: MEDICARE

## 2021-09-02 VITALS
SYSTOLIC BLOOD PRESSURE: 168 MMHG | WEIGHT: 179.9 LBS | HEIGHT: 69 IN | HEART RATE: 60 BPM | TEMPERATURE: 98 F | OXYGEN SATURATION: 97 % | DIASTOLIC BLOOD PRESSURE: 66 MMHG | RESPIRATION RATE: 16 BRPM

## 2021-09-02 VITALS
RESPIRATION RATE: 16 BRPM | OXYGEN SATURATION: 96 % | SYSTOLIC BLOOD PRESSURE: 151 MMHG | DIASTOLIC BLOOD PRESSURE: 77 MMHG | HEART RATE: 52 BPM

## 2021-09-02 DIAGNOSIS — R53.1 WEAKNESS: ICD-10-CM

## 2021-09-02 DIAGNOSIS — F31.9 BIPOLAR DISORDER, UNSPECIFIED: ICD-10-CM

## 2021-09-02 DIAGNOSIS — Z79.83 LONG TERM (CURRENT) USE OF BISPHOSPHONATES: ICD-10-CM

## 2021-09-02 DIAGNOSIS — Z20.822 CONTACT WITH AND (SUSPECTED) EXPOSURE TO COVID-19: ICD-10-CM

## 2021-09-02 DIAGNOSIS — E11.9 TYPE 2 DIABETES MELLITUS WITHOUT COMPLICATIONS: ICD-10-CM

## 2021-09-02 LAB
ALBUMIN SERPL ELPH-MCNC: 4.2 G/DL — SIGNIFICANT CHANGE UP (ref 3.3–5)
ALP SERPL-CCNC: 59 U/L — SIGNIFICANT CHANGE UP (ref 40–120)
ALT FLD-CCNC: 17 U/L — SIGNIFICANT CHANGE UP (ref 10–45)
ANION GAP SERPL CALC-SCNC: 10 MMOL/L — SIGNIFICANT CHANGE UP (ref 5–17)
APTT BLD: 34.5 SEC — SIGNIFICANT CHANGE UP (ref 27.5–35.5)
AST SERPL-CCNC: 21 U/L — SIGNIFICANT CHANGE UP (ref 10–40)
BASOPHILS # BLD AUTO: 0.07 K/UL — SIGNIFICANT CHANGE UP (ref 0–0.2)
BASOPHILS NFR BLD AUTO: 0.7 % — SIGNIFICANT CHANGE UP (ref 0–2)
BILIRUB SERPL-MCNC: 0.3 MG/DL — SIGNIFICANT CHANGE UP (ref 0.2–1.2)
BUN SERPL-MCNC: 15 MG/DL — SIGNIFICANT CHANGE UP (ref 7–23)
CALCIUM SERPL-MCNC: 9.2 MG/DL — SIGNIFICANT CHANGE UP (ref 8.4–10.5)
CHLORIDE SERPL-SCNC: 102 MMOL/L — SIGNIFICANT CHANGE UP (ref 96–108)
CO2 SERPL-SCNC: 27 MMOL/L — SIGNIFICANT CHANGE UP (ref 22–31)
CREAT SERPL-MCNC: 0.81 MG/DL — SIGNIFICANT CHANGE UP (ref 0.5–1.3)
EOSINOPHIL # BLD AUTO: 0.13 K/UL — SIGNIFICANT CHANGE UP (ref 0–0.5)
EOSINOPHIL NFR BLD AUTO: 1.3 % — SIGNIFICANT CHANGE UP (ref 0–6)
GLUCOSE SERPL-MCNC: 194 MG/DL — HIGH (ref 70–99)
HCT VFR BLD CALC: 41.2 % — SIGNIFICANT CHANGE UP (ref 39–50)
HGB BLD-MCNC: 13.7 G/DL — SIGNIFICANT CHANGE UP (ref 13–17)
IMM GRANULOCYTES NFR BLD AUTO: 0.2 % — SIGNIFICANT CHANGE UP (ref 0–1.5)
INR BLD: 0.99 — SIGNIFICANT CHANGE UP (ref 0.88–1.16)
LIDOCAIN IGE QN: 26 U/L — SIGNIFICANT CHANGE UP (ref 7–60)
LITHIUM SERPL-MCNC: 0.47 MMOL/L — LOW (ref 0.6–1.2)
LYMPHOCYTES # BLD AUTO: 4.04 K/UL — HIGH (ref 1–3.3)
LYMPHOCYTES # BLD AUTO: 40.9 % — SIGNIFICANT CHANGE UP (ref 13–44)
MAGNESIUM SERPL-MCNC: 1.9 MG/DL — SIGNIFICANT CHANGE UP (ref 1.6–2.6)
MCHC RBC-ENTMCNC: 30.9 PG — SIGNIFICANT CHANGE UP (ref 27–34)
MCHC RBC-ENTMCNC: 33.3 GM/DL — SIGNIFICANT CHANGE UP (ref 32–36)
MCV RBC AUTO: 92.8 FL — SIGNIFICANT CHANGE UP (ref 80–100)
MONOCYTES # BLD AUTO: 0.82 K/UL — SIGNIFICANT CHANGE UP (ref 0–0.9)
MONOCYTES NFR BLD AUTO: 8.3 % — SIGNIFICANT CHANGE UP (ref 2–14)
NEUTROPHILS # BLD AUTO: 4.8 K/UL — SIGNIFICANT CHANGE UP (ref 1.8–7.4)
NEUTROPHILS NFR BLD AUTO: 48.6 % — SIGNIFICANT CHANGE UP (ref 43–77)
NRBC # BLD: 0 /100 WBCS — SIGNIFICANT CHANGE UP (ref 0–0)
PLATELET # BLD AUTO: 213 K/UL — SIGNIFICANT CHANGE UP (ref 150–400)
POTASSIUM SERPL-MCNC: 4.2 MMOL/L — SIGNIFICANT CHANGE UP (ref 3.5–5.3)
POTASSIUM SERPL-SCNC: 4.2 MMOL/L — SIGNIFICANT CHANGE UP (ref 3.5–5.3)
PROT SERPL-MCNC: 7 G/DL — SIGNIFICANT CHANGE UP (ref 6–8.3)
PROTHROM AB SERPL-ACNC: 11.9 SEC — SIGNIFICANT CHANGE UP (ref 10.6–13.6)
RBC # BLD: 4.44 M/UL — SIGNIFICANT CHANGE UP (ref 4.2–5.8)
RBC # FLD: 13.2 % — SIGNIFICANT CHANGE UP (ref 10.3–14.5)
SARS-COV-2 RNA SPEC QL NAA+PROBE: SIGNIFICANT CHANGE UP
SODIUM SERPL-SCNC: 139 MMOL/L — SIGNIFICANT CHANGE UP (ref 135–145)
TROPONIN T SERPL-MCNC: 0.01 NG/ML — SIGNIFICANT CHANGE UP (ref 0–0.01)
TROPONIN T SERPL-MCNC: 0.01 NG/ML — SIGNIFICANT CHANGE UP (ref 0–0.01)
VALPROATE SERPL-MCNC: 43.9 UG/ML — LOW (ref 50–100)
WBC # BLD: 9.88 K/UL — SIGNIFICANT CHANGE UP (ref 3.8–10.5)
WBC # FLD AUTO: 9.88 K/UL — SIGNIFICANT CHANGE UP (ref 3.8–10.5)

## 2021-09-02 PROCEDURE — 71045 X-RAY EXAM CHEST 1 VIEW: CPT | Mod: 26

## 2021-09-02 PROCEDURE — 93010 ELECTROCARDIOGRAM REPORT: CPT

## 2021-09-02 PROCEDURE — 99285 EMERGENCY DEPT VISIT HI MDM: CPT | Mod: 25

## 2021-09-02 RX ORDER — SODIUM CHLORIDE 9 MG/ML
500 INJECTION INTRAMUSCULAR; INTRAVENOUS; SUBCUTANEOUS ONCE
Refills: 0 | Status: COMPLETED | OUTPATIENT
Start: 2021-09-02 | End: 2021-09-02

## 2021-09-02 RX ORDER — FAMOTIDINE 10 MG/ML
1 INJECTION INTRAVENOUS
Qty: 14 | Refills: 0
Start: 2021-09-02 | End: 2021-09-15

## 2021-09-02 RX ORDER — FAMOTIDINE 10 MG/ML
20 INJECTION INTRAVENOUS ONCE
Refills: 0 | Status: COMPLETED | OUTPATIENT
Start: 2021-09-02 | End: 2021-09-02

## 2021-09-02 RX ORDER — ONDANSETRON 8 MG/1
4 TABLET, FILM COATED ORAL ONCE
Refills: 0 | Status: COMPLETED | OUTPATIENT
Start: 2021-09-02 | End: 2021-09-02

## 2021-09-02 RX ADMIN — ONDANSETRON 4 MILLIGRAM(S): 8 TABLET, FILM COATED ORAL at 02:45

## 2021-09-02 RX ADMIN — SODIUM CHLORIDE 500 MILLILITER(S): 9 INJECTION INTRAMUSCULAR; INTRAVENOUS; SUBCUTANEOUS at 02:44

## 2021-09-02 RX ADMIN — FAMOTIDINE 20 MILLIGRAM(S): 10 INJECTION INTRAVENOUS at 02:47

## 2021-09-02 RX ADMIN — Medication 30 MILLILITER(S): at 02:53

## 2021-09-02 NOTE — ED PROVIDER NOTE - OBJECTIVE STATEMENT
71M hx bipolar (on lithium, depakote), dm, c/o feeling unwell for past hour. pt states feels bad taste in his mouth. no vomiting, no chest pain. no SOB. no dizziness. no fevers. pt states discomfort to upper abd, however denies pain. no diarrhea.

## 2021-09-02 NOTE — ED PROVIDER NOTE - PROGRESS NOTE DETAILS
pt feeling better, serial trop negative, doubt acs. no abd pain  I have discussed the discharge plan with the patient. The patient agrees with the plan, as discussed.  The patient understands Emergency Department diagnosis is a preliminary diagnosis often based on limited information and that the patient must adhere to the follow-up plan as discussed.  The patient understands that if the symptoms worsen or if prescribed medications do not have the desired/planned effect that the patient may return to the Emergency Department at any time for further evaluation and treatment.

## 2021-09-02 NOTE — ED ADULT NURSE NOTE - OBJECTIVE STATEMENT
pt received in bed, pt aox4, pt came form home, c/o feeling unwell for past hour .pt reported that feels bad taste in his mouth. pt point out his upper abdominal and reported that feeling discomfort here, Denies N/V/D, No CP or SOB, No fever, As per order blood work and med given, safety precaution in placed,Nursing care ongoing.

## 2021-09-02 NOTE — ED PROVIDER NOTE - NSFOLLOWUPINSTRUCTIONS_ED_ALL_ED_FT
Follow-up with your primary care physician    Weakness    Weakness is a lack of strength. You may feel weak all over your body (generalized), or you may feel weak in one part of your body (focal). There are many potential causes of weakness. Sometimes, the cause of your weakness may not be known. Some causes of weakness can be serious, so it is important to see your doctor.    Follow these instructions at home:    Activity     •Rest as needed.    •Try to get enough sleep. Most adults need 7–8 hours of sleep each night. Talk to your doctor about how much sleep you need each night.    •Do exercises, such as arm curls and leg raises, for 30 minutes at least 2 days a week or as told by your doctor.    •Think about working with a physical therapist or  to help you get stronger.    General instructions      •Take over-the-counter and prescription medicines only as told by your doctor.    •Eat a healthy, well-balanced diet. This includes:  •Proteins to build muscles, such as lean meats and fish.    •Fresh fruits and vegetables.    •Carbohydrates to boost energy, such as whole grains.    •Drink enough fluid to keep your pee (urine) pale yellow.    •Keep all follow-up visits as told by your doctor. This is important.    Contact a doctor if:    •Your weakness does not get better or it gets worse.    •Your weakness affects your ability to:  •Think clearly.    •Do your normal daily activities.    Get help right away if you:    •Have sudden weakness on one side of your face or body.    •Have chest pain.    •Have trouble breathing or shortness of breath.    •Have problems with your vision.    •Have trouble talking or swallowing.    •Have trouble standing or walking.    •Are light-headed.    •Pass out (lose consciousness).      Summary    •Weakness is a lack of strength. You may feel weak all over your body or just in one part of your body.    •There are many potential causes of weakness. Sometimes, the cause of your weakness may not be known.    •Rest as needed, and try to get enough sleep. Most adults need 7–8 hours of sleep each night.    •Eat a healthy, well-balanced diet.    This information is not intended to replace advice given to you by your health care provider. Make sure you discuss any questions you have with your health care provider.

## 2021-09-02 NOTE — ED PROVIDER NOTE - PATIENT PORTAL LINK FT
You can access the FollowMyHealth Patient Portal offered by [facility] by registering at the following website: [email or web address]. By joining [employer]’s FollowMyHealth portal, you will also be able to view your health information using other applications (apps) compatible with our system.

## 2021-12-02 ENCOUNTER — EMERGENCY (EMERGENCY)
Facility: HOSPITAL | Age: 71
LOS: 1 days | Discharge: LEFT BEFORE DISCHARGE | End: 2021-12-02
Attending: EMERGENCY MEDICINE | Admitting: EMERGENCY MEDICINE
Payer: MEDICARE

## 2021-12-02 VITALS
TEMPERATURE: 97 F | DIASTOLIC BLOOD PRESSURE: 76 MMHG | OXYGEN SATURATION: 98 % | HEART RATE: 56 BPM | SYSTOLIC BLOOD PRESSURE: 179 MMHG | RESPIRATION RATE: 16 BRPM

## 2021-12-02 VITALS
DIASTOLIC BLOOD PRESSURE: 74 MMHG | SYSTOLIC BLOOD PRESSURE: 153 MMHG | HEART RATE: 60 BPM | TEMPERATURE: 98 F | OXYGEN SATURATION: 100 % | HEIGHT: 69 IN | RESPIRATION RATE: 18 BRPM

## 2021-12-02 DIAGNOSIS — R00.1 BRADYCARDIA, UNSPECIFIED: ICD-10-CM

## 2021-12-02 DIAGNOSIS — R07.89 OTHER CHEST PAIN: ICD-10-CM

## 2021-12-02 DIAGNOSIS — E11.9 TYPE 2 DIABETES MELLITUS WITHOUT COMPLICATIONS: ICD-10-CM

## 2021-12-02 DIAGNOSIS — Z20.822 CONTACT WITH AND (SUSPECTED) EXPOSURE TO COVID-19: ICD-10-CM

## 2021-12-02 DIAGNOSIS — R26.2 DIFFICULTY IN WALKING, NOT ELSEWHERE CLASSIFIED: ICD-10-CM

## 2021-12-02 DIAGNOSIS — F31.9 BIPOLAR DISORDER, UNSPECIFIED: ICD-10-CM

## 2021-12-02 LAB
ALBUMIN SERPL ELPH-MCNC: 4.3 G/DL — SIGNIFICANT CHANGE UP (ref 3.3–5)
ALP SERPL-CCNC: 48 U/L — SIGNIFICANT CHANGE UP (ref 40–120)
ALT FLD-CCNC: SIGNIFICANT CHANGE UP U/L (ref 10–45)
ANION GAP SERPL CALC-SCNC: 12 MMOL/L — SIGNIFICANT CHANGE UP (ref 5–17)
APTT BLD: 36 SEC — HIGH (ref 27.5–35.5)
AST SERPL-CCNC: SIGNIFICANT CHANGE UP U/L (ref 10–40)
BASOPHILS # BLD AUTO: 0.05 K/UL — SIGNIFICANT CHANGE UP (ref 0–0.2)
BASOPHILS NFR BLD AUTO: 0.5 % — SIGNIFICANT CHANGE UP (ref 0–2)
BILIRUB SERPL-MCNC: 0.3 MG/DL — SIGNIFICANT CHANGE UP (ref 0.2–1.2)
BUN SERPL-MCNC: 15 MG/DL — SIGNIFICANT CHANGE UP (ref 7–23)
CALCIUM SERPL-MCNC: 9.6 MG/DL — SIGNIFICANT CHANGE UP (ref 8.4–10.5)
CHLORIDE SERPL-SCNC: 103 MMOL/L — SIGNIFICANT CHANGE UP (ref 96–108)
CO2 SERPL-SCNC: 24 MMOL/L — SIGNIFICANT CHANGE UP (ref 22–31)
CREAT SERPL-MCNC: 0.78 MG/DL — SIGNIFICANT CHANGE UP (ref 0.5–1.3)
EOSINOPHIL # BLD AUTO: 0.2 K/UL — SIGNIFICANT CHANGE UP (ref 0–0.5)
EOSINOPHIL NFR BLD AUTO: 2.1 % — SIGNIFICANT CHANGE UP (ref 0–6)
GLUCOSE SERPL-MCNC: 117 MG/DL — HIGH (ref 70–99)
HCT VFR BLD CALC: 39.2 % — SIGNIFICANT CHANGE UP (ref 39–50)
HGB BLD-MCNC: 13.1 G/DL — SIGNIFICANT CHANGE UP (ref 13–17)
IMM GRANULOCYTES NFR BLD AUTO: 0.3 % — SIGNIFICANT CHANGE UP (ref 0–1.5)
INR BLD: 1.06 — SIGNIFICANT CHANGE UP (ref 0.88–1.16)
LIDOCAIN IGE QN: 21 U/L — SIGNIFICANT CHANGE UP (ref 7–60)
LYMPHOCYTES # BLD AUTO: 3.61 K/UL — HIGH (ref 1–3.3)
LYMPHOCYTES # BLD AUTO: 37.2 % — SIGNIFICANT CHANGE UP (ref 13–44)
MAGNESIUM SERPL-MCNC: 1.8 MG/DL — SIGNIFICANT CHANGE UP (ref 1.6–2.6)
MCHC RBC-ENTMCNC: 31.3 PG — SIGNIFICANT CHANGE UP (ref 27–34)
MCHC RBC-ENTMCNC: 33.4 GM/DL — SIGNIFICANT CHANGE UP (ref 32–36)
MCV RBC AUTO: 93.6 FL — SIGNIFICANT CHANGE UP (ref 80–100)
MONOCYTES # BLD AUTO: 0.77 K/UL — SIGNIFICANT CHANGE UP (ref 0–0.9)
MONOCYTES NFR BLD AUTO: 7.9 % — SIGNIFICANT CHANGE UP (ref 2–14)
NEUTROPHILS # BLD AUTO: 5.05 K/UL — SIGNIFICANT CHANGE UP (ref 1.8–7.4)
NEUTROPHILS NFR BLD AUTO: 52 % — SIGNIFICANT CHANGE UP (ref 43–77)
NRBC # BLD: 0 /100 WBCS — SIGNIFICANT CHANGE UP (ref 0–0)
NT-PROBNP SERPL-SCNC: 52 PG/ML — SIGNIFICANT CHANGE UP (ref 0–300)
PLATELET # BLD AUTO: 206 K/UL — SIGNIFICANT CHANGE UP (ref 150–400)
POTASSIUM SERPL-MCNC: SIGNIFICANT CHANGE UP MMOL/L (ref 3.5–5.3)
POTASSIUM SERPL-SCNC: SIGNIFICANT CHANGE UP MMOL/L (ref 3.5–5.3)
PROT SERPL-MCNC: 7 G/DL — SIGNIFICANT CHANGE UP (ref 6–8.3)
PROTHROM AB SERPL-ACNC: 12.7 SEC — SIGNIFICANT CHANGE UP (ref 10.6–13.6)
RBC # BLD: 4.19 M/UL — LOW (ref 4.2–5.8)
RBC # FLD: 13.8 % — SIGNIFICANT CHANGE UP (ref 10.3–14.5)
SARS-COV-2 RNA SPEC QL NAA+PROBE: SIGNIFICANT CHANGE UP
SODIUM SERPL-SCNC: 139 MMOL/L — SIGNIFICANT CHANGE UP (ref 135–145)
TROPONIN T SERPL-MCNC: <0.01 NG/ML — SIGNIFICANT CHANGE UP (ref 0–0.01)
WBC # BLD: 9.71 K/UL — SIGNIFICANT CHANGE UP (ref 3.8–10.5)
WBC # FLD AUTO: 9.71 K/UL — SIGNIFICANT CHANGE UP (ref 3.8–10.5)

## 2021-12-02 PROCEDURE — 99285 EMERGENCY DEPT VISIT HI MDM: CPT

## 2021-12-02 PROCEDURE — 93010 ELECTROCARDIOGRAM REPORT: CPT

## 2021-12-02 PROCEDURE — 71045 X-RAY EXAM CHEST 1 VIEW: CPT | Mod: 26

## 2021-12-02 NOTE — ED PROVIDER NOTE - ATTENDING CONTRIBUTION TO CARE
71M hx dm, bipolar, tremors, c/o right sided chest pain for past few hours. pt sates was unable to sleep. pt also c/o feeling dizzy and having trouble walking.  no vomiting. no abd pain. no SOB. pt very poor historian.  gen- nad  heent- ncat, clear conj  cv -rrr  lungs -ctab  abd - soft, nt, nd  ext -wwp, no edema  neuro -aox3, hope   no acute st/t wave changes on EKG, will check labs, cxr. no focal neuro deficits on exam

## 2021-12-02 NOTE — ED PROVIDER NOTE - PROGRESS NOTE DETAILS
pt wants to leave against medical advice. Pt understands risks of leaving including heart attack, heart failure permanent disability or death.

## 2021-12-02 NOTE — ED PROVIDER NOTE - PATIENT PORTAL LINK FT
You can access the FollowMyHealth Patient Portal offered by [facility] by registering at the following website: [email or web address]. By joining [employer]’s FollowMyHealth portal, you will also be able to view your health information using other applications (apps) compatible with our system.

## 2021-12-02 NOTE — ED PROVIDER NOTE - SHIFT CHANGE DETAILS
71M poor historian c/o cp. avss. s/p labs. ekg/cxr w/o acute st/t changes.     dispo: pending lab results -- anticipate admission to [facility]

## 2021-12-02 NOTE — ED ADULT NURSE NOTE - TEMPLATE
Cardiac Doxycycline Pregnancy And Lactation Text: This medication is Pregnancy Category D and not consider safe during pregnancy. It is also excreted in breast milk but is considered safe for shorter treatment courses.

## 2021-12-02 NOTE — ED ADULT NURSE REASSESSMENT NOTE - NS ED NURSE REASSESS COMMENT FT1
pt a&ox3, AMA papers signed. Pt understands risk of leaving AMA. pt reports that he "wants to leave and will follow up with my doctor."

## 2021-12-02 NOTE — ED PROVIDER NOTE - PHYSICAL EXAMINATION
CONSTITUTIONAL: NAD   SKIN: Normal color and turgor.    HEAD: NC/AT.  EYES: Conjunctiva clear. EOMI. PERRL.    ENT: Airway clear. Normal voice.   RESPIRATORY:  Normal work of breathing. Lungs CTAB.  CARDIOVASCULAR:  RRR, S1S2. No M/R/G.      GI:  Abdomen soft, nontender.    MSK: Neck supple.  No lower extremity edema or calf tenderness.  No joint swelling or ROM limitation.  NEURO: Alert and oriented; CN II-XII grossly intact. Speech clear. 5/5 strength in all extremities.

## 2021-12-02 NOTE — ED PROVIDER NOTE - OBJECTIVE STATEMENT
70 yo m with hx DM, bipolar disorder, tremor, balance problems/falls - very poor historian, very disorganized  BIBA for chest pain.  Pt reported pain in the right parasternal region for apx 4-5 hours.  He could not describe the pain.  It did not radiate.  He was unable to sleep but he couldn't tell me if the pain is what kept him awake.  He called EMS twice tonight, unsure why he called the first time but EMS apparently left him home and advised him to call his doctor in the morning.  The pain was waxing and waning for several hours before EMS was called the second time and transported him to the ED here. He says he felt better and "safe" once he was with EMS.  No oxygen or other medications were given by EMS.  He has no pain in the ED currently.  Pt denies hx of heart disease, but he cannot tell me if he ever had a cardiac stress test.  Review of EMR shows that he had an echocardiogram during admission here in July.  Former cigarette smoker, quit 5-10 yrs ago.  Denies cardiac hx in his first degree relatives.    Pt also states he is unable to walk unassisted due to balance problems.  He says this has been going on for an hour, but that it happens "sometimes".  EMR shows that he sustained a wrist fracture due to recurrent falls in July.  He did not fall tonight.

## 2021-12-02 NOTE — ED PROVIDER NOTE - CARE PROVIDER_API CALL
Oneil Putnam (MD)  Cardiovascular Disease; Internal Medicine  Cardiology [facility], [address] [place] [address]  Phone: [phone]  Fax: [phone]  Follow Up Time:

## 2021-12-02 NOTE — ED ADULT NURSE NOTE - NSIMPLEMENTINTERV_GEN_ALL_ED
Implemented All Fall Risk Interventions:  [place] to call system. Call bell, personal items and telephone within reach. Instruct patient to call for assistance. Room bathroom lighting operational. Non-slip footwear when patient is off stretcher. Physically safe environment: no spills, clutter or unnecessary equipment. Stretcher in lowest position, wheels locked, appropriate side rails in place. Provide visual cue, wrist band, yellow gown, etc. Monitor gait and stability. Monitor for mental status changes and reorient to person, place, and time. Review medications for side effects contributing to fall risk. Reinforce activity limits and safety measures with patient and family.

## 2021-12-02 NOTE — ED ADULT NURSE REASSESSMENT NOTE - NS ED NURSE REASSESS COMMENT FT1
report received from nightshift RN lumbao. pt a&ox3, resting in stretcher. VS updated. pt refusing to change into Pt gown, refusing cardiac monitor. Pt states " I want to go home." NAYA tatum made aware. repeat coags sent to lab.

## 2021-12-02 NOTE — ED ADULT NURSE NOTE - OBJECTIVE STATEMENT
Received via stretcher BIBEMS with chief complaints of sudden onset of intermittent L pectoral area pain with associated lightheadedness ongoing 3-4 hours and resolved PTA. Patient poor historian. Most information taken from [facility].     AOX2, forgetful, speaking full sentences.  +PERRLA. Resps even and nonlabored. Moves all extremities. No obvious trauma/injury/deformity noted. Patient oriented to ED area. All needs attended. POC reviewed. Placed on continuos cardiac monitoring. Fall risk precautions maintained. Purposeful proactive hourly rounding in progress.

## 2021-12-02 NOTE — ED PROVIDER NOTE - NSFOLLOWUPINSTRUCTIONS_ED_ALL_ED_FT
You understands risks of leaving against medical advice include heart attack, heart failure permanent disability or death.     Chest Pain    Chest pain can be caused by many different conditions which may or may not be dangerous. Causes include heartburn, lung infections, heart attack, blood clot in lungs, skin infections, strain or damage to muscle, cartilage, or bones, etc. In addition to a history and physical examination, an electrocardiogram (ECG) or other lab tests may have been performed to determine the cause of your chest pain. Follow up with your primary care provider or with a cardiologist as instructed.     SEEK IMMEDIATE MEDICAL CARE IF YOU HAVE ANY OF THE FOLLOWING SYMPTOMS: worsening chest pain, coughing up blood, unexplained back/neck/jaw pain, severe abdominal pain, dizziness or lightheadedness, fainting, shortness of breath, sweaty or clammy skin, vomiting, or racing heart beat. These symptoms may represent a serious problem that is an emergency. Do not wait to see if the symptoms will go away. Get medical help right away. Call 911 and do not drive yourself to the hospital.

## 2022-02-14 ENCOUNTER — EMERGENCY (EMERGENCY)
Facility: HOSPITAL | Age: 72
LOS: 1 days | Discharge: HOME | End: 2022-02-14
Attending: EMERGENCY MEDICINE | Admitting: EMERGENCY MEDICINE
Payer: MEDICARE

## 2022-02-14 VITALS
SYSTOLIC BLOOD PRESSURE: 176 MMHG | HEIGHT: 69 IN | DIASTOLIC BLOOD PRESSURE: 93 MMHG | OXYGEN SATURATION: 98 % | TEMPERATURE: 98 F | RESPIRATION RATE: 18 BRPM | HEART RATE: 61 BPM | WEIGHT: 169.98 LBS

## 2022-02-14 VITALS
HEART RATE: 61 BPM | SYSTOLIC BLOOD PRESSURE: 155 MMHG | OXYGEN SATURATION: 98 % | DIASTOLIC BLOOD PRESSURE: 67 MMHG | RESPIRATION RATE: 18 BRPM | TEMPERATURE: 98 F

## 2022-02-14 DIAGNOSIS — Z79.82 LONG TERM (CURRENT) USE OF ASPIRIN: ICD-10-CM

## 2022-02-14 DIAGNOSIS — I10 ESSENTIAL (PRIMARY) HYPERTENSION: ICD-10-CM

## 2022-02-14 DIAGNOSIS — Z87.891 PERSONAL HISTORY OF NICOTINE DEPENDENCE: ICD-10-CM

## 2022-02-14 DIAGNOSIS — R10.10 UPPER ABDOMINAL PAIN, UNSPECIFIED: ICD-10-CM

## 2022-02-14 DIAGNOSIS — R11.2 NAUSEA WITH VOMITING, UNSPECIFIED: ICD-10-CM

## 2022-02-14 DIAGNOSIS — E11.9 TYPE 2 DIABETES MELLITUS WITHOUT COMPLICATIONS: ICD-10-CM

## 2022-02-14 DIAGNOSIS — Z20.822 CONTACT WITH AND (SUSPECTED) EXPOSURE TO COVID-19: ICD-10-CM

## 2022-02-14 DIAGNOSIS — R42 DIZZINESS AND GIDDINESS: ICD-10-CM

## 2022-02-14 LAB
ALBUMIN SERPL ELPH-MCNC: 4.8 G/DL — SIGNIFICANT CHANGE UP (ref 3.3–5)
ALP SERPL-CCNC: 54 U/L — SIGNIFICANT CHANGE UP (ref 40–120)
ALT FLD-CCNC: 8 U/L — LOW (ref 10–45)
ANION GAP SERPL CALC-SCNC: 12 MMOL/L — SIGNIFICANT CHANGE UP (ref 5–17)
APPEARANCE UR: CLEAR — SIGNIFICANT CHANGE UP
APTT BLD: 37.3 SEC — HIGH (ref 27.5–35.5)
AST SERPL-CCNC: 17 U/L — SIGNIFICANT CHANGE UP (ref 10–40)
BASE EXCESS BLDV CALC-SCNC: 5.6 MMOL/L — HIGH (ref -2–3)
BASOPHILS # BLD AUTO: 0.08 K/UL — SIGNIFICANT CHANGE UP (ref 0–0.2)
BASOPHILS NFR BLD AUTO: 0.8 % — SIGNIFICANT CHANGE UP (ref 0–2)
BILIRUB SERPL-MCNC: 0.4 MG/DL — SIGNIFICANT CHANGE UP (ref 0.2–1.2)
BILIRUB UR-MCNC: NEGATIVE — SIGNIFICANT CHANGE UP
BUN SERPL-MCNC: 11 MG/DL — SIGNIFICANT CHANGE UP (ref 7–23)
CA-I SERPL-SCNC: 1.26 MMOL/L — SIGNIFICANT CHANGE UP (ref 1.15–1.33)
CALCIUM SERPL-MCNC: 10.1 MG/DL — SIGNIFICANT CHANGE UP (ref 8.4–10.5)
CHLORIDE SERPL-SCNC: 108 MMOL/L — SIGNIFICANT CHANGE UP (ref 96–108)
CO2 BLDV-SCNC: 34.9 MMOL/L — HIGH (ref 22–26)
CO2 SERPL-SCNC: 28 MMOL/L — SIGNIFICANT CHANGE UP (ref 22–31)
COLOR SPEC: YELLOW — SIGNIFICANT CHANGE UP
CREAT SERPL-MCNC: 0.77 MG/DL — SIGNIFICANT CHANGE UP (ref 0.5–1.3)
DIFF PNL FLD: NEGATIVE — SIGNIFICANT CHANGE UP
EOSINOPHIL # BLD AUTO: 0.15 K/UL — SIGNIFICANT CHANGE UP (ref 0–0.5)
EOSINOPHIL NFR BLD AUTO: 1.4 % — SIGNIFICANT CHANGE UP (ref 0–6)
GAS PNL BLDV: 136 MMOL/L — SIGNIFICANT CHANGE UP (ref 136–145)
GAS PNL BLDV: SIGNIFICANT CHANGE UP
GAS PNL BLDV: SIGNIFICANT CHANGE UP
GLUCOSE SERPL-MCNC: 165 MG/DL — HIGH (ref 70–99)
GLUCOSE UR QL: 250
HCO3 BLDV-SCNC: 33 MMOL/L — HIGH (ref 22–29)
HCT VFR BLD CALC: 42.6 % — SIGNIFICANT CHANGE UP (ref 39–50)
HGB BLD-MCNC: 14.5 G/DL — SIGNIFICANT CHANGE UP (ref 13–17)
IMM GRANULOCYTES NFR BLD AUTO: 0.3 % — SIGNIFICANT CHANGE UP (ref 0–1.5)
INR BLD: 1.05 — SIGNIFICANT CHANGE UP (ref 0.88–1.16)
KETONES UR-MCNC: ABNORMAL MG/DL
LACTATE SERPL-SCNC: 1.9 MMOL/L — SIGNIFICANT CHANGE UP (ref 0.5–2)
LEUKOCYTE ESTERASE UR-ACNC: NEGATIVE — SIGNIFICANT CHANGE UP
LIDOCAIN IGE QN: 22 U/L — SIGNIFICANT CHANGE UP (ref 7–60)
LYMPHOCYTES # BLD AUTO: 3.31 K/UL — HIGH (ref 1–3.3)
LYMPHOCYTES # BLD AUTO: 31.3 % — SIGNIFICANT CHANGE UP (ref 13–44)
MCHC RBC-ENTMCNC: 31.5 PG — SIGNIFICANT CHANGE UP (ref 27–34)
MCHC RBC-ENTMCNC: 34 GM/DL — SIGNIFICANT CHANGE UP (ref 32–36)
MCV RBC AUTO: 92.4 FL — SIGNIFICANT CHANGE UP (ref 80–100)
MONOCYTES # BLD AUTO: 0.79 K/UL — SIGNIFICANT CHANGE UP (ref 0–0.9)
MONOCYTES NFR BLD AUTO: 7.5 % — SIGNIFICANT CHANGE UP (ref 2–14)
NEUTROPHILS # BLD AUTO: 6.22 K/UL — SIGNIFICANT CHANGE UP (ref 1.8–7.4)
NEUTROPHILS NFR BLD AUTO: 58.7 % — SIGNIFICANT CHANGE UP (ref 43–77)
NITRITE UR-MCNC: NEGATIVE — SIGNIFICANT CHANGE UP
NRBC # BLD: 0 /100 WBCS — SIGNIFICANT CHANGE UP (ref 0–0)
PCO2 BLDV: 60 MMHG — HIGH (ref 42–55)
PH BLDV: 7.35 — SIGNIFICANT CHANGE UP (ref 7.32–7.43)
PH UR: 7 — SIGNIFICANT CHANGE UP (ref 5–8)
PLATELET # BLD AUTO: 270 K/UL — SIGNIFICANT CHANGE UP (ref 150–400)
PO2 BLDV: 39 MMHG — SIGNIFICANT CHANGE UP (ref 25–45)
POTASSIUM BLDV-SCNC: 4.7 MMOL/L — SIGNIFICANT CHANGE UP (ref 3.5–5.1)
POTASSIUM SERPL-MCNC: 5.2 MMOL/L — SIGNIFICANT CHANGE UP (ref 3.5–5.3)
POTASSIUM SERPL-SCNC: 5.2 MMOL/L — SIGNIFICANT CHANGE UP (ref 3.5–5.3)
PROT SERPL-MCNC: 7.3 G/DL — SIGNIFICANT CHANGE UP (ref 6–8.3)
PROT UR-MCNC: NEGATIVE MG/DL — SIGNIFICANT CHANGE UP
PROTHROM AB SERPL-ACNC: 12.6 SEC — SIGNIFICANT CHANGE UP (ref 10.6–13.6)
RBC # BLD: 4.61 M/UL — SIGNIFICANT CHANGE UP (ref 4.2–5.8)
RBC # FLD: 13.4 % — SIGNIFICANT CHANGE UP (ref 10.3–14.5)
SAO2 % BLDV: 63.6 % — LOW (ref 67–88)
SARS-COV-2 RNA SPEC QL NAA+PROBE: NEGATIVE — SIGNIFICANT CHANGE UP
SODIUM SERPL-SCNC: 148 MMOL/L — HIGH (ref 135–145)
SP GR SPEC: 1.01 — SIGNIFICANT CHANGE UP (ref 1–1.03)
UROBILINOGEN FLD QL: 0.2 E.U./DL — SIGNIFICANT CHANGE UP
WBC # BLD: 10.58 K/UL — HIGH (ref 3.8–10.5)
WBC # FLD AUTO: 10.58 K/UL — HIGH (ref 3.8–10.5)

## 2022-02-14 PROCEDURE — 70498 CT ANGIOGRAPHY NECK: CPT | Mod: 26,MA

## 2022-02-14 PROCEDURE — 70450 CT HEAD/BRAIN W/O DYE: CPT | Mod: 26,MA,59

## 2022-02-14 PROCEDURE — 70496 CT ANGIOGRAPHY HEAD: CPT | Mod: 26,MA

## 2022-02-14 PROCEDURE — 71045 X-RAY EXAM CHEST 1 VIEW: CPT | Mod: 26

## 2022-02-14 PROCEDURE — 99284 EMERGENCY DEPT VISIT MOD MDM: CPT | Mod: 25

## 2022-02-14 PROCEDURE — 93010 ELECTROCARDIOGRAM REPORT: CPT

## 2022-02-14 RX ORDER — PANTOPRAZOLE SODIUM 20 MG/1
40 TABLET, DELAYED RELEASE ORAL ONCE
Refills: 0 | Status: COMPLETED | OUTPATIENT
Start: 2022-02-14 | End: 2022-02-14

## 2022-02-14 RX ORDER — ONDANSETRON 8 MG/1
4 TABLET, FILM COATED ORAL ONCE
Refills: 0 | Status: COMPLETED | OUTPATIENT
Start: 2022-02-14 | End: 2022-02-14

## 2022-02-14 RX ORDER — SODIUM CHLORIDE 9 MG/ML
1000 INJECTION INTRAMUSCULAR; INTRAVENOUS; SUBCUTANEOUS ONCE
Refills: 0 | Status: COMPLETED | OUTPATIENT
Start: 2022-02-14 | End: 2022-02-14

## 2022-02-14 RX ORDER — METOCLOPRAMIDE HCL 10 MG
10 TABLET ORAL ONCE
Refills: 0 | Status: COMPLETED | OUTPATIENT
Start: 2022-02-14 | End: 2022-02-14

## 2022-02-14 RX ADMIN — ONDANSETRON 4 MILLIGRAM(S): 8 TABLET, FILM COATED ORAL at 03:00

## 2022-02-14 RX ADMIN — SODIUM CHLORIDE 1000 MILLILITER(S): 9 INJECTION INTRAMUSCULAR; INTRAVENOUS; SUBCUTANEOUS at 02:12

## 2022-02-14 RX ADMIN — PANTOPRAZOLE SODIUM 40 MILLIGRAM(S): 20 TABLET, DELAYED RELEASE ORAL at 03:11

## 2022-02-14 RX ADMIN — Medication 104 MILLIGRAM(S): at 02:14

## 2022-02-14 NOTE — ED PROVIDER NOTE - PROGRESS NOTE DETAILS
Klepfish: , other labs grossly wnl. Still w/ episodic vomiting. C/o CP after vomiting. Still no abd pain. abd remains soft NTND. CTs pending. Will reassess. Klepfish: CTs w/o acute pathology. Pt feeling much better. Tolerating po. c/o minimal chest burning that began after vomiting but no other systemic symptoms. no dizziness. Has very unsteady slightly wide based gait - it is unclear if this is pt's baseline or not. given cane - gait seems to improve w/ more walking but still slightly unsteady. Will call [facility] at [phone] at ~0600. David: Pt states this is how he always walks. I recommended admission for possible PT/rehab, pt does not want. States he will try calling son ~0630/7 for  and if his son cant pick him he will take a cab. David: Pt states this is how he always walks - and that it takes him time to get steady. I recommended admission for possible PT/rehab, pt does not want. States he will try calling son ~0630/7 for  and if his son cant pick him he will take a cab. [place]fish: gait is now steady even w/o cane.

## 2022-02-14 NOTE — ED PROVIDER NOTE - PHYSICAL EXAMINATION
PERRL, EOMI, no nystagmus. CN intact. Strength 5/5. Normal F-->N. No pronator drift. Sensation intact. Normal speech, no dysarthria.   scant yellow vomit during encounter PERRL, EOMI, no nystagmus. CN intact. Strength 5/5. Normal F-->N. No pronator drift. Sensation intact. Normal speech, no dysarthria.   scant yellow vomit during encounter  b/l UE intention tremor

## 2022-02-14 NOTE — ED ADULT TRIAGE NOTE - CHIEF COMPLAINT QUOTE
abdominal pain and vomiting for 2-3 days vomiting and dizziness for 2-3 days; also reports abdominal pain; denies fever, chills, diarrhea

## 2022-02-14 NOTE — ED PROVIDER NOTE - OBJECTIVE STATEMENT
Poor historian  71M PMH DM, bipolar, chronic balance problems (based on prior notes) p/w several complaints. Has vague dizziness for >1month. Also episodic NBNB NV x 2 months. States he has difficulty ambulating, unclear if any acute changes. C/o upper abd pain - only while vomiting, no other abd pain. No other systemic symptoms. Unclear what changed that prompted pt to come to ED in middle of the night.   Denies HA, vision changes, focal weakness/numbness, neck pain, rashes, tinnitus, hearing changes, URI symptoms, f/c, SOB/CP, urinary complaints, black/bloody stool, lifestyle/dietary/medication changes.

## 2022-02-14 NOTE — ED ADULT NURSE NOTE - OBJECTIVE STATEMENT
71y Male A&OX4 C/O generalized abd discomfort "mostly upper" and n/v. Pt noted to have 2 episodes of vomiting in ED. Emeses Brown with bile. Pt poor historian. Reports nausea for last 2 months and Dizziness. Pt denies chest pain, visual changes, fever, chills, cough,  complaints, nor diarrhea. Repair Type: Complex Repair

## 2022-02-14 NOTE — ED PROVIDER NOTE - PATIENT PORTAL LINK FT
You can access the FollowMyHealth Patient Portal offered by [facility] by registering at the following website: [email or web address]. By joining [employer]’s FollowMyHealth portal, you will also be able to view your health information using other applications (apps) compatible with our system.

## 2022-02-14 NOTE — ED ADULT NURSE NOTE - NS ED NURSE LEVEL OF CONSCIOUSNESS ORIENTATION
Oriented - self; Oriented - place; Oriented - time Island Pedicle Flap Text: The defect edges were debeveled with a #15 scalpel blade.  Given the location of the defect, shape of the defect and the proximity to free margins an island pedicle advancement flap was deemed most appropriate.  Using a sterile surgical marker, an appropriate advancement flap was drawn incorporating the defect, outlining the appropriate donor tissue and placing the expected incisions within the relaxed skin tension lines where possible.    The area thus outlined was incised deep to adipose tissue with a #15 scalpel blade.  The skin margins were undermined to an appropriate distance in all directions around the primary defect and laterally outward around the island pedicle utilizing iris scissors.  There was minimal undermining beneath the pedicle flap.

## 2022-02-14 NOTE — ED PROVIDER NOTE - NSFOLLOWUPINSTRUCTIONS_ED_ALL_ED_FT
It is unclear what exactly is causing your symptoms! It is important to continue following up with your doctor outside the hospital and to return to ER for: Persistent fever/vomiting, uncontrolled pain, worsening swelling, worsening breathing, worsening lightheaded, spreading redness.     Stay well hydrated.    Follow up with primary doctor within 1-2 days.     Follow up with neurologist for your balance issues. Can call [phone] to schedule appointment.     Nausea / Vomiting    Nausea is the feeling that you have to vomit. As nausea gets worse, it can lead to vomiting. Vomiting puts you at an increased risk for dehydration. Older adults and people with other diseases or a weak immune system are at higher risk for dehydration. Drink clear fluids in small but frequent amounts as tolerated. Eat bland, easy-to-digest foods in small amounts as tolerated.    SEEK IMMEDIATE MEDICAL CARE IF YOU HAVE ANY OF THE FOLLOWING SYMPTOMS: fever, inability to keep sufficient fluids down, black or bloody vomitus, black or bloody stools, lightheadedness/dizziness, chest pain, severe headache, rash, shortness of breath, cold or clammy skin, confusion, pain with urination, or any signs of dehydration.

## 2022-02-14 NOTE — ED PROVIDER NOTE - CLINICAL SUMMARY MEDICAL DECISION MAKING FREE TEXT BOX
71M PMH DM, bipolar, chronic balance problems (based on prior notes) p/w several complaints. Has vague dizziness for >1month. Also episodic NBNB NV x 2 months. States he has difficulty ambulating, unclear if any acute changes. C/o upper abd pain - only while vomiting, no other abd pain. No other systemic symptoms. Unclear what changed that prompted pt to come to ED in middle of the night.   Hypertensive, other vitals wnl. Exam as above.  ddx: Possible vertigo. Possible metabolic or intracranial component.   MRI head July 2020 lmtd but no acute pathology.   Labs, CTA, IVF/symptom control, reassess.

## 2022-02-15 LAB
CULTURE RESULTS: SIGNIFICANT CHANGE UP
SPECIMEN SOURCE: SIGNIFICANT CHANGE UP

## 2022-03-09 NOTE — ED ADULT NURSE NOTE - ED CARDIAC RHYTHM
regular [facility] Counseling: I discussed with the patient the risks of tildrakizumab including but not limited to immunosuppression, malignancy, posterior leukoencephalopathy syndrome, and serious infections.  The patient understands that monitoring is required including a PPD at baseline and must alert us or the primary physician if symptoms of infection or other concerning signs are noted.

## 2022-04-12 ENCOUNTER — INPATIENT (INPATIENT)
Facility: HOSPITAL | Age: 72
LOS: 1 days | Discharge: HOME HEALTH | DRG: 918 | End: 2022-04-14
Payer: MEDICARE

## 2022-04-12 VITALS
DIASTOLIC BLOOD PRESSURE: 67 MMHG | OXYGEN SATURATION: 99 % | HEART RATE: 56 BPM | WEIGHT: 179.9 LBS | SYSTOLIC BLOOD PRESSURE: 162 MMHG | TEMPERATURE: 98 F | RESPIRATION RATE: 16 BRPM | HEIGHT: 69 IN

## 2022-04-12 DIAGNOSIS — R25.1 TREMOR, UNSPECIFIED: ICD-10-CM

## 2022-04-12 DIAGNOSIS — F31.9 BIPOLAR DISORDER, UNSPECIFIED: ICD-10-CM
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
## 2022-04-12 DIAGNOSIS — R79.89 OTHER SPECIFIED ABNORMAL FINDINGS OF BLOOD CHEMISTRY: ICD-10-CM

## 2022-04-12 DIAGNOSIS — E11.9 TYPE 2 DIABETES MELLITUS WITHOUT COMPLICATIONS: ICD-10-CM

## 2022-04-12 DIAGNOSIS — Z90.89 ACQUIRED ABSENCE OF OTHER ORGANS: Chronic | ICD-10-CM

## 2022-04-12 DIAGNOSIS — R42 DIZZINESS AND GIDDINESS: ICD-10-CM

## 2022-04-12 DIAGNOSIS — Z00.00 ENCOUNTER FOR GENERAL ADULT MEDICAL EXAMINATION WITHOUT ABNORMAL FINDINGS: ICD-10-CM

## 2022-04-12 LAB
ALBUMIN SERPL ELPH-MCNC: 4.4 G/DL — SIGNIFICANT CHANGE UP (ref 3.3–5)
ALP SERPL-CCNC: 56 U/L — SIGNIFICANT CHANGE UP (ref 40–120)
ALT FLD-CCNC: 12 U/L — SIGNIFICANT CHANGE UP (ref 10–45)
ANION GAP SERPL CALC-SCNC: 10 MMOL/L — SIGNIFICANT CHANGE UP (ref 5–17)
ANION GAP SERPL CALC-SCNC: 8 MMOL/L — SIGNIFICANT CHANGE UP (ref 5–17)
ANION GAP SERPL CALC-SCNC: 9 MMOL/L — SIGNIFICANT CHANGE UP (ref 5–17)
APAP SERPL-MCNC: <5 UG/ML — LOW (ref 10–30)
APPEARANCE UR: CLEAR — SIGNIFICANT CHANGE UP
APTT BLD: 30.1 SEC — SIGNIFICANT CHANGE UP (ref 27.5–35.5)
AST SERPL-CCNC: 19 U/L — SIGNIFICANT CHANGE UP (ref 10–40)
BACTERIA # UR AUTO: PRESENT /HPF
BASE EXCESS BLDV CALC-SCNC: 4.3 MMOL/L — HIGH (ref -2–3)
BASOPHILS # BLD AUTO: 0.05 K/UL — SIGNIFICANT CHANGE UP (ref 0–0.2)
BASOPHILS NFR BLD AUTO: 0.5 % — SIGNIFICANT CHANGE UP (ref 0–2)
BILIRUB SERPL-MCNC: 0.5 MG/DL — SIGNIFICANT CHANGE UP (ref 0.2–1.2)
BILIRUB UR-MCNC: NEGATIVE — SIGNIFICANT CHANGE UP
BUN SERPL-MCNC: 10 MG/DL — SIGNIFICANT CHANGE UP (ref 7–23)
BUN SERPL-MCNC: 13 MG/DL — SIGNIFICANT CHANGE UP (ref 7–23)
BUN SERPL-MCNC: 13 MG/DL — SIGNIFICANT CHANGE UP (ref 7–23)
CA-I SERPL-SCNC: 1.21 MMOL/L — SIGNIFICANT CHANGE UP (ref 1.15–1.33)
CALCIUM SERPL-MCNC: 9.4 MG/DL — SIGNIFICANT CHANGE UP (ref 8.4–10.5)
CALCIUM SERPL-MCNC: 9.5 MG/DL — SIGNIFICANT CHANGE UP (ref 8.4–10.5)
CALCIUM SERPL-MCNC: 9.6 MG/DL — SIGNIFICANT CHANGE UP (ref 8.4–10.5)
CHLORIDE SERPL-SCNC: 103 MMOL/L — SIGNIFICANT CHANGE UP (ref 96–108)
CHLORIDE SERPL-SCNC: 106 MMOL/L — SIGNIFICANT CHANGE UP (ref 96–108)
CHLORIDE SERPL-SCNC: 106 MMOL/L — SIGNIFICANT CHANGE UP (ref 96–108)
CO2 BLDV-SCNC: 33.6 MMOL/L — HIGH (ref 22–26)
CO2 SERPL-SCNC: 25 MMOL/L — SIGNIFICANT CHANGE UP (ref 22–31)
CO2 SERPL-SCNC: 26 MMOL/L — SIGNIFICANT CHANGE UP (ref 22–31)
CO2 SERPL-SCNC: 28 MMOL/L — SIGNIFICANT CHANGE UP (ref 22–31)
COLOR SPEC: SIGNIFICANT CHANGE UP
CREAT SERPL-MCNC: 0.83 MG/DL — SIGNIFICANT CHANGE UP (ref 0.5–1.3)
CREAT SERPL-MCNC: 0.83 MG/DL — SIGNIFICANT CHANGE UP (ref 0.5–1.3)
CREAT SERPL-MCNC: 0.85 MG/DL — SIGNIFICANT CHANGE UP (ref 0.5–1.3)
DIFF PNL FLD: ABNORMAL
EGFR: 93 ML/MIN/1.73M2 — SIGNIFICANT CHANGE UP
EGFR: 94 ML/MIN/1.73M2 — SIGNIFICANT CHANGE UP
EGFR: 94 ML/MIN/1.73M2 — SIGNIFICANT CHANGE UP
EOSINOPHIL # BLD AUTO: 0.18 K/UL — SIGNIFICANT CHANGE UP (ref 0–0.5)
EOSINOPHIL NFR BLD AUTO: 2 % — SIGNIFICANT CHANGE UP (ref 0–6)
EPI CELLS # UR: SIGNIFICANT CHANGE UP /HPF (ref 0–5)
ETHANOL SERPL-MCNC: <10 MG/DL — SIGNIFICANT CHANGE UP (ref 0–10)
GAS PNL BLDV: 133 MMOL/L — LOW (ref 136–145)
GAS PNL BLDV: SIGNIFICANT CHANGE UP
GLUCOSE SERPL-MCNC: 134 MG/DL — HIGH (ref 70–99)
GLUCOSE SERPL-MCNC: 148 MG/DL — HIGH (ref 70–99)
GLUCOSE SERPL-MCNC: 177 MG/DL — HIGH (ref 70–99)
GLUCOSE UR QL: NEGATIVE — SIGNIFICANT CHANGE UP
HCO3 BLDV-SCNC: 32 MMOL/L — HIGH (ref 22–29)
HCT VFR BLD CALC: 46.9 % — SIGNIFICANT CHANGE UP (ref 39–50)
HGB BLD-MCNC: 15.5 G/DL — SIGNIFICANT CHANGE UP (ref 13–17)
IMM GRANULOCYTES NFR BLD AUTO: 0.2 % — SIGNIFICANT CHANGE UP (ref 0–1.5)
INR BLD: 1.02 — SIGNIFICANT CHANGE UP (ref 0.88–1.16)
KETONES UR-MCNC: NEGATIVE — SIGNIFICANT CHANGE UP
LEUKOCYTE ESTERASE UR-ACNC: NEGATIVE — SIGNIFICANT CHANGE UP
LITHIUM SERPL-MCNC: 0.46 MMOL/L — LOW (ref 0.6–1.2)
LITHIUM SERPL-MCNC: 0.56 MMOL/L — LOW (ref 0.6–1.2)
LITHIUM SERPL-MCNC: 2.4 MMOL/L — CRITICAL HIGH (ref 0.6–1.2)
LYMPHOCYTES # BLD AUTO: 3.7 K/UL — HIGH (ref 1–3.3)
LYMPHOCYTES # BLD AUTO: 40.5 % — SIGNIFICANT CHANGE UP (ref 13–44)
MAGNESIUM SERPL-MCNC: 2.1 MG/DL — SIGNIFICANT CHANGE UP (ref 1.6–2.6)
MCHC RBC-ENTMCNC: 31.1 PG — SIGNIFICANT CHANGE UP (ref 27–34)
MCHC RBC-ENTMCNC: 33 GM/DL — SIGNIFICANT CHANGE UP (ref 32–36)
MCV RBC AUTO: 94.2 FL — SIGNIFICANT CHANGE UP (ref 80–100)
MONOCYTES # BLD AUTO: 0.68 K/UL — SIGNIFICANT CHANGE UP (ref 0–0.9)
MONOCYTES NFR BLD AUTO: 7.4 % — SIGNIFICANT CHANGE UP (ref 2–14)
NEUTROPHILS # BLD AUTO: 4.51 K/UL — SIGNIFICANT CHANGE UP (ref 1.8–7.4)
NEUTROPHILS NFR BLD AUTO: 49.4 % — SIGNIFICANT CHANGE UP (ref 43–77)
NITRITE UR-MCNC: NEGATIVE — SIGNIFICANT CHANGE UP
NRBC # BLD: 0 /100 WBCS — SIGNIFICANT CHANGE UP (ref 0–0)
PCO2 BLDV: 59 MMHG — HIGH (ref 42–55)
PH BLDV: 7.34 — SIGNIFICANT CHANGE UP (ref 7.32–7.43)
PH UR: 7.5 — SIGNIFICANT CHANGE UP (ref 5–8)
PHOSPHATE SERPL-MCNC: 3.3 MG/DL — SIGNIFICANT CHANGE UP (ref 2.5–4.5)
PLATELET # BLD AUTO: 254 K/UL — SIGNIFICANT CHANGE UP (ref 150–400)
PO2 BLDV: <29 MMHG — LOW (ref 25–45)
POTASSIUM BLDV-SCNC: 4.3 MMOL/L — SIGNIFICANT CHANGE UP (ref 3.5–5.1)
POTASSIUM SERPL-MCNC: 4.3 MMOL/L — SIGNIFICANT CHANGE UP (ref 3.5–5.3)
POTASSIUM SERPL-MCNC: 4.4 MMOL/L — SIGNIFICANT CHANGE UP (ref 3.5–5.3)
POTASSIUM SERPL-MCNC: 4.7 MMOL/L — SIGNIFICANT CHANGE UP (ref 3.5–5.3)
POTASSIUM SERPL-SCNC: 4.3 MMOL/L — SIGNIFICANT CHANGE UP (ref 3.5–5.3)
POTASSIUM SERPL-SCNC: 4.4 MMOL/L — SIGNIFICANT CHANGE UP (ref 3.5–5.3)
POTASSIUM SERPL-SCNC: 4.7 MMOL/L — SIGNIFICANT CHANGE UP (ref 3.5–5.3)
PROT SERPL-MCNC: 7.2 G/DL — SIGNIFICANT CHANGE UP (ref 6–8.3)
PROT UR-MCNC: NEGATIVE MG/DL — SIGNIFICANT CHANGE UP
PROTHROM AB SERPL-ACNC: 12.2 SEC — SIGNIFICANT CHANGE UP (ref 10.5–13.4)
RBC # BLD: 4.98 M/UL — SIGNIFICANT CHANGE UP (ref 4.2–5.8)
RBC # FLD: 13.1 % — SIGNIFICANT CHANGE UP (ref 10.3–14.5)
RBC CASTS # UR COMP ASSIST: > 10 /HPF
SALICYLATES SERPL-MCNC: <0.3 MG/DL — LOW (ref 2.8–20)
SAO2 % BLDV: 40.7 % — LOW (ref 67–88)
SARS-COV-2 RNA SPEC QL NAA+PROBE: SIGNIFICANT CHANGE UP
SODIUM SERPL-SCNC: 139 MMOL/L — SIGNIFICANT CHANGE UP (ref 135–145)
SODIUM SERPL-SCNC: 141 MMOL/L — SIGNIFICANT CHANGE UP (ref 135–145)
SODIUM SERPL-SCNC: 141 MMOL/L — SIGNIFICANT CHANGE UP (ref 135–145)
SP GR SPEC: 1.01 — SIGNIFICANT CHANGE UP (ref 1–1.03)
T4 AB SER-ACNC: 7.06 UG/DL — SIGNIFICANT CHANGE UP (ref 4.5–11.7)
TROPONIN T SERPL-MCNC: 0.01 NG/ML — SIGNIFICANT CHANGE UP (ref 0–0.01)
TSH SERPL-MCNC: 4.04 UIU/ML — SIGNIFICANT CHANGE UP (ref 0.27–4.2)
UROBILINOGEN FLD QL: 0.2 E.U./DL — SIGNIFICANT CHANGE UP
VALPROATE SERPL-MCNC: 61.3 UG/ML — SIGNIFICANT CHANGE UP (ref 50–100)
WBC # BLD: 9.14 K/UL — SIGNIFICANT CHANGE UP (ref 3.8–10.5)
WBC # FLD AUTO: 9.14 K/UL — SIGNIFICANT CHANGE UP (ref 3.8–10.5)
WBC UR QL: < 5 /HPF — SIGNIFICANT CHANGE UP

## 2022-04-12 PROCEDURE — 0042T: CPT

## 2022-04-12 PROCEDURE — 99291 CRITICAL CARE FIRST HOUR: CPT

## 2022-04-12 PROCEDURE — 71045 X-RAY EXAM CHEST 1 VIEW: CPT | Mod: 26

## 2022-04-12 PROCEDURE — 93010 ELECTROCARDIOGRAM REPORT: CPT

## 2022-04-12 PROCEDURE — 70498 CT ANGIOGRAPHY NECK: CPT | Mod: 26,MA

## 2022-04-12 PROCEDURE — 70496 CT ANGIOGRAPHY HEAD: CPT | Mod: 26,MA

## 2022-04-12 PROCEDURE — 70450 CT HEAD/BRAIN W/O DYE: CPT | Mod: 26,MA,59

## 2022-04-12 PROCEDURE — 99222 1ST HOSP IP/OBS MODERATE 55: CPT | Mod: GC

## 2022-04-12 RX ORDER — SODIUM CHLORIDE 9 MG/ML
1000 INJECTION, SOLUTION INTRAVENOUS
Refills: 0 | Status: DISCONTINUED | OUTPATIENT
Start: 2022-04-12 | End: 2022-04-14

## 2022-04-12 RX ORDER — INSULIN LISPRO 100/ML
VIAL (ML) SUBCUTANEOUS
Refills: 0 | Status: DISCONTINUED | OUTPATIENT
Start: 2022-04-12 | End: 2022-04-14

## 2022-04-12 RX ORDER — ONDANSETRON 8 MG/1
4 TABLET, FILM COATED ORAL EVERY 8 HOURS
Refills: 0 | Status: DISCONTINUED | OUTPATIENT
Start: 2022-04-12 | End: 2022-04-14

## 2022-04-12 RX ORDER — SODIUM CHLORIDE 9 MG/ML
1000 INJECTION INTRAMUSCULAR; INTRAVENOUS; SUBCUTANEOUS
Refills: 0 | Status: DISCONTINUED | OUTPATIENT
Start: 2022-04-12 | End: 2022-04-13

## 2022-04-12 RX ORDER — DIVALPROEX SODIUM 500 MG/1
500 TABLET, DELAYED RELEASE ORAL
Refills: 0 | Status: DISCONTINUED | OUTPATIENT
Start: 2022-04-12 | End: 2022-04-13

## 2022-04-12 RX ORDER — DEXTROSE 50 % IN WATER 50 %
25 SYRINGE (ML) INTRAVENOUS ONCE
Refills: 0 | Status: DISCONTINUED | OUTPATIENT
Start: 2022-04-12 | End: 2022-04-12

## 2022-04-12 RX ORDER — ASPIRIN/CALCIUM CARB/MAGNESIUM 324 MG
81 TABLET ORAL EVERY 24 HOURS
Refills: 0 | Status: DISCONTINUED | OUTPATIENT
Start: 2022-04-13 | End: 2022-04-14

## 2022-04-12 RX ORDER — SERTRALINE 25 MG/1
37.5 TABLET, FILM COATED ORAL EVERY 24 HOURS
Refills: 0 | Status: DISCONTINUED | OUTPATIENT
Start: 2022-04-13 | End: 2022-04-13

## 2022-04-12 RX ORDER — DEXTROSE 50 % IN WATER 50 %
12.5 SYRINGE (ML) INTRAVENOUS ONCE
Refills: 0 | Status: DISCONTINUED | OUTPATIENT
Start: 2022-04-12 | End: 2022-04-12

## 2022-04-12 RX ORDER — DEXTROSE 50 % IN WATER 50 %
15 SYRINGE (ML) INTRAVENOUS ONCE
Refills: 0 | Status: DISCONTINUED | OUTPATIENT
Start: 2022-04-12 | End: 2022-04-14

## 2022-04-12 RX ORDER — POLYETHYLENE GLYCOL 3350 17 G/17G
17 POWDER, FOR SOLUTION ORAL DAILY
Refills: 0 | Status: DISCONTINUED | OUTPATIENT
Start: 2022-04-12 | End: 2022-04-14

## 2022-04-12 RX ORDER — CLONAZEPAM 1 MG
0.5 TABLET ORAL EVERY 8 HOURS
Refills: 0 | Status: DISCONTINUED | OUTPATIENT
Start: 2022-04-12 | End: 2022-04-13

## 2022-04-12 RX ORDER — SENNA PLUS 8.6 MG/1
2 TABLET ORAL AT BEDTIME
Refills: 0 | Status: DISCONTINUED | OUTPATIENT
Start: 2022-04-12 | End: 2022-04-14

## 2022-04-12 RX ORDER — LAMOTRIGINE 25 MG/1
100 TABLET, ORALLY DISINTEGRATING ORAL EVERY 24 HOURS
Refills: 0 | Status: DISCONTINUED | OUTPATIENT
Start: 2022-04-12 | End: 2022-04-13

## 2022-04-12 RX ORDER — SODIUM CHLORIDE 9 MG/ML
1000 INJECTION INTRAMUSCULAR; INTRAVENOUS; SUBCUTANEOUS ONCE
Refills: 0 | Status: COMPLETED | OUTPATIENT
Start: 2022-04-12 | End: 2022-04-12

## 2022-04-12 RX ORDER — GLUCAGON INJECTION, SOLUTION 0.5 MG/.1ML
1 INJECTION, SOLUTION SUBCUTANEOUS ONCE
Refills: 0 | Status: DISCONTINUED | OUTPATIENT
Start: 2022-04-12 | End: 2022-04-14

## 2022-04-12 RX ORDER — LAMOTRIGINE 25 MG/1
150 TABLET, ORALLY DISINTEGRATING ORAL EVERY 24 HOURS
Refills: 0 | Status: DISCONTINUED | OUTPATIENT
Start: 2022-04-13 | End: 2022-04-13

## 2022-04-12 RX ADMIN — LAMOTRIGINE 100 MILLIGRAM(S): 25 TABLET, ORALLY DISINTEGRATING ORAL at 23:56

## 2022-04-12 RX ADMIN — SODIUM CHLORIDE 200 MILLILITER(S): 9 INJECTION INTRAMUSCULAR; INTRAVENOUS; SUBCUTANEOUS at 17:48

## 2022-04-12 RX ADMIN — DIVALPROEX SODIUM 500 MILLIGRAM(S): 500 TABLET, DELAYED RELEASE ORAL at 23:58

## 2022-04-12 RX ADMIN — SODIUM CHLORIDE 500 MILLILITER(S): 9 INJECTION INTRAMUSCULAR; INTRAVENOUS; SUBCUTANEOUS at 15:35

## 2022-04-12 NOTE — ED ADULT NURSE REASSESSMENT NOTE - NS ED NURSE REASSESS COMMENT FT1
Pt is lying comfortably on bed. Hooked to monitor. Pt states "I feel better now". No dizziness noted.

## 2022-04-12 NOTE — H&P ADULT - NSICDXPASTSURGICALHX_GEN_ALL_CORE_FT
PAST SURGICAL HISTORY:  No significant past surgical history      PAST SURGICAL HISTORY:  S/P tonsillectomy

## 2022-04-12 NOTE — ED ADULT NURSE NOTE - NSIMPLEMENTINTERV_GEN_ALL_ED
Implemented All Fall with Harm Risk Interventions:  [place] to call system. Call bell, personal items and telephone within reach. Instruct patient to call for assistance. Room bathroom lighting operational. Non-slip footwear when patient is off stretcher. Physically safe environment: no spills, clutter or unnecessary equipment. Stretcher in lowest position, wheels locked, appropriate side rails in place. Provide visual cue, wrist band, yellow gown, etc. Monitor gait and stability. Monitor for mental status changes and reorient to person, place, and time. Review medications for side effects contributing to fall risk. Reinforce activity limits and safety measures with patient and family. Provide visual clues: red socks.

## 2022-04-12 NOTE — H&P ADULT - PROBLEM SELECTOR PLAN 5
Patient with baseline tremor. On propranolol 40 bid.  -c/w home med Patient on Janumet 50/500 BID.  -mISS inpatient  -f/u a1c and lipid profile in AM

## 2022-04-12 NOTE — CONSULT NOTE ADULT - ASSESSMENT
71y Male with PMHx of DM (non complaint with meds), chronic balance problems, bipolar (on lithium, lamictal and depakote, unclear compliancy) presents today for dizziness and loss of balance x a few weeks as per patient and wife. Upon chart review, patient has presented with similar symptoms x a few years. Has been given outpatient general neurology follow up but as per wife, patient has never followed up. CTH, CTP, CTA neg. tPA not given as unknown last known well and unlikely etiology of his symptoms are due to stroke.     Plan:  - no further inpatient stroke w/u warranted  - additional w/u as per ED regarding elevated lithium level  - dispo as per ED  - emphasized importance of general neurology follow up to patient and wife      Discussed with Neurology Attending Dr. Castano

## 2022-04-12 NOTE — ED PROVIDER NOTE - CLINICAL SUMMARY MEDICAL DECISION MAKING FREE TEXT BOX
71M PMH DM, bipolar, chronic balance problems p/w unsteadiness/fall. Pt states that he has intermittent episodes where he feels unsteady for at least 1-2mos. Pt though seemed to be ok when he went to sleep last night. Awoke ~0700. Unclear if having dizziness/unsteadiness at that time. When he returned form [employer] he was off balance and ended up falling landing on his forehead. Currently asymptomatic while seated on stretcher. Wife at bedside attributes symptoms to being on too many (or too high of a dose) of meds - though she was unsure what he is on. Pt was in ED 2/14/22 for dizziness, vomiting, abd pain. Had CTA head/neck, labs w/ no significant abnormalities. Exam at that time notable for unsteady gait. On asa, no other AC.   Mild HTN/bradycardia, other vitals wnl. Exam as above.  ddx: Lmtd hx. Unclear etiology. Possible CVA (though less likely) vs. medication related vs. metabolic. Low suspicion for significant traumatic injury.   Code stroke activated. Not TPA candidate.   Labs, CTs, reassess.

## 2022-04-12 NOTE — H&P ADULT - PROBLEM SELECTOR PLAN 6
F: NS at 200cc/hr  E: replete prn  N: Regular diet  A/c: None  Code: RA  Dispo: 7 [facility] Patient with baseline tremor. On propranolol 40 bid.  -c/w home med

## 2022-04-12 NOTE — H&P ADULT - PROBLEM SELECTOR PLAN 4
Patient on janumet 50/500 BID.  -mISS inpatient  -f/u a1c and lipid profile in AM Patient on lithium  BID, depakote  TID, lamictal 150 daily, lamictal 100 daily, Zoloft 37.5mg daily, and Klonopin 0.5 TID. Patient has not had levels checked recently.  -tx of lithium toxicity as above  -valproic acid level normal  -f/u Lamictal level  -hold lithium, continue other home meds (will make Klonopin prn)  -psych consult in the AM

## 2022-04-12 NOTE — ED PROVIDER NOTE - OBJECTIVE STATEMENT
Poor Historian  71M PMH DM, bipolar, chronic balance problems p/w unsteadiness/fall. Pt states that he has intermittent episodes where he feels unsteady for at least 1-2mos. Pt though seemed to be ok when he went t  In ED 2/14/22 for dizziness, vomiting, abd pain.   Called [facility] pharmacy at [phone]: ondansetron 4mg, Klonopin 0.5mg TID, propranolol 40mg BID, meclizine 12.5mg TID, janumet 50/500 BID,  depakote ER 500mg 3 tabs daily, lamictal 150mg qd, lamictal 100 qd, asa 81 qd, lithium  2 tabs qhs, zoloft 25mg 1.5 tabs qd, Blanca Historian  71M PMH DM, bipolar, chronic balance problems p/w unsteadiness/fall. Pt states that he has intermittent episodes where he feels unsteady for at least 1-2mos. Pt though seemed to be ok when he went to sleep last night. Awoke ~0700. Unclear if having dizziness/unsteadiness at that time. When he returned form [employer] he was off balance and ended up falling landing on his forehead. Currently asymptomatic while seated on stretcher. Wife at bedside attributes symptoms to being on too many (or too high of a dose) of meds - though she was unsure what he is on. Pt was in ED 2/14/22 for dizziness, vomiting, abd pain. Had CTA head/neck, labs w/ no significant abnormalities. Exam at that time notable for unsteady gait. On asa, no other AC.   Denies HA, vision changes, focal weakness/numbness, neck/back pain, URI symptoms, SOB/CP, NVD, abd pain, urinary complaints, black/bloody stool.

## 2022-04-12 NOTE — H&P ADULT - PROBLEM SELECTOR PLAN 2
Patient with hx of multiple falls, presented s/p fall to forehead.  -stroke w/u including CTH negative (no bleed or signs of ischemia)  -PT/OT consult  -f/u orthostatics Patient with hx of multiple falls, presented s/p fall. Patient with baseline vertigo and tremor.  -stroke w/u including CTH negative (no bleed or signs of ischemia)  -PT/OT consult  -f/u orthostatics    #CAD? Patient does not know of cardiac hx, but does take ASA 81mg qd and has LBBB on ECG  -c/w ASA 81mg Patient with hx of multiple falls, presented s/p fall. Patient with baseline vertigo and tremor.  -stroke w/u including CTH negative (no bleed or signs of ischemia)  -PT/OT consult  -f/u orthostatics  -f/u TSH, B12, folate  -monitor tele for arrhythmia, ECG sinus    #CAD? Patient does not know of cardiac hx, but does take ASA 81mg qd and has LBBB on ECG  -c/w ASA 81mg

## 2022-04-12 NOTE — CONSULT NOTE ADULT - SUBJECTIVE AND OBJECTIVE BOX
**STROKE CODE CONSULT NOTE**    Last known well time/Time of onset of symptoms: unknown    Rajiv  used # [id number]    HPI: 71y Male with PMHx of DM, chronic balance problems, bipolar (on lithium, lamictal and depakote) presents today for dizziness and loss of balance. Patient explains he has been feeling dizzy x a few weeks, admits to nausea and the room spinning when he feels dizzy. Today after praying in the [place], patient felt dizzy with loss of balance, and fell backwards and hit his head. Denied LOC. Uses a walker at home. Denied vision or speech changes. Denies headache, chest pain, new onset numbness or weakness.    Of note, upon chart review, patient has had these sx for several years, was admitted to stroke service (did not have an acute stroke) and has been in the ED for same complaint a few times. Has been given outpatient general neurology follow up but as per wife, patient has never followed up.     T(C): 36.6 (04-12-22 @ 13:29), Max: 36.6 (04-12-22 @ 13:29)  HR: 56 (04-12-22 @ 13:29) (56 - 56)  BP: 162/67 (04-12-22 @ 13:29) (162/67 - 162/67)  RR: 16 (04-12-22 @ 13:29) (16 - 16)  SpO2: 99% (04-12-22 @ 13:29) (99% - 99%)    PAST MEDICAL & SURGICAL HISTORY:  Diabetes    Tremor    Bipolar disorder    No significant past surgical history        FAMILY HISTORY:          ROS:   Constitutional: No fever, weight loss or fatigue  Eyes: No eye pain, visual disturbances, or discharge  ENMT:  No difficulty hearing, tinnitus; No sinus or throat pain  Neck: No pain or stiffness  Respiratory: No cough, wheezing, chills or hemoptysis  Cardiovascular: No chest pain, palpitations, shortness of breath, or leg swelling  Gastrointestinal: No abdominal pain. No nausea, vomiting or hematemesis; No diarrhea or constipation. Nohematochezia.  Genitourinary: No dysuria, frequency, hematuria or incontinence  Neurological: As per HPI  Skin: No itching, burning, rashes or lesions   Endocrine: No heat or cold intolerance; No hair loss  Musculoskeletal: No joint pain or swelling; No muscle, back or extremity pain  Heme/Lymph: No easy bruising or bleeding gums    MEDICATIONS  (STANDING):    MEDICATIONS  (PRN):    Allergies    No Known Allergies    Intolerances      Vital Signs Last 24 Hrs  T(C): 36.6 (12 Apr 2022 13:29), Max: 36.6 (12 Apr 2022 13:29)  T(F): 97.8 (12 Apr 2022 13:29), Max: 97.8 (12 Apr 2022 13:29)  HR: 56 (12 Apr 2022 13:29) (56 - 56)  BP: 162/67 (12 Apr 2022 13:29) (162/67 - 162/67)  BP(mean): --  RR: 16 (12 Apr 2022 13:29) (16 - 16)  SpO2: 99% (12 Apr 2022 13:29) (99% - 99%)    Physical exam:  Constitutional: No acute distress, conversant  Eyes: Anicteric sclerae, moist conjunctivae, see below for CNs  Neck: trachea midline, FROM, supple, no thyromegaly or lymphadenopathy  Cardiovascular: Regular rate and rhythm, no murmurs, rubs, or gallops. No carotid bruits.   Pulmonary: Anterior breath sounds clear bilaterally, no crackles or wheezing. No use of accessory muscles  GI: Abdomen soft, non-distended, non-tender  Extremities: Radial and DP pulses +2, no edema    Neurologic:  -Mental status: Awake, alert, oriented to person, place, and time. Speech is fluent with intact naming, repetition, and comprehension, no dysarthria. Recent and remote memory intact. Follows commands. Attention/concentration intact. Fund of knowledge appropriate.  -Cranial nerves:   II: Visual fields are full to confrontation.  III, IV, VI: Extraocular movements are intact without nystagmus. Pupils equally round and reactive to light  V:  Facial sensation V1-V3 equal and intact   VII: Face is symmetric with normal eye closure and smile  VIII: Hearing is bilaterally intact to finger rub  IX, X: Uvula is midline and soft palate rises symmetrically  XI: Head turning and shoulder shrug are intact.  XII: Tongue protrudes midline  Motor: Normal bulk and tone. No pronator drift. Strength bilateral upper extremity 5/5, bilateral lower extremities 5/5.  Rapid alternating movements intact and symmetric  Sensation: Intact to light touch bilaterally. No neglect or extinction on double simultaneous testing.  Coordination: No dysmetria on finger-to-nose and heel-to-shin bilaterally  Reflexes: Downgoing toes bilaterally   Gait: Narrow gait and steady    NIHSS: **** ASPECT Score: ***** ICH Score: ****** (GCS)    Fingerstick Blood Glucose: CAPILLARY BLOOD GLUCOSE  119 (12 Apr 2022 14:46)      POCT Blood Glucose.: 119 mg/dL (12 Apr 2022 13:28)    LABS:                        15.5   9.14  )-----------( 254      ( 12 Apr 2022 13:54 )             46.9     04-12    139  |  103  |  13  ----------------------------<  134<H>  4.7   |  28  |  0.85    Ca    9.6      12 Apr 2022 13:54  Phos  3.3     04-12  Mg     2.1     04-12    TPro  7.2  /  Alb  4.4  /  TBili  0.5  /  DBili  x   /  AST  19  /  ALT  12  /  AlkPhos  56  04-12    PT/INR - ( 12 Apr 2022 13:54 )   PT: 12.2 sec;   INR: 1.02          PTT - ( 12 Apr 2022 13:54 )  PTT:30.1 sec  CARDIAC MARKERS ( 12 Apr 2022 13:54 )  x     / 0.01 ng/mL / x     / x     / x              RADIOLOGY & ADDITIONAL STUDIES:      -----------------------------------------------------------------------------------------------------------------  IV-tPA (Y/N):    ***                              Bolus time:    Alteplase Dose Verification w/ RN:  Reason IV-tPA not given: ***    **STROKE CODE CONSULT NOTE**    Last known well time/Time of onset of symptoms: unknown    Rajiv  used # [id number]    HPI: 71y Male with PMHx of DM, chronic balance problems, bipolar (on lithium, lamictal and depakote) presents today for dizziness and loss of balance. Patient explains he has been feeling dizzy x a few weeks, admits to nausea and the room spinning when he feels dizzy. Today after praying in the [place], patient felt dizzy with loss of balance, and fell backwards and hit his head. Denied LOC. Uses a walker at home. Denied vision or speech changes. Denies headache, chest pain, new onset numbness or weakness.    Of note, upon chart review, patient has had these sx for several years, was admitted to stroke service (workup was neg) 2 years ago and has been in the ED for same complaint a few times. Has been given outpatient general neurology follow up but as per wife, patient has never followed up.     T(C): 36.6 (04-12-22 @ 13:29), Max: 36.6 (04-12-22 @ 13:29)  HR: 56 (04-12-22 @ 13:29) (56 - 56)  BP: 162/67 (04-12-22 @ 13:29) (162/67 - 162/67)  RR: 16 (04-12-22 @ 13:29) (16 - 16)  SpO2: 99% (04-12-22 @ 13:29) (99% - 99%)    PAST MEDICAL & SURGICAL HISTORY:  Diabetes    Tremor    Bipolar disorder    No significant past surgical history        FAMILY HISTORY:          ROS:   Constitutional: No fever, weight loss or fatigue  Eyes: No eye pain, visual disturbances, or discharge  Neck: No pain or stiffness  Respiratory: No cough, wheezing, chills  Cardiovascular: No chest pain, palpitations, shortness of breath  Gastrointestinal: No abdominal pain. Pos for nausea when dizzy.  Neurological: As per HPI    MEDICATIONS  (STANDING):    MEDICATIONS  (PRN):    Allergies    No Known Allergies    Intolerances      Vital Signs Last 24 Hrs  T(C): 36.6 (12 Apr 2022 13:29), Max: 36.6 (12 Apr 2022 13:29)  T(F): 97.8 (12 Apr 2022 13:29), Max: 97.8 (12 Apr 2022 13:29)  HR: 56 (12 Apr 2022 13:29) (56 - 56)  BP: 162/67 (12 Apr 2022 13:29) (162/67 - 162/67)  BP(mean): --  RR: 16 (12 Apr 2022 13:29) (16 - 16)  SpO2: 99% (12 Apr 2022 13:29) (99% - 99%)    Physical exam:  Constitutional: No acute distress, conversant  Eyes: Anicteric sclerae, moist conjunctivae, see below for CNs  Neck: trachea midline, FROM, supple  Cardiovascular: Regular rate and rhythm  Pulmonary: No use of accessory muscles      Neurologic:  -Mental status: Awake, alert, oriented to person, place, and time. Speech is fluent with intact naming, repetition, and comprehension, no dysarthria. Recent and remote memory intact. Follows commands. Attention/concentration intact.   -Cranial nerves:   II: Visual fields are full to confrontation.  III, IV, VI: Extraocular movements are intact without nystagmus.   V:  Facial sensation V1-V3 equal and intact   VII: Face is symmetric with normal eye closure and smile  XI: Head turning intact.  XII: Tongue protrudes midline  Motor: Normal bulk and tone. No pronator drift. Strength bilateral upper extremity 5/5, bilateral lower extremities 5/5.  Sensation: Intact to light touch bilaterally. No neglect or extinction on double simultaneous testing. Patient is chronically tremulous  Coordination: No dysmetria on finger-to-nose bilaterally, patient has chronic tremors which were apparent on finger to nose, although no dysmetria      NIHSS: 0    Fingerstick Blood Glucose: CAPILLARY BLOOD GLUCOSE  119 (12 Apr 2022 14:46)      POCT Blood Glucose.: 119 mg/dL (12 Apr 2022 13:28)    LABS:                        15.5   9.14  )-----------( 254      ( 12 Apr 2022 13:54 )             46.9     04-12    139  |  103  |  13  ----------------------------<  134<H>  4.7   |  28  |  0.85    Ca    9.6      12 Apr 2022 13:54  Phos  3.3     04-12  Mg     2.1     04-12    TPro  7.2  /  Alb  4.4  /  TBili  0.5  /  DBili  x   /  AST  19  /  ALT  12  /  AlkPhos  56  04-12    PT/INR - ( 12 Apr 2022 13:54 )   PT: 12.2 sec;   INR: 1.02          PTT - ( 12 Apr 2022 13:54 )  PTT:30.1 sec  CARDIAC MARKERS ( 12 Apr 2022 13:54 )  x     / 0.01 ng/mL / x     / x     / x        RADIOLOGY & ADDITIONAL STUDIES:      -----------------------------------------------------------------------------------------------------------------  IV-tPA (Y/N):    no, unknown last known well, unlikely stroke                      **STROKE CODE CONSULT NOTE**    Last known well time/Time of onset of symptoms: unknown    Rajiv  used # [id number]    HPI: 71y Male with PMHx of DM (non complaint with meds), chronic balance problems, bipolar (on lithium, lamictal and depakote, unclear compliancy) presents today for dizziness and loss of balance. Patient explains he has been feeling dizzy x a few weeks, admits to nausea and the room spinning when he feels dizzy. Today after praying in the [employer], patient felt dizzy with loss of balance, and fell backwards and hit his head. Denied LOC. Uses a walker at home. Denied vision or speech changes. Denies headache, chest pain, new onset numbness or weakness.    Of note, upon chart review, patient has had these sx for several years, was admitted to stroke service (workup was neg) 2 years ago and has been in the ED for same complaint a few times. Has been given outpatient general neurology follow up but as per wife, patient has never followed up.     T(C): 36.6 (04-12-22 @ 13:29), Max: 36.6 (04-12-22 @ 13:29)  HR: 56 (04-12-22 @ 13:29) (56 - 56)  BP: 162/67 (04-12-22 @ 13:29) (162/67 - 162/67)  RR: 16 (04-12-22 @ 13:29) (16 - 16)  SpO2: 99% (04-12-22 @ 13:29) (99% - 99%)    PAST MEDICAL & SURGICAL HISTORY:  Diabetes    Tremor    Bipolar disorder    No significant past surgical history        FAMILY HISTORY:          ROS:   Constitutional: No fever, weight loss or fatigue  Eyes: No eye pain, visual disturbances, or discharge  Neck: No pain or stiffness  Respiratory: No cough, wheezing, chills  Cardiovascular: No chest pain, palpitations, shortness of breath  Gastrointestinal: No abdominal pain. Pos for nausea when dizzy.  Neurological: As per HPI    MEDICATIONS  (STANDING):    MEDICATIONS  (PRN):    Allergies    No Known Allergies    Intolerances      Vital Signs Last 24 Hrs  T(C): 36.6 (12 Apr 2022 13:29), Max: 36.6 (12 Apr 2022 13:29)  T(F): 97.8 (12 Apr 2022 13:29), Max: 97.8 (12 Apr 2022 13:29)  HR: 56 (12 Apr 2022 13:29) (56 - 56)  BP: 162/67 (12 Apr 2022 13:29) (162/67 - 162/67)  BP(mean): --  RR: 16 (12 Apr 2022 13:29) (16 - 16)  SpO2: 99% (12 Apr 2022 13:29) (99% - 99%)    Physical exam:  Constitutional: No acute distress, conversant  Eyes: Anicteric sclerae, moist conjunctivae, see below for CNs  Neck: trachea midline, FROM, supple  Cardiovascular: Regular rate and rhythm  Pulmonary: No use of accessory muscles      Neurologic:  -Mental status: Awake, alert, oriented to person, place, and time. Speech is fluent with intact naming, repetition, and comprehension, no dysarthria. Recent and remote memory intact. Follows commands. Attention/concentration intact.   -Cranial nerves:   II: Visual fields are full to confrontation.  III, IV, VI: Extraocular movements are intact without nystagmus.   V:  Facial sensation V1-V3 equal and intact   VII: Face is symmetric with normal eye closure and smile  XI: Head turning intact.  XII: Tongue protrudes midline  Motor: Normal bulk and tone. No pronator drift. Strength bilateral upper extremity 5/5, bilateral lower extremities 5/5.  Sensation: Intact to light touch bilaterally. No neglect or extinction on double simultaneous testing.   Coordination: No dysmetria on finger-to-nose bilaterally, patient has chronic tremors which were apparent on finger to nose, although no dysmetria      NIHSS: 0    Fingerstick Blood Glucose: CAPILLARY BLOOD GLUCOSE  119 (12 Apr 2022 14:46)      POCT Blood Glucose.: 119 mg/dL (12 Apr 2022 13:28)    LABS:                        15.5   9.14  )-----------( 254      ( 12 Apr 2022 13:54 )             46.9     04-12    139  |  103  |  13  ----------------------------<  134<H>  4.7   |  28  |  0.85    Ca    9.6      12 Apr 2022 13:54  Phos  3.3     04-12  Mg     2.1     04-12    TPro  7.2  /  Alb  4.4  /  TBili  0.5  /  DBili  x   /  AST  19  /  ALT  12  /  AlkPhos  56  04-12    PT/INR - ( 12 Apr 2022 13:54 )   PT: 12.2 sec;   INR: 1.02          PTT - ( 12 Apr 2022 13:54 )  PTT:30.1 sec  CARDIAC MARKERS ( 12 Apr 2022 13:54 )  x     / 0.01 ng/mL / x     / x     / x        RADIOLOGY & ADDITIONAL STUDIES:  CTH: No acute intracranial hemorrhage or large demarcated territorial   infarction..  CTP: No reported abnormal perfusion metrics.  CTA head/neck: No significant stenosis, occlusion, or dissection of the   cervical carotid or vertebral arteries      -----------------------------------------------------------------------------------------------------------------  IV-tPA (Y/N):    no, unknown last known well, unlikely stroke

## 2022-04-12 NOTE — H&P ADULT - NSICDXPASTMEDICALHX_GEN_ALL_CORE_FT
PAST MEDICAL HISTORY:  Bipolar disorder     Diabetes     Tremor      PAST MEDICAL HISTORY:  Bipolar disorder     Diabetes     Vertigo

## 2022-04-12 NOTE — H&P ADULT - NSHPPHYSICALEXAM_GEN_ALL_CORE
Vital Signs Last 24 Hrs  T(C): 36.5 (12 Apr 2022 16:54), Max: 36.6 (12 Apr 2022 13:29)  T(F): 97.7 (12 Apr 2022 16:54), Max: 97.8 (12 Apr 2022 13:29)  HR: 70 (12 Apr 2022 16:54) (56 - 70)  BP: 133/78 (12 Apr 2022 16:54) (133/78 - 162/67)  BP(mean): --  RR: 17 (12 Apr 2022 16:54) (16 - 17)  SpO2: 97% (12 Apr 2022 16:54) (97% - 99%)  I&O's Detail    Physical Exam:  GENERAL: Awake, alert and interactive, laying in bed  NEURO: A&Ox3, no focal deficits, 4/5 strength in all ext, CN 2-12 intact  HEENT: MMM  NECK: Supple  CARDIAC: Regular rate and rhythm, +S1/S2, no murmurs/rubs/gallops  PULM: Breathing comfortably on RA, clear to auscultation bilaterally, no wheezes/rales/rhonchi  ABDOMEN: Obese, soft, nontender, nondistended  SKIN: +multiple areas of healing skin breakdown on b/l shins  VASC: 1+ peripheral pulses, no edema, no LE tenderness  Psych: Poor insight Vital Signs Last 24 Hrs  T(C): 36.5 (12 Apr 2022 16:54), Max: 36.6 (12 Apr 2022 13:29)  T(F): 97.7 (12 Apr 2022 16:54), Max: 97.8 (12 Apr 2022 13:29)  HR: 70 (12 Apr 2022 16:54) (56 - 70)  BP: 133/78 (12 Apr 2022 16:54) (133/78 - 162/67)  BP(mean): --  RR: 17 (12 Apr 2022 16:54) (16 - 17)  SpO2: 97% (12 Apr 2022 16:54) (97% - 99%)  I&O's Detail    Physical Exam:  GENERAL: Awake, alert and interactive, laying in bed  NEURO: A&Ox3, slowed speech, 4/5 strength in all ext, CN 2-12 intact  HEENT: MMM  NECK: Supple  CARDIAC: Regular rate and rhythm, +S1/S2, no murmurs/rubs/gallops  PULM: Breathing comfortably on RA, clear to auscultation bilaterally, no wheezes/rales/rhonchi  ABDOMEN: Obese, soft, nontender, nondistended  SKIN: +multiple areas of healing skin breakdown on b/l shins  VASC: 1+ peripheral pulses, no edema, no LE tenderness  Psych: Poor insight Vital Signs Last 24 Hrs  T(C): 36.5 (12 Apr 2022 16:54), Max: 36.6 (12 Apr 2022 13:29)  T(F): 97.7 (12 Apr 2022 16:54), Max: 97.8 (12 Apr 2022 13:29)  HR: 70 (12 Apr 2022 16:54) (56 - 70)  BP: 133/78 (12 Apr 2022 16:54) (133/78 - 162/67)  BP(mean): --  RR: 17 (12 Apr 2022 16:54) (16 - 17)  SpO2: 97% (12 Apr 2022 16:54) (97% - 99%)  I&O's Detail    Physical Exam:  GENERAL: Awake, alert and interactive, laying in bed  NEURO: A&Ox3, slowed speech, 4/5 strength in all ext, b/l UE tremor, CN 2-12 intact  HEENT: MMM  NECK: Supple  CARDIAC: Regular rate and rhythm, +S1/S2, no murmurs/rubs/gallops  PULM: Breathing comfortably on RA, clear to auscultation bilaterally, no wheezes/rales/rhonchi  ABDOMEN: Obese, soft, nontender, nondistended  SKIN: +multiple areas of healing skin breakdown on b/l shins  VASC: 1+ peripheral pulses, no edema, no LE tenderness  Psych: Poor insight Vital Signs Last 24 Hrs  T(C): 36.5 (12 Apr 2022 16:54), Max: 36.6 (12 Apr 2022 13:29)  T(F): 97.7 (12 Apr 2022 16:54), Max: 97.8 (12 Apr 2022 13:29)  HR: 70 (12 Apr 2022 16:54) (56 - 70)  BP: 133/78 (12 Apr 2022 16:54) (133/78 - 162/67)  BP(mean): --  RR: 17 (12 Apr 2022 16:54) (16 - 17)  SpO2: 97% (12 Apr 2022 16:54) (97% - 99%)  I&O's Detail    Physical Exam:  GENERAL: Awake, alert and interactive, laying in bed  NEURO: A&Ox3, slowed speech, 4/5 strength in all ext, b/l UE tremor, CN 2-12 intact  HEENT: No bruising on back of head or forehead, MMM  NECK: Supple  CARDIAC: Regular rate and rhythm, +S1/S2, no murmurs/rubs/gallops  PULM: Breathing comfortably on RA, clear to auscultation bilaterally, no wheezes/rales/rhonchi  ABDOMEN: Obese, soft, nontender, nondistended  SKIN: +multiple areas of healing skin breakdown on b/l shins  VASC: 1+ peripheral pulses, no edema, no LE tenderness  Psych: Poor insight

## 2022-04-12 NOTE — H&P ADULT - NSHPLABSRESULTS_GEN_ALL_CORE
Labs:                        15.5   9.14  )-----------( 254      ( 2022 13:54 )             46.9     04-12    139  |  103  |  13  ----------------------------<  134<H>  4.7   |  28  |  0.85    Ca    9.6      2022 13:54  Phos  3.3     04-12  Mg     2.1     04-12    TPro  7.2  /  Alb  4.4  /  TBili  0.5  /  DBili  x   /  AST  19  /  ALT  12  /  AlkPhos  56  04-12    PT/INR - ( 2022 13:54 )   PT: 12.2 sec;   INR: 1.02          PTT - ( 2022 13:54 )  PTT:30.1 sec    Urinalysis Basic - ( 2022 14:42 )    Color: Jenny / Appearance: Clear / S.010 / pH: x  Gluc: x / Ketone: NEGATIVE  / Bili: Negative / Urobili: 0.2 E.U./dL   Blood: x / Protein: NEGATIVE mg/dL / Nitrite: NEGATIVE   Leuk Esterase: NEGATIVE / RBC: > 10 /HPF / WBC < 5 /HPF   Sq Epi: x / Non Sq Epi: 0-5 /HPF / Bacteria: Present /HPF      COVID-19 PCR: Negative (2022 02:09)  COVID-19 PCR: NotDetec (02 Dec 2021 06:25)      Radiology: Reviewed

## 2022-04-12 NOTE — H&P ADULT - ATTENDING COMMENTS
Bipolar patient with T2DM admitted with lithium toxicity  physical as above  NS hydration 150 ml/hour until lithium level clearly decreasing  reviewed with patient that this may be contributing to his falling down and vertigo  follow sugars  rest as above

## 2022-04-12 NOTE — ED PROVIDER NOTE - CONSTITUTIONAL, MLM
Well appearing, awake, alert, oriented to person, place, time/situation and in no apparent distress. normal... no

## 2022-04-12 NOTE — ED ADULT TRIAGE NOTE - CHIEF COMPLAINT QUOTE
Pt brought in by EMS for dizziness and unsteady gait since coming back home the [place] ~1200pm today. Pt states he felt off balance which caused a fall, +head injury, no LOC. Denies chest pain, shortness of breath, fevers, chills. Speaking clearly in full sentences.

## 2022-04-12 NOTE — H&P ADULT - PROBLEM SELECTOR PLAN 7
F: NS at 200cc/hr  E: replete prn  N: Regular diet  A/c: None  Code: RA  Dispo: 7 [facility]

## 2022-04-12 NOTE — CONSULT NOTE ADULT - SUBJECTIVE AND OBJECTIVE BOX
[facility] SERVICE CONSULTATION NOTE    CC: falls    HPI:  71 M PMH DM, BPD presenting with a fall that occurred . Pt has been in ED multiple times over the past 6 months for falls and unsteady gait. Pt is on Lithium, lamotrigine, and divalproex for his BPD but got into a "fight" with his psychiatrist and has not been checking levels. Reports about 1 year of an unsteady gait and dizziness that is difficult to describe but denies vertigo. Pt has episodes of nausea and vomiting associated with dizziness but is currently asymptomatic. ROS negative in ED, except for chronic unsteadiness. Denies fever, chills, chest pain, sob, cough, nausea, vomiting, diarrhea.     ED Course:  VS: T 97.8, HR 56, /67, O2 99  Labs s/f: Hb 15.5, Li 2.4  UA no WBCs, spec grav 1.010  EKG: first degree AV block, chronic widened QRS, no QT prolongation  CTA head and neck: normal  CT Head: negative  Interventions: stroke code consulted with imaging as above. poison control called and recommended fluids at 2xMaintenance with q4 hour Li levels. given 1L NS    ROS:  Otherwise negative, except as specified in HPI.    PMH: as above    PSH: as above    FH: non-contributory    SH: denies smoking, alcohol, or drug use. lives with wife and is reportedly independent of ADLs.    ALLERGIES: NKDA    MEDICATIONS: see A&P    VITAL SIGNS:  ICU Vital Signs Last 24 Hrs  T(C): 36.5 (2022 16:54), Max: 36.6 (2022 13:29)  T(F): 97.7 (2022 16:54), Max: 97.8 (2022 13:29)  HR: 70 (2022 16:54) (56 - 70)  BP: 133/78 (2022 16:54) (133/78 - 162/67)  BP(mean): --  ABP: --  ABP(mean): --  RR: 17 (2022 16:54) (16 - 17)  SpO2: 97% (2022 16:54) (97% - 99%)    CAPILLARY BLOOD GLUCOSE  119 (2022 14:46)      POCT Blood Glucose.: 119 mg/dL (2022 13:28)      PHYSICAL EXAM:  Constitutional: resting comfortably in bed; difficulty sitting up straight with frequent leaning to the sides and backwards  HEENT: ERRL, anicteric sclera; MMM  Neck: supple, no appreciable JVD  Respiratory: CTA B/L, no W/R/R; respirations appear non-labored, conversive in full sentences  Cardiovascular: +S1/S2, RRR  Gastrointestinal: abdomen soft, NT/ND  Extremities: WWP; no clubbing, cyanosis or edema  Vascular: 2+ radial, PT pulses B/L  Dermatologic: skin normal color and turgor; no visible rashes  Neurological: AAOx3, strength 5/5x4, b/l hand tremors, no rigidity or clonus appreciated  Psych: circumstantial thought process    LABS:                        15.5   9.14  )-----------( 254      ( 2022 13:54 )             46.9     04-12    139  |  103  |  13  ----------------------------<  134<H>  4.7   |  28  |  0.85    Ca    9.6      2022 13:54  Phos  3.3     04-12  Mg     2.1     04-12    TPro  7.2  /  Alb  4.4  /  TBili  0.5  /  DBili  x   /  AST  19  /  ALT  12  /  AlkPhos  56  04-12    PT/INR - ( 2022 13:54 )   PT: 12.2 sec;   INR: 1.02          PTT - ( 2022 13:54 )  PTT:30.1 sec    CARDIAC MARKERS ( 2022 13:54 )  x     / 0.01 ng/mL / x     / x     / x          Urinalysis Basic - ( 2022 14:42 )    Color: Jenny / Appearance: Clear / S.010 / pH: x  Gluc: x / Ketone: NEGATIVE  / Bili: Negative / Urobili: 0.2 E.U./dL   Blood: x / Protein: NEGATIVE mg/dL / Nitrite: NEGATIVE   Leuk Esterase: NEGATIVE / RBC: > 10 /HPF / WBC < 5 /HPF   Sq Epi: x / Non Sq Epi: 0-5 /HPF / Bacteria: Present /HPF      Blood Gas Profile w/Lytes - Venous: Performed in Lab (22 @ 14:12)      EKG: Reviewed.    RADIOLOGY & ADDITIONAL TESTS: Reviewed.

## 2022-04-12 NOTE — PATIENT PROFILE ADULT - FALL HARM RISK - RISK INTERVENTIONS

## 2022-04-12 NOTE — H&P ADULT - HISTORY OF PRESENT ILLNESS
71M PMH bipolar disorder and T2DM, on ASA 81mg, presented to ED s/p fall.    In the ED:  VS: Afebrile, HR 56-70, //78, RR 16, SpO2 99% on RA  Labs: Lithium level 2.40. CBC, CMP, coags, and trops unremarkable. VBG relatively nl. UA with >10 RBCs. Valproic acid level normal. Alcohol, salicylate, and acetaminophen levels negative.  ECG: NSR, ?LBBB, QRS 126ms, no ischemic changes  Imaging: Stroke protocol negative, CXR pending  Interventions: Stroke code called, poison control called (rec: q4hr Lithium levels and IVF at 2x maintenance), 1L NS 71M Yiddish/English speaking, PMH bipolar disorder, vertigo, and T2DM, on ASA 81mg, presented to ED s/p fall. Patient states he often gets dizzy and falls. At times, dizziness is accompanied by nausea, though not today. Today, he was walking with a walker and fell backwards? (unclear). Was found by wife supine on the floor. On ROS, patient notes he is constipated. Denies current HA, dizziness, metalic taste in mouth, CP, palp, SOB, abd pain, n/v/d, urinary issues.    Of note, patient states he takes all his medications at once (meaning, takes two pills at once for a BID med) except his Janumet which he takes one of at approx 3am when he goes to bed. He occasionally takes his second dose of Janumet during the day. He also skips his propanolol completely on some days. Very poor insight into how medications work.    In the ED:  VS: Afebrile, HR 56-70, //78, RR 16, SpO2 99% on RA  Labs: Lithium level 2.40. CBC, CMP, coags, and trops unremarkable. VBG relatively nl. UA with >10 RBCs. Valproic acid level normal. Alcohol, salicylate, and acetaminophen levels negative.  ECG: NSR, ?LBBB, QRS 126ms, no ischemic changes  Imaging: Stroke protocol negative, CXR relatively clear  Interventions: Stroke code called, poison control called (rec: q4hr Lithium levels and IVF at 2x maintenance), 1L NS

## 2022-04-12 NOTE — H&P ADULT - PROBLEM SELECTOR PLAN 3
Patient on lithium  BID, depakote  TID, lamictal 150 daily, lamictal 100 daily, Zoloft 37.5mg daily, and Klonopin 0.5 TID. Patient has not had levels checked recently.  -tx of lithium toxicity as above  -valproic acid level normal  -f/u Lamictal level  -hold lithium, continue other home meds (will make Klonopin prn)  -psych consult in the AM Patient take zofran 4mg PO prn and meclizine 12.5mg TID prn  -c/w zofran prn  -holding meclizine i/s/o dizziness

## 2022-04-12 NOTE — ED ADULT NURSE NOTE - CHIEF COMPLAINT QUOTE
Pt brought in by EMS for dizziness and unsteady gait since coming back home the [place] ~1200pm today. Pt states he felt off balance which caused a fall, +head injury, no LOC. Denies chest pain, shortness of breath, fevers, chills. Speaking clearly in full sentences.

## 2022-04-12 NOTE — H&P ADULT - PROBLEM SELECTOR PLAN 1
Patient takes Lithium ER 300mg BID. Presented with level of 2.40.   - Poison control called from ED, recommended IVF at x2 maintenance and q4hr lithium checks.  - Echo from 7/16/2020 with normal diastolic function and EF 60-65% Patient takes Lithium ER 300mg BID (takes 600mg once at 3am). Presented with level of 2.40.   - Poison control called from ED, recommended IVF at x2 maintenance and q4hr lithium checks. Need 2x levels decreasing before can d/c q4hr checks  - Echo from 7/16/2020 with normal diastolic function and EF 60-65% Patient takes Lithium ER 300mg BID (takes 600mg once at 3am). Presented with level of 2.40. Patient has not had lithium level checked in "quite some time" after having a fight with his psychiatrist.  - Poison control called from ED, recommended IVF at x2 maintenance and q4hr lithium checks. Need 2x levels decreasing before can d/c q4hr checks  - Echo from 7/16/2020 with normal diastolic function and EF 60-65%  -collateral from psychiatrist in AM

## 2022-04-12 NOTE — H&P ADULT - ASSESSMENT
71M PMH bipolar disorder and T2DM, on ASA 81mg, presented to ED s/p fall found to have elevated lithium level to 2.40, admitted to tele for frequent lab draws. 71M PMH bipolar disorder, vertigo, and T2DM, on ASA 81mg, presented to ED s/p fall found to have elevated lithium level to 2.40, admitted to tele for frequent lab draws. 71M Yiddish/English speaking, PMH bipolar disorder, vertigo, and T2DM, on ASA 81mg, presented to ED s/p fall found to have elevated lithium level to 2.40, admitted to tele for frequent lab draws.

## 2022-04-12 NOTE — ED PROVIDER NOTE - PHYSICAL EXAMINATION
b/l UE intention tremor  PERRL, EOMI, no nystagmus. CN intact. Strength 5/5. Normal F-->N. No pronator drift. Sensation intact. Normal speech, no dysarthria.  superficial abrasion R proximal forearm  No spinal ttp, neck FROM. No bony ttp, FROM all extremities.  No trismus. Jaw FROM. No obvious facial swelling. Normal voice. No stridor/drooling. No bony ttp. No bleeding or obvious skin breaks. No cervical LAD. No auricular LAD. PERRL, EOMI, no nystagmus. No proptosis. Ears symmetrical. No mastoid ttp. Neck FROM. No nuchal rigidity.

## 2022-04-12 NOTE — CONSULT NOTE ADULT - ASSESSMENT
71 M PMH DM, BPD presenting with fall 2/2 chronic gait instability likely 2/2 Li toxicity.    Neuro  #Gait instability with frequent falls  - CTA head and neck: negative  - CT Head: negative  - likely 2/2 Li toxicity as below  Plan:  - PT consult in AM    #chronic tremors  - likely 2/2 lithium toxicity as below  - pt is prescribed propranolol 40mg BID, but takes 80mg OD  Plan:  - continue propranolol 40mg BID as prescribed  - provide education on medication adherence  - discuss with PCP medication misuse    Renal  #Lithium toxicity  - pt takes Lithium 600mg OD for BPD but has not been getting levels checked as he had a "fight" with his psychiatrist  - Lithium level 2.4 in ED  - hx of chronic gait instabily over the past year  - normal Na and UA with spec grav 1.010 not concerning for DI  - ED provider discussed with Sanya for [facility] poison control ([phone]): Recommending IVF at twice maintenance (as long as pt able to tolerate) as well as q4 BMPs/lithium levels w/ goal of at least 2 repetitive levels declining  Plan:  - q4 lithium level and BMP  - start NS at 200cc/hr  - f/u AM TSH    Endo  #DM  - home med: janumet  BID  Plan:  - ISS while i/p  - f/u a1c    Psych  #BPD  - home meds: Li 600mg OD, sertraline 37.5mg OD, divalproex 1000mg OD, clonazepam .5mg qhs, lamotrigine 250mg OD  - divalproate level therapeutic  Plan:  - hold Li in the setting of toxicity  - hold clonazepam in the setting of gait instability and monitor for benzo withdrawal  - continue sertraline and divalproex  - hold lamotrigine pending level  - psych consult in AM    Med rec per ED physician - please clarify with patient because he says he takes most of his BID and TID medications once a day at 3am  [facility] at [phone]: ondansetron 4mg, Klonopin 0.5mg qhs, propranolol 40mg BID (takes propranolol 80mg OD), meclizine 12.5mg TID, janumet 50/500 BID,  depakote ER 500mg 3 tabs daily (takes 2 tabs at 3am), lamictal 250mg (at 3am), asa 81 qd, lithium  2 tabs qhs, zoloft 25mg 1.5 tabs qd.      F: NS @200cc/hr  E: replete prn  N: consistent carb  DVT ppx: not indicated, consider lovenox if stay >1 day  Code status: full code 71 M PMH DM, BPD presenting with fall 2/2 chronic gait instability likely 2/2 Li toxicity.    Neuro  #Gait instability with frequent falls  - CTA head and neck: negative  - CT Head: negative  - likely 2/2 Li toxicity as below  Plan:  - PT consult in AM    #chronic tremors  - likely 2/2 lithium toxicity as below  - pt is prescribed propranolol 40mg BID, but takes 80mg OD  Plan:  - continue propranolol 40mg BID as prescribed  - provide education on medication adherence  - discuss with PCP medication misuse    Renal  #Lithium toxicity  - pt takes Lithium 600mg OD for BPD but has not been getting levels checked as he had a "fight" with his psychiatrist  - Lithium level 2.4 in ED  - hx of chronic gait instabily over the past year  - normal Na and UA with spec grav 1.010 not concerning for DI  - ED provider discussed with Sanya for [facility] poison control ([phone]): Recommending IVF at twice maintenance (as long as pt able to tolerate) as well as q4 BMPs/lithium levels w/ goal of at least 2 repetitive levels declining  Plan:  - q4 lithium level and BMP  - start NS at 200cc/hr  - f/u AM TSH    Endo  #DM  - home med: janumet  BID  Plan:  - ISS while i/p  - f/u a1c    Psych  #BPD  - home meds: Li 600mg OD, sertraline 37.5mg OD, divalproex 1000mg OD, clonazepam .5mg qhs, lamotrigine 250mg OD  - divalproate level therapeutic  Plan:  - hold Li in the setting of toxicity  - hold clonazepam in the setting of gait instability and monitor for benzo withdrawal  - continue sertraline and divalproex  - hold lamotrigine pending level  - psych consult in AM    Med rec per ED physician - please clarify with patient because he says he takes most of his BID and TID medications once a day at 3am  [facility] at [phone]: ondansetron 4mg, Klonopin 0.5mg qhs, propranolol 40mg BID (takes propranolol 80mg OD), meclizine 12.5mg TID, janumet 50/500 BID,  depakote ER 500mg 3 tabs daily (takes 2 tabs at 3am), lamictal 250mg (at 3am), asa 81 qd, lithium  2 tabs qhs, zoloft 25mg 1.5 tabs qd.      F: NS @200cc/hr  E: replete prn  N: consistent carb  DVT ppx: not indicated, consider lovenox if stay >1 day  Code status: full code    Dispo: 7[facility] for frequent lab draws

## 2022-04-12 NOTE — ED PROVIDER NOTE - PROGRESS NOTE DETAILS
David: Called [facility] at [phone]: ondansetron 4mg, Klonopin 0.5mg TID, propranolol 40mg BID, meclizine 12.5mg TID, janumet 50/500 BID,  depakote ER 500mg 3 tabs daily, lamictal 150mg qd, lamictal 100 qd, asa 81 qd, lithium  2 tabs qhs, zoloft 25mg 1.5 tabs qd.  Will add levels. Reassess. [place]: lithium 2.4, other labs grossly wnl. CTs w/ no acute pathology. UA no infection. Pt remains well appearing/asymptomatic in bed. Pt states he last took lithium ~1500 yesterday. d/w Sanya for [facility] poison control ([phone]): Recommending IVF at twice maintenance (as long as pt able to tolerate) as well as q4 BMPs/lithium levels w/ goal of at least 2 repetitive levels declining. d/w briefly w/ hospitalist - unable to do q4 labs on floor. d/w ICU.   clinically unclear if pt has other chronic neuro process in addition to lithium toxicity (wife states that pt has been to neuro and was told he likely has parkinsons - was referred to movement specialist but never followed up).   Updated pt/wife. [place]: lithium 2.4, other labs grossly wnl. CTs w/ no acute pathology. UA no infection. Pt remains well appearing/asymptomatic in bed. Pt states he last took lithium ~1500 yesterday. d/w Sanya from [facility] poison control ([phone]): Recommending IVF at twice maintenance (as long as pt able to tolerate) as well as q4 BMPs/lithium levels w/ goal of at least 2 repetitive levels declining. d/w briefly w/ hospitalist - unable to do q4 labs on floor. d/w ICU.   clinically unclear if pt has other chronic neuro process in addition to lithium toxicity (wife states that pt has been to neuro and was told he likely has parkinsons - was referred to movement specialist but never followed up).   Updated pt/wife. [place]: accepted to stepdown

## 2022-04-12 NOTE — H&P ADULT - NSHPSOCIALHISTORY_GEN_ALL_CORE
Lives with wife  Ambulates intermittently with a walker  Denies EtOH, tob, drug use  Takes care of his ADLs

## 2022-04-12 NOTE — ED ADULT NURSE NOTE - OBJECTIVE STATEMENT
Pt is 71 y.o male client BIBA complaining of weakness this morning. Pt is able to communicate in simple sentences. Pt A&Ox4. Pt's wife endorses pt woke up feeling dizzy this morning went to [facility] went back home and woke up feeling dizzy and fell and hit his head. No bleeding or laceration noted. Per pt's wife pt had been had imbalances the past few weeks but refused to go see a doctor and pt is on baby aspirin. Pt denies loc, chest pain, sob, fever, chills, n&v, HA. Stroke code activated CT scan dry and wet done, Pt hooked to monitor and cont monitoring done. Pt has PMH of DM and tremors.

## 2022-04-13 ENCOUNTER — TRANSCRIPTION ENCOUNTER (OUTPATIENT)
Age: 72
End: 2022-04-13

## 2022-04-13 LAB
A1C WITH ESTIMATED AVERAGE GLUCOSE RESULT: 7.4 % — HIGH (ref 4–5.6)
ANION GAP SERPL CALC-SCNC: 9 MMOL/L — SIGNIFICANT CHANGE UP (ref 5–17)
BUN SERPL-MCNC: 11 MG/DL — SIGNIFICANT CHANGE UP (ref 7–23)
CALCIUM SERPL-MCNC: 9.2 MG/DL — SIGNIFICANT CHANGE UP (ref 8.4–10.5)
CHLORIDE SERPL-SCNC: 108 MMOL/L — SIGNIFICANT CHANGE UP (ref 96–108)
CHOLEST SERPL-MCNC: 164 MG/DL — SIGNIFICANT CHANGE UP
CO2 SERPL-SCNC: 25 MMOL/L — SIGNIFICANT CHANGE UP (ref 22–31)
CREAT SERPL-MCNC: 0.78 MG/DL — SIGNIFICANT CHANGE UP (ref 0.5–1.3)
CULTURE RESULTS: SIGNIFICANT CHANGE UP
EGFR: 96 ML/MIN/1.73M2 — SIGNIFICANT CHANGE UP
ESTIMATED AVERAGE GLUCOSE: 166 MG/DL — HIGH (ref 68–114)
FOLATE SERPL-MCNC: 13.4 NG/ML — SIGNIFICANT CHANGE UP
GLUCOSE BLDC GLUCOMTR-MCNC: 116 MG/DL — HIGH (ref 70–99)
GLUCOSE BLDC GLUCOMTR-MCNC: 146 MG/DL — HIGH (ref 70–99)
GLUCOSE BLDC GLUCOMTR-MCNC: 160 MG/DL — HIGH (ref 70–99)
GLUCOSE BLDC GLUCOMTR-MCNC: 245 MG/DL — HIGH (ref 70–99)
GLUCOSE SERPL-MCNC: 140 MG/DL — HIGH (ref 70–99)
HCT VFR BLD CALC: 46.4 % — SIGNIFICANT CHANGE UP (ref 39–50)
HDLC SERPL-MCNC: 54 MG/DL — SIGNIFICANT CHANGE UP
HGB BLD-MCNC: 15.1 G/DL — SIGNIFICANT CHANGE UP (ref 13–17)
LIPID PNL WITH DIRECT LDL SERPL: 92 MG/DL — SIGNIFICANT CHANGE UP
LITHIUM SERPL-MCNC: 0.34 MMOL/L — LOW (ref 0.6–1.2)
LITHIUM SERPL-MCNC: 2.33 MMOL/L — CRITICAL HIGH (ref 0.6–1.2)
MAGNESIUM SERPL-MCNC: 2.3 MG/DL — SIGNIFICANT CHANGE UP (ref 1.6–2.6)
MCHC RBC-ENTMCNC: 31.1 PG — SIGNIFICANT CHANGE UP (ref 27–34)
MCHC RBC-ENTMCNC: 32.5 GM/DL — SIGNIFICANT CHANGE UP (ref 32–36)
MCV RBC AUTO: 95.7 FL — SIGNIFICANT CHANGE UP (ref 80–100)
NON HDL CHOLESTEROL: 110 MG/DL — SIGNIFICANT CHANGE UP
NRBC # BLD: 0 /100 WBCS — SIGNIFICANT CHANGE UP (ref 0–0)
PHOSPHATE SERPL-MCNC: 2.8 MG/DL — SIGNIFICANT CHANGE UP (ref 2.5–4.5)
PLATELET # BLD AUTO: 276 K/UL — SIGNIFICANT CHANGE UP (ref 150–400)
POTASSIUM SERPL-MCNC: 4.1 MMOL/L — SIGNIFICANT CHANGE UP (ref 3.5–5.3)
POTASSIUM SERPL-SCNC: 4.1 MMOL/L — SIGNIFICANT CHANGE UP (ref 3.5–5.3)
RBC # BLD: 4.85 M/UL — SIGNIFICANT CHANGE UP (ref 4.2–5.8)
RBC # FLD: 13.2 % — SIGNIFICANT CHANGE UP (ref 10.3–14.5)
SODIUM SERPL-SCNC: 142 MMOL/L — SIGNIFICANT CHANGE UP (ref 135–145)
SPECIMEN SOURCE: SIGNIFICANT CHANGE UP
T4 FREE+ TSH PNL SERPL: 1.87 UIU/ML — SIGNIFICANT CHANGE UP (ref 0.27–4.2)
TRIGL SERPL-MCNC: 90 MG/DL — SIGNIFICANT CHANGE UP
VALPROATE SERPL-MCNC: 54.2 UG/ML — SIGNIFICANT CHANGE UP (ref 50–100)
VIT B12 SERPL-MCNC: 1483 PG/ML — HIGH (ref 232–1245)
WBC # BLD: 11.27 K/UL — HIGH (ref 3.8–10.5)
WBC # FLD AUTO: 11.27 K/UL — HIGH (ref 3.8–10.5)

## 2022-04-13 PROCEDURE — 99223 1ST HOSP IP/OBS HIGH 75: CPT

## 2022-04-13 PROCEDURE — 99233 SBSQ HOSP IP/OBS HIGH 50: CPT | Mod: GC

## 2022-04-13 RX ORDER — DIVALPROEX SODIUM 500 MG/1
1000 TABLET, DELAYED RELEASE ORAL AT BEDTIME
Refills: 0 | Status: DISCONTINUED | OUTPATIENT
Start: 2022-04-13 | End: 2022-04-14

## 2022-04-13 RX ORDER — ACETAMINOPHEN 500 MG
650 TABLET ORAL EVERY 6 HOURS
Refills: 0 | Status: DISCONTINUED | OUTPATIENT
Start: 2022-04-13 | End: 2022-04-14

## 2022-04-13 RX ORDER — CLONAZEPAM 1 MG
0.5 TABLET ORAL AT BEDTIME
Refills: 0 | Status: DISCONTINUED | OUTPATIENT
Start: 2022-04-13 | End: 2022-04-14

## 2022-04-13 RX ORDER — LAMOTRIGINE 25 MG/1
150 TABLET, ORALLY DISINTEGRATING ORAL EVERY 24 HOURS
Refills: 0 | Status: DISCONTINUED | OUTPATIENT
Start: 2022-04-14 | End: 2022-04-14

## 2022-04-13 RX ORDER — LAMOTRIGINE 25 MG/1
100 TABLET, ORALLY DISINTEGRATING ORAL AT BEDTIME
Refills: 0 | Status: DISCONTINUED | OUTPATIENT
Start: 2022-04-13 | End: 2022-04-14

## 2022-04-13 RX ORDER — ENOXAPARIN SODIUM 100 MG/ML
40 INJECTION SUBCUTANEOUS ONCE
Refills: 0 | Status: COMPLETED | OUTPATIENT
Start: 2022-04-13 | End: 2022-04-13

## 2022-04-13 RX ADMIN — SENNA PLUS 2 TABLET(S): 8.6 TABLET ORAL at 21:17

## 2022-04-13 RX ADMIN — DIVALPROEX SODIUM 1000 MILLIGRAM(S): 500 TABLET, DELAYED RELEASE ORAL at 21:17

## 2022-04-13 RX ADMIN — ENOXAPARIN SODIUM 40 MILLIGRAM(S): 100 INJECTION SUBCUTANEOUS at 15:08

## 2022-04-13 RX ADMIN — LAMOTRIGINE 150 MILLIGRAM(S): 25 TABLET, ORALLY DISINTEGRATING ORAL at 09:22

## 2022-04-13 RX ADMIN — Medication 0.5 MILLIGRAM(S): at 21:17

## 2022-04-13 RX ADMIN — Medication 4: at 12:05

## 2022-04-13 RX ADMIN — DIVALPROEX SODIUM 500 MILLIGRAM(S): 500 TABLET, DELAYED RELEASE ORAL at 09:22

## 2022-04-13 RX ADMIN — Medication 2: at 17:00

## 2022-04-13 RX ADMIN — Medication 81 MILLIGRAM(S): at 06:49

## 2022-04-13 RX ADMIN — LAMOTRIGINE 100 MILLIGRAM(S): 25 TABLET, ORALLY DISINTEGRATING ORAL at 21:17

## 2022-04-13 RX ADMIN — SERTRALINE 37.5 MILLIGRAM(S): 25 TABLET, FILM COATED ORAL at 06:49

## 2022-04-13 NOTE — PHYSICAL THERAPY INITIAL EVALUATION ADULT - GAIT DEVIATIONS NOTED, PT EVAL
Pt unsteady w/ dec weight shifting abilities. Difficulty w/ maneuvering LE for goal of performing side steps at EOB. Dec step length/abbie noted. Maintains crouched posture. Dec standing endurance, requested so sit at EOB w/ minimal ambulation./decreased abbie/decreased velocity of limb motion/decreased step length/decreased weight-shifting ability

## 2022-04-13 NOTE — BH CONSULTATION LIAISON ASSESSMENT NOTE - RISK ASSESSMENT
Patient has chronic elevated risk of self harm given sex , hx of mood d/o, however, acute risk is mitigitad by lack of current suicidal ideation, intent or plan, lack of pat suicide attempts, current mood stability, strong social support, residential stability, ability to state reasons for living and future orientation.  Given protective factors outweight risk factors patient does not appear to be at imminent risk of self harm at this time and does not meet criteria for escalation in level of care at this time.  Patient has chronic elevated risk of self harm given sex , hx of mood d/o, however, acute risk is mitigitad by lack of current suicidal ideation, intent or plan, lack of pat suicide attempts, current mood stability, strong social support, residential stability, ability to state reasons for living and future orientation.  Given protective factors outweigh risk factors patient does not appear to be at imminent risk of self harm at this time and does not meet criteria for escalation in level of care at this time.

## 2022-04-13 NOTE — PHYSICAL THERAPY INITIAL EVALUATION ADULT - MODALITIES TREATMENT COMMENTS
Cranial Nerves II - XII: II: visual fields are full to confrontation, R eye w/ dec convergence III, IV, VI: EOMI, no nystagmus appreciated V: facial sensation intact to light touch V1-V3 however reports dec sensation along L facial VII: no ptosis, no facial droop, symmetric eyebrow raise and closure VIII: hearing intact to finger rub b/l  XI: head turning and shoulder shrug intact b/l

## 2022-04-13 NOTE — DISCHARGE NOTE PROVIDER - CARE PROVIDER_API CALL
Jenny Nolasco)  Internal Medicine  [address], [address] [place] [address]  Phone: [phone]  Fax: [phone]  Follow Up Time:    Jenny Nolasco)  Internal Medicine  [address], [address] [place] [address]  Phone: [phone]  Fax: [phone]  Scheduled Appointment: 04/19/2022 01:30 PM   Jenny Nolasco)  Internal Medicine  [address], [address] [place] [address]  Phone: [phone]  Fax: [phone]  Scheduled Appointment: 04/19/2022 01:30 PM    Brian Sanders)  Neurology  [address], [address] [place] [address]  Phone: [phone]  Fax: [phone]  Scheduled Appointment: 04/18/2022 09:45 AM   Brian Sanders)  Neurology  [address], [address] [place] [address]  Phone: [phone]  Fax: [phone]  Scheduled Appointment: 04/18/2022 09:45 AM

## 2022-04-13 NOTE — DISCHARGE NOTE PROVIDER - NSDCFUSCHEDAPPT_GEN_ALL_CORE_FT
DARCIE LYNN ; 04/19/2022 ; [facility] [address] DARCIE LYNN ; 04/18/2022 ; [facility] [address]  DARCIE LYNN ; 04/19/2022 ; [facility] [address]

## 2022-04-13 NOTE — OCCUPATIONAL THERAPY INITIAL EVALUATION ADULT - SHORT TERM MEMORY, REHAB EVAL
based on disorientation to time and situation; poor insight into deficits and how it will impact function/impaired

## 2022-04-13 NOTE — OCCUPATIONAL THERAPY INITIAL EVALUATION ADULT - NS ASR FOLLOW COMMAND OT EVAL
2-step command follow with 75% accuracy, given max redirection/100% of the time/able to follow single-step instructions

## 2022-04-13 NOTE — PROGRESS NOTE ADULT - PROBLEM SELECTOR PLAN 1
Patient prescribed Lithium ER 300mg BID (but takes 600mg once at 3am). Presented with level of 2.40.   - Poison control called from ED, recommended IVF at x2 maintenance (200cc/hr) and q4hr lithium checks.   -Patient with decreasing Lithium levels on lab draws (2.33 at 6:40am on 4/13 appears to have been a lab error given recheck level).   - Echo from 7/16/2020 with normal diastolic function and EF 60-65%  - f/u psych recs

## 2022-04-13 NOTE — CONSULT NOTE ADULT - ASSESSMENT
per Internal Medicine    71 y o M Yiddish/English speaking, PMH bipolar disorder, vertigo, and T2DM, on ASA 81mg, presented to ED s/p fall found to have elevated lithium level to 2.40, admitted to tele for frequent lab draws.    Problem/Plan - 1:  ·  Problem: High lithium level.   ·  Plan: Patient takes Lithium ER 300mg BID (takes 600mg once at 3am). Presented with level of 2.40. Patient has not had lithium level checked in "quite some time" after having a fight with his psychiatrist.  - Poison control called from ED, recommended IVF at x2 maintenance and q4hr lithium checks. Need 2x levels decreasing before can d/c q4hr checks  - Echo from 7/16/2020 with normal diastolic function and EF 60-65%  -collateral from psychiatrist in AM.    Problem/Plan - 2:  ·  Problem: Fall.   ·  Plan: Patient with hx of multiple falls, presented s/p fall. Patient with baseline vertigo and tremor.  -stroke w/u including CTH negative (no bleed or signs of ischemia)  -PT/OT consult  -f/u orthostatics  -f/u TSH, B12, folate  -monitor tele for arrhythmia, ECG sinus    #CAD? Patient does not know of cardiac hx, but does take ASA 81mg qd and has LBBB on ECG  -c/w ASA 81mg.    Problem/Plan - 3:  ·  Problem: Vertigo.   ·  Plan: Patient take zofran 4mg PO prn and meclizine 12.5mg TID prn  -c/w zofran prn  -holding meclizine i/s/o dizziness.    Problem/Plan - 4:  ·  Problem: Bipolar disorder.   ·  Plan: Patient on lithium  BID, depakote  TID, lamictal 150 daily, lamictal 100 daily, Zoloft 37.5mg daily, and Klonopin 0.5 TID. Patient has not had levels checked recently.  -tx of lithium toxicity as above  -valproic acid level normal  -f/u Lamictal level  -hold lithium, continue other home meds (will make Klonopin prn)  -psych consult in the AM.    Problem/Plan - 5:  ·  Problem: Diabetes.   ·  Plan: Patient on Janumet 50/500 BID.  -mISS inpatient  -f/u a1c and lipid profile in AM.    Problem/Plan - 6:  ·  Problem: Tremor.   ·  Plan: Patient with baseline tremor. On propranolol 40 bid.  -c/w home med.    Problem/Plan - 7:  ·  Problem: Healthcare maintenance.   ·  Plan: F: NS at 200cc/hr  E: replete prn  N: Regular diet  A/c: None  Code: RA  Dispo: 7 [facility].

## 2022-04-13 NOTE — CONSULT NOTE ADULT - SUBJECTIVE AND OBJECTIVE BOX
COVID - 19 infection/ pneumonia/ cavitary lesion anemia, thrombocytopenia Abnormal imaging of chest   Patient is a 71y old  Male who presents with a chief complaint of lithium toxicity (2022 07:10)       HPI:  71M Yiddish/English speaking, PMH bipolar disorder, vertigo, and T2DM, on ASA 81mg, presented to ED s/p fall. Patient states he often gets dizzy and falls. At times, dizziness is accompanied by nausea, though not today. Today, he was walking with a walker and fell backwards? (unclear). Was found by wife supine on the floor. On ROS, patient notes he is constipated. Denies current HA, dizziness, metalic taste in mouth, CP, palp, SOB, abd pain, n/v/d, urinary issues.    Of note, patient states he takes all his medications at once (meaning, takes two pills at once for a BID med) except his Janumet which he takes one of at approx 3am when he goes to bed. He occasionally takes his second dose of Janumet during the day. He also skips his propanolol completely on some days. Very poor insight into how medications work.    In the ED:  VS: Afebrile, HR 56-70, //78, RR 16, SpO2 99% on RA  Labs: Lithium level 2.40. CBC, CMP, coags, and trops unremarkable. VBG relatively nl. UA with >10 RBCs. Valproic acid level normal. Alcohol, salicylate, and acetaminophen levels negative.  ECG: NSR, ?LBBB, QRS 126ms, no ischemic changes  Imaging: Stroke protocol negative, CXR relatively clear  Interventions: Stroke code called, poison control called (rec: q4hr Lithium levels and IVF at 2x maintenance), 1L NS (2022 17:30)      PAST MEDICAL & SURGICAL HISTORY:  Diabetes    Bipolar disorder    Vertigo    S/P tonsillectomy        MEDICATIONS  (STANDING):  aspirin  chewable 81 milliGRAM(s) Oral every 24 hours  dextrose 5%. 1000 milliLiter(s) (50 mL/Hr) IV Continuous <Continuous>  dextrose 5%. 1000 milliLiter(s) (100 mL/Hr) IV Continuous <Continuous>  diVALproex  milliGRAM(s) Oral <User Schedule>  glucagon  Injectable 1 milliGRAM(s) IntraMuscular once  insulin lispro (ADMELOG) corrective regimen sliding scale   SubCutaneous Before meals and at bedtime  lamoTRIgine 100 milliGRAM(s) Oral every 24 hours  lamoTRIgine 150 milliGRAM(s) Oral every 24 hours  polyethylene glycol 3350 17 Gram(s) Oral daily  propranolol 40 milliGRAM(s) Oral every 12 hours  senna 2 Tablet(s) Oral at bedtime  sertraline 37.5 milliGRAM(s) Oral every 24 hours  sodium chloride 0.9%. 1000 milliLiter(s) (200 mL/Hr) IV Continuous <Continuous>    MEDICATIONS  (PRN):  acetaminophen     Tablet .. 650 milliGRAM(s) Oral every 6 hours PRN Temp greater or equal to 38C (100.4F), Mild Pain (1 - 3)  clonazePAM  Tablet 0.5 milliGRAM(s) Oral every 8 hours PRN anxiety  dextrose Oral Gel 15 Gram(s) Oral once PRN Blood Glucose LESS THAN 70 milliGRAM(s)/deciliter  ondansetron Injectable 4 milliGRAM(s) IV Push every 8 hours PRN Nausea and/or Vomiting  polyethylene glycol 3350 17 Gram(s) Oral daily PRN Constipation        FAMILY HISTORY:  FH: type 2 diabetes    FH: bipolar disorder      CBC Full  -  ( 2022 13:54 )  WBC Count : 9.14 K/uL  RBC Count : 4.98 M/uL  Hemoglobin : 15.5 g/dL  Hematocrit : 46.9 %  Platelet Count - Automated : 254 K/uL  Mean Cell Volume : 94.2 fl  Mean Cell Hemoglobin : 31.1 pg  Mean Cell Hemoglobin Concentration : 33.0 gm/dL  Auto Neutrophil # : 4.51 K/uL  Auto Lymphocyte # : 3.70 K/uL  Auto Monocyte # : 0.68 K/uL  Auto Eosinophil # : 0.18 K/uL  Auto Basophil # : 0.05 K/uL  Auto Neutrophil % : 49.4 %  Auto Lymphocyte % : 40.5 %  Auto Monocyte % : 7.4 %  Auto Eosinophil % : 2.0 %  Auto Basophil % : 0.5 %      -12    141  |  106  |  13  ----------------------------<  148<H>  4.4   |  25  |  0.83    Ca    9.5      2022 22:54  Phos  3.3     04-12  Mg     2.1     04-12    TPro  7.2  /  Alb  4.4  /  TBili  0.5  /  DBili  x   /  AST  19  /  ALT  12  /  AlkPhos  56  -      Urinalysis Basic - ( 2022 14:42 )    Color: Jenny / Appearance: Clear / S.010 / pH: x  Gluc: x / Ketone: NEGATIVE  / Bili: Negative / Urobili: 0.2 E.U./dL   Blood: x / Protein: NEGATIVE mg/dL / Nitrite: NEGATIVE   Leuk Esterase: NEGATIVE / RBC: > 10 /HPF / WBC < 5 /HPF   Sq Epi: x / Non Sq Epi: 0-5 /HPF / Bacteria: Present /HPF          Radiology:    < from: CT Brain Stroke Protocol (22 @ 14:05) >  ACC: [id number] EXAM:  CT BRAIN STROKE PROTOCOL                          PROCEDURE DATE:  2022          INTERPRETATION:  Clinical history/reason for exam: Stroke, imbalance, fall    Technique: Multiple contiguous axial CT images of the head were obtained   from the base of the skull to the vertex without the administration of   intravenous contrast. Coronal and sagittal reformatted images were   obtained.  RAPID AI was used for preliminary interpretation.      Comparison:CT head 2022    Findings:    The ventricles and sulci are stable in size from prior examination    No evidence for intracranial hemorrhage, significant space occupying   process or recent territorial infarction.  No acute extra-axial fluid   collections are identified.    Gray-white matter differentiation is preserved. There are scattered foci   of periventricular and subcortical hypodensities consistent with mild   chronic microvascular ischemic disease. .    The bones of the calvarium are intact.    The paranasal sinuses and bilateral mastoid complexes are well aerated.    Impression:    No acute intracranial hemorrhage or large demarcated territorial   infarction..      < from: CT Perfusion w/ Maps w/ IV Cont (22 @ 14:05) >  ACC: [id number] EXAM:  CT PERFUSION W MAPS IC                          PROCEDURE DATE:  2022          INTERPRETATION:  CT perfusion    INDICATION: Stroke code. Impaired gait.    TECHNIQUE: After the bolus administration of 40 mL of Isovue-370, CT   perfusion is obtained as per [facility] RAPID protocol. Perfusion maps   are provided.    COMPARISON: None    FINDINGS: Examination is degraded by motion.  On CT perfusion, there is no reported defect in CBF <30% or region of   elevated Tmax > 6seconds, nor mismatch in volume thereof.      IMPRESSION:    No reported abnormal perfusion metrics.        < from: CT Angio Head w/ IV Cont (22 @ 14:06) >  ACC: [id number] EXAM:  CT ANGIO NECK (W)AW IC                        ACC: [id number] EXAM:  CT ANGIO BRAIN (W)AW IC                          PROCEDURE DATE:  2022          INTERPRETATION:  PROCEDURE: CTA brain with intravenous contrast.    INDICATION: Stroke code. Unsteady gait.    TECHNIQUE: Multiple axial thin section were obtained through the [employer]   [employer] [place] following the intravenous bolus injection of 80 ml of   Isovue-370. MIP series are provided.    COMPARISON: CTA Head and Neck 2022    FINDINGS:    The internal carotid arteries are patent at the skull base and   intracranial compartment without occlusion or high grade stenosis. There   is calcified atherosclerotic plaque noted at the cavernous portions of   the internal carotid arteries bilaterally.    The anterior and middle cerebral arteries are patent at their 1st and 2nd   order segments, and appear symmetric in caliber.    The posterior circulation shows no high grade stenosis or occlusion.    The intracranial vertebral arteries, the basilar artery and both   posterior cerebral arteries are patent.    There is no site of aneurysm within the resolution limitations of CTA.    IMPRESSION: No large vessel occlusion or high-grade stenosis.      PROCEDURE: CTA neck with intravenous contrast.    INDICATION: Stroke code. Unsteady gait.    TECHNIQUE: Multiple axial thin section were obtained through the neck   following the intravenous bolus injection of Isovue-370 as above. MIP   series are provided.    COMPARISON:CTA Head and Neck 2022    FINDINGS:    The aortic arch is normal size and shows patency, with normal 3 vessel   configuration.    Both common carotid arteries are patent up to the bifurcations.    The right internal carotid artery is patent up to the skull base, without   hemodynamically significant stenosis or occlusion.    There is calcified and noncalcified plaque in the left internal carotid   artery at the carotid bifurcation without hemodynamically significant   stenosis or occlusion based on NASCET criteria.    The cervical vertebral arteries are patent throughout, without   hemodynamically significant stenosis or occlusion.    There are bridging anterior osteophytes for through T1 with relative   preservation of the disc spaces compatible with diffuse idiopathic   skeletal hyperostosis. There are multilevel degenerative changes of the   cervical spine with ossification the of the posterior longitudinal   ligament at C5-6 resulting in moderate spinal canal stenosis. Multilevel  uncovertebral and facet hypertrophy resulting in bilateral neural   foraminal narrowing.    IMPRESSION: No significant stenosis, occlusion, or dissection of the   cervical carotid or vertebral arteries                Vital Signs Last 24 Hrs  T(C): 36.9 (2022 06:36), Max: 37.1 (2022 01:41)  T(F): 98.5 (2022 06:36), Max: 98.7 (2022 01:41)  HR: 52 (2022 08:51) (52 - 70)  BP: 158/70 (2022 08:51) (132/75 - 162/67)  BP(mean): 101 (2022 08:51) (100 - 103)  RR: 18 (2022 08:51) (16 - 18)  SpO2: 97% (2022 08:51) (95% - 99%)        REVIEW OF SYSTEMS: dizziness, fall      Physical Exam:  WDWN 60 yo  gentleman  lying in semi Hernandez's position, awake, alert,  no acute complaints, + tremors    Neurologic Exam:    Alert and oriented x 3 to speech fluent w/o dysarthria    Cranial Nerves:     II:                         pupils equal, round and reactive to light, visual fields intact   III/ IV/VI:              extraocular movements intact, neg nystagmus, neg ptosis  V:                        facial sensation intact, V1-3 normal  VII:                      face symmetric, no droop, normal eye closure and smile  VIII:                     hearing intact to finger rub bilaterally  IX and X:             no hoarseness, gag intact, palate/ uvula rise symmetrically  XI:                       SCM/ trapezius strength intact bilateral  XII:                      no tongue deviation    Motor Exam:    Right UE:            > 4/5    Left UE:              > 4/5        Right LE:            > 4/5    Left LE:               > 4/5               Sensation:           intact to light touch x 4 extremities                                               DTR:                  biceps/brachioradialis: equal                                                     patella/ankle: equal                               Gait:  not tested              PM&R Impression:    1) dizziness, s/p fall  2) no focal neuro deficits    Recommendations/ Plan :    1) Physical / Occupational therapy focusing on therapeutic exercises, bed mobility/transfer out of bed evaluation, progressive ambulation with assistive devices prn.    2) Anticipated Disposition Plan/Recs:  pending functional progress                   -

## 2022-04-13 NOTE — BH CONSULTATION LIAISON ASSESSMENT NOTE - HPI (INCLUDE ILLNESS QUALITY, SEVERITY, DURATION, TIMING, CONTEXT, MODIFYING FACTORS, ASSOCIATED SIGNS AND SYMPTOMS)
Patient is a 70 y/o male, , [employer], , father, domiciled, and Yiddish/English speaking with PMH of vertigo and T2DM and PPH of bipolar disorder presenting s/p fall with head injury after experiencing dizziness and unsteady gait since coming from [facility] [place] yesterday morning. Patient was apparently walking with a walker and fell after which he was found supine on the floor by his wife. He reportedly often gets dizzy and falls, with dizziness sometimes accompanied by nausea but not this time.   On approach, he is lying in bed, eating breakfast, accompanied by son at bedside.  He is alert, oriented to person, place and situation.  He is not oriented to time at our interview, however, as per medical team he is oriented to the [employer] calendar.  Pateint requests to see physical therapist as he reports not being able to ambulate and having pain in b/l LE.    Patient reports that he takes all of his medications at once except his Janumet at 3am when he goes to bed, (reports taking Sertraline 25mg PO qdaily; Lamictal 250mg PO qdaily, Lithium ER 600mg PO qdaily, Depakote 1000mg PO qdaily, Clonazapem 0.5mg PO daily and Propranolol 80mg PO qdaily).  He expresses that he takes his medications at once because it is difficult for him to take medications three times a day but reports that two times a day would be manageable. He also recently had a falling out with his psychiatrist and has not had his Lithium and Valproic acid levels checked in a while and that his current PMD Rxed above regimen.  He reports overall fine mood and denies persistently depressed, sad of tearful mood, denies other sx of acute depressive episode, including suicidal ideation, intent or plan.  He reports anxiety symptoms, which are relieved by Clonazaepm dosing.  Denies decreased need for sleep or sx of acute zainab.  Denies perceptual disturbances or other sx of acute psychosis.  He is agreeable to take a referral four outpatient psychiatrist.      Collateral obtained from patient's wife, in regards to psychiatric history. She noted that the patient last saw his psychiatrist (Dr. Hernandez) 1-1.5 years ago and has been seeing his primary care physician (Dr. Norwood) for management of his psychiatric medications since then. She feels that he is not currently being managed well on his medications and is “not well balanced.” She says he takes his medications himself “like candy” and has concerns that he is overdosing on certain medications. He was previously on Ativan for anxiety, but is no longer taking it.    She also has concerns of paranoia since he states she is “putting poison in his food” and “stealing money from him.” She mentioned “he frequently has paranoid thoughts, most recently this week.” She also mentioned his primary care physician has recommended he see a psychiatrist but he has refused possibly due to finances. She states that the patient has never had any psychiatric hospitalizations, suicidal ideations, or homicidal ideations.

## 2022-04-13 NOTE — PROGRESS NOTE ADULT - PROBLEM SELECTOR PLAN 7
F: NS at 200cc/hr  E: replete prn  N: Consistent carbs  A/c: Lovenox SQ  Code: FC  Dispo: 7 [facility], PT recommending K[facility]URRAM and patient refusing

## 2022-04-13 NOTE — DISCHARGE NOTE PROVIDER - HOSPITAL COURSE
71M Yiddish/English speaking, PMH bipolar disorder, vertigo, and T2DM, on ASA 81mg, presented to ED s/p fall found to have elevated lithium level to 2.40, admitted to tele for frequent lab draws and monitoring.    #High lithium level: Patient prescribed Lithium ER 300mg BID (but takes 600mg once at 3am). Presented with level of 2.40.   - Poison control called from ED, recommended IVF at x2 maintenance (200cc/hr) and q4hr lithium checks.   -Patient with decreasing Lithium levels on lab draws (2.33 at 6:40am on 4/13 appears to have been a lab error given recheck level). 0.34 at time of discharge  - Echo from 7/16/2020 with normal diastolic function and EF 60-65%  - Patient has not had lithium level checked in "quite some time" after having a fight with his psychiatrist. Inpatient psych was consulted. Patient given names of other providers in his area.  -Discontinued Lithium on discharge    #Fall: Patient with hx of multiple falls, presented s/p fall with head injury. Patient with baseline vertigo and tremor.  -Likely 2/2 Lithium toxicity and/or improperly taking medications  -stroke w/u (stroke code called for dizziness/tremor) including CTH negative (no bleed or signs of ischemia)  -PT saw patient and recommended KHURRAM, however, patient refused. PT recommended in case of patient going home that he is sent with a RW, WC, and commode.  -orthostatics negative  -TSH, B12, folate _______  -ECG sinus on admission, no arrhythmias noted on tele    #Tremor: Patient with baseline tremor. Prescribed propranolol 40 bid but takes both pills at 3am.  -discontinue as patient boarderline bradycardic and this may have contributed to fall    #Vertigo: Patient take zofran 4mg PO prn and meclizine 12.5mg TID prn  -c/w zofran prn  -holding meclizine i/s/o tremor    #Bipolar disorder: Patient prescribed lithium  BID, depakote  TID, lamictal 150 daily, lamictal 100 daily, Zoloft 37.5mg daily, and Klonopin 0.5 TID. He takes all meds at one at approx 3am before going to bed.  -tx of lithium toxicity as above  -valproic acid level normal  -Lamictal level ______  -psych consulted, recommended _____  -hold lithium and Klonopin (patient did not use at all while inpatient), continue other home meds    #Diabetes: Patient on Janumet 50/500 BID (often only takes one at 3am)  -mISS inpatient  -a1c _____  -lipid profile _____  -continue home med on discharge    #CAD? Patient does not know of cardiac hx, but does take ASA 81mg qd and has LBBB on ECG  -c/w ASA 81mg.    Patient was discharged to home (refused KHURRAM)    New meds: None  Changes to old meds: None  Meds discontinues: Lithium, Klonopin, meclizine, propranolol    Items to follow up outpatient: psych    Physical Exam at time of Discharge  Physical Exam:  GENERAL: Awake, alert and interactive, laying in bed  NEURO: A&Ox3, slowed speech, 4/5 strength in all ext, b/l UE tremor, CN 2-12 intact  HEENT: MMM  NECK: Supple  CARDIAC: Regular rate and rhythm, +S1/S2, no murmurs/rubs/gallops  PULM: Breathing comfortably on RA, clear to auscultation bilaterally, no wheezes/rales/rhonchi  ABDOMEN: Obese, soft, nontender, nondistended  SKIN: +multiple areas of healing skin breakdown on b/l shins  VASC: 1+ peripheral pulses, no edema, no LE tenderness  Psych: Poor insight 71M Yiddish/English speaking, PMH bipolar disorder, vertigo, and T2DM, on ASA 81mg, presented to ED s/p fall found to have elevated lithium level to 2.40, admitted to tele for frequent lab draws and monitoring.    #High lithium level: Patient prescribed Lithium ER 300mg BID (but takes 600mg once at 3am). Presented with level of 2.40.   - Poison control called from ED, recommended IVF at x2 maintenance (200cc/hr) and q4hr lithium checks.   -Patient with decreasing Lithium levels on lab draws (2.33 at 6:40am on 4/13 appears to have been a lab error given recheck level). 0.34 at time of discharge  - Echo from 7/16/2020 with normal diastolic function and EF 60-65%  - Patient has not had lithium level checked in "quite some time" after having a fight with his psychiatrist. Inpatient psych was consulted. Patient given names of other providers in his area.  -Discontinued Lithium on discharge    #Fall: Patient with hx of multiple falls, presented s/p fall with head injury. Patient with baseline vertigo and tremor.  -Likely 2/2 Lithium toxicity and/or improperly taking medications  -stroke w/u (stroke code called for dizziness/tremor) including CTH negative (no bleed or signs of ischemia)  -PT saw patient and recommended KHURRAM, however, patient refused. PT recommended in case of patient going home that he is sent with a RW, WC, and commode.  -orthostatics negative  -TSH, B12, folate _______  -ECG sinus on admission, no arrhythmias noted on tele    #Tremor: Patient with baseline tremor. Prescribed propranolol 40 bid but takes both pills at 3am.  -discontinue as patient boarderline bradycardic and this may have contributed to fall    #Vertigo: Patient take zofran 4mg PO prn and meclizine 12.5mg TID prn  -c/w zofran prn  -holding meclizine i/s/o tremor    #Bipolar disorder: Patient prescribed lithium  BID, depakote  TID, lamictal 150 daily, lamictal 100 daily, Zoloft 37.5mg daily, and Klonopin 0.5 TID. He takes all meds at one at approx 3am before going to bed.  -tx of lithium toxicity as above  -valproic acid level normal  -Lamictal level ______  -psych consulted, recommended _____  -hold lithium and Klonopin (patient did not use at all while inpatient), continue other home meds    #Diabetes: Patient on Janumet 50/500 BID (often only takes one at 3am)  -mISS inpatient  -a1c 7.4  -lipid profile _____  -continue home med on discharge  -f/u with [facility] clinic    #CAD? Patient does not know of cardiac hx, but does take ASA 81mg qd and has LBBB on ECG  -c/w ASA 81mg.    Patient was discharged to home (refused KHURRAM)    New meds: ______  Changes to old meds: None  Meds discontinues: Lithium, Klonopin, meclizine, propranolol    Items to follow up outpatient: psych    Physical Exam at time of Discharge  GENERAL: Awake, alert and interactive, laying in bed  NEURO: A&Ox3, slowed speech, 4/5 strength in all ext, b/l UE tremor  HEENT: MMM  NECK: Supple  CARDIAC: Regular rate and rhythm, +S1/S2, no murmurs/rubs/gallops  PULM: Breathing comfortably on RA, clear to auscultation bilaterally, no wheezes/rales/rhonchi  ABDOMEN: Obese, soft, nontender, nondistended  VASC: 1+ peripheral pulses, no edema, no LE tenderness  Psych: Poor insight 71M Yiddish/English speaking, PMH bipolar disorder, vertigo, and T2DM, on ASA 81mg, presented to ED s/p fall found to have elevated lithium level to 2.40, admitted to tele for frequent lab draws and monitoring.    #High lithium level: Patient prescribed Lithium ER 300mg BID (but takes 600mg once at 3am). Presented with level of 2.40.   - Poison control called from ED, recommended IVF at x2 maintenance (200cc/hr) and q4hr lithium checks.   -Patient with decreasing Lithium levels on lab draws (2.33 at 6:40am on 4/13 appears to have been a lab error given recheck level). 0.34 at time of discharge  - Echo from 7/16/2020 with normal diastolic function and EF 60-65%  - Patient has not had lithium level checked in "quite some time" after having a fight with his psychiatrist. Inpatient psych was consulted. Patient given names of other providers in his area.  -Discontinued Lithium on discharge    #Fall: Patient with hx of multiple falls, presented s/p fall with head injury. Patient with baseline vertigo and tremor.  -Likely 2/2 Lithium toxicity and/or improperly taking medications  -stroke w/u (stroke code called for dizziness/tremor) including CTH negative (no bleed or signs of ischemia)  -PT saw patient and recommended KHURRAM, however, patient refused. PT recommended in case of patient going home that he is sent with a RW, WC, and commode.  -orthostatics negative  -TSH, B12, folate _______  -ECG sinus on admission, no arrhythmias noted on tele  -f/u with Dr. Alexander's office.    #Tremor: Patient with baseline tremor. Prescribed propranolol 40 bid but takes both pills at 3am.  -discontinue as patient boarderline bradycardic and this may have contributed to fall  -f/u with Dr. Alexander's office.    #Vertigo: Patient take zofran 4mg PO prn and meclizine 12.5mg TID prn  -c/w zofran prn  -holding meclizine i/s/o tremor  -f/u with Dr. Alexander's office.    #Bipolar disorder: Patient prescribed lithium  BID, depakote  TID, lamictal 150 daily, lamictal 100 daily, Zoloft 37.5mg daily, and Klonopin 0.5 TID. He takes all meds at one at approx 3am before going to bed.  -tx of lithium toxicity as above  -valproic acid level normal  -Lamictal level ______  -psych consulted, recommended _____  -hold lithium and Klonopin (patient did not use at all while inpatient), continue other home meds  -patient given names of new psychiatrists    #Diabetes: Patient on Janumet 50/500 BID (often only takes one at 3am)  -mISS inpatient  -a1c 7.4  -lipid profile _____  -continue home med on discharge  -f/u with [facility] clinic    #CAD? Patient does not know of cardiac hx, but does take ASA 81mg qd and has LBBB on ECG  -c/w ASA 81mg.    Patient was discharged to home (refused KHURRAM)    New meds: ______  Changes to old meds: None  Meds discontinues: Lithium, Klonopin, meclizine, propranolol    Items to follow up outpatient: psych, f/u with Dr. Alexander's office (neuro/PCP)    Physical Exam at time of Discharge  GENERAL: Awake, alert and interactive, laying in bed  NEURO: A&Ox3, slowed speech, 4/5 strength in all ext, b/l UE tremor  HEENT: MMM  NECK: Supple  CARDIAC: Regular rate and rhythm, +S1/S2, no murmurs/rubs/gallops  PULM: Breathing comfortably on RA, clear to auscultation bilaterally, no wheezes/rales/rhonchi  ABDOMEN: Obese, soft, nontender, nondistended  VASC: 1+ peripheral pulses, no edema, no LE tenderness  Psych: Poor insight 71M Yiddish/English speaking, PMH bipolar disorder, vertigo, and T2DM, on ASA 81mg, presented to ED s/p fall found to have elevated lithium level to 2.40, admitted to tele for frequent lab draws and monitoring.    #High lithium level: Patient prescribed Lithium ER 300mg BID (but takes 600mg once at 3am). Presented with level of 2.40.   - Poison control called from ED, recommended IVF at x2 maintenance (200cc/hr) and q4hr lithium checks.   -Patient with decreasing Lithium levels on lab draws (2.33 at 6:40am on 4/13 appears to have been a lab error given recheck level).   -Lithium level 0.18 at time of discharge  - Echo from 7/16/2020 with normal diastolic function and EF 60-65%  - Patient has not had lithium level checked in "quite some time" after having a fight with his psychiatrist. Inpatient psych was consulted. Patient given names of other providers in his area.  -Discontinued Lithium on discharge    #Fall: Patient with hx of multiple falls, presented s/p fall with head injury. Patient with baseline vertigo and tremor. Was previously supposed to undergo testing for underlying neurological disorder.  -Likely 2/2 Lithium toxicity and/or improperly taking medications/polypharmacy  -stroke w/u (stroke code called for dizziness/tremor) including CTH negative (no bleed or signs of ischemia)  -orthostatics negative  -TSH, B12, folate all normal  -ECG sinus on admission, no arrhythmias noted on tele  -seen by neurology inpatient, recommended outpatient follow up  -PT saw patient and recommended KHURRAM, however, patient refused. PT recommended in case of patient going home that he is sent with a RW, WC, and commode. Per wife, will do 24/hr HHA via private pay.  -f/u with Dr. Alexander's office.    #Tremor: Patient with baseline tremor. Prescribed propranolol 40 bid but takes both pills at 3am.  -discontinue as patient boarderline bradycardic and this may have contributed to fall  -f/u with Dr. Alexander's office.    #Vertigo: Patient take zofran 4mg PO prn and meclizine 12.5mg TID prn  -c/w zofran prn  -discontinued meclizine i/s/o tremor  -f/u with Dr. Alexander's office.    #Bipolar disorder: Patient prescribed lithium  BID, depakote  TID, lamictal 150 daily, lamictal 100 daily, Zoloft 37.5mg daily, and Klonopin 0.5 TID. He takes all meds at one at approx 3am before going to bed.  -tx of lithium toxicity as above  -valproic acid level normal  -Lamictal level pending on discharge  -psych consulted, recommended the following regimen on discharge: Lamictal 150mg AM, Lamictal 100mg qhs, Klonopin 0.5mg qhs, Depakote ER 1000mg qhs  -patient given names of new psychiatrists, prefers to make appointment on his own    #Diabetes: Patient on Janumet 50/500 BID (often only takes one at 3am)  -mISS inpatient  -a1c 7.4  -lipid profile wnl  -continue home med on discharge  -f/u with [facility] clinic    #CAD? Patient does not know of cardiac hx, but does take ASA 81mg qd and has LBBB on ECG  -c/w ASA 81mg.    Patient was discharged to home (refused KHURRAM)    New meds: None  Changes to old meds: Psych meds as follows: Lamictal 150mg AM, Lamictal 100mg qhs, Klonopin 0.5mg qhs, Depakote ER 1000mg qhs  Meds discontinues: Lithium, meclizine, propranolol    Items to follow up outpatient: psych, f/u with Dr. Alexander's office (neuro), [facility]    Physical Exam at time of Discharge  GENERAL: Awake, alert and interactive, laying in bed  NEURO: A&Ox3, slowed speech, 4/5 strength in all ext, b/l UE tremor  HEENT: MMM  NECK: Supple  CARDIAC: Regular rate and rhythm, +S1/S2, no murmurs/rubs/gallops  PULM: Breathing comfortably on RA, clear to auscultation bilaterally, no wheezes/rales/rhonchi  ABDOMEN: Obese, soft, nontender, nondistended  VASC: 1+ peripheral pulses, no edema, no LE tenderness  Psych: Poor insight 71M Yiddish/English speaking, PMH bipolar disorder, vertigo, and T2DM, on ASA 81mg, presented to ED s/p fall found to have elevated lithium level to 2.40, admitted to tele for frequent lab draws and monitoring.    #High lithium level: Patient prescribed Lithium ER 300mg BID (but takes 600mg once at 3am). Presented with level of 2.40.   - Poison control called from ED, recommended IVF at x2 maintenance (200cc/hr) and q4hr lithium checks.   -Patient with decreasing Lithium levels on lab draws (2.33 at 6:40am on 4/13 appears to have been a lab error given recheck level).   -Lithium level 0.18 at time of discharge  - Echo from 7/16/2020 with normal diastolic function and EF 60-65%  - Patient has not had lithium level checked in "quite some time" after having a fight with his psychiatrist. Inpatient psych was consulted. Patient given names of other providers in his area.  -Discontinued Lithium on discharge    #Fall: Patient with hx of multiple falls, presented s/p fall with head injury. Patient with baseline vertigo and tremor. Was previously supposed to undergo testing for underlying neurological disorder.  -Likely 2/2 Lithium toxicity and/or improperly taking medications/polypharmacy  -stroke w/u (stroke code called for dizziness/tremor) including CTH negative (no bleed or signs of ischemia)  -orthostatics negative  -TSH, B12, folate all normal  -ECG sinus on admission, no arrhythmias noted on tele  -seen by neurology inpatient, recommended outpatient follow up  -PT saw patient and recommended KHURRAM, however, patient refused. PT recommended in case of patient going home that he is sent with a RW, WC, and commode. Per wife, will do 24/hr HHA via private pay.  -f/u with Dr. Alexander's office.    #Tremor: Patient with baseline tremor. Prescribed propranolol 40 bid but takes both pills at 3am.  -discontinue as patient boarderline bradycardic and this may have contributed to fall  -f/u with Dr. Alexander's office.    #Vertigo: Patient take zofran 4mg PO prn and meclizine 12.5mg TID prn  -c/w zofran prn  -discontinued meclizine i/s/o tremor  -f/u with Dr. Alexander's office.    #Bipolar disorder: Patient prescribed lithium  BID, depakote  TID, lamictal 150 daily, lamictal 100 daily, Zoloft 37.5mg daily, and Klonopin 0.5 TID. He takes all meds at one at approx 3am before going to bed.  -tx of lithium toxicity as above  -valproic acid level normal  -Lamictal level pending on discharge  -psych consulted, recommended the following regimen on discharge: Lamictal 150mg AM, Lamictal 100mg qhs, Klonopin 0.5mg qhs, Depakote ER 1000mg qhs  -patient given names of new psychiatrists, prefers to make appointment on his own    #Diabetes: Patient on Janumet 50/500 BID (often only takes one at 3am)  -mISS inpatient  -a1c 7.4  -lipid profile wnl  -continue home med on discharge  -f/u with PCP    #CAD? Patient does not know of cardiac hx, but does take ASA 81mg qd and has LBBB on ECG  -c/w ASA 81mg.    Patient was discharged to home (refused KHURRAM)    New meds: None  Changes to old meds: Psych meds as follows: Lamictal 150mg AM, Lamictal 100mg qhs, Klonopin 0.5mg qhs, Depakote ER 1000mg qhs  Meds discontinues: Lithium, meclizine, propranolol    Items to follow up outpatient: psych, f/u with Dr. Alexander's office (neuro), [facility]    Physical Exam at time of Discharge  GENERAL: Awake, alert and interactive, laying in bed  NEURO: A&Ox3, slowed speech, 4/5 strength in all ext, b/l UE tremor  HEENT: MMM  NECK: Supple  CARDIAC: Regular rate and rhythm, +S1/S2, no murmurs/rubs/gallops  PULM: Breathing comfortably on RA, clear to auscultation bilaterally, no wheezes/rales/rhonchi  ABDOMEN: Obese, soft, nontender, nondistended  VASC: 1+ peripheral pulses, no edema, no LE tenderness  Psych: Poor insight

## 2022-04-13 NOTE — DISCHARGE NOTE PROVIDER - NSDCMRMEDTOKEN_GEN_ALL_CORE_FT
aspirin 81 mg oral tablet: 1 tab(s) orally once a day  clonazePAM 0.5 mg oral tablet: 1 tab(s) orally 3 times a day  divalproex sodium 500 mg oral delayed release tablet: 1 tab(s) orally 3 times a day  lamoTRIgine 150 mg oral tablet: 1 tab(s) orally once a day  lithium 300 mg oral capsule: 1 cap(s) orally 2 times a day  meclizine 12.5 mg oral tablet: 1 tab(s) orally 3 times a day  propranolol 40 mg oral tablet: 1 tab(s) orally 2 times a day  sitagliptin-metformin 50 mg-500 mg oral tablet: 1 tab(s) orally 2 times a day  Zofran 4 mg oral tablet: 1 tab(s) orally every 8 hours  Zoloft 25 mg oral tablet: 1.5 tab(s) orally once a day   aspirin 81 mg oral tablet: 1 tab(s) orally once a day  clonazePAM 0.5 mg oral tablet: 1 tab(s) orally once a day (at bedtime)  divalproex sodium 500 mg oral tablet, extended release: 2 tab(s) orally once a day (at bedtime)  lamoTRIgine 100 mg oral tablet: 1 tab(s) orally once a day (at bedtime)  lamoTRIgine 150 mg oral tablet: 1 tab(s) orally every 24 hours  MiraLax oral powder for reconstitution: 17 gram(s) orally once a day  senna oral tablet: 2 tab(s) orally once a day (at bedtime)  sitagliptin-metformin 50 mg-500 mg oral tablet: 1 tab(s) orally 2 times a day  Zofran 4 mg oral tablet: 1 tab(s) orally every 8 hours

## 2022-04-13 NOTE — BH CONSULTATION LIAISON ASSESSMENT NOTE - ADDITIONAL PSYCHIATRIC MEDICATIONS
Patient reports taking 1000mg of Depakote (not 1500mg) and 25mg of Sertraline (not 37.5mg) and 1 pill of 0.5mg Clonazepam

## 2022-04-13 NOTE — PROGRESS NOTE ADULT - PROBLEM SELECTOR PLAN 3
Patient take zofran 4mg PO prn and meclizine 12.5mg TID prn  -c/w zofran prn  -holding meclizine i/s/o tremor

## 2022-04-13 NOTE — PROGRESS NOTE ADULT - PROBLEM SELECTOR PLAN 4
Patient prescribed lithium  BID, depakote  TID, lamictal 150 daily, lamictal 100 daily, Zoloft 37.5mg daily, and Klonopin 0.5 TID. He takes all meds at one at approx 3am before going to bed.  -tx of lithium toxicity as above  -valproic acid level normal  -Lamictal level pending  -psych consulted, f/u recs  -hold lithium, continue other home meds

## 2022-04-13 NOTE — BH CONSULTATION LIAISON ASSESSMENT NOTE - CURRENT MEDICATION
MEDICATIONS  (STANDING):  aspirin  chewable 81 milliGRAM(s) Oral every 24 hours  dextrose 5%. 1000 milliLiter(s) (50 mL/Hr) IV Continuous <Continuous>  dextrose 5%. 1000 milliLiter(s) (100 mL/Hr) IV Continuous <Continuous>  diVALproex  milliGRAM(s) Oral <User Schedule>  glucagon  Injectable 1 milliGRAM(s) IntraMuscular once  insulin lispro (ADMELOG) corrective regimen sliding scale   SubCutaneous Before meals and at bedtime  lamoTRIgine 100 milliGRAM(s) Oral every 24 hours  lamoTRIgine 150 milliGRAM(s) Oral every 24 hours  polyethylene glycol 3350 17 Gram(s) Oral daily  propranolol 40 milliGRAM(s) Oral every 12 hours  senna 2 Tablet(s) Oral at bedtime  sertraline 37.5 milliGRAM(s) Oral every 24 hours  sodium chloride 0.9%. 1000 milliLiter(s) (200 mL/Hr) IV Continuous <Continuous>    MEDICATIONS  (PRN):  acetaminophen     Tablet .. 650 milliGRAM(s) Oral every 6 hours PRN Temp greater or equal to 38C (100.4F), Mild Pain (1 - 3)  clonazePAM  Tablet 0.5 milliGRAM(s) Oral every 8 hours PRN anxiety  dextrose Oral Gel 15 Gram(s) Oral once PRN Blood Glucose LESS THAN 70 milliGRAM(s)/deciliter  ondansetron Injectable 4 milliGRAM(s) IV Push every 8 hours PRN Nausea and/or Vomiting  polyethylene glycol 3350 17 Gram(s) Oral daily PRN Constipation

## 2022-04-13 NOTE — PHYSICAL THERAPY INITIAL EVALUATION ADULT - TRANSFER SAFETY CONCERNS NOTED: SIT/STAND, REHAB EVAL
Pt unsteady w/ standing, VC and TC given to erect standing posture./losing balance/decreased sequencing ability/decreased weight-shifting ability

## 2022-04-13 NOTE — PROGRESS NOTE ADULT - PROBLEM SELECTOR PLAN 5
Patient on Janumet 50/500 BID (often only takes one at 3am)  -mISS inpatient  -a1c 7.4  -lipid profile pending  -continue home med on discharge  -f/u with [facility] clinic    #CAD? Patient does not know of cardiac hx, but does take ASA 81mg qd and has LBBB on ECG  -c/w ASA 81mg.

## 2022-04-13 NOTE — BH CONSULTATION LIAISON ASSESSMENT NOTE - NSBHREFERDETAILS_PSY_A_CORE_FT
recommendations for current medication regimen for Bipolar Disorder; outpatient psychiatry referral sources

## 2022-04-13 NOTE — BH CONSULTATION LIAISON ASSESSMENT NOTE - NSBHCONSFOLLOWNEEDS_PSY_ALL_CORE
Vaccine Information Statement(s) given, reviewed, questions answered, and verbal consent given by Patient for injection(s) and administration of Influenza (Inactivated).  Tolerated injection well       No psychiatric contraindications to discharge

## 2022-04-13 NOTE — OCCUPATIONAL THERAPY INITIAL EVALUATION ADULT - PLANNED THERAPY INTERVENTIONS, OT EVAL
ADL retraining/balance training/bed mobility training/cognitive, visual perceptual/fine motor coordination training/motor coordination training/neuromuscular re-education/parent/caregiver training.../strengthening/transfer training

## 2022-04-13 NOTE — OCCUPATIONAL THERAPY INITIAL EVALUATION ADULT - MODALITIES TREATMENT COMMENTS
Pt demonstrates ability to achieve 3 side steps edge of bed with mod X 2 person assist, b/l hand held assist with max vcs for safety

## 2022-04-13 NOTE — BH CONSULTATION LIAISON ASSESSMENT NOTE - CASE SUMMARY
Patient is a 72 y/o man with history of bipolar disorder, DM, vertigo, on multiple psychotropic medications admitted to [facility] for workup for falls, found to have lithium level of 2.4, now on telemetry for monitoring. Psychiatry was consulted to provide recommendations for outpt follow up and review of current bipolar treatment. Pt is currently prescribed multiple psychotropics that likely interact and contribute to pt's current presentation. REcommend to d/c lithium  (pt currently takes 3 different mood stabilizers, shows signs of chronic li toxicity and has poor adherence with the taking the li as prescribed and li level monitoring); -taper down and eventually stop clonazepam due to risk of falls, -stop propranolol and sertraline (unclear benefit at this time). Patient would benefit from following up with a psychiatrist (see above for referral information)

## 2022-04-13 NOTE — BH CONSULTATION LIAISON ASSESSMENT NOTE - NSBHCHARTREVIEWVS_PSY_A_CORE FT
Vital Signs Last 24 Hrs  T(C): 37 (13 Apr 2022 13:22), Max: 37.1 (13 Apr 2022 01:41)  T(F): 98.6 (13 Apr 2022 13:22), Max: 98.7 (13 Apr 2022 01:41)  HR: 56 (13 Apr 2022 12:59) (52 - 70)  BP: 145/67 (13 Apr 2022 12:59) (132/75 - 161/70)  BP(mean): 97 (13 Apr 2022 12:59) (97 - 103)  RR: 18 (13 Apr 2022 12:59) (17 - 18)  SpO2: 97% (13 Apr 2022 12:59) (95% - 98%)

## 2022-04-13 NOTE — PROGRESS NOTE ADULT - PROBLEM SELECTOR PLAN 2
Patient with hx of multiple falls, presented s/p fall with head injury. Patient with baseline vertigo and tremor.  -Likely 2/2 Lithium toxicity and/or improperly taking medications  -stroke w/u (stroke code called for dizziness/tremor) including CTH negative (no bleed or signs of ischemia)  -PT saw patient and recommended KHURRAM, however, patient refused. PT recommended in case of patient going home that he is sent with a RW, WC, and commode.  -orthostatics negative  -TSH, B12, folate pending  -ECG sinus on admission, no arrhythmias noted on tele thus far

## 2022-04-13 NOTE — PHYSICAL THERAPY INITIAL EVALUATION ADULT - GROSSLY INTACT, SENSORY
Grossly intact to light touch sensation throughout BLE and BUE Grossly intact to light touch sensation throughout BLE and BUE; pt however states RLE has "30% less" sensation in comparison to LLE.

## 2022-04-13 NOTE — OCCUPATIONAL THERAPY INITIAL EVALUATION ADULT - ADDITIONAL COMMENTS
Pt reports he was independent with functional mobility and BADLs prior to admission, however unsure if patient is an accurate historian based on his presentation

## 2022-04-13 NOTE — PROGRESS NOTE ADULT - PROBLEM SELECTOR PLAN 6
Patient with baseline tremor. Prescribed propranolol 40 bid but takes both pills at 3am.  -discontinue as patient boarderline bradycardic and this may have contributed to fall

## 2022-04-13 NOTE — PROGRESS NOTE ADULT - ATTENDING COMMENTS
Patient seen and examined with house-staff during bedside rounds.  Resident note read, including vitals, physical findings, laboratory data, and radiological reports.   Revisions included below.  Direct personal management at bed side and extensive interpretation of the data.  Plan was outlined and discussed in details with the housestaff.  Decision making of high complexity  Action taken for acute disease activity to reflect the level of care provided:  - medication reconciliation  - review laboratory data  He is sgable  PT  Lithium level is decreasing

## 2022-04-13 NOTE — DISCHARGE NOTE PROVIDER - NSDCFUADDAPPT_GEN_ALL_CORE_FT
Please bring your Insurance card, Photo ID and Discharge paperwork to the following appointment:    (1) Please follow up with [facility] at [address] [place] [address], [place] [address] on 04/19/2022 at 1:30pm.    Appointment was scheduled by Ms. RASHEL Pride, Referral Coordinator. Please bring your Insurance card, Photo ID and Discharge paperwork to the following appointment:    (1) Please follow up with [facility] at [address], [address] Floor, [place] [address] on 04/19/2022 at 1:30pm.    Appointment was scheduled by Ms. RASHEL Pride, Referral Coordinator.    Please follow up with Dr. Sanders's office at [address] [place] [address] to address your tremor and frequent falls. Please bring your Insurance card, Photo ID and Discharge paperwork to the following appointment:    (1) Please follow up with [facility] at [address], [address] Floor, [place] [address] on 04/19/2022 at 1:30pm.    Appointment was scheduled by Ms. RASHEL Pride, Referral Coordinator.    Please follow up with Dr. Sanders's office at [address] [place] [address] to address your tremor and frequent falls.    Please make sure to make a psychiatrist appointment within the next 2 weeks. Please bring your Insurance card, Photo ID and Discharge paperwork to the following appointment:    Please follow up with Dr. Sanders's office at [address] [place] [address] to address your tremor and frequent falls.    Please make sure to make a psychiatrist appointment within the next 2 weeks.

## 2022-04-13 NOTE — DISCHARGE NOTE PROVIDER - NSDCCPCAREPLAN_GEN_ALL_CORE_FT
PRINCIPAL DISCHARGE DIAGNOSIS  Diagnosis: Lithium toxicity  Assessment and Plan of Treatment: You were found to have a high Lithium level when you came into the hospital. This is likely due to you taking all of your daily Lithium at the same time instead of 12 hours apart as prescribed. It is also a medication that requires frequent level checks which you were not doing. We gave you fluids to flush the Lithium out of your system and monitored your Lithium levels to make sure they were decreasing. PLEASE DO NOT CONTINUE TO TAKE LITHIUM.      SECONDARY DISCHARGE DIAGNOSES  Diagnosis: Bipolar disorder  Assessment and Plan of Treatment: You are on multiple medications for your bipolar disorder. As stated above, you SHOULD NOT continue taking Lithium. You were also taking Klonopin which can make you more likely to fall.  You should not continue to take this either. It is important that you take your medications AS PRESCRIBED. You should take _____. You were seen by our psychiatry team and given names of outpatient psychiatrists you can follow up with. Please make sure to follow up within 2 weeks.    Diagnosis: Vertigo  Assessment and Plan of Treatment: You were taking meclizine for dizziness (vertigo). This medication can increase your unsteadiness. Please do not continue to take this medication. Please follow up with our clinic.    Diagnosis: Tremor  Assessment and Plan of Treatment: You were taking propranolol for your tremor. This medication can lower your heart rate and make you more likely to fall. Please do not continue to take this medication.     PRINCIPAL DISCHARGE DIAGNOSIS  Diagnosis: Lithium toxicity  Assessment and Plan of Treatment: You were found to have a high Lithium level when you came into the hospital. This is likely due to you taking all of your daily Lithium at the same time instead of 12 hours apart as prescribed. It is also a medication that requires frequent level checks which you were not doing. We gave you fluids to flush the Lithium out of your system and monitored your Lithium levels to make sure they were decreasing. PLEASE DO NOT CONTINUE TO TAKE LITHIUM.      SECONDARY DISCHARGE DIAGNOSES  Diagnosis: Bipolar disorder  Assessment and Plan of Treatment: You are on multiple medications for your bipolar disorder. As stated above, you SHOULD NOT continue taking Lithium. You were also taking Klonopin which can make you more likely to fall.  You should not continue to take this either. It is important that you take your medications AS PRESCRIBED. You should take _____. You were seen by our psychiatry team and given names of outpatient psychiatrists you can follow up with. Please make sure to follow up within 2 weeks.    Diagnosis: Vertigo  Assessment and Plan of Treatment: You were taking meclizine for dizziness (vertigo). This medication can increase your unsteadiness. Please do not continue to take this medication. Please follow up with our clinic.    Diagnosis: Tremor  Assessment and Plan of Treatment: You were taking propranolol for your tremor. This medication can lower your heart rate and make you more likely to fall. Please do not continue to take this medication. Please follow up with our clinic.    Diagnosis: Diabetes  Assessment and Plan of Treatment: You are prescribed Janumet to take twice per day. Your a1c, a marker of your diabetes, was 7.4 which is slightly elevated. It is important you take your Janumet as prescribed (one pill in the morning and one at night). Please follow up with our clinic.     PRINCIPAL DISCHARGE DIAGNOSIS  Diagnosis: Lithium toxicity  Assessment and Plan of Treatment: You were found to have a high Lithium level when you came into the hospital. This is likely due to you taking all of your daily Lithium at the same time instead of 12 hours apart as prescribed. It is also a medication that requires frequent level checks which you were not doing. We gave you fluids to flush the Lithium out of your system and monitored your Lithium levels to make sure they were decreasing. PLEASE DO NOT CONTINUE TO TAKE LITHIUM.      SECONDARY DISCHARGE DIAGNOSES  Diagnosis: Bipolar disorder  Assessment and Plan of Treatment: You are on multiple medications for your bipolar disorder. As stated above, you SHOULD NOT continue taking Lithium. You were also taking Klonopin which can make you more likely to fall.  You should not continue to take this either. It is important that you take your medications AS PRESCRIBED. You should take _____. You were seen by our psychiatry team and given names of outpatient psychiatrists you can follow up with. Please make sure to follow up within 2 weeks.    Diagnosis: Tremor  Assessment and Plan of Treatment: You were taking propranolol for your tremor. This medication can lower your heart rate and make you more likely to fall. Please do not continue to take this medication. Please follow up with Dr. Alexander's office.    Diagnosis: Vertigo  Assessment and Plan of Treatment: You were taking meclizine for dizziness (vertigo). This medication can increase your unsteadiness. Please do not continue to take this medication. Please follow up with Dr. Alexander's office.    Diagnosis: Diabetes  Assessment and Plan of Treatment: You are prescribed Janumet to take twice per day. Your a1c, a marker of your diabetes, was 7.4 which is slightly elevated. It is important you take your Janumet as prescribed (one pill in the morning and one at night). Please follow up with our clinic.     PRINCIPAL DISCHARGE DIAGNOSIS  Diagnosis: Lithium toxicity  Assessment and Plan of Treatment: You were found to have a high Lithium level when you came into the hospital. This is likely due to you taking all of your daily Lithium at the same time instead of 12 hours apart as prescribed. It is also a medication that requires frequent level checks which you were not doing. We gave you fluids to flush the Lithium out of your system and monitored your Lithium levels to make sure they were decreasing. PLEASE DO NOT CONTINUE TO TAKE LITHIUM.      SECONDARY DISCHARGE DIAGNOSES  Diagnosis: Bipolar disorder  Assessment and Plan of Treatment: You are on multiple medications for your bipolar disorder. As stated above, you SHOULD NOT continue taking Lithium. You were also taking Klonopin which can make you more likely to fall.  You should not continue to take this either. It is important that you take your medications AS PRESCRIBED. You should take _____. You were seen by our psychiatry team. They suggest you have a dedicated psychiatrist to help manage your bipolar medications. Places we suggest calling are as follows:  [facility]: [address] [place] [address]: Phone: [phone]  [facility]: [place]: [address] [place] [address]; [phone]  [facility]: [facility]: [address] [place] [address], Intakes: [phone]  Please make sure to follow up within 2 weeks.    Diagnosis: Tremor  Assessment and Plan of Treatment: You were taking propranolol for your tremor. This medication can lower your heart rate and make you more likely to fall. Please do not continue to take this medication. Please follow up with Dr. Alexander's office.    Diagnosis: Vertigo  Assessment and Plan of Treatment: You were taking meclizine for dizziness (vertigo). This medication can increase your unsteadiness. Please do not continue to take this medication. Please follow up with Dr. Alexander's office.    Diagnosis: Diabetes  Assessment and Plan of Treatment: You are prescribed Janumet to take twice per day. Your a1c, a marker of your diabetes, was 7.4 which is slightly elevated. It is important you take your Janumet as prescribed (one pill in the morning and one at night). Please follow up with our clinic.     PRINCIPAL DISCHARGE DIAGNOSIS  Diagnosis: Lithium toxicity  Assessment and Plan of Treatment: You were found to have a high Lithium level when you came into the hospital. This is likely due to you taking all of your daily Lithium at the same time instead of 12 hours apart as prescribed. It is also a medication that requires frequent level checks which you were not doing. We gave you fluids to flush the Lithium out of your system and monitored your Lithium levels to make sure they were decreasing. PLEASE DO NOT CONTINUE TO TAKE LITHIUM.      SECONDARY DISCHARGE DIAGNOSES  Diagnosis: Bipolar disorder  Assessment and Plan of Treatment: You are on multiple medications for your bipolar disorder. As stated above, you SHOULD NOT continue taking Lithium. It is important that you take your medications AS PRESCRIBED. You should take lamictal 150mg in the morning, Lamictal 100mg at night, Depakote 1000mg (two pills) at night, and Klonopin 0.5mg at night. You should NOT continue to take Zoloft (sertraline). You were seen by our psychiatry team. They suggest you have a dedicated psychiatrist to help manage your bipolar medications. Places we suggest calling are as follows:  [facility]: [address] [place] [address]: Phone: [phone]  [facility]: [place]: [address] [place] [address]; [phone]  [facility]: [facility]: [address] [place] [address], Intakes: [phone]  Please make sure to follow up within 2 weeks.    Diagnosis: Tremor  Assessment and Plan of Treatment: You were taking propranolol for your tremor. This medication can lower your heart rate and make you more likely to fall. Please do not continue to take this medication. Please follow up with Dr. Alexander's office. You need to have a home health aide 24/7 to help you so you do not fall.    Diagnosis: Vertigo  Assessment and Plan of Treatment: You were taking meclizine for dizziness (vertigo). This medication can increase your unsteadiness. Please do not continue to take this medication. Please follow up with Dr. Alexander's office.    Diagnosis: Diabetes  Assessment and Plan of Treatment: You are prescribed Janumet to take twice per day. Your a1c, a marker of your diabetes, was 7.4 which is slightly elevated. It is important you take your Janumet as prescribed (one pill in the morning and one at night). Please follow up with our clinic.     PRINCIPAL DISCHARGE DIAGNOSIS  Diagnosis: Lithium toxicity  Assessment and Plan of Treatment: You were found to have a high Lithium level when you came into the hospital. This is likely due to you taking all of your daily Lithium at the same time instead of 12 hours apart as prescribed. It is also a medication that requires frequent level checks which you were not doing. We gave you fluids to flush the Lithium out of your system and monitored your Lithium levels to make sure they were decreasing. PLEASE DO NOT CONTINUE TO TAKE LITHIUM.      SECONDARY DISCHARGE DIAGNOSES  Diagnosis: Vertigo  Assessment and Plan of Treatment: You were taking meclizine for dizziness (vertigo). This medication can increase your unsteadiness. Please do not continue to take this medication. Please follow up with Dr. Alexander's office.    Diagnosis: Tremor  Assessment and Plan of Treatment: You were taking propranolol for your tremor. This medication can lower your heart rate and make you more likely to fall. Please do not continue to take this medication. Please follow up with Dr. Alexander's office. You need to have a home health aide 24/7 to help you so you do not fall.    Diagnosis: Bipolar disorder  Assessment and Plan of Treatment: You are on multiple medications for your bipolar disorder. As stated above, you SHOULD NOT continue taking Lithium. It is important that you take your medications AS PRESCRIBED. You should take lamictal 150mg in the morning, Lamictal 100mg at night, Depakote 1000mg (two pills) at night, and Klonopin 0.5mg at night. You should NOT continue to take Zoloft (sertraline). You were seen by our psychiatry team. They suggest you have a dedicated psychiatrist to help manage your bipolar medications. Places we suggest calling are as follows:  [facility]: [address] [place] [address]: Phone: [phone]  [facility]: [place]: [address] [address]; [phone]  [facility]: [facility]: [address] [place] [address], Intakes: [phone]  Please make sure to follow up within 2 weeks.    Diagnosis: Diabetes  Assessment and Plan of Treatment: You are prescribed Janumet to take twice per day. Your a1c, a marker of your diabetes, was 7.4 which is slightly elevated. It is important you take your Janumet as prescribed (one pill in the morning and one at night). Please follow up with your primary care doctor to discuss treatment for your diabetes.

## 2022-04-13 NOTE — PHYSICAL THERAPY INITIAL EVALUATION ADULT - PERTINENT HX OF CURRENT PROBLEM, REHAB EVAL
71M Yiddish/English speaking, PMH bipolar disorder, vertigo, and T2DM, on ASA 81mg, presented to ED s/p fall. Patient states he often gets dizzy and falls. At times, dizziness is accompanied by nausea, though not today. Today, he was walking with a walker and fell backwards? (unclear).

## 2022-04-13 NOTE — OCCUPATIONAL THERAPY INITIAL EVALUATION ADULT - PERTINENT HX OF CURRENT PROBLEM, REHAB EVAL
71M Yiddish/English speaking, PMH bipolar disorder, vertigo, and T2DM, on ASA 81mg, presented to ED s/p fall found to have elevated lithium level to 2.40, admitted to tele for frequent lab draws.

## 2022-04-13 NOTE — PHYSICAL THERAPY INITIAL EVALUATION ADULT - ADDITIONAL COMMENTS
Pt slightly confused, PT unsure of PLOF accuracy - Pt currently resides w/ wife in house, [address]. Has rollator in home. Denies HHA, states wife and sons are able to assist w/ needs as needed.

## 2022-04-13 NOTE — BH CONSULTATION LIAISON ASSESSMENT NOTE - NSHPLANGTRANSLATORFT_GEN_A_CORE
patient speaks English, however, has trouble with idiomatic English phrases for which son at bedside provided translation.

## 2022-04-13 NOTE — BH CONSULTATION LIAISON ASSESSMENT NOTE - NSSUICPROTFACT_PSY_ALL_CORE
Responsibility to children, family, or others/Identifies reasons for living/Supportive social network of family or friends/Fear of death or the actual act of killing self/Cultural, spiritual and/or moral attitudes against suicide/Restorationist beliefs

## 2022-04-13 NOTE — DISCHARGE NOTE PROVIDER - PROVIDER TOKENS
PROVIDER:[TOKEN:[[id number]]] PROVIDER:[TOKEN:[[id number]],SCHEDULEDAPPT:[04/19/2022],SCHEDULEDAPPTTIME:[01:30 PM]] PROVIDER:[TOKEN:[[id number]],SCHEDULEDAPPT:[04/19/2022],SCHEDULEDAPPTTIME:[01:30 PM]],PROVIDER:[TOKEN:[[id number]],SCHEDULEDAPPT:[04/18/2022],SCHEDULEDAPPTTIME:[09:45 AM]] PROVIDER:[TOKEN:[[id number]],SCHEDULEDAPPT:[04/18/2022],SCHEDULEDAPPTTIME:[09:45 AM]]

## 2022-04-13 NOTE — PHYSICAL THERAPY INITIAL EVALUATION ADULT - MANUAL MUSCLE TESTING RESULTS, REHAB EVAL
Grossly 3+/5 throughout BLE in hip flex, knee flex/ext. Grossly 4/5 throughout BUE in shoulder flex/ext.

## 2022-04-13 NOTE — BH CONSULTATION LIAISON ASSESSMENT NOTE - OTHER PAST PSYCHIATRIC HISTORY (INCLUDE DETAILS REGARDING ONSET, COURSE OF ILLNESS, INPATIENT/OUTPATIENT TREATMENT)
Patient reports being diagnosed 40 years ago with bipolar disorder; as per his wife, he has no hx of inpatient psychiatric hospitalizations, suicidality or homicidality.     As per patient's wife -- patient had been seeing a psychiatrist until recent falling out with 1-1.5 years ago. He has been taking lithium for the past 10 years and has been experiencing an "essential tremor" for the past 7 years. He has also been taking Klonopin, propranolol, meclizine, Depakote, Lamictal, Zoloft.

## 2022-04-13 NOTE — PHYSICAL THERAPY INITIAL EVALUATION ADULT - DISCHARGE DISPOSITION, PT EVAL
Sub-acute Rehab. Pt currently adamantly refusing 2/2 Worship/cultural values & wishing to celebrate upcoming holiday at home.

## 2022-04-13 NOTE — PROGRESS NOTE ADULT - SUBJECTIVE AND OBJECTIVE BOX
Internal Medicine Progress Note  Lindsay Allen, PGY-1  Pager: [phone]    OVERNIGHT EVENTS/INTERVAL HPI: Lithium levels decreased o/n, patient with good UOP. This AM, denied HA, dizziness, SOB, CP, palp, abd pain, n/v/d/c, urinary issues.    OBJECTIVE:  Vital Signs Last 24 Hrs  T(C): 37 (2022 13:22), Max: 37.1 (2022 01:41)  T(F): 98.6 (2022 13:22), Max: 98.7 (2022 01:41)  HR: 56 (2022 12:59) (52 - 70)  BP: 145/67 (2022 12:59) (132/75 - 162/67)  BP(mean): 97 (2022 12:59) (97 - 103)  RR: 18 (2022 12:59) (16 - 18)  SpO2: 97% (2022 12:59) (95% - 99%)  I&O's Detail    2022 07:01  -  2022 07:00  --------------------------------------------------------  IN:    sodium chloride 0.9%: 1200 mL  Total IN: 1200 mL    OUT:    Voided (mL): 650 mL  Total OUT: 650 mL    Total NET: 550 mL      2022 07:01  -  2022 13:25  --------------------------------------------------------  IN:    sodium chloride 0.9%: 600 mL  Total IN: 600 mL    OUT:    Voided (mL): 200 mL  Total OUT: 200 mL    Total NET: 400 mL      Physical Exam:  GENERAL: Awake, alert and interactive, laying in bed  NEURO: A&Ox3, slowed speech, 4/5 strength in all ext, b/l UE tremor  HEENT: MMM  NECK: Supple  CARDIAC: Regular rate and rhythm, +S1/S2, no murmurs/rubs/gallops  PULM: Breathing comfortably on RA, clear to auscultation bilaterally, no wheezes/rales/rhonchi  ABDOMEN: Obese, soft, nontender, nondistended  VASC: 1+ peripheral pulses, no edema, no LE tenderness  Psych: Poor insight      Medications:  MEDICATIONS  (STANDING):  aspirin  chewable 81 milliGRAM(s) Oral every 24 hours  dextrose 5%. 1000 milliLiter(s) (50 mL/Hr) IV Continuous <Continuous>  dextrose 5%. 1000 milliLiter(s) (100 mL/Hr) IV Continuous <Continuous>  diVALproex  milliGRAM(s) Oral <User Schedule>  glucagon  Injectable 1 milliGRAM(s) IntraMuscular once  insulin lispro (ADMELOG) corrective regimen sliding scale   SubCutaneous Before meals and at bedtime  lamoTRIgine 100 milliGRAM(s) Oral every 24 hours  lamoTRIgine 150 milliGRAM(s) Oral every 24 hours  polyethylene glycol 3350 17 Gram(s) Oral daily  propranolol 40 milliGRAM(s) Oral every 12 hours  senna 2 Tablet(s) Oral at bedtime  sertraline 37.5 milliGRAM(s) Oral every 24 hours  sodium chloride 0.9%. 1000 milliLiter(s) (200 mL/Hr) IV Continuous <Continuous>    MEDICATIONS  (PRN):  acetaminophen     Tablet .. 650 milliGRAM(s) Oral every 6 hours PRN Temp greater or equal to 38C (100.4F), Mild Pain (1 - 3)  clonazePAM  Tablet 0.5 milliGRAM(s) Oral every 8 hours PRN anxiety  dextrose Oral Gel 15 Gram(s) Oral once PRN Blood Glucose LESS THAN 70 milliGRAM(s)/deciliter  ondansetron Injectable 4 milliGRAM(s) IV Push every 8 hours PRN Nausea and/or Vomiting  polyethylene glycol 3350 17 Gram(s) Oral daily PRN Constipation      Labs:                        15.1   11.27 )-----------( 276      ( 2022 12:31 )             46.4     04-12    141  |  106  |  13  ----------------------------<  148<H>  4.4   |  25  |  0.83    Ca    9.5      2022 22:54  Phos  3.3     04-12  Mg     2.1     04-12    TPro  7.2  /  Alb  4.4  /  TBili  0.5  /  DBili  x   /  AST  19  /  ALT  12  /  AlkPhos  56  04-12    PT/INR - ( 2022 13:54 )   PT: 12.2 sec;   INR: 1.02          PTT - ( 2022 13:54 )  PTT:30.1 sec    Urinalysis Basic - ( 2022 14:42 )    Color: Jenny / Appearance: Clear / S.010 / pH: x  Gluc: x / Ketone: NEGATIVE  / Bili: Negative / Urobili: 0.2 E.U./dL   Blood: x / Protein: NEGATIVE mg/dL / Nitrite: NEGATIVE   Leuk Esterase: NEGATIVE / RBC: > 10 /HPF / WBC < 5 /HPF   Sq Epi: x / Non Sq Epi: 0-5 /HPF / Bacteria: Present /HPF      COVID-19 PCR: NotDetec (2022 13:54)  COVID-19 PCR: Negative (2022 02:09)  COVID-19 PCR: NotDetec (02 Dec 2021 06:25)      Radiology: Reviewed

## 2022-04-13 NOTE — BH CONSULTATION LIAISON ASSESSMENT NOTE - SUMMARY
On evaluation, patient is alert, oriented to person, place time and situation.  Patient’s recent falls may be secondary to psychiatric medication polypharmacy, as well as compliance with recommended time intervals at which to take these medications, w/ possible resultant chronic lithium toxicity as evidenced by fluctuating Li levels on blood draws.  Given challenges in compliance and patient’s noted difficulty with taking medications multiple times a day and lack of acute psychiatric decompensation at present or in recent past, patient would likely benefit from simplification of medication regimen as well as close outpatient follow up upon d/c with a psychiatrist to ensure adequate level follow up, regular assessment of symptom return in this time period, and fine tuning of regimen if/as indicated. Please see below for medication regimen change recommendations w/ rationale, as well as information for outpatient psychiatric follow up.  Please also obtain an additional Lithium level to ensure non toxic level.     Recommendations (NOTE STILL IN PROGRESS; NOT FINAL RECOMMENDATIONS):   -d/c Sertraline (patient notes he takes 25mg; not 37.5mg) as current low dose not likely to be conferring benefit and may be contributing to polypharmacy adverse side effects   -recommend taper and d/c Propranolol as patient’s tremor does not appear to be responding to this and may be contributing to his falls due to irregular compliance and co administration with Clonazepam  -change Clonazepam from TID to once daily administration as patient states this is how he takes at home   -change Depakote dosing from TID to 500mg PO qAM and 1000mg PO qhs   -agree with d/c of Lithium and not to restart due to risks of toxicity and difficulty with compliance for this patient   -continue Lamictal 150mg PO qAM and 100mg PO qhs  -please obtain a follow up Lithium level   -will follow up tomorrow; recommend not discharging patient given multiple medication changes and ongoing monitoring of Li level  -patient should have medication regimen closely followed by psychiatrist, rather than PMD, please provide referral for [facility]: [address] [place] [address]: Phone: [phone]  Alternative [place] options include: [facility]: [place]: [address] [place] [address]; [phone] or   [facility] Board: [facility]: [address] [place] [address], Intakes: [phone] On evaluation, patient is alert, oriented to person, place time and situation.  Patient’s recent falls may be secondary to psychiatric medication polypharmacy, as well as compliance with recommended time intervals at which to take these medications, w/ possible resultant chronic lithium toxicity as evidenced by fluctuating Li levels on blood draws.  Given challenges in compliance and patient’s noted difficulty with taking medications multiple times a day and lack of acute psychiatric decompensation at present or in recent past, patient would likely benefit from simplification of medication regimen as well as close outpatient follow up upon d/c with a psychiatrist to ensure adequate level follow up, regular assessment of symptom return in this time period, and fine tuning of regimen if/as indicated. Please see below for medication regimen change recommendations w/ rationale, as well as information for outpatient psychiatric follow up.  Please also obtain an additional Lithium level to ensure non toxic level.     Recommendations (NOTE STILL IN PROGRESS; NOT FINAL RECOMMENDATIONS):   -d/c Sertraline (patient notes he takes 25mg; not 37.5mg) as current low dose not likely to be conferring benefit and may be contributing to polypharmacy adverse side effects   -recommend d/c Propranolol as patient’s tremor does not appear to be responding to this and may be contributing to his falls due to irregular compliance and co administration with Clonazepam  -change Clonazepam from TID to once daily administration as patient states this is how he takes at home   -change Depakote dosing from TID to 1000mg PO qhs (as this is patient's reported home dose)  -agree with d/c of Lithium and not to restart due to risks of toxicity and difficulty with compliance for this patient   -continue Lamictal 150mg PO qAM and 100mg PO qhs  -please obtain a follow up Lithium level   -will follow up tomorrow; recommend not discharging patient given multiple medication changes and ongoing monitoring of Li level  -patient should have medication regimen closely followed by psychiatrist, rather than PMD, please provide referral for [facility]: [address] [place] [address]: Phone: [phone]  Alternative [place] options include: [facility]: [place]/[facility]: [address] [place] [address]; [phone] or   [facility] Board: [facility]: [address] [place], [place] [id number], Intakes: [phone]

## 2022-04-13 NOTE — OCCUPATIONAL THERAPY INITIAL EVALUATION ADULT - ANTICIPATED DISCHARGE DISPOSITION, OT EVAL
subacute rehab; if patient refuses rehab and wants to go home, 2 person assist is recommended for all functional activities with Home OT and the above DME/rehabilitation facility

## 2022-04-13 NOTE — DISCHARGE NOTE PROVIDER - CARE PROVIDERS DIRECT ADDRESSES
[email or web address]

## 2022-04-13 NOTE — BH CONSULTATION LIAISON ASSESSMENT NOTE - DESCRIPTION
Patient is a 71 year old  male, [employer], domiciled with his wife and has 2 sons. Domiciled in [place].

## 2022-04-13 NOTE — DISCHARGE NOTE PROVIDER - NSDCCAREPROVSEEN_GEN_ALL_CORE_FT
Nayely Thomas Nayely Thomas A  Patient seen and examined with house-staff during bedside rounds.  Resident note read, including vitals, physical findings, laboratory data, and radiological reports.   Revisions included below.  Direct personal management at bed side and extensive interpretation of the data.  Plan was outlined and discussed in details with the housestaff.  Decision making of high complexity  Action taken for acute disease activity to reflect the level of care provided:  - medication reconciliation  - review laboratory data    Discussed the case with the health liaison.  The patient is clinically stable.  Patient will be transferred to rehab.  The patient was evaluated by neurology.  Lithium level is normal.

## 2022-04-14 ENCOUNTER — TRANSCRIPTION ENCOUNTER (OUTPATIENT)
Age: 72
End: 2022-04-14

## 2022-04-14 VITALS
RESPIRATION RATE: 18 BRPM | DIASTOLIC BLOOD PRESSURE: 70 MMHG | SYSTOLIC BLOOD PRESSURE: 143 MMHG | OXYGEN SATURATION: 93 % | HEART RATE: 70 BPM

## 2022-04-14 LAB
ANION GAP SERPL CALC-SCNC: 11 MMOL/L — SIGNIFICANT CHANGE UP (ref 5–17)
BUN SERPL-MCNC: 9 MG/DL — SIGNIFICANT CHANGE UP (ref 7–23)
CALCIUM SERPL-MCNC: 9 MG/DL — SIGNIFICANT CHANGE UP (ref 8.4–10.5)
CHLORIDE SERPL-SCNC: 105 MMOL/L — SIGNIFICANT CHANGE UP (ref 96–108)
CO2 SERPL-SCNC: 24 MMOL/L — SIGNIFICANT CHANGE UP (ref 22–31)
CREAT SERPL-MCNC: 0.76 MG/DL — SIGNIFICANT CHANGE UP (ref 0.5–1.3)
EGFR: 97 ML/MIN/1.73M2 — SIGNIFICANT CHANGE UP
GLUCOSE BLDC GLUCOMTR-MCNC: 129 MG/DL — HIGH (ref 70–99)
GLUCOSE BLDC GLUCOMTR-MCNC: 155 MG/DL — HIGH (ref 70–99)
GLUCOSE SERPL-MCNC: 148 MG/DL — HIGH (ref 70–99)
LITHIUM SERPL-MCNC: 0.18 MMOL/L — LOW (ref 0.6–1.2)
POTASSIUM SERPL-MCNC: 4.1 MMOL/L — SIGNIFICANT CHANGE UP (ref 3.5–5.3)
POTASSIUM SERPL-SCNC: 4.1 MMOL/L — SIGNIFICANT CHANGE UP (ref 3.5–5.3)
SODIUM SERPL-SCNC: 140 MMOL/L — SIGNIFICANT CHANGE UP (ref 135–145)

## 2022-04-14 PROCEDURE — 99239 HOSP IP/OBS DSCHRG MGMT >30: CPT

## 2022-04-14 PROCEDURE — 99233 SBSQ HOSP IP/OBS HIGH 50: CPT

## 2022-04-14 PROCEDURE — 99222 1ST HOSP IP/OBS MODERATE 55: CPT

## 2022-04-14 RX ORDER — LAMOTRIGINE 25 MG/1
1 TABLET, ORALLY DISINTEGRATING ORAL
Qty: 0 | Refills: 0 | DISCHARGE
Start: 2022-04-14

## 2022-04-14 RX ORDER — DIVALPROEX SODIUM 500 MG/1
2 TABLET, DELAYED RELEASE ORAL
Qty: 0 | Refills: 0 | DISCHARGE
Start: 2022-04-14

## 2022-04-14 RX ORDER — CLONAZEPAM 1 MG
1 TABLET ORAL
Qty: 0 | Refills: 0 | DISCHARGE
Start: 2022-04-14

## 2022-04-14 RX ORDER — SENNA PLUS 8.6 MG/1
2 TABLET ORAL
Qty: 0 | Refills: 0 | DISCHARGE
Start: 2022-04-14

## 2022-04-14 RX ORDER — POLYETHYLENE GLYCOL 3350 17 G/17G
17 POWDER, FOR SOLUTION ORAL
Qty: 0 | Refills: 0 | DISCHARGE
Start: 2022-04-14

## 2022-04-14 RX ADMIN — LAMOTRIGINE 150 MILLIGRAM(S): 25 TABLET, ORALLY DISINTEGRATING ORAL at 06:08

## 2022-04-14 RX ADMIN — Medication 81 MILLIGRAM(S): at 06:10

## 2022-04-14 RX ADMIN — Medication 650 MILLIGRAM(S): at 04:57

## 2022-04-14 RX ADMIN — Medication 2: at 11:04

## 2022-04-14 RX ADMIN — Medication 650 MILLIGRAM(S): at 03:47

## 2022-04-14 NOTE — BH CONSULTATION LIAISON PROGRESS NOTE - NSBHASSESSMENTFT_PSY_ALL_CORE
2 y/o man with history of bipolar disorder, DM, vertigo, on multiple psychotropic medications admitted to [facility] for workup for falls, found to have lithium level of 2.4, now on telemetry for monitoring. Psychiatry was consulted to provide recommendations for outpt follow up and review of current bipolar treatment. Pt is currently prescribed multiple psychotropics that likely interact and contribute to pt's current presentation.   72 y/o man with history of bipolar disorder, DM, vertigo, on multiple psychotropic medications admitted to [facility] for workup for falls, found to have lithium level of 2.4, now on telemetry for monitoring. Psychiatry was consulted to provide recommendations for outpt follow up and review of current bipolar treatment. Pt is currently prescribed multiple psychotropics that likely interact and contribute to pt's current presentation:  Plan  On evaluation, patient is alert, oriented to person, place time and situation.  Patient’s recent falls may be secondary to psychiatric medication polypharmacy, as well as compliance with recommended time intervals at which to take these medications, w/ possible resultant chronic lithium toxicity as evidenced by fluctuating Li levels on blood draws.  Given challenges in compliance and patient’s noted difficulty with taking medications multiple times a day and lack of acute psychiatric decompensation at present or in recent past, patient would likely benefit from simplification of medication regimen as well as close outpatient follow up upon d/c with a psychiatrist to ensure adequate level follow up, regular assessment of symptom return in this time period, and fine tuning of regimen if/as indicated. Please see below for medication regimen change recommendations w/ rationale, as well as information for outpatient psychiatric follow up.  Please also obtain an additional Lithium level to ensure non toxic level.     Recommendations:   -sertraline, propranolol, lithium were d/arabella  -clonazepam was switched to 0.5mg qhs; recommend to be tapered off as outpatient (likely contributing to recent falls)  -continue depakote  1000mg PO qhs  -continue Lamictal 150mg PO qAM and 100mg PO qhs    - please provide referral for [facility]: [address] [place] [address]: Phone: [phone]  Alternative [place] options include: [facility]: [place]/[facility]: [address] [place] [address]; [phone] or   [facility] Board: [facility]: [address] [place] [address], Intakes: [phone]   72 y/o man with history of bipolar disorder, DM, vertigo, on multiple psychotropic medications admitted to [facility] for workup for falls, found to have lithium level of 2.4, now on telemetry for monitoring. Psychiatry was consulted to provide recommendations for outpt follow up and review of current bipolar treatment. Pt is currently prescribed multiple psychotropics that likely interact and contribute to pt's current presentation:      Recommendations:   -sertraline, propranolol, lithium were d/arabella  -clonazepam was switched to 0.5mg qhs; recommend to be tapered off as outpatient (likely contributing to recent falls)  -continue depakote  1000mg PO qhs  -continue Lamictal 150mg PO qAM and 100mg PO qhs    - please provide referral for [facility]: [address] [place] [address]: Phone: [phone]  Alternative [place] options include: [facility]: [place]/[facility]: [address] [place] [address]; [phone] or   [facility] Board: [facility]: [address] [place] [address], Intakes: [phone]

## 2022-04-14 NOTE — CONSULT NOTE ADULT - ATTENDING COMMENTS
suspect parkinson's causing falls.  agree with starting sinemet 25/100 TID inpatient so that he can be monitored for potential side effect of psychosis.  outpatient movement f/u appropriate
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
## 2022-04-14 NOTE — DISCHARGE NOTE NURSING/CASE MANAGEMENT/SOCIAL WORK - PATIENT PORTAL LINK FT
You can access the FollowMyHealth Patient Portal offered by [facility] by registering at the following website: [email or web address]. By joining [facility]’s FollowMyHealth portal, you will also be able to view your health information using other applications (apps) compatible with our system.

## 2022-04-14 NOTE — DISCHARGE NOTE NURSING/CASE MANAGEMENT/SOCIAL WORK - NSDCFUADDAPPT_GEN_ALL_CORE_FT
Please bring your Insurance card, Photo ID and Discharge paperwork to the following appointment:    (1) Please follow up with [facility] at [address], [place] [address] on 04/19/2022 at 1:30pm.    Appointment was scheduled by Ms. RASHEL Pride, Referral Coordinator.    Please follow up with Dr. Sanders's office at [address] [place] [address] to address your tremor and frequent falls.

## 2022-04-14 NOTE — CHART NOTE - NSCHARTNOTEFT_GEN_A_CORE
The patient has a mobility limitation that significantly impairs their ability to participate in MRADL's in the home such as toileting and bathing. The patient's mobility limitation cannot be resolved by use of a cane or walker and the patient's home has adequate access and space for a wheelchair. The use of a wheelchair will improve the patient's ability to participate in MRADL's and patient will use it on a regular basis in the home. Patient has a willingness and is not limited by strength or endurance to use a wheelchair and has a hired caregiver in the home who is willing and able to provide assistance. Without a wheelchair the patient will be bedbound.

## 2022-04-14 NOTE — BH CONSULTATION LIAISON PROGRESS NOTE - NSBHADMITCOUNSEL_PSY_A_CORE
diagnostic results/impressions and/or recommended studies/risk factor reduction/client/family/caregiver education

## 2022-04-14 NOTE — BH CONSULTATION LIAISON PROGRESS NOTE - CURRENT MEDICATION
MEDICATIONS  (STANDING):  aspirin  chewable 81 milliGRAM(s) Oral every 24 hours  clonazePAM  Tablet 0.5 milliGRAM(s) Oral at bedtime  dextrose 5%. 1000 milliLiter(s) (50 mL/Hr) IV Continuous <Continuous>  dextrose 5%. 1000 milliLiter(s) (100 mL/Hr) IV Continuous <Continuous>  diVALproex ER 1000 milliGRAM(s) Oral at bedtime  glucagon  Injectable 1 milliGRAM(s) IntraMuscular once  insulin lispro (ADMELOG) corrective regimen sliding scale   SubCutaneous Before meals and at bedtime  lamoTRIgine 100 milliGRAM(s) Oral at bedtime  lamoTRIgine 150 milliGRAM(s) Oral every 24 hours  polyethylene glycol 3350 17 Gram(s) Oral daily  senna 2 Tablet(s) Oral at bedtime    MEDICATIONS  (PRN):  acetaminophen     Tablet .. 650 milliGRAM(s) Oral every 6 hours PRN Temp greater or equal to 38C (100.4F), Mild Pain (1 - 3)  dextrose Oral Gel 15 Gram(s) Oral once PRN Blood Glucose LESS THAN 70 milliGRAM(s)/deciliter  ondansetron Injectable 4 milliGRAM(s) IV Push every 8 hours PRN Nausea and/or Vomiting  polyethylene glycol 3350 17 Gram(s) Oral daily PRN Constipation

## 2022-04-14 NOTE — BH CONSULTATION LIAISON PROGRESS NOTE - NSBHCHARTREVIEWVS_PSY_A_CORE FT
Vital Signs Last 24 Hrs  T(C): 36.7 (14 Apr 2022 09:57), Max: 37 (13 Apr 2022 13:22)  T(F): 98 (14 Apr 2022 09:57), Max: 98.6 (13 Apr 2022 13:22)  HR: 62 (14 Apr 2022 08:25) (56 - 62)  BP: 157/75 (14 Apr 2022 08:25) (129/62 - 159/72)  BP(mean): 108 (14 Apr 2022 08:25) (89 - 108)  RR: 18 (14 Apr 2022 08:25) (18 - 18)  SpO2: 95% (14 Apr 2022 08:25) (95% - 97%)

## 2022-04-14 NOTE — CONSULT NOTE ADULT - SUBJECTIVE AND OBJECTIVE BOX
NEUROLOGY CONSULT    HPI:  71M Yiddish/English speaking, PMH bipolar disorder, vertigo, and T2DM, on ASA 81mg, presented to ED s/p fall. Patient states he often gets dizzy and falls. At times, dizziness is accompanied by nausea, though not today. Today, he was walking with a walker and fell backwards? (unclear). Was found by wife supine on the floor. On ROS, patient notes he is constipated. Denies current HA, dizziness, metalic taste in mouth, CP, palp, SOB, abd pain, n/v/d, urinary issues. Of note, patient states he takes all his medications at once (meaning, takes two pills at once for a BID med) except his Janumet which he takes one of at approx 3am when he goes to bed. He occasionally takes his second dose of Janumet during the day. He also skips his propanolol completely on some days. Very poor insight into how medications work.    Additional HPI: Patient admitted with elevated lithium level. Psych consulted for medication management - recommended d/c lithium as well as adjusting his other psychiatric medications - d/c sertraline, d/c propranolol, reduce clonazepam to once daily, change depakote to once daily. Neurology consulted for patient's difficulty walking in the setting of lithium toxicity.     SUBJECTIVE: Patient is conversant and willing to participate in exam. Initially confused as to who our team was, but re-directable.     REVIEW OF SYSTEMS: as above    MEDICATIONS  Home Medications:  aspirin 81 mg oral tablet: 1 tab(s) orally once a day (2022 18:53)  clonazePAM 0.5 mg oral tablet: 1 tab(s) orally 3 times a day (2022 18:53)  divalproex sodium 500 mg oral delayed release tablet: 1 tab(s) orally 3 times a day (2022 18:53)  lamoTRIgine 150 mg oral tablet: 1 tab(s) orally once a day (2022 18:53)  lithium 300 mg oral capsule: 1 cap(s) orally 2 times a day (2022 18:53)  meclizine 12.5 mg oral tablet: 1 tab(s) orally 3 times a day (2022 18:53)  propranolol 40 mg oral tablet: 1 tab(s) orally 2 times a day (2022 18:53)  sitagliptin-metformin 50 mg-500 mg oral tablet: 1 tab(s) orally 2 times a day (2022 18:53)  Zofran 4 mg oral tablet: 1 tab(s) orally every 8 hours (2022 18:53)  Zoloft 25 mg oral tablet: 1.5 tab(s) orally once a day (2022 18:53)    MEDICATIONS  (STANDING):  aspirin  chewable 81 milliGRAM(s) Oral every 24 hours  clonazePAM  Tablet 0.5 milliGRAM(s) Oral at bedtime  dextrose 5%. 1000 milliLiter(s) (50 mL/Hr) IV Continuous <Continuous>  dextrose 5%. 1000 milliLiter(s) (100 mL/Hr) IV Continuous <Continuous>  diVALproex ER 1000 milliGRAM(s) Oral at bedtime  glucagon  Injectable 1 milliGRAM(s) IntraMuscular once  insulin lispro (ADMELOG) corrective regimen sliding scale   SubCutaneous Before meals and at bedtime  lamoTRIgine 100 milliGRAM(s) Oral at bedtime  lamoTRIgine 150 milliGRAM(s) Oral every 24 hours  polyethylene glycol 3350 17 Gram(s) Oral daily  senna 2 Tablet(s) Oral at bedtime    MEDICATIONS  (PRN):  acetaminophen     Tablet .. 650 milliGRAM(s) Oral every 6 hours PRN Temp greater or equal to 38C (100.4F), Mild Pain (1 - 3)  dextrose Oral Gel 15 Gram(s) Oral once PRN Blood Glucose LESS THAN 70 milliGRAM(s)/deciliter  ondansetron Injectable 4 milliGRAM(s) IV Push every 8 hours PRN Nausea and/or Vomiting  polyethylene glycol 3350 17 Gram(s) Oral daily PRN Constipation      FAMILY HISTORY:  FH: type 2 diabetes    FH: bipolar disorder      SOCIAL HISTORY: negative for tobacco, alcohol, or ilicit drug use.    Allergies    No Known Allergies    Intolerances            Vital Signs Last 24 Hrs  T(C): 36.7 (2022 09:57), Max: 37 (2022 13:22)  T(F): 98 (2022 09:57), Max: 98.6 (2022 13:22)  HR: 62 (2022 08:25) (56 - 64)  BP: 157/75 (2022 08:25) (129/62 - 169/76)  BP(mean): 108 (2022 08:25) (89 - 109)  RR: 18 (2022 08:25) (18 - 18)  SpO2: 95% (2022 08:25) (95% - 98%)      PHYSICAL EXAMINATION:  General: Comfortable, pleasant male, slightly confused but redirectable, well nourished  Neurologic:     -Mental Status: AAOx2. Speech is fluent however patient has some trouble with comprehension and slight difficulty following commands. Attention/concentration are slightly impaired. Fund of  knowledge impaired.      -Cranial Nerves:          II: Visual fields are full to confrontation.          III, IV, VI: Some nystagmus with lateral gazes. PERRL b/l          V:  Facial sensation V1-V3 equal and intact           VII: Face is symmetric with normal eye closure and smile          VIII: Hearing is bilaterally intact to finger rub          IX, X: Uvula is midline and soft palate rises symmetrically          XI: Head turning and shoulder shrug are intact.          XII: Tongue protrudes midline     -Motor: Normal bulk, slightly rigid tone overall. Has mild facial tremor + tremor of proximal upper extremity muscles and hands bilaterally. +Cogwheel rigidity of UE bilaterally. No tardive dyskinesia present.           Upper extremities: shoulder abduction, elbow flexion/extension, wrist flexion/extension, handgrip 4/5 bilaterally           Lower extremities: hip flexion, knee extension/flexion, plantar flexion, ankle dorsiflexion 4/5 bilaterally      -Sensation: Intact to light touch.       -Coordination: No dysmetria on finger-to-nose testing, however noticeable tremor of hands and forearms bilaterally.      -Reflexes: 2+ and symmetric at biceps, triceps, brachioradialis, patellar, ankles          Plantar reflexes downgoing bilaterally. Downgoing toes bilaterally      -Gait: narrow and slightly unbalanced - requires 2 person assist. No shuffling noted.       LABS:                        15.1   11.27 )-----------( 276      ( 2022 12:31 )             46.4     04-14    140  |  105  |  9   ----------------------------<  148<H>  4.1   |  24  |  0.76    Ca    9.0      2022 06:19  Phos  2.8     04-13  Mg     2.3         TPro  7.2  /  Alb  4.4  /  TBili  0.5  /  DBili  x   /  AST  19  /  ALT  12  /  AlkPhos  56  -    Hemoglobin A1C:   Vitamin B12 Vitamin B12, Serum: 1483 pg/mL ( @ 12:28)    PT/INR - ( 2022 13:54 )   PT: 12.2 sec;   INR: 1.02          PTT - ( 2022 13:54 )  PTT:30.1 sec  CAPILLARY BLOOD GLUCOSE  119 (2022 14:46)      POCT Blood Glucose.: 155 mg/dL (2022 10:58)      Urinalysis Basic - ( 2022 14:42 )    Color: Jenny / Appearance: Clear / S.010 / pH: x  Gluc: x / Ketone: NEGATIVE  / Bili: Negative / Urobili: 0.2 E.U./dL   Blood: x / Protein: NEGATIVE mg/dL / Nitrite: NEGATIVE   Leuk Esterase: NEGATIVE / RBC: > 10 /HPF / WBC < 5 /HPF   Sq Epi: x / Non Sq Epi: 0-5 /HPF / Bacteria: Present /HPF            Microbiology:    Culture - Urine (collected 2022 15:07)  Source: Clean Catch Clean Catch (Midstream)  Final Report (2022 10:09):    Insignificant amount of mixed growth.        RADIOLOGY, EKG AND ADDITIONAL TESTS: Reviewed.

## 2022-04-14 NOTE — CONSULT NOTE ADULT - ASSESSMENT
71M Yiddish/English speaking, PMH bipolar disorder, vertigo, and T2DM, on ASA 81mg, presented to ED s/p fall 71M Yiddish/English speaking, PMH bipolar disorder, vertigo, and T2DM, on ASA 81mg, presented to ED s/p fall. Neurology consulted for patient's inability to walk i/s/o lithium toxicity.     #Cogwheel Rigidity and Bilateral UE Tremors i/s/o Lithium Toxicity   Patient with history of bipolar disorder on lithium at home presenting with lithium toxicity based on lithium levels - taking medications incorrectly. On exam, patient has notable findings including cogwheel rigidity of upper extremities, tremor of proximal UE muscles, nystagmus with lateral gaze, and narrow, unsteady gait. Unclear about the chronicity of these findings. However, these are all notable nervous system side effects of lithium toxicity. There may also be a slight component of underlying Parkinsonism that we cannot rule out.   - Recommend starting sinemet    - Patient does have upcoming outpatient neurology appointment. May be more prudent to wait to start Sinemet until visit with outpatient neurologist for closer monitoring in case patient were to get discharged soon. However if starting Sinemet inpatient ,would recommend keeping inpatient to monitor for 1-2 days as Sinemet can predispose worsening psychiatric symptoms.     Case discussed with attending, Dr. Jones. Neurology consult will continue to follow.

## 2022-04-14 NOTE — BH CONSULTATION LIAISON PROGRESS NOTE - NSBHFUPINTERVALHXFT_PSY_A_CORE
Patient seen at bedside. Pt's son was present at bedside. Patient reports stable mood, improved sleep and good appetite. He is looking forward to going back home before his holiday. He continues to have b/l UE tremors but appears less intense compared to yesterday. No other mood sx verbalized. No evidence of psychosis, thought process d/o.  Patient seen at bedside. Pt's son was present at bedside. Patient reports stable mood, improved sleep and good appetite. He is looking forward to going back home before his holiday. He continues to have b/l UE tremors but appears less intense compared to yesterday. No other mood sx verbalized. No evidence of psychosis, thought process d/o. Does appear to have some elements of chronic cognitive impairment.

## 2022-04-15 LAB — LAMOTRIGINE SERPL-MCNC: 10.2 UG/ML — SIGNIFICANT CHANGE UP (ref 2–20)

## 2022-04-18 ENCOUNTER — APPOINTMENT (OUTPATIENT)
Dept: NEUROLOGY | Facility: CLINIC | Age: 72
End: 2022-04-18

## 2022-04-19 ENCOUNTER — APPOINTMENT (OUTPATIENT)
Dept: INTERNAL MEDICINE | Facility: CLINIC | Age: 72
End: 2022-04-19

## 2022-04-20 DIAGNOSIS — T43.591A POISONING BY OTHER ANTIPSYCHOTICS AND NEUROLEPTICS, ACCIDENTAL (UNINTENTIONAL), INITIAL ENCOUNTER: ICD-10-CM

## 2022-04-20 DIAGNOSIS — E11.9 TYPE 2 DIABETES MELLITUS WITHOUT COMPLICATIONS: ICD-10-CM

## 2022-04-20 DIAGNOSIS — K59.00 CONSTIPATION, UNSPECIFIED: ICD-10-CM

## 2022-04-20 DIAGNOSIS — R26.81 UNSTEADINESS ON FEET: ICD-10-CM

## 2022-04-20 DIAGNOSIS — F31.9 BIPOLAR DISORDER, UNSPECIFIED: ICD-10-CM

## 2022-04-20 DIAGNOSIS — Z79.84 LONG TERM (CURRENT) USE OF ORAL HYPOGLYCEMIC DRUGS: ICD-10-CM

## 2022-04-20 DIAGNOSIS — R25.1 TREMOR, UNSPECIFIED: ICD-10-CM
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
## 2022-04-20 DIAGNOSIS — I44.7 LEFT BUNDLE-BRANCH BLOCK, UNSPECIFIED: ICD-10-CM
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
## 2022-04-20 DIAGNOSIS — Z79.82 LONG TERM (CURRENT) USE OF ASPIRIN: ICD-10-CM

## 2022-04-20 DIAGNOSIS — R42 DIZZINESS AND GIDDINESS: ICD-10-CM

## 2022-05-08 ENCOUNTER — EMERGENCY (EMERGENCY)
Facility: HOSPITAL | Age: 72
LOS: 1 days | Discharge: HOME | End: 2022-05-08
Attending: EMERGENCY MEDICINE | Admitting: EMERGENCY MEDICINE
Payer: MEDICARE

## 2022-05-08 VITALS
HEART RATE: 61 BPM | RESPIRATION RATE: 20 BRPM | DIASTOLIC BLOOD PRESSURE: 61 MMHG | OXYGEN SATURATION: 97 % | SYSTOLIC BLOOD PRESSURE: 137 MMHG

## 2022-05-08 VITALS
HEART RATE: 68 BPM | SYSTOLIC BLOOD PRESSURE: 135 MMHG | WEIGHT: 199.96 LBS | HEIGHT: 69 IN | RESPIRATION RATE: 18 BRPM | DIASTOLIC BLOOD PRESSURE: 62 MMHG | TEMPERATURE: 98 F | OXYGEN SATURATION: 95 %

## 2022-05-08 DIAGNOSIS — Z90.89 ACQUIRED ABSENCE OF OTHER ORGANS: Chronic | ICD-10-CM

## 2022-05-08 DIAGNOSIS — R07.89 OTHER CHEST PAIN: ICD-10-CM

## 2022-05-08 DIAGNOSIS — I51.7 CARDIOMEGALY: ICD-10-CM

## 2022-05-08 DIAGNOSIS — F31.9 BIPOLAR DISORDER, UNSPECIFIED: ICD-10-CM

## 2022-05-08 DIAGNOSIS — Z20.822 CONTACT WITH AND (SUSPECTED) EXPOSURE TO COVID-19: ICD-10-CM

## 2022-05-08 DIAGNOSIS — Z79.82 LONG TERM (CURRENT) USE OF ASPIRIN: ICD-10-CM

## 2022-05-08 DIAGNOSIS — Z81.8 FAMILY HISTORY OF OTHER MENTAL AND BEHAVIORAL DISORDERS: ICD-10-CM

## 2022-05-08 DIAGNOSIS — Z79.84 LONG TERM (CURRENT) USE OF ORAL HYPOGLYCEMIC DRUGS: ICD-10-CM

## 2022-05-08 DIAGNOSIS — Z83.3 FAMILY HISTORY OF DIABETES MELLITUS: ICD-10-CM

## 2022-05-08 DIAGNOSIS — E11.9 TYPE 2 DIABETES MELLITUS WITHOUT COMPLICATIONS: ICD-10-CM

## 2022-05-08 LAB
ALBUMIN SERPL ELPH-MCNC: 4.3 G/DL — SIGNIFICANT CHANGE UP (ref 3.3–5)
ALP SERPL-CCNC: 55 U/L — SIGNIFICANT CHANGE UP (ref 40–120)
ALT FLD-CCNC: 14 U/L — SIGNIFICANT CHANGE UP (ref 10–45)
ANION GAP SERPL CALC-SCNC: 10 MMOL/L — SIGNIFICANT CHANGE UP (ref 5–17)
AST SERPL-CCNC: 20 U/L — SIGNIFICANT CHANGE UP (ref 10–40)
BASOPHILS # BLD AUTO: 0.05 K/UL — SIGNIFICANT CHANGE UP (ref 0–0.2)
BASOPHILS NFR BLD AUTO: 0.7 % — SIGNIFICANT CHANGE UP (ref 0–2)
BILIRUB SERPL-MCNC: 0.2 MG/DL — SIGNIFICANT CHANGE UP (ref 0.2–1.2)
BUN SERPL-MCNC: 12 MG/DL — SIGNIFICANT CHANGE UP (ref 7–23)
CALCIUM SERPL-MCNC: 8.9 MG/DL — SIGNIFICANT CHANGE UP (ref 8.4–10.5)
CHLORIDE SERPL-SCNC: 105 MMOL/L — SIGNIFICANT CHANGE UP (ref 96–108)
CO2 SERPL-SCNC: 27 MMOL/L — SIGNIFICANT CHANGE UP (ref 22–31)
CREAT SERPL-MCNC: 0.72 MG/DL — SIGNIFICANT CHANGE UP (ref 0.5–1.3)
EGFR: 98 ML/MIN/1.73M2 — SIGNIFICANT CHANGE UP
EOSINOPHIL # BLD AUTO: 0.17 K/UL — SIGNIFICANT CHANGE UP (ref 0–0.5)
EOSINOPHIL NFR BLD AUTO: 2.3 % — SIGNIFICANT CHANGE UP (ref 0–6)
GLUCOSE SERPL-MCNC: 135 MG/DL — HIGH (ref 70–99)
HCT VFR BLD CALC: 41 % — SIGNIFICANT CHANGE UP (ref 39–50)
HGB BLD-MCNC: 13.4 G/DL — SIGNIFICANT CHANGE UP (ref 13–17)
IMM GRANULOCYTES NFR BLD AUTO: 0.3 % — SIGNIFICANT CHANGE UP (ref 0–1.5)
LITHIUM SERPL-MCNC: 0.39 MMOL/L — LOW (ref 0.6–1.2)
LYMPHOCYTES # BLD AUTO: 2.69 K/UL — SIGNIFICANT CHANGE UP (ref 1–3.3)
LYMPHOCYTES # BLD AUTO: 35.8 % — SIGNIFICANT CHANGE UP (ref 13–44)
MAGNESIUM SERPL-MCNC: 1.7 MG/DL — SIGNIFICANT CHANGE UP (ref 1.6–2.6)
MCHC RBC-ENTMCNC: 30.7 PG — SIGNIFICANT CHANGE UP (ref 27–34)
MCHC RBC-ENTMCNC: 32.7 GM/DL — SIGNIFICANT CHANGE UP (ref 32–36)
MCV RBC AUTO: 94 FL — SIGNIFICANT CHANGE UP (ref 80–100)
MONOCYTES # BLD AUTO: 0.63 K/UL — SIGNIFICANT CHANGE UP (ref 0–0.9)
MONOCYTES NFR BLD AUTO: 8.4 % — SIGNIFICANT CHANGE UP (ref 2–14)
NEUTROPHILS # BLD AUTO: 3.96 K/UL — SIGNIFICANT CHANGE UP (ref 1.8–7.4)
NEUTROPHILS NFR BLD AUTO: 52.5 % — SIGNIFICANT CHANGE UP (ref 43–77)
NRBC # BLD: 0 /100 WBCS — SIGNIFICANT CHANGE UP (ref 0–0)
PLATELET # BLD AUTO: 241 K/UL — SIGNIFICANT CHANGE UP (ref 150–400)
POTASSIUM SERPL-MCNC: 4.4 MMOL/L — SIGNIFICANT CHANGE UP (ref 3.5–5.3)
POTASSIUM SERPL-SCNC: 4.4 MMOL/L — SIGNIFICANT CHANGE UP (ref 3.5–5.3)
PROT SERPL-MCNC: 6.8 G/DL — SIGNIFICANT CHANGE UP (ref 6–8.3)
RBC # BLD: 4.36 M/UL — SIGNIFICANT CHANGE UP (ref 4.2–5.8)
RBC # FLD: 13.5 % — SIGNIFICANT CHANGE UP (ref 10.3–14.5)
SARS-COV-2 RNA SPEC QL NAA+PROBE: NEGATIVE — SIGNIFICANT CHANGE UP
SODIUM SERPL-SCNC: 142 MMOL/L — SIGNIFICANT CHANGE UP (ref 135–145)
TROPONIN T SERPL-MCNC: 0.01 NG/ML — SIGNIFICANT CHANGE UP (ref 0–0.01)
TROPONIN T SERPL-MCNC: 0.01 NG/ML — SIGNIFICANT CHANGE UP (ref 0–0.01)
WBC # BLD: 7.52 K/UL — SIGNIFICANT CHANGE UP (ref 3.8–10.5)
WBC # FLD AUTO: 7.52 K/UL — SIGNIFICANT CHANGE UP (ref 3.8–10.5)

## 2022-05-08 PROCEDURE — 71045 X-RAY EXAM CHEST 1 VIEW: CPT | Mod: 26

## 2022-05-08 PROCEDURE — 99285 EMERGENCY DEPT VISIT HI MDM: CPT | Mod: CS,25

## 2022-05-08 PROCEDURE — 93010 ELECTROCARDIOGRAM REPORT: CPT | Mod: 59

## 2022-05-08 NOTE — ED ADULT NURSE NOTE - OBJECTIVE STATEMENT
71M, p/w CP that started yesterday. referred by PCP for further evaluation. CP has since resolved, lasted about 30 minutes yesterday. Denies SOB, nausea, vomiting, dizziness, abd pain, fevers, chills. No JVD/ Edema, normal work of breathing. EKG in progress 71M, p/w CP that started yesterday. referred by PCP for further evaluation. CP has since resolved, lasted about 30 minutes yesterday. Denies SOB, nausea, vomiting, dizziness, abd pain, fevers, chills. No JVD/ Edema, normal work of breathing. EKG in progress.    patient reports CP was non-radiating, non-exertional, and described as a chest pressure. pt appears to be in no obvious distress

## 2022-05-08 NOTE — ED PROVIDER NOTE - CARE PROVIDER_API CALL
Karrie Dubose  CARDIOLOGY  [address] [place] [address]  Phone: [phone]  Fax: [phone]  Follow Up Time:    Oneil Putnam (MD)  Cardiovascular Disease; Internal Medicine  Cardiology [facility], [address] [place] [address]  Phone: [phone]  Fax: [phone]  Follow Up Time:

## 2022-05-08 NOTE — ED PROVIDER NOTE - NSFOLLOWUPINSTRUCTIONS_ED_ALL_ED_FT
Please see referral to cardiologist and psychiatrist for followup and see your primary care provider.  Call for appointment.  If you have any problems with followup, please call the ED Referral Coordinator at [phone].  Return to the ER if symptoms worsen or other concerns.    There is a walk in clinic at [facility].  Located on the first floor of the [facility], at [facility] and [address], the Walk-in Evaluation unit provides individuals with mental health assessments to determine what treatment best suits their needs. The unit is open weekdays from 8:30 a.m. to 5 p.m. Individuals requiring service after 5 p.m. or on weekends or holidays can seek assistance at the Psychiatric Emergency Room, located in the Hospital’s Main Building, in Room 1A19. For more information, call [phone] or [phone].    [facility]  o	[email or web address]  o	[address]th [address]  [place], [place] [address]   [phone] ([phone] for intakes)  o	Medicare, Medicaid, most private insurance (including [employer])      •	[facility]  o	[email or web address]  o	[address] [place] [address] [phone]  o	Medicare, Medicaid, Aetna, [employer], [employer], BCBS, [employer], [place], [employer]HI, Healthfirst, HealthPlus, MetroPlus, Wellcare      •	[facility]  o	[email or web address]  o	[address] [place] [address] [phone]  o	Medicare, Medicaid, most private insurance.      Nonspecific Chest Pain  Chest pain can be caused by many different conditions. There is always a chance that your pain could be related to something serious, such as a heart attack or a blood clot in your lungs. Chest pain can also be caused by conditions that are not life-threatening. If you have chest pain, it is very important to follow up with your health care provider.    What are the causes?  Causes of this condition include:    Heartburn.  Pneumonia or bronchitis.  Anxiety or stress.  Inflammation around your heart (pericarditis) or lung (pleuritis or pleurisy).  A blood clot in your lung.  A collapsed lung (pneumothorax). This can develop suddenly on its own (spontaneous pneumothorax) or from trauma to the chest.  Shingles infection (varicella-zoster virus).  Heart attack.  Damage to the bones, muscles, and cartilage that make up your chest wall. This can include:    Bruised bones due to injury.  Strained muscles or cartilage due to frequent or repeated coughing or overwork.  Fracture to one or more ribs.  Sore cartilage due to inflammation (costochondritis).      What increases the risk?  Risk factors for this condition may include:    Activities that increase your risk for trauma or injury to your chest.  Respiratory infections or conditions that cause frequent coughing.  Medical conditions or overeating that can cause heartburn.  Heart disease or family history of heart disease.  Conditions or health behaviors that increase your risk of developing a blood clot.  Having had chicken pox (varicella zoster).    What are the signs or symptoms?  Chest pain can feel like:    Burning or tingling on the surface of your chest or deep in your chest.  Crushing, pressure, aching, or squeezing pain.  Dull or sharp pain that is worse when you move, cough, or take a deep breath.  Pain that is also felt in your back, neck, shoulder, or arm, or pain that spreads to any of these areas.    Your chest pain may come and go, or it may stay constant.    How is this diagnosed?  Lab tests or other studies may be needed to find the cause of your pain. Your health care provider may have you take a test called an ECG (electrocardiogram). An ECG records your heartbeat patterns at the time the test is performed. You may also have other tests, such as:    Transthoracic echocardiogram (TTE). In this test, sound waves are used to create a picture of the heart structures and to look at how blood flows through your heart.  Transesophageal echocardiogram (RENZO). This is a more advanced imaging test that takes images from inside your body. It allows your health care provider to see your heart in finer detail.  Cardiac monitoring. This allows your health care provider to monitor your heart rate and rhythm in real time.  Holter monitor. This is a portable device that records your heartbeat and can help to diagnose abnormal heartbeats. It allows your health care provider to track your heart activity for several days, if needed.  Stress tests. These can be done through exercise or by taking medicine that makes your heart beat more quickly.  Blood tests.  Other imaging tests.    How is this treated?  Treatment depends on what is causing your chest pain. Treatment may include:    Medicines. These may include:    Acid blockers for heartburn.  Anti-inflammatory medicine.  Pain medicine for inflammatory conditions.  Antibiotic medicine, if an infection is present.  Medicines to dissolve blood clots.  Medicines to treat coronary artery disease (CAD).    Supportive care for conditions that do not require medicines. This may include:    Resting.  Applying heat or cold packs to injured areas.  Limiting activities until pain decreases.      Follow these instructions at home:  Medicines     If you were prescribed an antibiotic, take it as told by your health care provider. Do not stop taking the antibiotic even if you start to feel better.  Take over-the-counter and prescription medicines only as told by your health care provider.  Lifestyle     Do not use any products that contain nicotine or tobacco, such as cigarettes and e-cigarettes. If you need help quitting, ask your health care provider.  Do not drink alcohol.  ImageMake lifestyle changes as directed by your health care provider. These may include:    Getting regular exercise. Ask your health care provider to suggest some activities that are safe for you.  Eating a heart-healthy diet. A registered dietitian can help you to learn healthy eating options.  Maintaining a healthy weight.  Managing diabetes, if necessary.  Reducing stress, such as with yoga or relaxation techniques.    General instructions     Avoid any activities that bring on chest pain.  If heartburn is the cause for your chest pain, raise (elevate) the head of your bed about 6 inches (15 cm) by putting blocks under the legs. Sleeping with more pillows does not effectively relieve heartburn because it only changes the position of your head.  Keep all follow-up visits as told by your health care provider. This is important. This includes any further testing if your chest pain does not go away.  Contact a health care provider if:  Your chest pain does not go away.  You have a rash with blisters on your chest.  You have a fever.  You have chills.  Get help right away if:  Your chest pain is worse.  You have a cough that gets worse, or you cough up blood.  You have severe pain in your abdomen.  You have severe weakness.  You faint.  You have sudden, unexplained chest discomfort.  You have sudden, unexplained discomfort in your arms, back, neck, or jaw.  You have shortness of breath at any time.  You suddenly start to sweat, or your skin gets clammy.  You feel nauseous or you vomit.  You suddenly feel light-headed or dizzy.  Your heart begins to beat quickly, or it feels like it is skipping beats.  These symptoms may represent a serious problem that is an emergency. Do not wait to see if the symptoms will go away. Get medical help right away. Call your local emergency services (911 in the [place] Do not drive yourself to the hospital.     This information is not intended to replace advice given to you by your health care provider. Make sure you discuss any questions you have with your health care provider.

## 2022-05-08 NOTE — ED PROVIDER NOTE - OBJECTIVE STATEMENT
PMH bipolar disorder, vertigo, and T2DM, here with episode of chest pressure. About 9 am was sitting in a chair when he felt pressure in mid chest. Non radiating. Lasted about 10 min and resolved when he took a nitro. Notes similar symptoms in past, yesterday felt it and took a klonipen and improved. Denies sob, nausea, fever, chills, cough. Hasn't recurred since episode this am. Went to pmd and had ecg done, sent to ED for further eval. Currently asymptomatic. PMH bipolar disorder, vertigo, and T2DM, here with episode of chest pressure. About 9 am was sitting in a chair when he felt pressure in mid chest. Non radiating. Lasted about 10 min and resolved when he took a nitro. Notes similar symptoms in past, yesterday felt it and took a klonipen and improved. Denies sob, nausea, fever, chills, cough. Hasn't recurred since episode this am. Went to pmd and had ecg done, sent to ED for further eval. Currently asymptomatic. Notes he only got about 1 hour of sleep last night.

## 2022-05-08 NOTE — ED PROVIDER NOTE - CARE PROVIDERS DIRECT ADDRESSES
[email or web address]

## 2022-05-08 NOTE — ED ADULT TRIAGE NOTE - CHIEF COMPLAINT QUOTE
Pt BIBA from pcp office due to ~ 30 min of chest pressure yesterday and then this AM.  Denies fevers, coughing, sob. Pt took x1 subling nitro with +relief this AM, seen at pcp who requested pt be admitted for serial trops/ekg due to abnormal ekg (Dr. Villasenor). RR even and unlabored, no increased WOB. No LE edema. EKG in progress.

## 2022-05-08 NOTE — ED PROVIDER NOTE - PATIENT PORTAL LINK FT
You can access the FollowMyHealth Patient Portal offered by [facility] by registering at the following website: [email or web address]. By joining [employer]’s FollowMyHealth portal, you will also be able to view your health information using other applications (apps) compatible with our system.

## 2022-05-08 NOTE — ED PROVIDER NOTE - CLINICAL SUMMARY MEDICAL DECISION MAKING FREE TEXT BOX
episode of chest pressure this am at rest without associated symptoms. has had before, relieved with klonipen, today took a nitro and improved. labs/cxr/ecg ordered. cxr neg for pneumonia, pneumothorax, widened mediastinum to suggest dissection.  troponin neg x2 and ecg unchanged from prior making acs unlikely. possible component of anxiety but given age, needs close f/u with cardiology for further eval. referral given. return precautions discussed

## 2022-05-16 ENCOUNTER — EMERGENCY (EMERGENCY)
Facility: HOSPITAL | Age: 72
LOS: 1 days | Discharge: HOME | End: 2022-05-16
Attending: EMERGENCY MEDICINE | Admitting: EMERGENCY MEDICINE
Payer: MEDICARE

## 2022-05-16 VITALS
HEIGHT: 69 IN | OXYGEN SATURATION: 96 % | WEIGHT: 210.1 LBS | HEART RATE: 72 BPM | DIASTOLIC BLOOD PRESSURE: 67 MMHG | RESPIRATION RATE: 18 BRPM | TEMPERATURE: 98 F | SYSTOLIC BLOOD PRESSURE: 143 MMHG

## 2022-05-16 VITALS
DIASTOLIC BLOOD PRESSURE: 62 MMHG | TEMPERATURE: 99 F | OXYGEN SATURATION: 97 % | RESPIRATION RATE: 17 BRPM | SYSTOLIC BLOOD PRESSURE: 132 MMHG | HEART RATE: 82 BPM

## 2022-05-16 DIAGNOSIS — Z90.89 ACQUIRED ABSENCE OF OTHER ORGANS: Chronic | ICD-10-CM

## 2022-05-16 DIAGNOSIS — Z79.82 LONG TERM (CURRENT) USE OF ASPIRIN: ICD-10-CM

## 2022-05-16 DIAGNOSIS — Z20.822 CONTACT WITH AND (SUSPECTED) EXPOSURE TO COVID-19: ICD-10-CM

## 2022-05-16 DIAGNOSIS — R07.89 OTHER CHEST PAIN: ICD-10-CM

## 2022-05-16 DIAGNOSIS — E11.9 TYPE 2 DIABETES MELLITUS WITHOUT COMPLICATIONS: ICD-10-CM

## 2022-05-16 DIAGNOSIS — Z79.84 LONG TERM (CURRENT) USE OF ORAL HYPOGLYCEMIC DRUGS: ICD-10-CM

## 2022-05-16 DIAGNOSIS — F31.9 BIPOLAR DISORDER, UNSPECIFIED: ICD-10-CM

## 2022-05-16 LAB
ALBUMIN SERPL ELPH-MCNC: 3.8 G/DL — SIGNIFICANT CHANGE UP (ref 3.3–5)
ALP SERPL-CCNC: 56 U/L — SIGNIFICANT CHANGE UP (ref 40–120)
ALT FLD-CCNC: 12 U/L — SIGNIFICANT CHANGE UP (ref 10–45)
ANION GAP SERPL CALC-SCNC: 10 MMOL/L — SIGNIFICANT CHANGE UP (ref 5–17)
APPEARANCE UR: CLEAR — SIGNIFICANT CHANGE UP
APTT BLD: 35.2 SEC — SIGNIFICANT CHANGE UP (ref 27.5–35.5)
AST SERPL-CCNC: 17 U/L — SIGNIFICANT CHANGE UP (ref 10–40)
BASOPHILS # BLD AUTO: 0.05 K/UL — SIGNIFICANT CHANGE UP (ref 0–0.2)
BASOPHILS NFR BLD AUTO: 0.7 % — SIGNIFICANT CHANGE UP (ref 0–2)
BILIRUB SERPL-MCNC: 0.3 MG/DL — SIGNIFICANT CHANGE UP (ref 0.2–1.2)
BILIRUB UR-MCNC: NEGATIVE — SIGNIFICANT CHANGE UP
BUN SERPL-MCNC: 10 MG/DL — SIGNIFICANT CHANGE UP (ref 7–23)
CALCIUM SERPL-MCNC: 9 MG/DL — SIGNIFICANT CHANGE UP (ref 8.4–10.5)
CHLORIDE SERPL-SCNC: 105 MMOL/L — SIGNIFICANT CHANGE UP (ref 96–108)
CK MB CFR SERPL CALC: 1.6 NG/ML — SIGNIFICANT CHANGE UP (ref 0–6.7)
CK SERPL-CCNC: 48 U/L — SIGNIFICANT CHANGE UP (ref 30–200)
CO2 SERPL-SCNC: 26 MMOL/L — SIGNIFICANT CHANGE UP (ref 22–31)
COLOR SPEC: YELLOW — SIGNIFICANT CHANGE UP
CREAT SERPL-MCNC: 0.71 MG/DL — SIGNIFICANT CHANGE UP (ref 0.5–1.3)
DIFF PNL FLD: NEGATIVE — SIGNIFICANT CHANGE UP
EGFR: 99 ML/MIN/1.73M2 — SIGNIFICANT CHANGE UP
EOSINOPHIL # BLD AUTO: 0.17 K/UL — SIGNIFICANT CHANGE UP (ref 0–0.5)
EOSINOPHIL NFR BLD AUTO: 2.5 % — SIGNIFICANT CHANGE UP (ref 0–6)
GLUCOSE SERPL-MCNC: 171 MG/DL — HIGH (ref 70–99)
GLUCOSE UR QL: 500
HCT VFR BLD CALC: 41.9 % — SIGNIFICANT CHANGE UP (ref 39–50)
HGB BLD-MCNC: 13.5 G/DL — SIGNIFICANT CHANGE UP (ref 13–17)
IMM GRANULOCYTES NFR BLD AUTO: 0.3 % — SIGNIFICANT CHANGE UP (ref 0–1.5)
INR BLD: 1.02 — SIGNIFICANT CHANGE UP (ref 0.88–1.16)
KETONES UR-MCNC: ABNORMAL MG/DL
LEUKOCYTE ESTERASE UR-ACNC: NEGATIVE — SIGNIFICANT CHANGE UP
LIDOCAIN IGE QN: 28 U/L — SIGNIFICANT CHANGE UP (ref 7–60)
LYMPHOCYTES # BLD AUTO: 2.52 K/UL — SIGNIFICANT CHANGE UP (ref 1–3.3)
LYMPHOCYTES # BLD AUTO: 36.4 % — SIGNIFICANT CHANGE UP (ref 13–44)
MCHC RBC-ENTMCNC: 30.5 PG — SIGNIFICANT CHANGE UP (ref 27–34)
MCHC RBC-ENTMCNC: 32.2 GM/DL — SIGNIFICANT CHANGE UP (ref 32–36)
MCV RBC AUTO: 94.6 FL — SIGNIFICANT CHANGE UP (ref 80–100)
MONOCYTES # BLD AUTO: 0.62 K/UL — SIGNIFICANT CHANGE UP (ref 0–0.9)
MONOCYTES NFR BLD AUTO: 9 % — SIGNIFICANT CHANGE UP (ref 2–14)
NEUTROPHILS # BLD AUTO: 3.54 K/UL — SIGNIFICANT CHANGE UP (ref 1.8–7.4)
NEUTROPHILS NFR BLD AUTO: 51.1 % — SIGNIFICANT CHANGE UP (ref 43–77)
NITRITE UR-MCNC: NEGATIVE — SIGNIFICANT CHANGE UP
NRBC # BLD: 0 /100 WBCS — SIGNIFICANT CHANGE UP (ref 0–0)
NT-PROBNP SERPL-SCNC: 73 PG/ML — SIGNIFICANT CHANGE UP (ref 0–300)
PH UR: 7 — SIGNIFICANT CHANGE UP (ref 5–8)
PLATELET # BLD AUTO: 246 K/UL — SIGNIFICANT CHANGE UP (ref 150–400)
POTASSIUM SERPL-MCNC: 4.1 MMOL/L — SIGNIFICANT CHANGE UP (ref 3.5–5.3)
POTASSIUM SERPL-SCNC: 4.1 MMOL/L — SIGNIFICANT CHANGE UP (ref 3.5–5.3)
PROT SERPL-MCNC: 6.3 G/DL — SIGNIFICANT CHANGE UP (ref 6–8.3)
PROT UR-MCNC: NEGATIVE MG/DL — SIGNIFICANT CHANGE UP
PROTHROM AB SERPL-ACNC: 12.2 SEC — SIGNIFICANT CHANGE UP (ref 10.5–13.4)
RBC # BLD: 4.43 M/UL — SIGNIFICANT CHANGE UP (ref 4.2–5.8)
RBC # FLD: 13.2 % — SIGNIFICANT CHANGE UP (ref 10.3–14.5)
SARS-COV-2 RNA SPEC QL NAA+PROBE: NEGATIVE — SIGNIFICANT CHANGE UP
SODIUM SERPL-SCNC: 141 MMOL/L — SIGNIFICANT CHANGE UP (ref 135–145)
SP GR SPEC: 1.02 — SIGNIFICANT CHANGE UP (ref 1–1.03)
TROPONIN T SERPL-MCNC: 0.01 NG/ML — SIGNIFICANT CHANGE UP (ref 0–0.01)
TROPONIN T SERPL-MCNC: <0.01 NG/ML — SIGNIFICANT CHANGE UP (ref 0–0.01)
UROBILINOGEN FLD QL: 0.2 E.U./DL — SIGNIFICANT CHANGE UP
WBC # BLD: 6.92 K/UL — SIGNIFICANT CHANGE UP (ref 3.8–10.5)
WBC # FLD AUTO: 6.92 K/UL — SIGNIFICANT CHANGE UP (ref 3.8–10.5)

## 2022-05-16 PROCEDURE — 93010 ELECTROCARDIOGRAM REPORT: CPT

## 2022-05-16 PROCEDURE — 87635 SARS-COV-2 COVID-19 AMP PRB: CPT

## 2022-05-16 PROCEDURE — 83690 ASSAY OF LIPASE: CPT

## 2022-05-16 PROCEDURE — 82553 CREATINE MB FRACTION: CPT

## 2022-05-16 PROCEDURE — 82550 ASSAY OF CK (CPK): CPT

## 2022-05-16 PROCEDURE — 70450 CT HEAD/BRAIN W/O DYE: CPT | Mod: MA

## 2022-05-16 PROCEDURE — 85025 COMPLETE CBC W/AUTO DIFF WBC: CPT

## 2022-05-16 PROCEDURE — 84484 ASSAY OF TROPONIN QUANT: CPT

## 2022-05-16 PROCEDURE — 99285 EMERGENCY DEPT VISIT HI MDM: CPT | Mod: CS

## 2022-05-16 PROCEDURE — 99285 EMERGENCY DEPT VISIT HI MDM: CPT | Mod: 25

## 2022-05-16 PROCEDURE — 85730 THROMBOPLASTIN TIME PARTIAL: CPT

## 2022-05-16 PROCEDURE — 85610 PROTHROMBIN TIME: CPT

## 2022-05-16 PROCEDURE — 36415 COLL VENOUS BLD VENIPUNCTURE: CPT

## 2022-05-16 PROCEDURE — 80053 COMPREHEN METABOLIC PANEL: CPT

## 2022-05-16 PROCEDURE — 81003 URINALYSIS AUTO W/O SCOPE: CPT

## 2022-05-16 PROCEDURE — 93005 ELECTROCARDIOGRAM TRACING: CPT

## 2022-05-16 PROCEDURE — 70450 CT HEAD/BRAIN W/O DYE: CPT | Mod: 26,MA

## 2022-05-16 PROCEDURE — 83880 ASSAY OF NATRIURETIC PEPTIDE: CPT

## 2022-05-16 NOTE — ED ADULT NURSE NOTE - OBJECTIVE STATEMENT
Pt arrived to ED AAOX4, spont unlab breathing on RA, NAD. PT had an argument with his son 1 hour prior to arrival and then began experiencing dizziness and chest pain. Pt took 4 baby aspirin at home. Upon arrival in ED pt states the pain has subsided and he is no longer dizzy. Pt reports intermittent dizzy spells x 3 months. No recent falls. Denies CP, SOB, fever, cough, or chills at this time. pt neuro intact.

## 2022-05-16 NOTE — ED PROVIDER NOTE - OBJECTIVE STATEMENT
71M with a PMH bipolar disorder, vertigo, and T2DM, who p/w substernal chest pain that started approx 1 hr PTA. He was given 324 asa and 1 nitro with improvement of pain. He denies dizziness currently, but states he is dizzy off and on x 3 months and reports falling within the past few months due to unsteadiness. He is coming from home. Has been compliant with his meds.  Denies sob, nausea, fever, chills, cough. Pt reports he went to his PMD and was sent to ED for further eval. Recent ER visit for similar last week.

## 2022-05-16 NOTE — ED ADULT NURSE NOTE - IS THE PATIENT ABLE TO BE SCREENED?
Returned call. Spoke with Zeynep pharmacist to confirm medication dosing and directions.  rx for diazepam 2mg. This is a tapering dose.  Direction is to take 1 tablet every other day for 1 week. Then take 1 tablet every 3rd day for 1 week and stop. Qty:10 with 0 refill. Trazodone will replace this medication.  rx for trazodone 50mg; direction is to take 1 tablet by mouth nightly.  Qty: 30 with 1 refill. Zeynep verbalizes understanding and denies questions at this time.    Yes Pt is cognitively impaired and restless

## 2022-05-16 NOTE — ED ADULT NURSE NOTE - PAIN: PRESENCE, MLM
denies pain/discomfort Attending MD Bush:  I personally have seen and examined this patient.  Resident note reviewed and agree on plan of care and except where noted.  See HPI, PE, and MDM for details.       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 CT head and C spine at OSH with no fracture or ICH, transferred for neurosurgical evaluation. Patient on exam truly weak and with sensory deficit on right, plan for emergent MR brain and C spine, CTA head and neck to r/o vascular dissection with secondary CVA that could alternatively explain symptoms.

## 2022-05-16 NOTE — ED PROVIDER NOTE - CLINICAL SUMMARY MEDICAL DECISION MAKING FREE TEXT BOX
71M with a PMH bipolar disorder, vertigo, and T2DM, who p/w substernal chest pain that started approx 1 hr PTA. He was given 324 asa and 1 nitro with improvement of pain. He denies dizziness currently, but states he is dizzy off and on x 3 months and reports falling within the past few months due to unsteadiness. He is coming from home. Has been compliant with his meds.  Denies sob, nausea, fever, chills, cough. Pt reports he went to his PMD and was sent to ED for further eval. Recent ER visit for similar last week.   Pt is well-appearing on exam, VSS, no focal neuro deficits, EKG no stemi, unchanged form previous.   Plan for labs including trop x 2. CT head given intermittent dizziness and c/o fall. Pt currently asymptomatic.     Labs, CT with no emergent findings. Do not suspect ACS, PE, dissection or other acute life-threatening pathology at this time.  Plan for DC with prompt outpt pmd/cards follow up.   Pt feeling improved and is stable for DC. ED evaluation and management discussed with the patient in detail.  Close PMD follow up encouraged.  Strict ED return instructions discussed in detail and patient given the opportunity to ask any questions about their discharge diagnosis and instructions. Patient verbalized understanding.

## 2022-05-16 NOTE — ED PROVIDER NOTE - PHYSICAL EXAMINATION
GEN: Elderly, obese, well developed, awake, alert, oriented to person, place, time/situation and in no apparent distress. NTAF  ENT: Airway patent, Nasal mucosa clear. Mouth with normal mucosa.  EYES: Clear bilaterally. PERRL, EOMI  RESPIRATORY: Breathing comfortably with normal RR. No W/C/R, no hypoxia or resp distress.  CARDIAC: Regular rate and rhythm, no M/R/G  ABDOMEN: Soft, nontender, +bowel sounds, no rebound, rigidity, or guarding.  MSK: Range of motion is not limited, no deformities noted.  NEURO: Alert and oriented, no focal deficits.  SKIN: Skin normal color for race, warm, dry and intact. No evidence of rash.  PSYCH: Alert and oriented to person, place, time/situation. normal mood and affect. no apparent risk to self or others.

## 2022-05-16 NOTE — ED PROVIDER NOTE - NSFOLLOWUPINSTRUCTIONS_ED_ALL_ED_FT
Please follow up with your primary care physician. You may call our referrals coordinator at [phone] Monday to Friday 11am-7pm for assistance with making an appointment.  Return to the Emergency Department if you have any new or worsening symptoms, or for any other concerns. Please read below for further information.    Chest Pain    Chest pain can be caused by many different conditions which may or may not be dangerous. Causes include heartburn, lung infections, heart attack, blood clot in lungs, skin infections, strain or damage to muscle, cartilage, or bones, etc. In addition to a history and physical examination, an electrocardiogram (ECG) or other lab tests may have been performed to determine the cause of your chest pain. Follow up with your primary care provider or with a cardiologist as instructed.     SEEK IMMEDIATE MEDICAL CARE IF YOU HAVE ANY OF THE FOLLOWING SYMPTOMS: worsening chest pain, coughing up blood, unexplained back/neck/jaw pain, severe abdominal pain, dizziness or lightheadedness, fainting, shortness of breath, sweaty or clammy skin, vomiting, or racing heart beat. These symptoms may represent a serious problem that is an emergency. Do not wait to see if the symptoms will go away. Get medical help right away. Call 911 and do not drive yourself to the hospital.

## 2022-05-16 NOTE — ED PROVIDER NOTE - PATIENT PORTAL LINK FT
You can access the FollowMyHealth Patient Portal offered by [facility] by registering at the following website: [email or web address]. By joining "[employer]’s FollowMyHealth portal, you will also be able to view your health information using other applications (apps) compatible with our system.

## 2022-05-17 NOTE — BH CONSULTATION LIAISON ASSESSMENT NOTE - MEDICAL RECORD REVIEWED
Samples Given: Cerave am and ultra light sunscreen
Detail Level: Zone
Render In Strict Bullet Format?: No
Yes

## 2022-05-19 NOTE — ED PROVIDER NOTE - CLINICAL SUMMARY MEDICAL DECISION MAKING FREE TEXT BOX
weakness, bad taste to mouth, discomfort to upper abd, however no pain on exam. no guarding, no rebound, doubt acute surgical abd at this time. no chest pain, no SOB  -check labs, ekg  -ivf  -pepciadenike coulteralox the EKG is unchanged from prior EKG

## 2022-05-24 ENCOUNTER — EMERGENCY (EMERGENCY)
Facility: HOSPITAL | Age: 72
LOS: 1 days | Discharge: HOME | End: 2022-05-24
Attending: EMERGENCY MEDICINE | Admitting: EMERGENCY MEDICINE
Payer: MEDICARE

## 2022-05-24 VITALS
DIASTOLIC BLOOD PRESSURE: 77 MMHG | OXYGEN SATURATION: 95 % | TEMPERATURE: 98 F | HEIGHT: 69 IN | HEART RATE: 69 BPM | SYSTOLIC BLOOD PRESSURE: 134 MMHG | RESPIRATION RATE: 18 BRPM

## 2022-05-24 VITALS
OXYGEN SATURATION: 98 % | HEART RATE: 65 BPM | SYSTOLIC BLOOD PRESSURE: 125 MMHG | RESPIRATION RATE: 18 BRPM | DIASTOLIC BLOOD PRESSURE: 68 MMHG | TEMPERATURE: 98 F

## 2022-05-24 DIAGNOSIS — Z79.82 LONG TERM (CURRENT) USE OF ASPIRIN: ICD-10-CM

## 2022-05-24 DIAGNOSIS — R07.89 OTHER CHEST PAIN: ICD-10-CM

## 2022-05-24 DIAGNOSIS — Z90.89 ACQUIRED ABSENCE OF OTHER ORGANS: Chronic | ICD-10-CM

## 2022-05-24 DIAGNOSIS — Z79.84 LONG TERM (CURRENT) USE OF ORAL HYPOGLYCEMIC DRUGS: ICD-10-CM

## 2022-05-24 DIAGNOSIS — F31.9 BIPOLAR DISORDER, UNSPECIFIED: ICD-10-CM

## 2022-05-24 DIAGNOSIS — E11.9 TYPE 2 DIABETES MELLITUS WITHOUT COMPLICATIONS: ICD-10-CM

## 2022-05-24 LAB
ALBUMIN SERPL ELPH-MCNC: 4.3 G/DL — SIGNIFICANT CHANGE UP (ref 3.3–5)
ALP SERPL-CCNC: 56 U/L — SIGNIFICANT CHANGE UP (ref 40–120)
ALT FLD-CCNC: 14 U/L — SIGNIFICANT CHANGE UP (ref 10–45)
ANION GAP SERPL CALC-SCNC: 11 MMOL/L — SIGNIFICANT CHANGE UP (ref 5–17)
AST SERPL-CCNC: 22 U/L — SIGNIFICANT CHANGE UP (ref 10–40)
BASOPHILS # BLD AUTO: 0.04 K/UL — SIGNIFICANT CHANGE UP (ref 0–0.2)
BASOPHILS NFR BLD AUTO: 0.5 % — SIGNIFICANT CHANGE UP (ref 0–2)
BILIRUB SERPL-MCNC: 0.2 MG/DL — SIGNIFICANT CHANGE UP (ref 0.2–1.2)
BUN SERPL-MCNC: 17 MG/DL — SIGNIFICANT CHANGE UP (ref 7–23)
CALCIUM SERPL-MCNC: 9.2 MG/DL — SIGNIFICANT CHANGE UP (ref 8.4–10.5)
CHLORIDE SERPL-SCNC: 101 MMOL/L — SIGNIFICANT CHANGE UP (ref 96–108)
CO2 SERPL-SCNC: 25 MMOL/L — SIGNIFICANT CHANGE UP (ref 22–31)
CREAT SERPL-MCNC: 0.79 MG/DL — SIGNIFICANT CHANGE UP (ref 0.5–1.3)
EGFR: 96 ML/MIN/1.73M2 — SIGNIFICANT CHANGE UP
EOSINOPHIL # BLD AUTO: 0.11 K/UL — SIGNIFICANT CHANGE UP (ref 0–0.5)
EOSINOPHIL NFR BLD AUTO: 1.3 % — SIGNIFICANT CHANGE UP (ref 0–6)
GLUCOSE SERPL-MCNC: 146 MG/DL — HIGH (ref 70–99)
HCT VFR BLD CALC: 42.7 % — SIGNIFICANT CHANGE UP (ref 39–50)
HGB BLD-MCNC: 14 G/DL — SIGNIFICANT CHANGE UP (ref 13–17)
IMM GRANULOCYTES NFR BLD AUTO: 0.2 % — SIGNIFICANT CHANGE UP (ref 0–1.5)
LIDOCAIN IGE QN: 20 U/L — SIGNIFICANT CHANGE UP (ref 7–60)
LYMPHOCYTES # BLD AUTO: 3.36 K/UL — HIGH (ref 1–3.3)
LYMPHOCYTES # BLD AUTO: 38.5 % — SIGNIFICANT CHANGE UP (ref 13–44)
MAGNESIUM SERPL-MCNC: 1.8 MG/DL — SIGNIFICANT CHANGE UP (ref 1.6–2.6)
MCHC RBC-ENTMCNC: 29.8 PG — SIGNIFICANT CHANGE UP (ref 27–34)
MCHC RBC-ENTMCNC: 32.8 GM/DL — SIGNIFICANT CHANGE UP (ref 32–36)
MCV RBC AUTO: 90.9 FL — SIGNIFICANT CHANGE UP (ref 80–100)
MONOCYTES # BLD AUTO: 0.71 K/UL — SIGNIFICANT CHANGE UP (ref 0–0.9)
MONOCYTES NFR BLD AUTO: 8.1 % — SIGNIFICANT CHANGE UP (ref 2–14)
NEUTROPHILS # BLD AUTO: 4.48 K/UL — SIGNIFICANT CHANGE UP (ref 1.8–7.4)
NEUTROPHILS NFR BLD AUTO: 51.4 % — SIGNIFICANT CHANGE UP (ref 43–77)
NRBC # BLD: 0 /100 WBCS — SIGNIFICANT CHANGE UP (ref 0–0)
NT-PROBNP SERPL-SCNC: 48 PG/ML — SIGNIFICANT CHANGE UP (ref 0–300)
PLATELET # BLD AUTO: 250 K/UL — SIGNIFICANT CHANGE UP (ref 150–400)
POTASSIUM SERPL-MCNC: 4.3 MMOL/L — SIGNIFICANT CHANGE UP (ref 3.5–5.3)
POTASSIUM SERPL-SCNC: 4.3 MMOL/L — SIGNIFICANT CHANGE UP (ref 3.5–5.3)
PROT SERPL-MCNC: 6.8 G/DL — SIGNIFICANT CHANGE UP (ref 6–8.3)
RBC # BLD: 4.7 M/UL — SIGNIFICANT CHANGE UP (ref 4.2–5.8)
RBC # FLD: 13.3 % — SIGNIFICANT CHANGE UP (ref 10.3–14.5)
SODIUM SERPL-SCNC: 137 MMOL/L — SIGNIFICANT CHANGE UP (ref 135–145)
TROPONIN T SERPL-MCNC: 0.01 NG/ML — SIGNIFICANT CHANGE UP (ref 0–0.01)
TROPONIN T SERPL-MCNC: 0.01 NG/ML — SIGNIFICANT CHANGE UP (ref 0–0.01)
WBC # BLD: 8.72 K/UL — SIGNIFICANT CHANGE UP (ref 3.8–10.5)
WBC # FLD AUTO: 8.72 K/UL — SIGNIFICANT CHANGE UP (ref 3.8–10.5)

## 2022-05-24 PROCEDURE — 83880 ASSAY OF NATRIURETIC PEPTIDE: CPT

## 2022-05-24 PROCEDURE — 99285 EMERGENCY DEPT VISIT HI MDM: CPT | Mod: 25

## 2022-05-24 PROCEDURE — 36415 COLL VENOUS BLD VENIPUNCTURE: CPT

## 2022-05-24 PROCEDURE — 71046 X-RAY EXAM CHEST 2 VIEWS: CPT | Mod: 26

## 2022-05-24 PROCEDURE — 99285 EMERGENCY DEPT VISIT HI MDM: CPT | Mod: FS

## 2022-05-24 PROCEDURE — 83735 ASSAY OF MAGNESIUM: CPT

## 2022-05-24 PROCEDURE — 85025 COMPLETE CBC W/AUTO DIFF WBC: CPT

## 2022-05-24 PROCEDURE — 83690 ASSAY OF LIPASE: CPT

## 2022-05-24 PROCEDURE — 93010 ELECTROCARDIOGRAM REPORT: CPT

## 2022-05-24 PROCEDURE — 80053 COMPREHEN METABOLIC PANEL: CPT

## 2022-05-24 PROCEDURE — 93005 ELECTROCARDIOGRAM TRACING: CPT

## 2022-05-24 PROCEDURE — 71046 X-RAY EXAM CHEST 2 VIEWS: CPT

## 2022-05-24 PROCEDURE — 84484 ASSAY OF TROPONIN QUANT: CPT

## 2022-05-24 NOTE — ED ADULT NURSE NOTE - NSICDXPASTMEDICALHX_GEN_ALL_CORE_FT
PAST MEDICAL HISTORY:  Depression     Type 2 diabetes mellitus without complication, without long-term current use of insulin

## 2022-05-24 NOTE — ED PROVIDER NOTE - NSFOLLOWUPINSTRUCTIONS_ED_ALL_ED_FT
CHEST PAIN - You were seen in the Emergency Department today for chest pain. Your work-up was reviewed and your results are on the pages to follow. No other emergency testing is indicated in the ER today to rule out other life threatening causes of your symptoms, however you will require further evaluation after leaving the Emergency Department today. The remaining workup will be done as an outpatient given we don't have the capacity to perform every test in the ER.    FOLLOW UP - You need to follow up with a Primary Care Doctor / cardiology within one week  - call your established providers to make an appointment for as soon as possible.     RETURN PRECAUTIONS - Return to the Emergency Department for persistent, worsening or new symptoms including severe chest pain, shortness of breath, difficulty breathing, nausea/vomiting, excessive sweating, or any other serious concerns. CHEST PAIN - You were seen in the Emergency Department today for chest pain. Your work-up was reviewed and your results are on the pages to follow. No other emergency testing is indicated in the ER today to rule out other life threatening causes of your symptoms, however you will require further evaluation after leaving the Emergency Department today. The remaining workup will be done as an outpatient given we don't have the capacity to perform every test in the ER.    FOLLOW UP - You need to follow up with a Primary Care Doctor / cardiology within one week  - call your established providers to make an appointment for as soon as possible.   You may call our referrals coordinator at [phone] Monday to Friday 11am-7pm for assistance with making an appointment      RETURN PRECAUTIONS - Return to the Emergency Department for persistent, worsening or new symptoms including severe chest pain, shortness of breath, difficulty breathing, nausea/vomiting, excessive sweating, or any other serious concerns.

## 2022-05-24 NOTE — ED PROVIDER NOTE - PATIENT PORTAL LINK FT
You can access the FollowMyHealth Patient Portal offered by [facility] by registering at the following website: [email or web address]. By joining [employer]’s FollowMyHealth portal, you will also be able to view your health information using other applications (apps) compatible with our system.

## 2022-05-24 NOTE — ED PROVIDER NOTE - CLINICAL SUMMARY MEDICAL DECISION MAKING FREE TEXT BOX
pt w history of bipolar disorder, vertigo, DM2. here w chest pain that started 2 hours PTA. took ASA prior to arrival. pt seen in ED 8 days ago for same presentation. cardiac work-up at that time negative. pt also w vague "dizzy" sensation, ongoing x months. had CT head done on last ED visit that was unremarkable.  pt resting comfortably, sleeping at initial evaluation. vitals ok.  ecg unchanged from prior.  low suspicion ACS but will get trop x2. Do not suspect PE, aortic etiology of pain.     otherwise ongoing "dizzy" sensation. pt inquiring about rpt head imaging but no new injury / trauma to head. no new characteristic of symptom. nonfocal neuro exam. do not feel need for rpt head imaging at this time.

## 2022-05-24 NOTE — ED PROVIDER NOTE - CARDIAC, MLM
Normal rate, regular rhythm.   Ex Normal rate, regular rhythm. extremities warm and well perfused. 2+ radial and DP pulses

## 2022-05-24 NOTE — ED PROVIDER NOTE - OBJECTIVE STATEMENT
71 yr old male, history of bipolar disorder, vertigo, DM2, presents to the Emergency Department with chest pain. Pt reports central chest pain that started two hours prior to arrival. Pt took 324mg ASA at home, also took one dose of Klonopin. Pain is somewhat improved at time of arrival but still present. Central, non-radiating. Non exertional, non-pleuritic.   Does note "dizzy" sensation in his head. This has been ongoing for months per pt. Wondering if he should get a CT scan to "check it out."   No shortness of breath, palpitations, nausea, vomiting, diaphoresis, lightheadedness, near-syncope / syncope, leg swelling.   No vision changes, extremity weakness / numbness / paresthesias, gait changes, speech changes, headache.   No recent infectious symptoms. Has been compliant with all medications.     Of note- seen in this ED on 5/16/22 for similar chest pain. At that time had ecg, chest xray ok, trop eng x2, ck / ckmb, bnp wnl. At that time pt also reporting ongoing dizziness, had CT head that was unremarkable. 71 yr old male, history of bipolar disorder, vertigo, DM2, presents to the Emergency Department with chest pain. Pt reports central chest pain that started two hours prior to arrival. Pt took 324mg ASA at home, also took one dose of Klonopin. Pain nearly resolved at time of arrival. Central, non-radiating. Non exertional, non-pleuritic.   Does note "dizzy" sensation in his head. This has been ongoing for months per pt. Wondering if he should get a CT scan to "check it out."   No shortness of breath, palpitations, nausea, vomiting, diaphoresis, lightheadedness, near-syncope / syncope, leg swelling.   No vision changes, extremity weakness / numbness / paresthesias, gait changes, speech changes, headache.   No recent infectious symptoms. Has been compliant with all medications.     Of note- seen in this ED on 5/16/22 for similar chest pain. At that time had ecg, chest xray ok, trop eng x2, ck / ckmb, bnp wnl. At that time pt also reporting ongoing dizziness, had CT head that was unremarkable.

## 2022-05-24 NOTE — ED PROVIDER NOTE - NS ED ATTENDING STATEMENT MOD
I have seen and examined this patient and fully participated in the care of this patient as the teaching attending.  The service was shared with the ERICK.  I reviewed and verified the documentation and independently performed the documented:

## 2022-05-24 NOTE — ED PROVIDER NOTE - CHIEF COMPLAINT
Department of Anesthesiology  Postprocedure Note    Patient: Tammi Shin  MRN: [id number]  YOB: 1992  Date of evaluation: 7/8/2020  Time:  2:41 PM     Procedure Summary     Date:  07/08/20 Room / Location:  [address]    Anesthesia Start:  1838 Anesthesia Stop:  3533    Procedure:  CYSTOSCOPY, URETHRAL DILATATION (N/A Bladder) Diagnosis:  (INCONTINENCE)    Surgeon:  Liliana Whiteside MD Responsible Provider:  Mary Carmen Renteria MD    Anesthesia Type:  general ASA Status:  3          Anesthesia Type: general    Alejandro Phase I: Alejandro Score: 10    Alejandro Phase II: Alejandro Score: 10    Last vitals: Reviewed and per EMR flowsheets.        Anesthesia Post Evaluation    Comments: Postoperative Anesthesia Note    Name:    Tammi Shin  MRN:      [id number]    Patient Vitals in the past 12 hrs:  07/08/20 1405, BP:105/64, Temp:97.5 °F (36.4 °C), Pulse:80, Resp:14, SpO2:98 %  07/08/20 1337, BP:110/84, Temp:98 °F (36.7 °C), Pulse:88, Resp:14, SpO2:98 %  07/08/20 1155, BP:109/70, Temp:97.8 °F (36.6 °C), Pulse:90, Resp:16, SpO2:96 %, Height:5' 2.5\" (1.588 m), Weight:214 lb (97.1 kg)     LABS:    CBC  Lab Results       Component                Value               Date/Time                  WBC                      7.9                 08/27/2019 07:14 PM        HGB                      13.5                08/27/2019 07:14 PM        HCT                      39.5                08/27/2019 07:14 PM        PLT                      276                 08/27/2019 07:14 PM   RENAL  Lab Results       Component                Value               Date/Time                  NA                       132 (L)             08/27/2019 07:14 PM        K                        3.4 (L)             08/27/2019 07:14 PM        CL                       99                  08/27/2019 07:14 PM        CO2                      23                  08/27/2019 07:14 PM        BUN                      5 (L)               08/27/2019 07:14 PM        CREATININE               <0.5 (L)            08/27/2019 07:14 PM        GLUCOSE                  128 (H)             08/27/2019 07:14 PM   COAGS  No results found for: PROTIME, INR, APTT    Intake & Output:  @[id number]@    Nausea & Vomiting:  No    Level of Consciousness:  Awake    Pain Assessment:  Adequate analgesia    Anesthesia Complications:  No apparent anesthetic complications    SUMMARY      Vital signs stable  OK to discharge from Stage I post anesthesia care.   Care transferred from Anesthesiology department on discharge from perioperative area The patient is a 71y Male complaining of chest pain.

## 2022-05-24 NOTE — ED PROVIDER NOTE - ATTENDING CONTRIBUTION TO CARE
71 yr old male history of Bipolar, vertigo, DM2, presents with chest pain, started two hours prior to arrival. No pain at present, non-radiating, non exertional and non-pleuritic.  Dizziness for many months.  Denies associated SOB, NVD, lightheaded, diaphoresis, palpitations, cough/rhinorrhea, black/bloody stool, LE pain/swelling, focal weakness/numbness, recent travel/immobilization, abd pain, urinary complaints, f/c. No hormone use.  Well appearing, nad, nc/at, lung cta, heart reg, abd soft, nt, ext no gross deformity, no gross neuro deficits, no acute ischemia on ekg, no focal neuro deficits, initial trop neg, do not suspect acute PE/dissection/other GI or life threatening pathology, pending rpt trop, s/o to Dr. Mackenzie, NAYA Nevarez.

## 2022-05-24 NOTE — ED ADULT NURSE NOTE - OBJECTIVE STATEMENT
pt states he woke up with chest pain, took Aspirin and pain resolved on his way to ED, denies SOB, states has pressure in head

## 2022-05-24 NOTE — ED PROVIDER NOTE - PROGRESS NOTE DETAILS
pt received at signout pending rpt trop which is neg.  pt given information to f/u outpt w/ cards.  currently feels well and requesting dc.  discussed strict return parameters

## 2022-06-06 ENCOUNTER — TRANSCRIPTION ENCOUNTER (OUTPATIENT)
Age: 72
End: 2022-06-06

## 2022-06-06 ENCOUNTER — INPATIENT (INPATIENT)
Facility: HOSPITAL | Age: 72
LOS: 0 days | Discharge: HOME | DRG: 247 | End: 2022-06-07
Attending: INTERNAL MEDICINE | Admitting: INTERNAL MEDICINE
Payer: MEDICARE

## 2022-06-06 VITALS
RESPIRATION RATE: 17 BRPM | TEMPERATURE: 98 F | HEIGHT: 69 IN | HEART RATE: 62 BPM | DIASTOLIC BLOOD PRESSURE: 87 MMHG | SYSTOLIC BLOOD PRESSURE: 155 MMHG | OXYGEN SATURATION: 98 %

## 2022-06-06 DIAGNOSIS — F31.9 BIPOLAR DISORDER, UNSPECIFIED: ICD-10-CM

## 2022-06-06 DIAGNOSIS — Z90.89 ACQUIRED ABSENCE OF OTHER ORGANS: Chronic | ICD-10-CM

## 2022-06-06 DIAGNOSIS — E11.9 TYPE 2 DIABETES MELLITUS WITHOUT COMPLICATIONS: ICD-10-CM

## 2022-06-06 DIAGNOSIS — R07.9 CHEST PAIN, UNSPECIFIED: ICD-10-CM

## 2022-06-06 LAB
A1C WITH ESTIMATED AVERAGE GLUCOSE RESULT: 7.2 % — HIGH (ref 4–5.6)
ALBUMIN SERPL ELPH-MCNC: 4 G/DL — SIGNIFICANT CHANGE UP (ref 3.3–5)
ALP SERPL-CCNC: 66 U/L — SIGNIFICANT CHANGE UP (ref 40–120)
ALT FLD-CCNC: 13 U/L — SIGNIFICANT CHANGE UP (ref 10–45)
ANION GAP SERPL CALC-SCNC: 8 MMOL/L — SIGNIFICANT CHANGE UP (ref 5–17)
APTT BLD: 34.2 SEC — SIGNIFICANT CHANGE UP (ref 27.5–35.5)
AST SERPL-CCNC: 17 U/L — SIGNIFICANT CHANGE UP (ref 10–40)
BASOPHILS # BLD AUTO: 0.06 K/UL — SIGNIFICANT CHANGE UP (ref 0–0.2)
BASOPHILS NFR BLD AUTO: 0.8 % — SIGNIFICANT CHANGE UP (ref 0–2)
BILIRUB SERPL-MCNC: 0.2 MG/DL — SIGNIFICANT CHANGE UP (ref 0.2–1.2)
BUN SERPL-MCNC: 14 MG/DL — SIGNIFICANT CHANGE UP (ref 7–23)
CALCIUM SERPL-MCNC: 9.2 MG/DL — SIGNIFICANT CHANGE UP (ref 8.4–10.5)
CHLORIDE SERPL-SCNC: 105 MMOL/L — SIGNIFICANT CHANGE UP (ref 96–108)
CHOLEST SERPL-MCNC: 156 MG/DL — SIGNIFICANT CHANGE UP
CK MB CFR SERPL CALC: 1.8 NG/ML — SIGNIFICANT CHANGE UP (ref 0–6.7)
CK SERPL-CCNC: 50 U/L — SIGNIFICANT CHANGE UP (ref 30–200)
CO2 SERPL-SCNC: 30 MMOL/L — SIGNIFICANT CHANGE UP (ref 22–31)
CREAT SERPL-MCNC: 0.88 MG/DL — SIGNIFICANT CHANGE UP (ref 0.5–1.3)
EGFR: 93 ML/MIN/1.73M2 — SIGNIFICANT CHANGE UP
EOSINOPHIL # BLD AUTO: 0.12 K/UL — SIGNIFICANT CHANGE UP (ref 0–0.5)
EOSINOPHIL NFR BLD AUTO: 1.6 % — SIGNIFICANT CHANGE UP (ref 0–6)
ESTIMATED AVERAGE GLUCOSE: 160 MG/DL — HIGH (ref 68–114)
GLUCOSE BLDC GLUCOMTR-MCNC: 131 MG/DL — HIGH (ref 70–99)
GLUCOSE BLDC GLUCOMTR-MCNC: 211 MG/DL — HIGH (ref 70–99)
GLUCOSE SERPL-MCNC: 211 MG/DL — HIGH (ref 70–99)
HCT VFR BLD CALC: 40.7 % — SIGNIFICANT CHANGE UP (ref 39–50)
HDLC SERPL-MCNC: 58 MG/DL — SIGNIFICANT CHANGE UP
HGB BLD-MCNC: 13.2 G/DL — SIGNIFICANT CHANGE UP (ref 13–17)
IMM GRANULOCYTES NFR BLD AUTO: 0.3 % — SIGNIFICANT CHANGE UP (ref 0–1.5)
INR BLD: 1.02 — SIGNIFICANT CHANGE UP (ref 0.88–1.16)
LIPID PNL WITH DIRECT LDL SERPL: 84 MG/DL — SIGNIFICANT CHANGE UP
LITHIUM SERPL-MCNC: 0.21 MMOL/L — LOW (ref 0.6–1.2)
LYMPHOCYTES # BLD AUTO: 3.32 K/UL — HIGH (ref 1–3.3)
LYMPHOCYTES # BLD AUTO: 43.2 % — SIGNIFICANT CHANGE UP (ref 13–44)
MCHC RBC-ENTMCNC: 30.1 PG — SIGNIFICANT CHANGE UP (ref 27–34)
MCHC RBC-ENTMCNC: 32.4 GM/DL — SIGNIFICANT CHANGE UP (ref 32–36)
MCV RBC AUTO: 92.9 FL — SIGNIFICANT CHANGE UP (ref 80–100)
MONOCYTES # BLD AUTO: 0.81 K/UL — SIGNIFICANT CHANGE UP (ref 0–0.9)
MONOCYTES NFR BLD AUTO: 10.5 % — SIGNIFICANT CHANGE UP (ref 2–14)
NEUTROPHILS # BLD AUTO: 3.36 K/UL — SIGNIFICANT CHANGE UP (ref 1.8–7.4)
NEUTROPHILS NFR BLD AUTO: 43.6 % — SIGNIFICANT CHANGE UP (ref 43–77)
NON HDL CHOLESTEROL: 98 MG/DL — SIGNIFICANT CHANGE UP
NRBC # BLD: 0 /100 WBCS — SIGNIFICANT CHANGE UP (ref 0–0)
NT-PROBNP SERPL-SCNC: 78 PG/ML — SIGNIFICANT CHANGE UP (ref 0–300)
PLATELET # BLD AUTO: 238 K/UL — SIGNIFICANT CHANGE UP (ref 150–400)
POTASSIUM SERPL-MCNC: 4.9 MMOL/L — SIGNIFICANT CHANGE UP (ref 3.5–5.3)
POTASSIUM SERPL-SCNC: 4.9 MMOL/L — SIGNIFICANT CHANGE UP (ref 3.5–5.3)
PROT SERPL-MCNC: 6.5 G/DL — SIGNIFICANT CHANGE UP (ref 6–8.3)
PROTHROM AB SERPL-ACNC: 12.1 SEC — SIGNIFICANT CHANGE UP (ref 10.5–13.4)
RBC # BLD: 4.38 M/UL — SIGNIFICANT CHANGE UP (ref 4.2–5.8)
RBC # FLD: 13.4 % — SIGNIFICANT CHANGE UP (ref 10.3–14.5)
SARS-COV-2 RNA SPEC QL NAA+PROBE: NEGATIVE — SIGNIFICANT CHANGE UP
SODIUM SERPL-SCNC: 143 MMOL/L — SIGNIFICANT CHANGE UP (ref 135–145)
TRIGL SERPL-MCNC: 70 MG/DL — SIGNIFICANT CHANGE UP
TROPONIN T SERPL-MCNC: 0.01 NG/ML — SIGNIFICANT CHANGE UP (ref 0–0.01)
VALPROATE SERPL-MCNC: 53.5 UG/ML — SIGNIFICANT CHANGE UP (ref 50–100)
WBC # BLD: 7.69 K/UL — SIGNIFICANT CHANGE UP (ref 3.8–10.5)
WBC # FLD AUTO: 7.69 K/UL — SIGNIFICANT CHANGE UP (ref 3.8–10.5)

## 2022-06-06 PROCEDURE — 92928 PRQ TCAT PLMT NTRAC ST 1 LES: CPT | Mod: LC

## 2022-06-06 PROCEDURE — 99053 MED SERV 10PM-8AM 24 HR FAC: CPT

## 2022-06-06 PROCEDURE — 99285 EMERGENCY DEPT VISIT HI MDM: CPT | Mod: 25

## 2022-06-06 PROCEDURE — 99222 1ST HOSP IP/OBS MODERATE 55: CPT

## 2022-06-06 PROCEDURE — 93458 L HRT ARTERY/VENTRICLE ANGIO: CPT | Mod: 26,59

## 2022-06-06 PROCEDURE — 75574 CT ANGIO HRT W/3D IMAGE: CPT | Mod: 26,MA

## 2022-06-06 PROCEDURE — 71045 X-RAY EXAM CHEST 1 VIEW: CPT | Mod: 26

## 2022-06-06 PROCEDURE — 93010 ELECTROCARDIOGRAM REPORT: CPT

## 2022-06-06 PROCEDURE — 99152 MOD SED SAME PHYS/QHP 5/>YRS: CPT

## 2022-06-06 RX ORDER — DIVALPROEX SODIUM 500 MG/1
500 TABLET, DELAYED RELEASE ORAL EVERY 12 HOURS
Refills: 0 | Status: DISCONTINUED | OUTPATIENT
Start: 2022-06-06 | End: 2022-06-07

## 2022-06-06 RX ORDER — SODIUM CHLORIDE 9 MG/ML
250 INJECTION INTRAMUSCULAR; INTRAVENOUS; SUBCUTANEOUS ONCE
Refills: 0 | Status: COMPLETED | OUTPATIENT
Start: 2022-06-06 | End: 2022-06-06

## 2022-06-06 RX ORDER — SODIUM CHLORIDE 9 MG/ML
1000 INJECTION, SOLUTION INTRAVENOUS
Refills: 0 | Status: DISCONTINUED | OUTPATIENT
Start: 2022-06-06 | End: 2022-06-07

## 2022-06-06 RX ORDER — CHLORHEXIDINE GLUCONATE 213 G/1000ML
1 SOLUTION TOPICAL ONCE
Refills: 0 | Status: DISCONTINUED | OUTPATIENT
Start: 2022-06-06 | End: 2022-06-07

## 2022-06-06 RX ORDER — LITHIUM CARBONATE 300 MG/1
300 TABLET, EXTENDED RELEASE ORAL AT BEDTIME
Refills: 0 | Status: DISCONTINUED | OUTPATIENT
Start: 2022-06-06 | End: 2022-06-06

## 2022-06-06 RX ORDER — DEXTROSE 50 % IN WATER 50 %
25 SYRINGE (ML) INTRAVENOUS ONCE
Refills: 0 | Status: DISCONTINUED | OUTPATIENT
Start: 2022-06-06 | End: 2022-06-07

## 2022-06-06 RX ORDER — CLOPIDOGREL BISULFATE 75 MG/1
600 TABLET, FILM COATED ORAL ONCE
Refills: 0 | Status: COMPLETED | OUTPATIENT
Start: 2022-06-06 | End: 2022-06-06

## 2022-06-06 RX ORDER — SODIUM CHLORIDE 9 MG/ML
1000 INJECTION INTRAMUSCULAR; INTRAVENOUS; SUBCUTANEOUS
Refills: 0 | Status: COMPLETED | OUTPATIENT
Start: 2022-06-06 | End: 2022-06-06

## 2022-06-06 RX ORDER — DEXTROSE 50 % IN WATER 50 %
15 SYRINGE (ML) INTRAVENOUS ONCE
Refills: 0 | Status: DISCONTINUED | OUTPATIENT
Start: 2022-06-06 | End: 2022-06-07

## 2022-06-06 RX ORDER — CLONAZEPAM 1 MG
0.5 TABLET ORAL AT BEDTIME
Refills: 0 | Status: DISCONTINUED | OUTPATIENT
Start: 2022-06-06 | End: 2022-06-07

## 2022-06-06 RX ORDER — DIVALPROEX SODIUM 500 MG/1
1000 TABLET, DELAYED RELEASE ORAL AT BEDTIME
Refills: 0 | Status: DISCONTINUED | OUTPATIENT
Start: 2022-06-06 | End: 2022-06-06

## 2022-06-06 RX ORDER — ASPIRIN/CALCIUM CARB/MAGNESIUM 324 MG
81 TABLET ORAL DAILY
Refills: 0 | Status: DISCONTINUED | OUTPATIENT
Start: 2022-06-07 | End: 2022-06-07

## 2022-06-06 RX ORDER — DEXTROSE 50 % IN WATER 50 %
12.5 SYRINGE (ML) INTRAVENOUS ONCE
Refills: 0 | Status: DISCONTINUED | OUTPATIENT
Start: 2022-06-06 | End: 2022-06-07

## 2022-06-06 RX ORDER — INSULIN LISPRO 100/ML
VIAL (ML) SUBCUTANEOUS EVERY 6 HOURS
Refills: 0 | Status: DISCONTINUED | OUTPATIENT
Start: 2022-06-06 | End: 2022-06-07

## 2022-06-06 RX ORDER — LAMOTRIGINE 25 MG/1
100 TABLET, ORALLY DISINTEGRATING ORAL AT BEDTIME
Refills: 0 | Status: DISCONTINUED | OUTPATIENT
Start: 2022-06-06 | End: 2022-06-06

## 2022-06-06 RX ORDER — LAMOTRIGINE 25 MG/1
250 TABLET, ORALLY DISINTEGRATING ORAL AT BEDTIME
Refills: 0 | Status: DISCONTINUED | OUTPATIENT
Start: 2022-06-06 | End: 2022-06-06

## 2022-06-06 RX ORDER — LAMOTRIGINE 25 MG/1
150 TABLET, ORALLY DISINTEGRATING ORAL DAILY
Refills: 0 | Status: DISCONTINUED | OUTPATIENT
Start: 2022-06-07 | End: 2022-06-07

## 2022-06-06 RX ORDER — GLUCAGON INJECTION, SOLUTION 0.5 MG/.1ML
1 INJECTION, SOLUTION SUBCUTANEOUS ONCE
Refills: 0 | Status: DISCONTINUED | OUTPATIENT
Start: 2022-06-06 | End: 2022-06-07

## 2022-06-06 RX ORDER — SODIUM CHLORIDE 9 MG/ML
500 INJECTION INTRAMUSCULAR; INTRAVENOUS; SUBCUTANEOUS
Refills: 0 | Status: DISCONTINUED | OUTPATIENT
Start: 2022-06-06 | End: 2022-06-07

## 2022-06-06 RX ORDER — CLOPIDOGREL BISULFATE 75 MG/1
75 TABLET, FILM COATED ORAL DAILY
Refills: 0 | Status: DISCONTINUED | OUTPATIENT
Start: 2022-06-07 | End: 2022-06-07

## 2022-06-06 RX ADMIN — Medication 0.5 MILLIGRAM(S): at 23:17

## 2022-06-06 RX ADMIN — DIVALPROEX SODIUM 500 MILLIGRAM(S): 500 TABLET, DELAYED RELEASE ORAL at 23:18

## 2022-06-06 RX ADMIN — SODIUM CHLORIDE 50 MILLILITER(S): 9 INJECTION INTRAMUSCULAR; INTRAVENOUS; SUBCUTANEOUS at 16:57

## 2022-06-06 RX ADMIN — SODIUM CHLORIDE 125 MILLILITER(S): 9 INJECTION INTRAMUSCULAR; INTRAVENOUS; SUBCUTANEOUS at 14:31

## 2022-06-06 RX ADMIN — Medication 4: at 22:45

## 2022-06-06 RX ADMIN — CLOPIDOGREL BISULFATE 600 MILLIGRAM(S): 75 TABLET, FILM COATED ORAL at 12:18

## 2022-06-06 RX ADMIN — SODIUM CHLORIDE 215 MILLILITER(S): 9 INJECTION INTRAMUSCULAR; INTRAVENOUS; SUBCUTANEOUS at 20:50

## 2022-06-06 NOTE — PATIENT PROFILE ADULT - DO YOU FEEL LIKE HURTING YOURSELF OR OTHERS?
Requested updates within Care Everywhere.  Patient's chart was reviewed for overdue SEBAS topics.  Immunizations reconciled.      
no

## 2022-06-06 NOTE — H&P ADULT - PROBLEM SELECTOR PLAN 1
Pt presented with chest pain upon waking this morning, now resolved. He took 4 baby Aspirin which relieved the pain. Currently CP free. VSS.  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  - ECHO ordered  - S/p Aspirin load at home today, Plavix 600mg PO x 1 in the ED.   - NPO for cardiac cath today with Dr. Campbell Pt presented with chest pain upon waking this morning, now resolved. He took 4 baby Aspirin which relieved the pain. Currently CP free. VSS.  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  - ECHO ordered  - S/p Aspirin load at home today, Plavix 600mg PO x 1 in the ED.   - NPO for cardiac cath today with Dr. Campbell

## 2022-06-06 NOTE — H&P ADULT - NSHPLABSRESULTS_GEN_ALL_CORE
13.2   7.69  )-----------( 238      ( 06 Jun 2022 07:05 )             40.7       06-06    143  |  105  |  14  ----------------------------<  211<H>  4.9   |  30  |  0.88    Ca    9.2      06 Jun 2022 07:05    TPro  6.5  /  Alb  4.0  /  TBili  0.2  /  DBili  x   /  AST  17  /  ALT  13  /  AlkPhos  66  06-06      PT/INR - ( 06 Jun 2022 07:05 )   PT: 12.1 sec;   INR: 1.02          PTT - ( 06 Jun 2022 07:05 )  PTT:34.2 sec    CARDIAC MARKERS ( 06 Jun 2022 07:05 )  x     / 0.01 ng/mL / 50 U/L / x     / 1.8 ng/mL

## 2022-06-06 NOTE — ED PROVIDER NOTE - OBJECTIVE STATEMENT
70 y/o M w/ PMHx of bipolar disorder, DM, vertigo, BIBEMS for CP this morning. States he had a dream where he was running and awoke w/ onset of mild pressure in mid and right chest w/ associated mild SOB.  Symptoms lasted for half and hr and resolved spontaneously. Currently asymptomatic. No palpitations, dizziness, syncope, diaphoresis, or radiations. Of note, pt had frequent complaints of CP, last seen in ED on 5/8 for CP. 72 y/o M w/ PMHx of bipolar disorder, DM, vertigo, BIBEMS for CP this morning. States he had a dream where he was running and awoke w/ onset of mild pressure to mid and right chest w/ associated mild SOB.  Symptoms lasted for half and hr and resolved spontaneously. Currently asymptomatic. No palpitations, dizziness, syncope, diaphoresis, or radiations. Of note, pt was seen in ED on 5/8 for CP and had neg trop x 2, non-ischemic ekg and dc'd home.

## 2022-06-06 NOTE — H&P ADULT - PROBLEM SELECTOR PLAN 3
Home meds: Lithium 300mg PO QD, Depakote 1000mg PO QD, Lamictal 250mg PO QD             DVT PPX: None pending cath  Dispo: Pending cath  Case d/w Dr. Montez Home meds: Lithium 300mg PO QD, Depakote 1000mg PO QD, Lamictal 250mg PO QD, Propranolol 40mg PO QD          DVT PPX: None pending cath  Dispo: Pending cath  Case d/w Dr. Montez Home meds: Depakote 1000mg PO QD, Lamictal 150mg QAM, Lamictal 100mg PO QHS, Clonazepam 0.5mg PO QHS.   - Pt w/ admission in 4/2022 after a fall, he was found to have lithium toxicity and was unable to walk. Refused dispo to [facility], has 24 hr HHA.   - Pt reports he is still taking Lithium 300mg PO QD however not in surescripts and unable to confirm with pharmacy over the phone. Ordered lithium level and did not order lithium.   - Pt w/ history of not following instructions and taking BID meds at once        DVT PPX: None pending cath  Dispo: Pending cath  Case d/w Dr. Montez Home meds: Depakote 1000mg PO QD, Lamictal 150mg QAM, Lamictal 100mg PO QHS, Clonazepam 0.5mg PO QHS.   - Pt w/ admission in 4/2022 after a fall, he was found to have lithium toxicity and was unable to walk. Stroke code w/u negative. Instructed to d/c Lithium at that time. Refused dispo to [facility], has 24 hr HHA. Pt never f/u with neurology.   - Pt reports he is still taking Lithium 300mg PO QD. Rx in surescripts from 5/2022, unable to confirm with pharmacy over the phone. Ordered lithium level.  - Pt w/ history of not following instructions and taking BID meds at once        DVT PPX: None pending cath  Dispo: Pending cath  Case d/w Dr. Montez Home meds: Depakote 1000mg PO QHS, Lamictal 150mg QAM, Lamictal 100mg PO QHS, Clonazepam 0.5mg PO QHS.   - Pt w/ admission in 4/2022 after a fall, he was found to have lithium toxicity and was unable to walk. Stroke code w/u negative. Instructed to d/c Lithium at that time per psych recs. Per neuro there may be a slight component of underlying Parkinsonism that could not be ruled out, Recommended outpatient neuro f/u to start sinemet . Pt did not follow up.   - Pt reports he is still taking Lithium 300mg PO QD. Rx in surescripts from 5/2022, unable to confirm with pharmacy over the phone.   - Ordered lithium level.  - Pt w/ history of not following instructions and taking BID meds at once        DVT PPX: None pending cath  Dispo: Pending cath  PT jonatan  Pt has 24 private hire HHA, refused dispo to [facility] prior admission.   Case d/w Dr. Montez Home meds: Depakote 1000mg PO QHS, Lamictal 150mg QAM, Lamictal 100mg PO QHS, Clonazepam 0.5mg PO QHS.   - Pt w/ admission in 4/2022 after a fall, he was found to have lithium toxicity and was unable to walk. Stroke code w/u negative. Instructed to d/c Lithium at that time per psych recs. Per neuro there may be a slight component of underlying Parkinsonism that could not be ruled out, Recommended outpatient neuro f/u to start sinemet . Pt did not follow up.   - Pt reports he is still taking Lithium 300mg PO QD. Rx in surescripts from 5/2022, unable to confirm with pharmacy over the phone.   - Lithium level low at .21  - Pt w/ history of not following instructions and taking BID meds at once        DVT PPX: None pending cath  Dispo: Pending cath  PT jonatan  Pt has 24 private hire HHA, refused dispo to [facility] prior admission.   Case d/w Dr. Montez Home meds: Depakote 1000mg PO QHS, Lamictal 150mg QAM, Lamictal 100mg PO QHS, Clonazepam 0.5mg PO QHS.   - Pt w/ admission in 4/2022 after a fall, he was found to have lithium toxicity and was unable to walk. Stroke code w/u negative. Instructed to d/c Lithium at that time per psych recs. Per neuro there may be a slight component of underlying Parkinsonism that could not be ruled out, Recommended outpatient neuro f/u to start sinemet . Pt did not follow up.   - Pt reports he is still taking Lithium 300mg PO QD. Rx in surescripts from 5/2022, unable to confirm with pharmacy over the phone.   - Lithium level low at .21  - Pt w/ history of not following instructions and taking BID meds at once  - Psych consulted, recommend not ordering Lithium or Lamictal until adherence can be confirmed. Depakote not ordered until level resulted. Will see pt in AM      DVT PPX: None pending cath  Dispo: Pending cath  PT jonatan  Pt has 24 private hire A, refused dispo to [facility] prior admission.   Case d/w Dr. Montez Home meds: Depakote 1000mg PO QHS, Lamictal 150mg QAM, Lamictal 100mg PO QHS, Clonazepam 0.5mg PO QHS.   - Pt w/ admission in 4/2022 after a fall, he was found to have lithium toxicity and was unable to walk. Stroke code w/u negative. Instructed to d/c Lithium at that time per psych recs. Per neuro there may be a slight component of underlying Parkinsonism that could not be ruled out, Recommended outpatient neuro f/u to start sinemet . Pt did not follow up.   - Pt reports he is still taking Lithium 300mg PO QD. Rx in surescripts from 5/2022, unable to confirm with pharmacy over the phone.   - Pt w/ history of not following instructions and taking BID meds at once  - Lithium level low at .21  - Psych consulted, recommend not ordering Lithium or Lamictal until adherence can be confirmed. Will see pt in AM  - Depakote level wnl- ordered Depakote 500mg Q12H per psych recs     DVT PPX: None pending cath  Dispo: Pending cath  PT eval/SW  Pt has 24 private hire HHA, refused dispo to [facility] prior admission.   Case d/w Dr. Montez

## 2022-06-06 NOTE — H&P ADULT - ASSESSMENT
72 y/o M w/ PMHx of Bipolar disorder, DM w/ neuropathy, vertigo who presented to [facility] ED 6/6/22 BIBEMS after walking up from a dream where he was running and awoke with onset of mid and right sided CP associated with mild SOB which lasted for 30 minutes and self resolved this morning. 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 Pt is now admitted to cardiology for further management of UA with plan for cardiac cath today.  70 y/o M w/ PMHx of Bipolar disorder, DM w/ neuropathy, vertigo who presented to [facility] ED 6/6/22 BIBEMS after walking up from a dream where he was running and awoke with onset of mid and right sided CP associated with mild SOB which lasted for 30 minutes and self resolved this morning. 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 Pt is now admitted to cardiology for further management of UA with plan for cardiac cath today.    Precath Checklist  Shellfish/Contrast Allergies?  No    COVID Result within 48-72hours:   COVID-19 PCR: Negative (06-06-22 @ 07:05)	    ASA III		Mallampati class: II	            Anginal Class: IV    Sedation Plan:   [  ] None   [X] Moderate   [  ]  Deep    [  ]  General Anesthesia   Patient Is Suitable Candidate For Sedation?     [X] Yes   [  ] No   [  ] Not Applicable   Cath Order Entered: [X] Yes  DAPT LOAD Ordered: [X] Yes  [  ] No load 2/2 ________  Pre-Cath fluids Ordered: [X] Yes  [  ] Not indicated 2/2 _________    Risks Benefits Annotation:   CARDIAC CATH: Risks & benefits of procedure and sedation and risks and benefits for the alternative therapy have been explained to the patient and/or HCP in layman’s terms including but not limited to: allergic reaction, bleeding, infection, arrhythmia, respiratory compromise, renal and vascular compromise, limb damage, MI, CVA, emergent CABG/Vascular Surgery and death. Informed consent obtained and in chart.  Pt was unable to physically sign the consent form as it is a [facility] holiday where they are unable to write, he gave verbal consent for the procedure.  70 y/o Yiddish/English speaking M w/ PMHx of Bipolar disorder, DM w/ neuropathy, vertigo who presented to [facility] ED 6/6/22 BIBEMS after walking up from a dream where he was running and awoke with onset of mid and right sided CP associated with mild SOB which lasted for 30 minutes and resolved after taking 4 baby aspirin's at home this morning. 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 Pt is now admitted to cardiology for further management of UA with plan for cardiac cath today.    Precath Checklist  Shellfish/Contrast Allergies?  No    COVID Result within 48-72hours:   COVID-19 PCR: Negative (06-06-22 @ 07:05)	    ASA III		Mallampati class: II	            Anginal Class: IV    Sedation Plan:   [  ] None   [X] Moderate   [  ]  Deep    [  ]  General Anesthesia   Patient Is Suitable Candidate For Sedation?     [X] Yes   [  ] No   [  ] Not Applicable   Cath Order Entered: [X] Yes  DAPT LOAD Ordered: [X] Yes  [  ] No load 2/2 ________  Pre-Cath fluids Ordered: [X] Yes  [  ] Not indicated 2/2 _________    Risks Benefits Annotation:   CARDIAC CATH: Risks & benefits of procedure and sedation and risks and benefits for the alternative therapy have been explained to the patient and/or HCP in layman’s terms including but not limited to: allergic reaction, bleeding, infection, arrhythmia, respiratory compromise, renal and vascular compromise, limb damage, MI, CVA, emergent CABG/Vascular Surgery and death. Informed consent obtained and in chart.  Pt was unable to physically sign the consent form as it is a [facility] holiday where they are unable to write, he gave verbal consent for the procedure.  72 y/o Yiddish/English speaking M w/ PMHx of Bipolar disorder, DM w/ neuropathy, vertigo who presented to [facility] ED 6/6/22 BIBEMS after walking up from a dream where he was running and awoke with onset of mid and right sided CP associated with mild SOB which lasted for 30 minutes and resolved after taking 4 baby aspirin's at home this morning. 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 Pt is now admitted to cardiology for further management of UA with plan for cardiac cath today.    Precath Checklist  Shellfish/Contrast Allergies?  No    COVID Result within 48-72hours:   COVID-19 PCR: Negative (06-06-22 @ 07:05)	    ASA III		Mallampati class: II	            Anginal Class: IV    Sedation Plan:   [  ] None   [X] Moderate   [  ]  Deep    [  ]  General Anesthesia   Patient Is Suitable Candidate For Sedation?     [X] Yes   [  ] No   [  ] Not Applicable   Cath Order Entered: [X] Yes  DAPT LOAD Ordered: [X] Yes  [  ] No load 2/2 ________  Pre-Cath fluids Ordered: [X] Yes  [  ] Not indicated 2/2 _________    Risks Benefits Annotation:   CARDIAC CATH: Risks & benefits of procedure and sedation and risks and benefits for the alternative therapy have been explained to the patient and/or HCP in layman’s terms including but not limited to: allergic reaction, bleeding, infection, arrhythmia, respiratory compromise, renal and vascular compromise, limb damage, MI, CVA, emergent CABG/Vascular Surgery and death. Informed consent obtained and in chart.  Pt was unable to physically sign the consent form as it is a [facility] holiday where they are unable to write, he gave verbal consent for the procedure. Pt's son Avinash Blevins was also consented for the procedure over the phone as pt has poor health literacy and appears confused at times.

## 2022-06-06 NOTE — ED ADULT NURSE NOTE - OBJECTIVE STATEMENT
71y Male A&OX4 C/O chest pain after dream of running. Symptoms revolved at time of assessment. Denies fever, chills, sob, dizziness, n/v/d. Per pt "I feel better now".

## 2022-06-06 NOTE — H&P ADULT - MENTAL STATUS
Speech is fluent however patient has some trouble with comprehension. Attention/concentration are slightly impaired.

## 2022-06-06 NOTE — H&P ADULT - HISTORY OF PRESENT ILLNESS
INCOMPLETE    72 y/o M w/ PMHx of Bipolar disorder, DM, vertigo who presented to [facility] ED 6/6/22 BIBEMS after walking up from a dream where he was running and awoke with onset of mid and right sided CP associated with mild SOB which lasted for 30 minutes and self resolved this morning. Since then his symptoms have not recurred. Pt also had a recent ED visit on 5/8/22 for chest pain which resolved with SL Nitro, trop neg x 2 at that time and pt d/c to f/u with PMD. Pt denies fever, chills, cough, palpitations, PND/orthopnea, LE edema, abdominal pain, N/V/D, dizziness, syncope.   In the ED VS: T 98.3F, HR 62, /87, RR 17, O2 sat 98% on RA. Labs significant for Trop neg x 1, CK/CKMN wnl, BNP nl, COVID PCR neg. ECG: NSR @ 70 bpm, RAD, TWI in aVL. CCTA: calcium score is moderate at 257 Agatston units, which is at the 60 percentile, adjusted for age, gender and race, OM2 has moderate to severe stenosis, Remaining segments are non obstructive. Pt was treated with Plavix 600mg PO x 1. Pt is now admitted to cardiology for further management of UA with plan for cardiac cath today.  72 y/o M w/ PMHx of Bipolar disorder, DM w/ neuropathy, vertigo who presented to [facility] ED 6/6/22 BIBEMS after walking up from a dream where he was running and awoke with onset of mid and right sided CP associated with mild SOB which lasted for 30 minutes and self resolved this morning. Since then his symptoms have not recurred. Pt also had a recent ED visit on 5/8/22 for chest pain which resolved with SL Nitro, trop neg x 2 at that time and pt d/c to f/u with PMD. Pt denies fever, chills, cough, palpitations, PND/orthopnea, LE edema, abdominal pain, N/V/D, dizziness, syncope.   In the ED VS: T 98.3F, HR 62, /87, RR 17, O2 sat 98% on RA. Labs significant for Trop neg x 1, CK/CKMB wnl, BNP nl, COVID PCR neg. ECG: NSR @ 70 bpm, RAD, TWI in aVL. CCTA: calcium score is moderate at 257 Agatston units, which is at the 60 percentile, adjusted for age, gender and race, OM2 has moderate to severe stenosis, Remaining segments are non obstructive. Pt was treated with Plavix 600mg PO x 1. Pt is now admitted to cardiology for further management of UA with plan for cardiac cath today.  72 y/o M w/ PMHx of Bipolar disorder, DM w/ neuropathy, vertigo who presented to [facility] ED 6/6/22 BIBEMS after walking up from a dream where he was running and awoke with onset of mid and right sided CP associated with mild SOB which lasted for 30 minutes and resolved after taking 4 baby aspirin's at home this morning. Since then his symptoms have not recurred. Pt also had a recent ED visit on 5/8/22 for chest pain which resolved with SL Nitro, trop neg x 2 at that time and pt d/c to f/u with PMD. Pt denies fever, chills, cough, palpitations, PND/orthopnea, LE edema, abdominal pain, N/V/D, dizziness, syncope. In the ED VS: T 98.3F, HR 62, /87, RR 17, O2 sat 98% on RA. Labs significant for Trop neg x 1, CK/CKMB wnl, BNP nl, COVID PCR neg. ECG: NSR @ 70 bpm, RAD, TWI in aVL. CCTA: calcium score is moderate at 257 Agatston units, which is at the 60 percentile, adjusted for age, gender and race, OM2 has moderate to severe stenosis, Remaining segments are non obstructive. Pt was treated with Plavix 600mg PO x 1. Pt is now admitted to cardiology for further management of UA with plan for cardiac cath today.  Med list provided verbally by pt. The pharmacy is closed and unable to confirm all med doses, only some available in surescripts.    70 y/o M w/ PMHx of Bipolar disorder, DM w/ neuropathy, vertigo who presented to [facility] ED 6/6/22 BIBEMS after walking up from a dream where he was running and awoke with onset of mid and right sided CP associated with mild SOB which lasted for 30 minutes and resolved after taking 4 baby aspirin's at home this morning. Since then his symptoms have not recurred. Pt also had a recent ED visit on 5/8/22 for chest pain which resolved with SL Nitro, trop neg x 2 at that time and pt d/c to f/u with PMD. Pt denies fever, chills, cough, palpitations, PND/orthopnea, LE edema, abdominal pain, N/V/D, dizziness, syncope. In the ED VS: T 98.3F, HR 62, /87, RR 17, O2 sat 98% on RA. Labs significant for Trop neg x 1, CK/CKMB wnl, BNP nl, COVID PCR neg. ECG: NSR @ 70 bpm, RAD, TWI in aVL. CCTA: calcium score is moderate at 257 Agatston units, which is at the 60 percentile, adjusted for age, gender and race, OM2 has moderate to severe stenosis, Remaining segments are non obstructive. Pt was treated with Plavix 600mg PO x 1. Pt is now admitted to cardiology for further management of UA with plan for cardiac cath today.  Med list provided verbally by pt. The pharmacy is closed and unable to confirm all med doses, only some available in surescripts.    72 y/o Yiddish/English speaking M w/ PMHx of Bipolar disorder, DM w/ neuropathy, vertigo who presented to [facility] ED 6/6/22 BIBEMS after walking up from a dream where he was running and awoke with onset of mid and right sided CP associated with mild SOB which lasted for 30 minutes and resolved after taking 4 baby aspirin's at home this morning. Since then his symptoms have not recurred. Pt also had a recent ED visit on 5/8/22 for chest pain which resolved with SL Nitro, trop neg x 2 at that time and pt d/c to f/u with PMD. Pt also with admission in 4/2022 after a fall and was found to have lithium toxicity. Pt was instructed to d/c lithium however he endorses that he continues to take it. Pt denies fever, chills, cough, palpitations, PND/orthopnea, LE edema, abdominal pain, N/V/D, dizziness, syncope. In the ED VS: T 98.3F, HR 62, /87, RR 17, O2 sat 98% on RA. Labs significant for Trop neg x 1, CK/CKMB wnl, BNP nl, COVID PCR neg. ECG: NSR @ 70 bpm, RAD, TWI in aVL. CCTA: calcium score is moderate at 257 Agatston units, which is at the 60 percentile, adjusted for age, gender and race, OM2 has moderate to severe stenosis, Remaining segments are non obstructive. Pt was treated with Plavix 600mg PO x 1. Pt is now admitted to cardiology for further management of UA with plan for cardiac cath today.  Med list provided verbally by pt. The pharmacy is closed and unable to confirm all med doses, only some available in surescripts.    72 y/o Yiddish/English speaking M w/ PMHx of Bipolar disorder, DM w/ neuropathy, vertigo who presented to [facility] ED 6/6/22 BIBEMS after walking up from a dream where he was running and awoke with onset of mid and right sided CP associated with mild SOB which lasted for 30 minutes and resolved after taking 4 baby aspirin's at home this morning. Since then his symptoms have not recurred. Pt also had a recent ED visit on 5/8/22 for chest pain which resolved with SL Nitro, trop neg x 2 at that time and pt d/c to f/u with PMD. Pt also with admission in 4/2022 after a fall and was found to have lithium toxicity. Pt was instructed to d/c lithium however he endorses that he continues to take it. Pt denies fever, chills, cough, palpitations, PND/orthopnea, LE edema, abdominal pain, N/V/D, dizziness, syncope. In the ED VS: T 98.3F, HR 62, /87, RR 17, O2 sat 98% on RA. Labs significant for Trop neg x 1, CK/CKMB wnl, BNP nl, COVID PCR neg. ECG: NSR @ 70 bpm, LBBB, RAD, TWI in aVL. CCTA: calcium score is moderate at 257 Agatston units, which is at the 60 percentile, adjusted for age, gender and race, OM2 has moderate to severe stenosis, Remaining segments are non obstructive. Pt was treated with Plavix 600mg PO x 1. Pt is now admitted to cardiology for further management of UA with plan for cardiac cath today.

## 2022-06-06 NOTE — ED ADULT NURSE REASSESSMENT NOTE - NS ED NURSE REASSESS COMMENT FT1
pt assessed, pt currently sleeping, pt HR 59-64, pt waiting for CCTA, cardiac monitor maintained, safety and comfort maintained, will continue to monitor.

## 2022-06-06 NOTE — PATIENT PROFILE ADULT - FALL HARM RISK - RISK INTERVENTIONS
Assistance OOB with selected safe patient handling equipment/Assistance with ambulation/Communicate Fall Risk and Risk Factors to all staff, patient, and family/Monitor gait and stability/Reinforce activity limits and safety measures with patient and family/Sit up slowly, dangle for a short time, stand at bedside before walking/Use of alarms - bed, chair and/or voice tab/Visual Cue: Yellow wristband/Bed in lowest position, wheels locked, appropriate side rails in place/Call bell, personal items and telephone in reach/Instruct patient to call for assistance before getting out of bed or chair/Non-slip footwear when patient is out of bed/[place] to call system/Physically safe environment - no spills, clutter or unnecessary equipment/Purposeful Proactive Rounding/Room/bathroom lighting operational, light cord in reach

## 2022-06-06 NOTE — ED PROVIDER NOTE - CLINICAL SUMMARY MEDICAL DECISION MAKING FREE TEXT BOX
Impression: Hx of bipolar disorder, DM, vertigo, here w/ CP and mild SOB resolved spontaneously after half and hr. Currently asymptomatic. Physical exam unremarkable. EKG negative for STEMI. Labs reviewed w/ normal trop, CPK, and BNP. No leukocytosis. Glucose mildly elevated at 211. Negative anion gap. CXR negative for I/E/CHF. Case discussed w/ Moses (nurse liaison). Will arrange CTA coronaries to evaluate for obstructive disease. Continue to monitor. Impression: Hx of bipolar disorder, DM, vertigo, here w/ CP and mild SOB resolved spontaneously after half and hr. Currently asymptomatic. Physical exam unremarkable. EKG negative for STEMI. Labs reviewed w/ normal trop, CPK, and BNP. No leukocytosis. Glucose mildly elevated at 211. Negative anion gap. CXR negative for I/E/CHF. Case discussed w/ Moses (nurse liaison). Will arrange CTA coronaries to evaluate for obstructive disease.    Cardiac cta showing mod to severe dz to OM2. Findings d/w Dr. Campbell; will admit to cardiac tele for cardiac cath today. Will give loading dose of plavix. Pt already received baby asa x 4 pta to ed.

## 2022-06-06 NOTE — H&P ADULT - PROBLEM SELECTOR PLAN 2
Hgb A1c 7.2  - H/o diabetic neuropathy with loss of sensation of B/L legs and unsteady gait   - Home med: Janumet 50-500mg PO BID on hold  - MISS ordered Hgb A1c 7.2  - H/o diabetic neuropathy with decreased sensation of B/L legs and unsteady gait   - Home med: Janumet 50-500mg PO BID on hold  - MISS ordered

## 2022-06-06 NOTE — H&P ADULT - NSHPSOCIALHISTORY_GEN_ALL_CORE
Pt reports smoking 1-2 cigarettes/day x 5 years, quit 10 years ago, denies EtOH and illicit drug use. Pt reports smoking 1-2 cigarettes/day x 5 years, quit 10 years ago, denies EtOH and illicit drug use.  Reports difficulty ambulating due to his neuropathy, does not use assistive devices, leans on to people to walk around. Reports recent fall.

## 2022-06-06 NOTE — H&P ADULT - NS ATTEND AMEND GEN_ALL_CORE FT
Initial attending contact date 6/6/22     . See PA note written above for details. I reviewed the PA documentation. I have personally seen and examined this patient. I reviewed vitals, labs, medications, cardiac studies, and additional imaging. I agree with the above PA's findings and plans as written above with the following additions/statements.    72 y/o M w/ PMHx of Bipolar disorder, DM w/ neuropathy, vertigo who presented to [facility] ED 6/6/22 BIBEMS after walking up from a dream where he was running and awoke with onset of mid and right sided CP associated with mild SOB which lasted for 30 minutes and self resolved this morning. 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   -Trop negative, EKG NSR without sig acute ischemic changes  -Plan for cath today  -Meds as noted   -Likely DC 6/7

## 2022-06-07 ENCOUNTER — TRANSCRIPTION ENCOUNTER (OUTPATIENT)
Age: 72
End: 2022-06-07

## 2022-06-07 VITALS — TEMPERATURE: 98 F

## 2022-06-07 LAB
ANION GAP SERPL CALC-SCNC: 12 MMOL/L — SIGNIFICANT CHANGE UP (ref 5–17)
BASOPHILS # BLD AUTO: 0.06 K/UL — SIGNIFICANT CHANGE UP (ref 0–0.2)
BASOPHILS NFR BLD AUTO: 0.6 % — SIGNIFICANT CHANGE UP (ref 0–2)
BUN SERPL-MCNC: 10 MG/DL — SIGNIFICANT CHANGE UP (ref 7–23)
CALCIUM SERPL-MCNC: 9.1 MG/DL — SIGNIFICANT CHANGE UP (ref 8.4–10.5)
CHLORIDE SERPL-SCNC: 106 MMOL/L — SIGNIFICANT CHANGE UP (ref 96–108)
CO2 SERPL-SCNC: 23 MMOL/L — SIGNIFICANT CHANGE UP (ref 22–31)
CREAT SERPL-MCNC: 0.75 MG/DL — SIGNIFICANT CHANGE UP (ref 0.5–1.3)
EGFR: 97 ML/MIN/1.73M2 — SIGNIFICANT CHANGE UP
EOSINOPHIL # BLD AUTO: 0.19 K/UL — SIGNIFICANT CHANGE UP (ref 0–0.5)
EOSINOPHIL NFR BLD AUTO: 2 % — SIGNIFICANT CHANGE UP (ref 0–6)
GLUCOSE BLDC GLUCOMTR-MCNC: 140 MG/DL — HIGH (ref 70–99)
GLUCOSE BLDC GLUCOMTR-MCNC: 172 MG/DL — HIGH (ref 70–99)
GLUCOSE SERPL-MCNC: 141 MG/DL — HIGH (ref 70–99)
HCT VFR BLD CALC: 40 % — SIGNIFICANT CHANGE UP (ref 39–50)
HGB BLD-MCNC: 13.4 G/DL — SIGNIFICANT CHANGE UP (ref 13–17)
IMM GRANULOCYTES NFR BLD AUTO: 0.3 % — SIGNIFICANT CHANGE UP (ref 0–1.5)
LYMPHOCYTES # BLD AUTO: 3.15 K/UL — SIGNIFICANT CHANGE UP (ref 1–3.3)
LYMPHOCYTES # BLD AUTO: 32.8 % — SIGNIFICANT CHANGE UP (ref 13–44)
MAGNESIUM SERPL-MCNC: 1.9 MG/DL — SIGNIFICANT CHANGE UP (ref 1.6–2.6)
MCHC RBC-ENTMCNC: 30.7 PG — SIGNIFICANT CHANGE UP (ref 27–34)
MCHC RBC-ENTMCNC: 33.5 GM/DL — SIGNIFICANT CHANGE UP (ref 32–36)
MCV RBC AUTO: 91.5 FL — SIGNIFICANT CHANGE UP (ref 80–100)
MONOCYTES # BLD AUTO: 0.77 K/UL — SIGNIFICANT CHANGE UP (ref 0–0.9)
MONOCYTES NFR BLD AUTO: 8 % — SIGNIFICANT CHANGE UP (ref 2–14)
NEUTROPHILS # BLD AUTO: 5.4 K/UL — SIGNIFICANT CHANGE UP (ref 1.8–7.4)
NEUTROPHILS NFR BLD AUTO: 56.3 % — SIGNIFICANT CHANGE UP (ref 43–77)
NRBC # BLD: 0 /100 WBCS — SIGNIFICANT CHANGE UP (ref 0–0)
PLATELET # BLD AUTO: 253 K/UL — SIGNIFICANT CHANGE UP (ref 150–400)
POTASSIUM SERPL-MCNC: 4.3 MMOL/L — SIGNIFICANT CHANGE UP (ref 3.5–5.3)
POTASSIUM SERPL-SCNC: 4.3 MMOL/L — SIGNIFICANT CHANGE UP (ref 3.5–5.3)
RBC # BLD: 4.37 M/UL — SIGNIFICANT CHANGE UP (ref 4.2–5.8)
RBC # FLD: 13.5 % — SIGNIFICANT CHANGE UP (ref 10.3–14.5)
SODIUM SERPL-SCNC: 141 MMOL/L — SIGNIFICANT CHANGE UP (ref 135–145)
WBC # BLD: 9.6 K/UL — SIGNIFICANT CHANGE UP (ref 3.8–10.5)
WBC # FLD AUTO: 9.6 K/UL — SIGNIFICANT CHANGE UP (ref 3.8–10.5)

## 2022-06-07 PROCEDURE — 93306 TTE W/DOPPLER COMPLETE: CPT | Mod: 26

## 2022-06-07 PROCEDURE — 99223 1ST HOSP IP/OBS HIGH 75: CPT

## 2022-06-07 PROCEDURE — 99239 HOSP IP/OBS DSCHRG MGMT >30: CPT

## 2022-06-07 PROCEDURE — 93010 ELECTROCARDIOGRAM REPORT: CPT

## 2022-06-07 RX ORDER — ASPIRIN/CALCIUM CARB/MAGNESIUM 324 MG
1 TABLET ORAL
Qty: 30 | Refills: 11
Start: 2022-06-07 | End: 2023-06-01

## 2022-06-07 RX ORDER — PANTOPRAZOLE SODIUM 20 MG/1
1 TABLET, DELAYED RELEASE ORAL
Qty: 30 | Refills: 11
Start: 2022-06-07 | End: 2023-06-01

## 2022-06-07 RX ORDER — CLOPIDOGREL BISULFATE 75 MG/1
1 TABLET, FILM COATED ORAL
Qty: 30 | Refills: 11
Start: 2022-06-07 | End: 2023-06-01

## 2022-06-07 RX ORDER — ACETAMINOPHEN 500 MG
650 TABLET ORAL ONCE
Refills: 0 | Status: COMPLETED | OUTPATIENT
Start: 2022-06-07 | End: 2022-06-07

## 2022-06-07 RX ORDER — ATORVASTATIN CALCIUM 80 MG/1
40 TABLET, FILM COATED ORAL AT BEDTIME
Refills: 0 | Status: DISCONTINUED | OUTPATIENT
Start: 2022-06-07 | End: 2022-06-07

## 2022-06-07 RX ORDER — ATORVASTATIN CALCIUM 80 MG/1
1 TABLET, FILM COATED ORAL
Qty: 30 | Refills: 3
Start: 2022-06-07 | End: 2022-10-04

## 2022-06-07 RX ADMIN — DIVALPROEX SODIUM 500 MILLIGRAM(S): 500 TABLET, DELAYED RELEASE ORAL at 09:14

## 2022-06-07 RX ADMIN — LAMOTRIGINE 150 MILLIGRAM(S): 25 TABLET, ORALLY DISINTEGRATING ORAL at 09:13

## 2022-06-07 RX ADMIN — Medication 81 MILLIGRAM(S): at 09:12

## 2022-06-07 RX ADMIN — Medication 2: at 12:59

## 2022-06-07 RX ADMIN — CLOPIDOGREL BISULFATE 75 MILLIGRAM(S): 75 TABLET, FILM COATED ORAL at 09:13

## 2022-06-07 NOTE — PHYSICAL THERAPY INITIAL EVALUATION ADULT - GENERAL OBSERVATIONS, REHAB EVAL
Pt received semi supine in bed +hep lock +tele +dressing on R wrist from cardiac cath CDI. PT received consent to treat from MARIANA Olson. pt was left as received with call bell in reach, VSS, and in NAD.

## 2022-06-07 NOTE — DISCHARGE NOTE PROVIDER - NSDCMRMEDTOKEN_GEN_ALL_CORE_FT
aspirin 81 mg oral tablet: 1 tab(s) orally once a day  clonazePAM 0.5 mg oral tablet: 1 tab(s) orally once a day (at bedtime)  divalproex sodium 500 mg oral tablet, extended release: 2 tab(s) orally once a day (at bedtime)  lamoTRIgine 100 mg oral tablet: 1 tab(s) orally once a day (at bedtime)  lamoTRIgine 150 mg oral tablet: 1 tab(s) orally once a day (in the morning)  lithium 300 mg oral tablet: 1 tab(s) orally once a day  sitagliptin-metformin 50 mg-500 mg oral tablet: 1 tab(s) orally 2 times a day   Aspirin Enteric Coated 81 mg oral delayed release tablet: 1 tab(s) orally once a day  atorvastatin 40 mg oral tablet: 1 tab(s) orally once a day (at bedtime)  Cardiac Rehabilitation: 3x / week for 12 weeks for diagnosis of Coronary Artery Disease (cardiologist: Dr. Jose M Nascimento).   clopidogrel 75 mg oral tablet: 1 tab(s) orally once a day  divalproex sodium 500 mg oral tablet, extended release: 2 tab(s) orally once a day (at bedtime)  lamoTRIgine 100 mg oral tablet: 1 tab(s) orally once a day (at bedtime)  lamoTRIgine 150 mg oral tablet: 1 tab(s) orally once a day (in the morning)  pantoprazole 40 mg oral delayed release tablet: 1 tab(s) orally once a day   sitagliptin-metformin 50 mg-500 mg oral tablet: 1 tab(s) orally 2 times a day; RESTART ON THURSDAY, 6/9/22.

## 2022-06-07 NOTE — DISCHARGE NOTE PROVIDER - NSDCCPCAREPLAN_GEN_ALL_CORE_FT
PRINCIPAL DISCHARGE DIAGNOSIS  Diagnosis: CAD (coronary artery disease)  Assessment and Plan of Treatment: You underwent a cardiac catheterization and the blockage/narrowing due to plaque build-up in your Obtuse marginal and middle Left anterior descending arteries were opened using 2 stents. You should continue taking aspirin 81mg daily and Plavix 75 mg daily.  NEVER MISS A DOSE OF ASPIRIN OR PLAVIX; IF YOU DO, YOU ARE AT RISK OF YOUR STENT CLOSING AND HAVING A HEART ATTACK. DO NOT STOP THESE TWO MEDICATIONS UNLESS INSTRUCTED TO DO SO BY YOUR CARDIOLOGIST.  Aspirin and Plavix can put you at increased risk of bleeding; please avoid NSAIDS (such as Motrin, Advil, Ibuprofen, Naproxen, or Aleve, as these medications may further your risk of bleeding.  -Your procedure was done throughyour wrist  -You do not need to keep this area covered and you may shower  -Please avoid any heavy lifting  (no more than 3 to 5 lbs) or strenuous activity for five days  -If you develop any swelling, bleeding, hardening of the skin (hematoma formation), acute pain, numbness/tingling  in your arm or leg please contact your doctor immediately or call our 24/7 line: [phone]  -Please follow up with  __________in 1-2 weeks  Please return to the hospital or seek immediate medical attention if you have symptoms including, but not limited to, persistent chest pain or shortness of breath, especially if it’s associated with nausea, vomiting, sweating, passing out, or pain radiating to your jaw, shoulder, or arm.      SECONDARY DISCHARGE DIAGNOSES  Diagnosis: Diabetes  Assessment and Plan of Treatment: Please HOLD your home Sitagliptan-Metformin 50-500mg twice daily and RESUME on 6/9/22 as this medication can have an interaction with the contrast dye you recieved for your CT scan and procedure.    Diagnosis: Bipolar disorder  Assessment and Plan of Treatment: We consulted the psychiatry team to clarify your medications for your bipolar diagnosis. Please continue ____________    Diagnosis: HLD (hyperlipidemia)  Assessment and Plan of Treatment: We started you on a cholesterol medication known as Atorvastatin 40mg daily in order to lower your cholesterol as well as prevent plaque build up in your new stents. Please continue this medication on discharge, a new prescription was sent to your preferred pharmacy.     PRINCIPAL DISCHARGE DIAGNOSIS  Diagnosis: CAD (coronary artery disease)  Assessment and Plan of Treatment: You underwent a cardiac catheterization and the blockage/narrowing due to plaque build-up in your Obtuse marginal and middle Left anterior descending arteries were opened using 2 stents. You should continue taking aspirin 81mg daily and Plavix 75 mg daily.  NEVER MISS A DOSE OF ASPIRIN OR PLAVIX; IF YOU DO, YOU ARE AT RISK OF YOUR STENT CLOSING AND HAVING A HEART ATTACK. DO NOT STOP THESE TWO MEDICATIONS UNLESS INSTRUCTED TO DO SO BY YOUR CARDIOLOGIST.  Aspirin and Plavix can put you at increased risk of bleeding; please avoid NSAIDS (such as Motrin, Advil, Ibuprofen, Naproxen, or Aleve, as these medications may further your risk of bleeding.  -Your procedure was done throughyour wrist  -You do not need to keep this area covered and you may shower  -Please avoid any heavy lifting  (no more than 3 to 5 lbs) or strenuous activity for five days  -If you develop any swelling, bleeding, hardening of the skin (hematoma formation), acute pain, numbness/tingling  in your arm or leg please contact your doctor immediately or call our 24/7 line: [phone]  -Please follow up with Dr. TANG in 1-2 weeks  Please return to the hospital or seek immediate medical attention if you have symptoms including, but not limited to, persistent chest pain or shortness of breath, especially if it’s associated with nausea, vomiting, sweating, passing out, or pain radiating to your jaw, shoulder, or arm.      SECONDARY DISCHARGE DIAGNOSES  Diagnosis: HLD (hyperlipidemia)  Assessment and Plan of Treatment: We started you on a cholesterol medication known as Atorvastatin 40mg daily in order to lower your cholesterol as well as prevent plaque build up in your new stents. Please continue this medication on discharge, a new prescription was sent to your preferred pharmacy.    Diagnosis: Diabetes  Assessment and Plan of Treatment: Please HOLD your home Sitagliptan-Metformin 50-500mg twice daily and RESUME on 6/9/22 as this medication can have an interaction with the contrast dye you recieved for your CT scan and procedure.    Diagnosis: Bipolar disorder  Assessment and Plan of Treatment: We consulted the psychiatry team to clarify your medications for your bipolar diagnosis. Please continue ____________     PRINCIPAL DISCHARGE DIAGNOSIS  Diagnosis: CAD (coronary artery disease)  Assessment and Plan of Treatment: You underwent a cardiac catheterization and the blockage/narrowing due to plaque build-up in your Obtuse marginal and middle Left anterior descending arteries were opened using 2 stents. You should continue taking aspirin 81mg daily and Plavix 75 mg daily.  NEVER MISS A DOSE OF ASPIRIN OR PLAVIX; IF YOU DO, YOU ARE AT RISK OF YOUR STENT CLOSING AND HAVING A HEART ATTACK. DO NOT STOP THESE TWO MEDICATIONS UNLESS INSTRUCTED TO DO SO BY YOUR CARDIOLOGIST.  Aspirin and Plavix can put you at increased risk of bleeding; please avoid NSAIDS (such as Motrin, Advil, Ibuprofen, Naproxen, or Aleve, as these medications may further your risk of bleeding.  -Your procedure was done throughyour wrist  -You do not need to keep this area covered and you may shower  -Please avoid any heavy lifting  (no more than 3 to 5 lbs) or strenuous activity for five days  -If you develop any swelling, bleeding, hardening of the skin (hematoma formation), acute pain, numbness/tingling  in your arm or leg please contact your doctor immediately or call our 24/7 line: [phone]  -Please follow up with Dr. TANG in 1-2 weeks  Please return to the hospital or seek immediate medical attention if you have symptoms including, but not limited to, persistent chest pain or shortness of breath, especially if it’s associated with nausea, vomiting, sweating, passing out, or pain radiating to your jaw, shoulder, or arm.      SECONDARY DISCHARGE DIAGNOSES  Diagnosis: HLD (hyperlipidemia)  Assessment and Plan of Treatment: We started you on a cholesterol medication known as Atorvastatin 40mg daily in order to lower your cholesterol as well as prevent plaque build up in your new stents. Please continue this medication on discharge, a new prescription was sent to your preferred pharmacy.    Diagnosis: Diabetes  Assessment and Plan of Treatment: Please HOLD your home Sitagliptan-Metformin 50-500mg twice daily and RESUME on 6/9/22 as this medication can have an interaction with the contrast dye you recieved for your CT scan and procedure.    Diagnosis: Bipolar disorder  Assessment and Plan of Treatment: We consulted the psychiatry team to clarify your medications for your bipolar diagnosis. Please STOP taking Lithium and STOP taking Clonazepam. You should inform your PCP that these medications are no longer recommended. It is important that you follow up with a Psychiatrist for further management of your psychiatric needs.

## 2022-06-07 NOTE — BH CONSULTATION LIAISON ASSESSMENT NOTE - NSBHCONSULTFOLLOWAFTERCARE_PSY_A_CORE FT
- please provide referral for [facility]: [address] [place] [id number]: Phone: [phone]  Alternative [place] options include: [facility]: [place]: [address] [place] [address]; [phone] or   [facility]: [facility]: [address] [place] [address], Intakes: [phone]

## 2022-06-07 NOTE — BH CONSULTATION LIAISON ASSESSMENT NOTE - NSBHMSEKNOWHOW_PSY_ALL_CORE
Current Events
Sibling  Still living? Yes, Estimated age: Age Unknown  Asthma, Age at diagnosis: Age Unknown     Child  Still living? Yes, Estimated age: Age Unknown  Asthma, Age at diagnosis: Age Unknown

## 2022-06-07 NOTE — DISCHARGE NOTE NURSING/CASE MANAGEMENT/SOCIAL WORK - PATIENT PORTAL LINK FT
You can access the FollowMyHealth Patient Portal offered by [facility] by registering at the following website: [email or web address]. By joining [employer]’s FollowMyHealth portal, you will also be able to view your health information using other applications (apps) compatible with our system.

## 2022-06-07 NOTE — BH CONSULTATION LIAISON ASSESSMENT NOTE - NSBHCHARTREVIEWVS_PSY_A_CORE FT
Vital Signs Last 24 Hrs  T(C): 36 (07 Jun 2022 05:55), Max: 36.8 (06 Jun 2022 12:23)  T(F): 96.8 (07 Jun 2022 05:55), Max: 98.3 (06 Jun 2022 12:23)  HR: 64 (07 Jun 2022 08:38) (51 - 78)  BP: 134/63 (07 Jun 2022 08:38) (123/59 - 155/87)  BP(mean): 93 (07 Jun 2022 05:39) (84 - 109)  RR: 18 (07 Jun 2022 08:38) (16 - 20)  SpO2: 96% (07 Jun 2022 08:38) (95% - 99%)

## 2022-06-07 NOTE — BH CONSULTATION LIAISON ASSESSMENT NOTE - HPI (INCLUDE ILLNESS QUALITY, SEVERITY, DURATION, TIMING, CONTEXT, MODIFYING FACTORS, ASSOCIATED SIGNS AND SYMPTOMS)
05/05/2022	05/09/2022	clonazepam 0.5 mg tablet	90	30	Ignacio Norwood MD	[id number]	[facility] 70 y/o Yiddish/English speaking M w/ PMHx of Bipolar disorder, DM w/ neuropathy, vertigo who presented to [facility] ED 6/6/22 BIBEMS after walking up from a dream where he was running and awoke with onset of mid and right sided CP associated with mild SOB which lasted for 30 minutes and resolved after taking 4 baby aspirin's at home this morning. 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 Pt is now admitted to cardiology for further management of UA and cardiac cath. Psychiatry was consulted to provide recommendations for medications management. Patient is known to the writer from his prior admission on 4/14/22. At the time patient presented with AMS, with the concern that polypharmacy contributed to his state. Patient was discharged with the recommendation to stop lithium and clonazepam, however he restarted both medications as outpatient. Today he presents AAOx3. He reports normal mood and denies other psychiatric symptoms. Denies SI/HI. States that he has been taking clonazepam to feel more relaxed but understands that he can explore alternatives with his doctors. Reports that he did not remember that the lithium was stopped and restarted taking it at home. Admits that since the lithium was decreased his tremors have improved significantly. He would like to be referred to an outpatient program and accepts the referral information provided by the writer.       05/05/2022	05/09/2022	clonazepam 0.5 mg tablet	90	30	Ignacio Norwood MD	[id number]	[facility]

## 2022-06-07 NOTE — PHYSICAL THERAPY INITIAL EVALUATION ADULT - NUMBER OF STAIRS, REHAB EVAL
12 risk reduction/prevention/functional limitations in following categories/therapy frequency/predicted duration of therapy intervention/rehab potential/anticipated equipment needs at discharge/anticipated discharge recommendation/impairments found/Home with outpatient services

## 2022-06-07 NOTE — PHYSICAL THERAPY INITIAL EVALUATION ADULT - ADDITIONAL COMMENTS
Pt states he lives in 2 flight walk up with wife. Pt was mostly independent with amb and ADLs PTA, used RW at times.

## 2022-06-07 NOTE — BH CONSULTATION LIAISON ASSESSMENT NOTE - CURRENT MEDICATION
MEDICATIONS  (STANDING):  aspirin enteric coated 81 milliGRAM(s) Oral daily  atorvastatin 40 milliGRAM(s) Oral at bedtime  chlorhexidine 4% Liquid 1 Application(s) Topical once  clonazePAM  Tablet 0.5 milliGRAM(s) Oral at bedtime  clopidogrel Tablet 75 milliGRAM(s) Oral daily  dextrose 5%. 1000 milliLiter(s) (50 mL/Hr) IV Continuous <Continuous>  dextrose 5%. 1000 milliLiter(s) (100 mL/Hr) IV Continuous <Continuous>  dextrose 50% Injectable 25 Gram(s) IV Push once  dextrose 50% Injectable 12.5 Gram(s) IV Push once  dextrose 50% Injectable 25 Gram(s) IV Push once  diVALproex  milliGRAM(s) Oral every 12 hours  glucagon  Injectable 1 milliGRAM(s) IntraMuscular once  insulin lispro (ADMELOG) corrective regimen sliding scale   SubCutaneous every 6 hours  lamoTRIgine 150 milliGRAM(s) Oral daily  sodium chloride 0.9%. 500 milliLiter(s) (215 mL/Hr) IV Continuous <Continuous>    MEDICATIONS  (PRN):  dextrose Oral Gel 15 Gram(s) Oral once PRN Blood Glucose LESS THAN 70 milliGRAM(s)/deciliter

## 2022-06-07 NOTE — BH CONSULTATION LIAISON ASSESSMENT NOTE - RISK ASSESSMENT
low risk: future oriented thinking, no prior history of self harm, strong family and community support, [employer] and personal beliefs are strong protective factors

## 2022-06-07 NOTE — DISCHARGE NOTE NURSING/CASE MANAGEMENT/SOCIAL WORK - NSDCPEFALRISK_GEN_ALL_CORE
For information on Fall & Injury Prevention, visit: [email or web address] OR  [email or web address] OR  https://www.cdc.gov/steadi/patient.html

## 2022-06-07 NOTE — DISCHARGE NOTE PROVIDER - CARE PROVIDER_API CALL
Jose M Nascimento)  Cardiovascular Disease; Internal Medicine  [address], [facility] [address]  [place], [place] [address]  Phone: [phone]  Fax: [phone]  Follow Up Time:

## 2022-06-07 NOTE — BH CONSULTATION LIAISON ASSESSMENT NOTE - SUMMARY
70 y/o Yiddish/English speaking M w/ PMHx of Bipolar disorder, DM w/ neuropathy, vertigo who presented to [facility] ED 6/6/22 BIBEMS after walking up from a dream where he was running and awoke with onset of mid and right sided CP associated with mild SOB which lasted for 30 minutes and resolved after taking 4 baby aspirin's at home this morning. 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 Pt is now admitted to cardiology for further management of UA and cardiac cath. Psychiatry was consulted to provide recommendations for medications management:  Plan:  -discontinue lithium; pt is currently taking 2 other mood stabilizers for his mood symptoms and had several episodes of lithium toxicity in the past  -discontinue clonazepam as pt is already at high risk of falls  -continue depakote 1000mg po qhs  -continue lamotrigine 150mg po daily  -pt was provided again with referral information for outpatient mental health follow up  -please reach out to pt's PCP to communicate the above modifications in treatment      72 y/o Yiddish/English speaking M w/ PMHx of Bipolar disorder, DM w/ neuropathy, vertigo who presented to [facility] ED 6/6/22 BIBEMS after walking up from a dream where he was running and awoke with onset of mid and right sided CP associated with mild SOB which lasted for 30 minutes and resolved after taking 4 baby aspirin's at home this morning. 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 Pt is now admitted to cardiology for further management of UA and cardiac cath. Psychiatry was consulted to provide recommendations for medications management:  Plan:  -discontinue lithium; pt is currently taking 2 other mood stabilizers for his mood symptoms and had several episodes of lithium toxicity in the past  -discontinue clonazepam as pt is already at high risk of falls  -continue depakote 1000mg po qhs  -continue lamotrigine 150mg po daily and 100mg po qhs  -pt was provided again with referral information for outpatient mental health follow up  -please reach out to pt's PCP to communicate the above modifications in treatment      70 y/o Yiddish/English speaking M w/ PMHx of Bipolar disorder, DM w/ neuropathy, vertigo who presented to [facility] ED 6/6/22 BIBEMS after walking up from a dream where he was running and awoke with onset of mid and right sided CP associated with mild SOB which lasted for 30 minutes and resolved after taking 4 baby aspirin's at home this morning. 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 Pt is now admitted to cardiology for further management of UA and cardiac cath. Psychiatry was consulted to provide recommendations for medications management. Pt currently presents AAOx3, with stable mood and no apparent psychotic sx, no SI/HI. He acknowledges memory deficits suggestive of an underlying cognitive disorder (mild).  Plan:  -discontinue lithium; pt is currently taking 2 other mood stabilizers for his mood symptoms and had several episodes of lithium toxicity in the past  -discontinue clonazepam as pt is already at high risk of falls  -continue depakote 1000mg po qhs  -continue lamotrigine 150mg po daily and 100mg po qhs  -pt was provided again with referral information for outpatient mental health follow up  -please reach out to pt's PCP to communicate the above modifications in treatment

## 2022-06-07 NOTE — DISCHARGE NOTE PROVIDER - HOSPITAL COURSE
INCOMPLETE    72 y/o Yiddish/English speaking M w/ PMHx of Bipolar disorder, DM w/ neuropathy, vertigo who presented to [facility] ED 6/6/22 BIBEMS after walking up from a dream where he was running and awoke with onset of mid and right sided CP associated with mild SOB which lasted for 30 minutes and resolved after taking 4 baby aspirin's at home this morning. ECG: SR @ 70bpm with LBBB, TWI aVL, Troponin negative x1. CCTA 6/6/22: calcium score is moderate at 257 Agatston units, which is at the 60 percentile, adjusted for age, gender and race, OM2 has moderate to severe stenosis, Remaining segments are non obstructive. S/p cardiac catheterization 6/6/22: YEHUDA OM1 (80-90%), YEHUDA mLAD (70-80%). Other findings: LM normal, LCx and RCA mild disease, D1 30%, EDP 13, EF 60%. R TR @ 10 PM. Echocardiogram 6/7/22: ___________________. Psychiatry consulted for medication clarification/optimization and was recommended for _____. PT rec _____.     Patient seen and examined at the bedside. No complaints at this time. AM labs WNL, HD stable, no overnight events on tele. R radial access site stable with distal pulses to baseline. As per Dr. Montez, patient is stable for discharge home on ASA 81mg QD, Plavix 75mg QD, Atorvastatin 40mg QD with instruction to follow up with  _____ in 1-2 weeks.     ***DETERMINE BELOW BASED ON PT EVAL***           Cardiac Rehab (STEMI/NSTEMI/ACS/Unstable Angina/CHF/Post PCI):            *Education on benefits of Cardiac Rehab provided to patient: Yes/No         *Referral and Prescription Given for Cardiac Rehab : Yes/No.  If No, Why Not?         *Pt given list of locations & instructed to contact their insurance company to review list of participating providers    Reasons for No Cardiac Rehab Referral Rx (Must select 1 or more options):                Patient Refused            Medical Reason: ex needs Home Care, Home PT            Patient lacks medical coverage for Cardiac Rehab            Pt discharged to Nursing Care/[facility]/Long term Care Facility            Patient Lacks Transportation or no cardiac rehab within 60 minutes driving range            Patient already participates in Cardiac Rehab            Other: (provide details) ex: Hospice patient      AMI: Beta Blocker Prescribed: Yes/No. If no, Why not?            ACE-I/ARB Prescribed: Yes/No. If no, Why not? ex: Entresto prescribed    Statin Prescribed (STEMI/NSTEMI/UA &/OR PCI this admission):  Yes/No; If No, Why Not?  DAPT: Prescriptions for Aspirin/Plavix/Brilinta/Effient e-prescribed to patient's pharmacy Yes/No__.                *No Aspirin/Plavix/Brilinta/Effient prescribed due to ___.     INCOMPLETE    72 y/o Yiddish/English speaking M w/ PMHx of Bipolar disorder, DM w/ neuropathy, vertigo who presented to [facility] ED 6/6/22 BIBEMS after walking up from a dream where he was running and awoke with onset of mid and right sided CP associated with mild SOB which lasted for 30 minutes and resolved after taking 4 baby aspirin's at home this morning. ECG: SR @ 70bpm with LBBB, TWI aVL, Troponin negative x1. CCTA 6/6/22: calcium score is moderate at 257 Agatston units, which is at the 60 percentile, adjusted for age, gender and race, OM2 has moderate to severe stenosis, Remaining segments are non obstructive. S/p cardiac catheterization 6/6/22: YEHUDA OM1 (80-90%), YEHUDA mLAD (70-80%). Other findings: LM normal, LCx and RCA mild disease, D1 30%, EDP 13, EF 60%. R TR @ 10 PM. Echocardiogram 6/7/22: LVEF 55-60%, no significant valvular disease, no signs of pulmonary hypertension. Psychiatry consulted for medication clarification/optimization and was recommended for DISCONTINUATION of LITHIUM and DISCONTINUATION of CLONAZEPAM. PT rec home w/ home PT.     Patient seen and examined at the bedside. No complaints at this time. AM labs WNL, HD stable, no overnight events on tele. R radial access site stable with distal pulses to baseline. As per Dr. Montez, patient is stable for discharge home on ASA 81mg QD, Plavix 75mg QD, Atorvastatin 40mg QD with instruction to follow up with Dr. Nascimento in 1-2 weeks.              Cardiac Rehab (STEMI/NSTEMI/ACS/Unstable Angina/CHF/Post PCI):  -- NO CARDIAC REHAB AS PATIENT IS GETTING HOME PT SERVICES         *Education on benefits of Cardiac Rehab provided to patient: Yes/No         *Referral and Prescription Given for Cardiac Rehab : Yes/No.  If No, Why Not?         *Pt given list of locations & instructed to contact their insurance company to review list of participating providers    Statin Prescribed (STEMI/NSTEMI/UA &/OR PCI this admission):  Yes/No; If No, Why Not?    DAPT: Prescriptions for Aspirin/Plavix/Brilinta/Effient e-prescribed to patient's pharmacy Yes/No__.                *No Aspirin/Plavix/Brilinta/Effient prescribed due to ___.     70 y/o Yiddish/English speaking M w/ PMHx of Bipolar disorder, DM w/ neuropathy, vertigo who presented to [facility] ED 6/6/22 BIBEMS after walking up from a dream where he was running and awoke with onset of mid and right sided CP associated with mild SOB which lasted for 30 minutes and resolved after taking 4 baby aspirin's at home this morning. ECG: SR @ 70bpm with LBBB, TWI aVL, Troponin negative x1. CCTA 6/6/22: calcium score is moderate at 257 Agatston units, which is at the 60 percentile, adjusted for age, gender and race, OM2 has moderate to severe stenosis, Remaining segments are non obstructive. S/p cardiac catheterization 6/6/22: YEHUDA OM1 (80-90%), YEHUDA mLAD (70-80%). Other findings: LM normal, LCx and RCA mild disease, D1 30%, EDP 13, EF 60%. R TR @ 10 PM. Echocardiogram 6/7/22: LVEF 55-60%, no significant valvular disease, no signs of pulmonary hypertension. Psychiatry consulted for medication clarification/optimization and was recommended for DISCONTINUATION of LITHIUM and DISCONTINUATION of CLONAZEPAM. PT rec home w/ home PT.     Patient seen and examined at the bedside. No complaints at this time. AM labs WNL, HD stable, no overnight events on tele. R radial access site stable with distal pulses to baseline. As per Dr. Montez, patient is stable for discharge home on ASA 81mg QD, Plavix 75mg QD, Atorvastatin 40mg QD with instruction to follow up with Dr. Nascimento in 1-2 weeks.              Cardiac Rehab (STEMI/NSTEMI/ACS/Unstable Angina/CHF/Post PCI):  -- NO CARDIAC REHAB AS PATIENT IS GETTING HOME PT SERVICES         *Education on benefits of Cardiac Rehab provided to patient: Yes/No         *Referral and Prescription Given for Cardiac Rehab : Yes/No.  If No, Why Not?         *Pt given list of locations & instructed to contact their insurance company to review list of participating providers    Statin Prescribed (STEMI/NSTEMI/UA &/OR PCI this admission):  Yes/No; If No, Why Not?    DAPT: Prescriptions for Aspirin/Plavix/Brilinta/Effient e-prescribed to patient's pharmacy Yes/No__.                *No Aspirin/Plavix/Brilinta/Effient prescribed due to ___.

## 2022-06-07 NOTE — DISCHARGE NOTE NURSING/CASE MANAGEMENT/SOCIAL WORK - NSDCFUADDAPPT_GEN_ALL_CORE_FT
Please follow up with cardiologist, Dr. Nascimento, within 1 week of discharge home from the hospital.

## 2022-06-07 NOTE — PHYSICAL THERAPY INITIAL EVALUATION ADULT - PERTINENT HX OF CURRENT PROBLEM, REHAB EVAL
72 y/o Yiddish/English speaking M w/ PMHx of Bipolar disorder, DM w/ neuropathy, vertigo who presented to [facility] ED 6/6/22 BIBEMS after walking up from a dream where he was running and awoke with onset of mid and right sided CP associated with mild SOB which lasted for 30 minutes and resolved after taking 4 baby aspirin's at home this morning. 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 Pt is now admitted to cardiology for further management of UA with plan for cardiac cath today.

## 2022-06-07 NOTE — DISCHARGE NOTE PROVIDER - NSDCFUADDAPPT_GEN_ALL_CORE_FT
[address]  [address] [place] [id number]  Dept: [phone]  Loc: [phone]    Oliver Griggs is a 28 y.o. female who presents today for her medical conditions/complaints as noted below. Oliver Griggs is c/o of Otalgia (PT STATES GREY FLUID COMING FROM EAR) and Ear Fullness        HPI:     HPI   Pt presents to clinic with c/o ear pain for 2 days. States ear has stabbing pain. States fever for 3 days. Takes motrin for relief of fever and pain. Denies congestion, sore throat, cough or any other symptoms. Past Medical History:   Diagnosis Date    Celiac disease     Kidney stone     # 29    Lactose intolerance       Past Surgical History:   Procedure Laterality Date     SECTION      KIDNEY STONE SURGERY      TONSILLECTOMY AND ADENOIDECTOMY      TUMOR REMOVAL Left     arm    TYMPANOSTOMY TUBE PLACEMENT         Family History   Problem Relation Age of Onset    Breast Cancer Maternal Grandmother 72    Hypertension Father        Social History   Substance Use Topics    Smoking status: Never Smoker    Smokeless tobacco: Never Used    Alcohol use No      Current Outpatient Prescriptions   Medication Sig Dispense Refill    doxycycline hyclate (VIBRAMYCIN) 100 MG capsule Take 1 capsule by mouth 2 times daily for 10 days 20 capsule 0    acetaminophen (TYLENOL) 500 MG tablet Take 500 mg by mouth every 6 hours as needed for Pain       No current facility-administered medications for this visit. Allergies   Allergen Reactions    Amoxil [Amoxicillin]     Ceclor [Cefaclor]     Erythromycin     Gluten Meal        Health Maintenance   Topic Date Due    DTaP/Tdap/Td vaccine (1 - Tdap) 2005    Cervical cancer screen  2016    Flu vaccine (1) 2017    HIV screen  Completed       Subjective:      Review of Systems   Constitutional: Positive for fever.  Negative for activity change, appetite change and fatigue. HENT: Positive for ear pain. Negative for congestion, hearing loss, nosebleeds, rhinorrhea, sinus pressure, sore throat and trouble swallowing. Eyes: Negative. Negative for redness and visual disturbance. Respiratory: Negative. Negative for cough, shortness of breath and wheezing. Cardiovascular: Negative. Negative for chest pain. Gastrointestinal: Negative. Negative for abdominal distention, abdominal pain, constipation and vomiting. Skin: Negative. Negative for rash. Allergic/Immunologic: Negative. Neurological: Negative. Negative for headaches. Hematological: Negative. Psychiatric/Behavioral: Negative. Objective:     Physical Exam   Constitutional: She is oriented to person, place, and time. Vital signs are normal. She appears well-developed and well-nourished. Non-toxic appearance. She does not have a sickly appearance. She does not appear ill. No distress. HENT:   Head: Normocephalic and atraumatic. Right Ear: Hearing, tympanic membrane and ear canal normal.   Left Ear: Hearing, external ear and ear canal normal. Tympanic membrane is injected and bulging. Nose: Nose normal. Right sinus exhibits no maxillary sinus tenderness and no frontal sinus tenderness. Left sinus exhibits no maxillary sinus tenderness and no frontal sinus tenderness. Mouth/Throat: Uvula is midline, oropharynx is clear and moist and mucous membranes are normal.   Eyes: Conjunctivae are normal. Pupils are equal, round, and reactive to light. Neck: Trachea normal and normal range of motion. Carotid bruit is not present. No thyroid mass present. Cardiovascular: Normal rate, regular rhythm and normal heart sounds. Pulmonary/Chest: Effort normal and breath sounds normal.   Abdominal: Soft. Normal appearance and bowel sounds are normal.   Musculoskeletal: Normal range of motion.    Lymphadenopathy:        Head (right side): No submental, no submandibular, no tonsillar, no preauricular and no occipital adenopathy present. Head (left side): No submental, no submandibular, no tonsillar, no preauricular, no posterior auricular and no occipital adenopathy present. She has no cervical adenopathy. Neurological: She is alert and oriented to person, place, and time. She has normal strength. She displays a negative Romberg sign. Skin: Skin is warm and intact. No cyanosis. Psychiatric: She has a normal mood and affect. Her behavior is normal. Judgment and thought content normal.   Nursing note and vitals reviewed. /83  Pulse 74  Temp 98.4 °F (36.9 °C)  Resp 20  Wt 179 lb (81.2 kg)  SpO2 98%  BMI 28.04 kg/m2    Assessment:      1. Left otitis media, unspecified chronicity, unspecified otitis media type  doxycycline hyclate (VIBRAMYCIN) 100 MG capsule    [facility] Chago Reece MD       Plan:      Orders Placed This Encounter   Procedures   [facility] Chago Reece MD     Referral Priority:   Routine     Referral Type:   Consult for Advice and Opinion     Referral Reason:   Specialty Services Required     Referred to Provider:   Yoel Palafox DO     Requested Specialty:   Internal Medicine     Number of Visits Requested:   1       No Follow-up on file. Orders Placed This Encounter   Procedures   [facility] Chago Reece MD     Referral Priority:   Routine     Referral Type:   Consult for Advice and Opinion     Referral Reason:   Specialty Services Required     Referred to Provider:   Yoel Palafox DO     Requested Specialty:   Internal Medicine     Number of Visits Requested:   1     Orders Placed This Encounter   Medications    doxycycline hyclate (VIBRAMYCIN) 100 MG capsule     Sig: Take 1 capsule by mouth 2 times daily for 10 days     Dispense:  20 capsule     Refill:  0       Patient given educational materials - see patient instructions.   Discussed use, benefit, and side Please follow up with cardiologist, Dr. Nascimento, within 1 week of discharge home from the hospital.

## 2022-06-09 DIAGNOSIS — I25.10 ATHEROSCLEROTIC HEART DISEASE OF NATIVE CORONARY ARTERY WITHOUT ANGINA PECTORIS: ICD-10-CM

## 2022-06-09 DIAGNOSIS — E11.40 TYPE 2 DIABETES MELLITUS WITH DIABETIC NEUROPATHY, UNSPECIFIED: ICD-10-CM

## 2022-06-09 DIAGNOSIS — R07.9 CHEST PAIN, UNSPECIFIED: ICD-10-CM

## 2022-06-09 DIAGNOSIS — Z79.899 OTHER LONG TERM (CURRENT) DRUG THERAPY: ICD-10-CM

## 2022-06-09 DIAGNOSIS — Z79.82 LONG TERM (CURRENT) USE OF ASPIRIN: ICD-10-CM

## 2022-06-09 DIAGNOSIS — F31.9 BIPOLAR DISORDER, UNSPECIFIED: ICD-10-CM

## 2022-06-09 DIAGNOSIS — R42 DIZZINESS AND GIDDINESS: ICD-10-CM

## 2022-06-19 ENCOUNTER — EMERGENCY (EMERGENCY)
Facility: HOSPITAL | Age: 72
LOS: 1 days | Discharge: HOME | End: 2022-06-19
Attending: EMERGENCY MEDICINE | Admitting: EMERGENCY MEDICINE
Payer: MEDICARE

## 2022-06-19 VITALS
OXYGEN SATURATION: 96 % | DIASTOLIC BLOOD PRESSURE: 83 MMHG | HEART RATE: 57 BPM | HEIGHT: 69 IN | TEMPERATURE: 99 F | SYSTOLIC BLOOD PRESSURE: 182 MMHG | RESPIRATION RATE: 18 BRPM | WEIGHT: 210.1 LBS

## 2022-06-19 VITALS
SYSTOLIC BLOOD PRESSURE: 141 MMHG | TEMPERATURE: 99 F | RESPIRATION RATE: 18 BRPM | DIASTOLIC BLOOD PRESSURE: 70 MMHG | OXYGEN SATURATION: 95 % | HEART RATE: 69 BPM

## 2022-06-19 DIAGNOSIS — Z79.84 LONG TERM (CURRENT) USE OF ORAL HYPOGLYCEMIC DRUGS: ICD-10-CM

## 2022-06-19 DIAGNOSIS — K59.00 CONSTIPATION, UNSPECIFIED: ICD-10-CM

## 2022-06-19 DIAGNOSIS — Z79.02 LONG TERM (CURRENT) USE OF ANTITHROMBOTICS/ANTIPLATELETS: ICD-10-CM

## 2022-06-19 DIAGNOSIS — R10.13 EPIGASTRIC PAIN: ICD-10-CM

## 2022-06-19 DIAGNOSIS — Z87.891 PERSONAL HISTORY OF NICOTINE DEPENDENCE: ICD-10-CM

## 2022-06-19 DIAGNOSIS — E11.9 TYPE 2 DIABETES MELLITUS WITHOUT COMPLICATIONS: ICD-10-CM

## 2022-06-19 DIAGNOSIS — K80.20 CALCULUS OF GALLBLADDER WITHOUT CHOLECYSTITIS WITHOUT OBSTRUCTION: ICD-10-CM

## 2022-06-19 DIAGNOSIS — R11.2 NAUSEA WITH VOMITING, UNSPECIFIED: ICD-10-CM

## 2022-06-19 DIAGNOSIS — F31.9 BIPOLAR DISORDER, UNSPECIFIED: ICD-10-CM

## 2022-06-19 DIAGNOSIS — Z79.82 LONG TERM (CURRENT) USE OF ASPIRIN: ICD-10-CM

## 2022-06-19 DIAGNOSIS — Z90.89 ACQUIRED ABSENCE OF OTHER ORGANS: Chronic | ICD-10-CM

## 2022-06-19 DIAGNOSIS — Z20.822 CONTACT WITH AND (SUSPECTED) EXPOSURE TO COVID-19: ICD-10-CM

## 2022-06-19 DIAGNOSIS — Z95.5 PRESENCE OF CORONARY ANGIOPLASTY IMPLANT AND GRAFT: ICD-10-CM

## 2022-06-19 LAB
ALBUMIN SERPL ELPH-MCNC: 4.8 G/DL — SIGNIFICANT CHANGE UP (ref 3.3–5)
ALP SERPL-CCNC: 80 U/L — SIGNIFICANT CHANGE UP (ref 40–120)
ALT FLD-CCNC: 16 U/L — SIGNIFICANT CHANGE UP (ref 10–45)
ANION GAP SERPL CALC-SCNC: 10 MMOL/L — SIGNIFICANT CHANGE UP (ref 5–17)
APPEARANCE UR: CLEAR — SIGNIFICANT CHANGE UP
AST SERPL-CCNC: 21 U/L — SIGNIFICANT CHANGE UP (ref 10–40)
BASOPHILS # BLD AUTO: 0.06 K/UL — SIGNIFICANT CHANGE UP (ref 0–0.2)
BASOPHILS NFR BLD AUTO: 0.5 % — SIGNIFICANT CHANGE UP (ref 0–2)
BILIRUB SERPL-MCNC: 0.3 MG/DL — SIGNIFICANT CHANGE UP (ref 0.2–1.2)
BILIRUB UR-MCNC: NEGATIVE — SIGNIFICANT CHANGE UP
BUN SERPL-MCNC: 11 MG/DL — SIGNIFICANT CHANGE UP (ref 7–23)
CALCIUM SERPL-MCNC: 9.3 MG/DL — SIGNIFICANT CHANGE UP (ref 8.4–10.5)
CHLORIDE SERPL-SCNC: 98 MMOL/L — SIGNIFICANT CHANGE UP (ref 96–108)
CO2 SERPL-SCNC: 29 MMOL/L — SIGNIFICANT CHANGE UP (ref 22–31)
COLOR SPEC: YELLOW — SIGNIFICANT CHANGE UP
CREAT SERPL-MCNC: 0.96 MG/DL — SIGNIFICANT CHANGE UP (ref 0.5–1.3)
DIFF PNL FLD: NEGATIVE — SIGNIFICANT CHANGE UP
EGFR: 85 ML/MIN/1.73M2 — SIGNIFICANT CHANGE UP
EOSINOPHIL # BLD AUTO: 0.05 K/UL — SIGNIFICANT CHANGE UP (ref 0–0.5)
EOSINOPHIL NFR BLD AUTO: 0.4 % — SIGNIFICANT CHANGE UP (ref 0–6)
GLUCOSE SERPL-MCNC: 187 MG/DL — HIGH (ref 70–99)
GLUCOSE UR QL: 250
HCT VFR BLD CALC: 47.1 % — SIGNIFICANT CHANGE UP (ref 39–50)
HGB BLD-MCNC: 15.4 G/DL — SIGNIFICANT CHANGE UP (ref 13–17)
IMM GRANULOCYTES NFR BLD AUTO: 0.3 % — SIGNIFICANT CHANGE UP (ref 0–1.5)
KETONES UR-MCNC: 15 MG/DL
LEUKOCYTE ESTERASE UR-ACNC: NEGATIVE — SIGNIFICANT CHANGE UP
LIDOCAIN IGE QN: 24 U/L — SIGNIFICANT CHANGE UP (ref 7–60)
LITHIUM SERPL-MCNC: 0.21 MMOL/L — LOW (ref 0.6–1.2)
LYMPHOCYTES # BLD AUTO: 1.66 K/UL — SIGNIFICANT CHANGE UP (ref 1–3.3)
LYMPHOCYTES # BLD AUTO: 13.7 % — SIGNIFICANT CHANGE UP (ref 13–44)
MAGNESIUM SERPL-MCNC: 1.8 MG/DL — SIGNIFICANT CHANGE UP (ref 1.6–2.6)
MCHC RBC-ENTMCNC: 29.9 PG — SIGNIFICANT CHANGE UP (ref 27–34)
MCHC RBC-ENTMCNC: 32.7 GM/DL — SIGNIFICANT CHANGE UP (ref 32–36)
MCV RBC AUTO: 91.5 FL — SIGNIFICANT CHANGE UP (ref 80–100)
MONOCYTES # BLD AUTO: 0.7 K/UL — SIGNIFICANT CHANGE UP (ref 0–0.9)
MONOCYTES NFR BLD AUTO: 5.8 % — SIGNIFICANT CHANGE UP (ref 2–14)
NEUTROPHILS # BLD AUTO: 9.61 K/UL — HIGH (ref 1.8–7.4)
NEUTROPHILS NFR BLD AUTO: 79.3 % — HIGH (ref 43–77)
NITRITE UR-MCNC: NEGATIVE — SIGNIFICANT CHANGE UP
NRBC # BLD: 0 /100 WBCS — SIGNIFICANT CHANGE UP (ref 0–0)
PH UR: 6.5 — SIGNIFICANT CHANGE UP (ref 5–8)
PLATELET # BLD AUTO: 282 K/UL — SIGNIFICANT CHANGE UP (ref 150–400)
POTASSIUM SERPL-MCNC: 4 MMOL/L — SIGNIFICANT CHANGE UP (ref 3.5–5.3)
POTASSIUM SERPL-SCNC: 4 MMOL/L — SIGNIFICANT CHANGE UP (ref 3.5–5.3)
PROT SERPL-MCNC: 7.7 G/DL — SIGNIFICANT CHANGE UP (ref 6–8.3)
PROT UR-MCNC: NEGATIVE MG/DL — SIGNIFICANT CHANGE UP
RBC # BLD: 5.15 M/UL — SIGNIFICANT CHANGE UP (ref 4.2–5.8)
RBC # FLD: 13.2 % — SIGNIFICANT CHANGE UP (ref 10.3–14.5)
SARS-COV-2 RNA SPEC QL NAA+PROBE: NEGATIVE — SIGNIFICANT CHANGE UP
SODIUM SERPL-SCNC: 137 MMOL/L — SIGNIFICANT CHANGE UP (ref 135–145)
SP GR SPEC: 1.02 — SIGNIFICANT CHANGE UP (ref 1–1.03)
TROPONIN T SERPL-MCNC: 0.01 NG/ML — SIGNIFICANT CHANGE UP (ref 0–0.01)
TROPONIN T SERPL-MCNC: <0.01 NG/ML — SIGNIFICANT CHANGE UP (ref 0–0.01)
UROBILINOGEN FLD QL: 0.2 E.U./DL — SIGNIFICANT CHANGE UP
VALPROATE SERPL-MCNC: 57 UG/ML — SIGNIFICANT CHANGE UP (ref 50–100)
WBC # BLD: 12.12 K/UL — HIGH (ref 3.8–10.5)
WBC # FLD AUTO: 12.12 K/UL — HIGH (ref 3.8–10.5)

## 2022-06-19 PROCEDURE — 99285 EMERGENCY DEPT VISIT HI MDM: CPT | Mod: 25

## 2022-06-19 PROCEDURE — 71045 X-RAY EXAM CHEST 1 VIEW: CPT | Mod: 26

## 2022-06-19 PROCEDURE — 76705 ECHO EXAM OF ABDOMEN: CPT | Mod: 26

## 2022-06-19 PROCEDURE — 74177 CT ABD & PELVIS W/CONTRAST: CPT | Mod: 26,MG

## 2022-06-19 PROCEDURE — G1004: CPT

## 2022-06-19 RX ORDER — MORPHINE SULFATE 50 MG/1
2 CAPSULE, EXTENDED RELEASE ORAL ONCE
Refills: 0 | Status: DISCONTINUED | OUTPATIENT
Start: 2022-06-19 | End: 2022-06-19

## 2022-06-19 RX ORDER — FAMOTIDINE 10 MG/ML
20 INJECTION INTRAVENOUS ONCE
Refills: 0 | Status: COMPLETED | OUTPATIENT
Start: 2022-06-19 | End: 2022-06-19

## 2022-06-19 RX ORDER — DIATRIZOATE MEGLUMINE 180 MG/ML
30 INJECTION, SOLUTION INTRAVESICAL ONCE
Refills: 0 | Status: COMPLETED | OUTPATIENT
Start: 2022-06-19 | End: 2022-06-19

## 2022-06-19 RX ORDER — FAMOTIDINE 10 MG/ML
1 INJECTION INTRAVENOUS
Qty: 14 | Refills: 0
Start: 2022-06-19 | End: 2022-07-02

## 2022-06-19 RX ORDER — ONDANSETRON 8 MG/1
4 TABLET, FILM COATED ORAL ONCE
Refills: 0 | Status: COMPLETED | OUTPATIENT
Start: 2022-06-19 | End: 2022-06-19

## 2022-06-19 RX ADMIN — MORPHINE SULFATE 2 MILLIGRAM(S): 50 CAPSULE, EXTENDED RELEASE ORAL at 06:28

## 2022-06-19 RX ADMIN — MORPHINE SULFATE 2 MILLIGRAM(S): 50 CAPSULE, EXTENDED RELEASE ORAL at 08:25

## 2022-06-19 RX ADMIN — FAMOTIDINE 20 MILLIGRAM(S): 10 INJECTION INTRAVENOUS at 05:21

## 2022-06-19 RX ADMIN — ONDANSETRON 4 MILLIGRAM(S): 8 TABLET, FILM COATED ORAL at 05:21

## 2022-06-19 RX ADMIN — DIATRIZOATE MEGLUMINE 30 MILLILITER(S): 180 INJECTION, SOLUTION INTRAVESICAL at 07:01

## 2022-06-19 NOTE — ED PROVIDER NOTE - CARE PROVIDERS DIRECT ADDRESSES
,[email or web address]_[email or web address]

## 2022-06-19 NOTE — ED PROVIDER NOTE - CLINICAL SUMMARY MEDICAL DECISION MAKING FREE TEXT BOX
Several hours epigastric pain.  Tenderness to epigastric region on exam.  Several episodes vomiting.  Recent PCI.  Not having CP or SOB; EKG nonischemic and unchanged from recent.  Plan: Labs, RUQ US, CXR.  Consider CT scan if US negative.  Trial GI meds.

## 2022-06-19 NOTE — ED PROVIDER NOTE - PATIENT PORTAL LINK FT
You can access the FollowMyHealth Patient Portal offered by [facility] by registering at the following website: [email or web address]. By joining [facility]’s FollowMyHealth portal, you will also be able to view your health information using other applications (apps) compatible with our system.

## 2022-06-19 NOTE — ED ADULT NURSE NOTE - OBJECTIVE STATEMENT
61y Male A&OX4 from home c/o Midsternal chest pain and generalized abd pain x3 hours. Pt endorses nausea with multiple episodes of vomiting today "More than 6". Emeses what patient had to eat. Pt endorses constipation for 3 days. Pt s/p cardiac stent placement. Denies sob, dizziness, cough, fever, chills, nor night sweats.

## 2022-06-19 NOTE — ED PROVIDER NOTE - ATTENDING APP SHARED VISIT CONTRIBUTION OF CARE
epigastric pain with n/v. recent cardiac stent. ecg non ischemic. labs/us obtained. eval gallstones/ cholecystitis. may need ct if us wnl. r/o obstruction. r/o acs. signed out to Dr. Vivar/ NAYA Nevarez pending results/ reassessment

## 2022-06-19 NOTE — ED PROVIDER NOTE - OBJECTIVE STATEMENT
71 m PMHx DM, bipolar, vertigo, recent admission for CAD cardiac cath with YEHUDA x 2 on June 6th; tonight was laying in bed when he started to become nauseated and vomited apx 5-7 times in the past 3-4 hours.  Emesis initially gastric food contents with small amt yellow emesis at the end. No hematemesis/coffee ground emesis.  He has epigastric pain, poorly described.  Took Tylenol without relief.  Has been constipated past few days.  No pain in chest, palpitations, SOB, leg swelling.  No bloody stool or melena.  No urinary symptoms.  No fever or chills.

## 2022-06-19 NOTE — ED PROVIDER NOTE - PHYSICAL EXAMINATION
GENERAL:  NAD  HEAD: NCAT  EYE: anicteric  ENT: MMM  CV:  DCID8P3, no MRG  PULM: CTAB  GI: + epigastric tenderness  :  SKIN: normal color and turgor  MSK: no leg swelling or calf tenderness  NEURO: alert, clear sensorium. speech clear. FERNANDEZ.

## 2022-06-19 NOTE — ED PROVIDER NOTE - NSFOLLOWUPINSTRUCTIONS_ED_ALL_ED_FT
Continue all home medications as prescribed    Start famotidine as prescribed    Make sure to stay well hydrated    Follow up with surgery specialist for gallstones and gastroenterologist for acid reflux/esophagitis  You may call our referrals coordinator at [phone] Monday to Friday 11am-7pm for assistance with making an appointment    Gastritis    Gastritis is soreness and swelling (inflammation) of the lining of the stomach. Gastritis can develop as a sudden onset (acute) or long-term (chronic) condition. If gastritis is not treated, it can lead to stomach bleeding and ulcers. Causes include viral and bacterial infections, excessive alcohol consumption, tobacco use, or certain medications. Symptoms include nausea, vomiting, or abdominal pain or burning especially after eating. Avoid foods or drinks that make your symptoms worse such as caffeine, chocolate, spicy foods, acidic foods, or alcohol.    SEEK IMMEDIATE MEDICAL CARE IF YOU HAVE ANY OF THE FOLLOWING SYMPTOMS: black or bloody stools, blood or coffee-ground-colored vomitus, worsening abdominal pain, fever, or inability to keep fluids down.      Cholelithiasis       Cholelithiasis is a disease in which gallstones form in the gallbladder. The gallbladder is an organ that stores bile. Bile is a fluid that helps to digest fats. Gallstones begin as small crystals and can slowly grow into stones. They may cause no symptoms until they block the gallbladder duct, or cystic duct, when the gallbladder tightens (contracts) after food is eaten. This can cause pain and is known as a gallbladder attack, or biliary colic.    There are two main types of gallstones:  •Cholesterol stones. These are the most common type of gallstone. These stones are made of hardened cholesterol and are usually yellow-green in color. Cholesterol is a fat-like substance that is made in the liver.      •Pigment stones. These are dark in color and are made of a red-yellow substance, called bilirubin,that forms when hemoglobin from red blood cells breaks down.        What are the causes?    This condition may be caused by an imbalance in the different parts that make bile. This can happen if the bile:  •Has too much bilirubin. This can happen in certain blood diseases, such as sickle cell anemia.      •Has too much cholesterol.      •Does not have enough bile salts. These salts help the body absorb and digest fats.      In some cases, this condition can also be caused by the gallbladder not emptying completely or often enough. This is common during pregnancy.      What increases the risk?    The following factors may make you more likely to develop this condition:  •Being female.      •Having multiple pregnancies. Health care providers sometimes advise removing diseased gallbladders before future pregnancies.      •Eating a diet that is heavy in fried foods, fat, and refined carbohydrates, such as white bread and white rice.      •Being obese.      •Being older than age 40.      •Using medicines that contain female hormones (estrogen) for a long time.      •Losing weight quickly.      •Having a family history of gallstones.    •Having certain medical problems, such as:  •Diabetes mellitus.      •Cystic fibrosis.      •Crohn's disease.      •Cirrhosis or other long-term (chronic) liver disease.      •Certain blood diseases, such as sickle cell anemia or leukemia.          What are the signs or symptoms?    In many cases, having gallstones causes no symptoms. When you have gallstones but do not have symptoms, you have silent gallstones. If a gallstone blocks your bile duct, it can cause a gallbladder attack. The main symptom of a gallbladder attack is sudden pain in the upper right part of the abdomen. The pain:  •Usually comes at night or after eating.       •Can last for one hour or more.      •Can spread to your right shoulder, back, or chest.      •Can feel like indigestion. This is discomfort, burning, or fullness in your upper abdomen.      If the bile duct is blocked for more than a few hours, it can cause an infection or inflammation of your gallbladder (cholecystitis), liver, or pancreas. This can cause:  •Nausea or vomiting.      •Bloating.      •Pain in your abdomen that lasts for 5 hours or longer.      •Tenderness in your upper abdomen, often in the upper right section and under your rib cage.      •Fever or chills.      •Skin or the white parts of your eyes turning yellow (jaundice). This usually happens when a stone has blocked bile from passing through the common bile duct.      •Dark urine or light-colored stools.        How is this diagnosed?    This condition may be diagnosed based on:  •A physical exam.      •Your medical history.      •Ultrasound.      •CT scan.      •MRI.      You may also have other tests, including:  •Blood tests to check for signs of an infection or inflammation.      •Cholescintigraphy, or HIDA scan. This is a scan of your gallbladder and bile ducts (biliary system) using non-harmful radioactive material and special cameras that can see the radioactive material.      •Endoscopic retrograde cholangiopancreatogram. This involves inserting a small tube with a camera on the end (endoscope) through your mouth to look at bile ducts and check for blockages.        How is this treated?    Treatment for this condition depends on the severity of the condition. Silent gallstones do not need treatment. Treatment may be needed if a blockage causes a gallbladder attack or other symptoms. Treatment may include:•Home care, if symptoms are not severe.  •During a simple gallbladder attack, stop eating and drinking for 12–24 hours (except for water and clear liquids). This helps to "cool down" your gallbladder. After 1 or 2 days, you can start to eat a diet of simple or clear foods, such as broths and crackers.      •You may also need medicines for pain or nausea or both.       •If you have cholecystitis and an infection, you will need antibiotics.        •A hospital stay, if needed for pain control or for cholecystitis with severe infection.      •Cholecystectomy, or surgery to remove your gallbladder. This is the most common treatment if all other treatments have not worked.      •Medicines to break up gallstones. These are most effective at treating small gallstones. Medicines may be used for up to 6–12 months.      •Endoscopic retrograde cholangiopancreatogram. A small basket can be attached to the endoscope and used to capture and remove gallstones, mainly those that are in the common bile duct.        Follow these instructions at home:    Medicines     •Take over-the-counter and prescription medicines only as told by your health care provider.      •If you were prescribed an antibiotic medicine, take it as told by your health care provider. Do not stop taking the antibiotic even if you start to feel better.      •Ask your health care provider if the medicine prescribed to you requires you to avoid driving or using machinery.      Eating and drinking     •Drink enough fluid to keep your urine pale yellow. This is important during a gallbladder attack. Water and clear liquids are preferred.    •Follow a healthy diet. This includes:  •Reducing fatty foods, such as fried food and foods high in cholesterol.      •Reducing refined carbohydrates, such as white bread and white rice.      •Eating more fiber. Aim for foods such as almonds, fruit, and beans.        Alcohol use   •If you drink alcohol:•Limit how much you use to:  •0–1 drink a day for nonpregnant women.      •0–2 drinks a day for men.        •Be aware of how much alcohol is in your drink. In the [place] one drink equals one 12 oz bottle of beer (355 mL), one 5 oz glass of wine (148 mL), or one 1½ oz glass of hard liquor (44 mL).        General instructions     • Do not use any products that contain nicotine or tobacco, such as cigarettes, e-cigarettes, and chewing tobacco. If you need help quitting, ask your health care provider.      •Maintain a healthy weight.      •Keep all follow-up visits as told by your health care provider. These may include consultations with a surgeon or specialist. This is important.        Where to find more information    •[place] of Diabetes and Digestive and Kidney Diseases: www.niddk.nih.gov        Contact a health care provider if:    •You think you have had a gallbladder attack.      •You have been diagnosed with silent gallstones and you develop pain in your abdomen or indigestion.      •You begin to have attacks more often.      •You have dark urine or light-colored stools.        Get help right away if:    •You have pain from a gallbladder attack that lasts for more than 2 hours.      •You have pain in your abdomen that lasts for more than 5 hours or is getting worse.      •You have a fever or chills.      •You have nausea and vomiting that do not go away.      •You develop jaundice.        Summary    •Cholelithiasis is a disease in which gallstones form in the gallbladder.      •This condition may be caused by an imbalance in the different parts that make bile. This can happen if your bile has too much bilirubin or cholesterol, or does not have enough bile salts.      •Treatment for gallstones depends on the severity of the condition. Silent gallstones do not need treatment.      •If gallstones cause a gallbladder attack or other symptoms, treatment usually involves not eating or drinking anything. Treatment may also include pain medicines and antibiotics, and it sometimes includes a hospital stay.      •Surgery to remove the gallbladder is common if all other treatments have not worked.      This information is not intended to replace advice given to you by your health care provider. Make sure you discuss any questions you have with your health care provider.

## 2022-06-19 NOTE — ED PROVIDER NOTE - CARE PROVIDER_API CALL
Michelle Vallejo)  Surgery  [address], [address]  [place] [address]  Phone: [phone]  Fax: [phone]  Follow Up Time:     Christiano Holland)  Medicine  [address] [place] [address]  Phone: [phone]  Fax: [phone]  Follow Up Time:

## 2022-06-19 NOTE — ED ADULT TRIAGE NOTE - CHIEF COMPLAINT QUOTE
Pt presents to ED c/o chest pain x 4 hours. Had cardiac stent placed 1 week ago. Also reporting generalized abdominal discomfort and constipation x 3 days. 12 lead ekg initiated in triage.

## 2022-06-19 NOTE — ED PROVIDER NOTE - PROGRESS NOTE DETAILS
pt received at signout pending CT scan for epigastric abd pain.  sonogram showed cholelithasis, CT also w/ cholelithiasis and sxs of esophagitis and gerd, no other acute pathology.  rpt trop neg.  pt feeling better s/p meds.  will dc w/ famotidine and have f/u with surg and GI outpt.  discussed strict return parameters

## 2022-06-21 ENCOUNTER — APPOINTMENT (OUTPATIENT)
Dept: SURGICAL ONCOLOGY | Facility: CLINIC | Age: 72
End: 2022-06-21

## 2022-06-21 NOTE — HISTORY OF PRESENT ILLNESS
[de-identified] : Patient Name: KRYSTYNA LYNN \par MRN: [id number] \par Mario MRN:\par Referring Provider: none \par Oncologist:\par Date: 06/21/2022 \par \par Diagnosis: cholelithiasis\par \par 71 year male  presents for evaluation of gallstones. He was seen in the ED on 6/19 for complaints of chest pain, nausea and vomiting. Imaging revealed cholelithiasis.\par \par Of note, he was hospitalized on 6/6/22 for CAD and underwent a cardiac cath s/p 2 stent placements. On aspirin and Plavix daily.\par Currently, Mr. LYNN []  abdominal pain and discomfort, [] having decreased appetite, and [] nausea or vomiting. He [] changes in bowel habits and [] recent unintentional weight loss. He [] fevers, chills, or night sweats.\par \par Functional Status: Mr. LYNN is able to [] without fatigue or dyspnea.\par \par He [] genetic testing\par

## 2022-06-21 NOTE — RESULTS/DATA
[FreeTextEntry1] : Diagnostic Studies\par \par Date: 6/19/22\par Study: CT AP WWO ([facility])\par Results:  \par IMPRESSION:\par Cholelithiasis. No evidence of acute cholecystitis.\par  Normal appendix.\par  No bowel obstruction.\par \par Date: 6/19/22\par Study: US Abdomen ([facility])\par Results:IMPRESSION:\par Cholelithiasis without evidence of acute cholecystitis.\par Bile ducts: Normal caliber. Common bile duct measures 5 mm.\par Gallbladder: Cholelithiasis. Large 4.4 cm solitary dependent stone within \par the gallbladder. Could not assess for mobility of calculus as patient \par could not turn/be turned.  Gallbladder borderline distended. No \par gallbladder wall thickening. No pericholecystic fluid.\par Pancreas: Visualized portions appear unremarkable. Limited pancreatic \par tail.\par \par Labs:\par Date: 6/19/22\par Results: WBC 12.12, TBili 0.3, Alk Phos 80, AST 21, ALT 16, Lipase 24\par \par Pathology:\par \par Date:\par Results:\par \par

## 2022-06-22 ENCOUNTER — INPATIENT (INPATIENT)
Facility: HOSPITAL | Age: 72
LOS: 6 days | Discharge: NURSING, REHAB OR LONG-TERM CARE | DRG: 446 | End: 2022-06-29
Attending: SURGERY | Admitting: SURGERY
Payer: MEDICARE

## 2022-06-22 VITALS
OXYGEN SATURATION: 95 % | HEART RATE: 84 BPM | TEMPERATURE: 98 F | RESPIRATION RATE: 16 BRPM | HEIGHT: 69 IN | SYSTOLIC BLOOD PRESSURE: 146 MMHG | DIASTOLIC BLOOD PRESSURE: 86 MMHG | WEIGHT: 199.96 LBS

## 2022-06-22 DIAGNOSIS — Z90.89 ACQUIRED ABSENCE OF OTHER ORGANS: Chronic | ICD-10-CM

## 2022-06-22 LAB
ALBUMIN SERPL ELPH-MCNC: 3.9 G/DL — SIGNIFICANT CHANGE UP (ref 3.3–5)
ALP SERPL-CCNC: 76 U/L — SIGNIFICANT CHANGE UP (ref 40–120)
ALT FLD-CCNC: 24 U/L — SIGNIFICANT CHANGE UP (ref 10–45)
ANION GAP SERPL CALC-SCNC: 11 MMOL/L — SIGNIFICANT CHANGE UP (ref 5–17)
ANISOCYTOSIS BLD QL: SLIGHT — SIGNIFICANT CHANGE UP
APTT BLD: 34.2 SEC — SIGNIFICANT CHANGE UP (ref 27.5–35.5)
AST SERPL-CCNC: 26 U/L — SIGNIFICANT CHANGE UP (ref 10–40)
BASE EXCESS BLDV CALC-SCNC: 4.5 MMOL/L — HIGH (ref -2–3)
BASOPHILS # BLD AUTO: 0 K/UL — SIGNIFICANT CHANGE UP (ref 0–0.2)
BASOPHILS NFR BLD AUTO: 0 % — SIGNIFICANT CHANGE UP (ref 0–2)
BILIRUB SERPL-MCNC: 0.5 MG/DL — SIGNIFICANT CHANGE UP (ref 0.2–1.2)
BUN SERPL-MCNC: 20 MG/DL — SIGNIFICANT CHANGE UP (ref 7–23)
CA-I SERPL-SCNC: 1.23 MMOL/L — SIGNIFICANT CHANGE UP (ref 1.15–1.33)
CALCIUM SERPL-MCNC: 9.8 MG/DL — SIGNIFICANT CHANGE UP (ref 8.4–10.5)
CHLORIDE SERPL-SCNC: 97 MMOL/L — SIGNIFICANT CHANGE UP (ref 96–108)
CO2 BLDV-SCNC: 32.9 MMOL/L — HIGH (ref 22–26)
CO2 SERPL-SCNC: 28 MMOL/L — SIGNIFICANT CHANGE UP (ref 22–31)
CREAT SERPL-MCNC: 0.93 MG/DL — SIGNIFICANT CHANGE UP (ref 0.5–1.3)
EGFR: 88 ML/MIN/1.73M2 — SIGNIFICANT CHANGE UP
EOSINOPHIL # BLD AUTO: 0 K/UL — SIGNIFICANT CHANGE UP (ref 0–0.5)
EOSINOPHIL NFR BLD AUTO: 0 % — SIGNIFICANT CHANGE UP (ref 0–6)
GAS PNL BLDV: 133 MMOL/L — LOW (ref 136–145)
GAS PNL BLDV: SIGNIFICANT CHANGE UP
GIANT PLATELETS BLD QL SMEAR: PRESENT — SIGNIFICANT CHANGE UP
GLUCOSE SERPL-MCNC: 286 MG/DL — HIGH (ref 70–99)
HCO3 BLDV-SCNC: 31 MMOL/L — HIGH (ref 22–29)
HCT VFR BLD CALC: 45.7 % — SIGNIFICANT CHANGE UP (ref 39–50)
HGB BLD-MCNC: 15 G/DL — SIGNIFICANT CHANGE UP (ref 13–17)
INR BLD: 1.2 — HIGH (ref 0.88–1.16)
LACTATE SERPL-SCNC: 1.6 MMOL/L — SIGNIFICANT CHANGE UP (ref 0.5–2)
LIDOCAIN IGE QN: 11 U/L — SIGNIFICANT CHANGE UP (ref 7–60)
LYMPHOCYTES # BLD AUTO: 1.11 K/UL — SIGNIFICANT CHANGE UP (ref 1–3.3)
LYMPHOCYTES # BLD AUTO: 5.2 % — LOW (ref 13–44)
MACROCYTES BLD QL: SLIGHT — SIGNIFICANT CHANGE UP
MANUAL SMEAR VERIFICATION: SIGNIFICANT CHANGE UP
MCHC RBC-ENTMCNC: 29.8 PG — SIGNIFICANT CHANGE UP (ref 27–34)
MCHC RBC-ENTMCNC: 32.8 GM/DL — SIGNIFICANT CHANGE UP (ref 32–36)
MCV RBC AUTO: 90.9 FL — SIGNIFICANT CHANGE UP (ref 80–100)
METAMYELOCYTES # FLD: 0.9 % — HIGH (ref 0–0)
MICROCYTES BLD QL: SLIGHT — SIGNIFICANT CHANGE UP
MONOCYTES # BLD AUTO: 0.75 K/UL — SIGNIFICANT CHANGE UP (ref 0–0.9)
MONOCYTES NFR BLD AUTO: 3.5 % — SIGNIFICANT CHANGE UP (ref 2–14)
NEUTROPHILS # BLD AUTO: 19.26 K/UL — HIGH (ref 1.8–7.4)
NEUTROPHILS NFR BLD AUTO: 90.4 % — HIGH (ref 43–77)
PCO2 BLDV: 54 MMHG — SIGNIFICANT CHANGE UP (ref 42–55)
PH BLDV: 7.37 — SIGNIFICANT CHANGE UP (ref 7.32–7.43)
PLAT MORPH BLD: ABNORMAL
PLATELET # BLD AUTO: 305 K/UL — SIGNIFICANT CHANGE UP (ref 150–400)
PO2 BLDV: <29 MMHG — SIGNIFICANT CHANGE UP (ref 25–45)
POLYCHROMASIA BLD QL SMEAR: SLIGHT — SIGNIFICANT CHANGE UP
POTASSIUM BLDV-SCNC: 4.9 MMOL/L — SIGNIFICANT CHANGE UP (ref 3.5–5.1)
POTASSIUM SERPL-MCNC: 5 MMOL/L — SIGNIFICANT CHANGE UP (ref 3.5–5.3)
POTASSIUM SERPL-SCNC: 5 MMOL/L — SIGNIFICANT CHANGE UP (ref 3.5–5.3)
PROT SERPL-MCNC: 7.7 G/DL — SIGNIFICANT CHANGE UP (ref 6–8.3)
PROTHROM AB SERPL-ACNC: 14.3 SEC — HIGH (ref 10.5–13.4)
RBC # BLD: 5.03 M/UL — SIGNIFICANT CHANGE UP (ref 4.2–5.8)
RBC # FLD: 13.8 % — SIGNIFICANT CHANGE UP (ref 10.3–14.5)
RBC BLD AUTO: ABNORMAL
SAO2 % BLDV: 38.3 % — LOW (ref 67–88)
SARS-COV-2 RNA SPEC QL NAA+PROBE: NEGATIVE — SIGNIFICANT CHANGE UP
SODIUM SERPL-SCNC: 136 MMOL/L — SIGNIFICANT CHANGE UP (ref 135–145)
SPHEROCYTES BLD QL SMEAR: SLIGHT — SIGNIFICANT CHANGE UP
WBC # BLD: 21.31 K/UL — HIGH (ref 3.8–10.5)
WBC # FLD AUTO: 21.31 K/UL — HIGH (ref 3.8–10.5)

## 2022-06-22 PROCEDURE — 99285 EMERGENCY DEPT VISIT HI MDM: CPT

## 2022-06-22 PROCEDURE — 74177 CT ABD & PELVIS W/CONTRAST: CPT | Mod: 26,QQ

## 2022-06-22 PROCEDURE — 99223 1ST HOSP IP/OBS HIGH 75: CPT

## 2022-06-22 PROCEDURE — 99053 MED SERV 10PM-8AM 24 HR FAC: CPT

## 2022-06-22 PROCEDURE — 76705 ECHO EXAM OF ABDOMEN: CPT | Mod: 26

## 2022-06-22 RX ORDER — PANTOPRAZOLE SODIUM 20 MG/1
40 TABLET, DELAYED RELEASE ORAL
Refills: 0 | Status: DISCONTINUED | OUTPATIENT
Start: 2022-06-22 | End: 2022-06-29

## 2022-06-22 RX ORDER — LAMOTRIGINE 25 MG/1
100 TABLET, ORALLY DISINTEGRATING ORAL ONCE
Refills: 0 | Status: COMPLETED | OUTPATIENT
Start: 2022-06-22 | End: 2022-06-22

## 2022-06-22 RX ORDER — PIPERACILLIN AND TAZOBACTAM 4; .5 G/20ML; G/20ML
3.38 INJECTION, POWDER, LYOPHILIZED, FOR SOLUTION INTRAVENOUS ONCE
Refills: 0 | Status: COMPLETED | OUTPATIENT
Start: 2022-06-22 | End: 2022-06-22

## 2022-06-22 RX ORDER — HYDROMORPHONE HYDROCHLORIDE 2 MG/ML
1 INJECTION INTRAMUSCULAR; INTRAVENOUS; SUBCUTANEOUS EVERY 6 HOURS
Refills: 0 | Status: DISCONTINUED | OUTPATIENT
Start: 2022-06-22 | End: 2022-06-23

## 2022-06-22 RX ORDER — DEXTROSE 50 % IN WATER 50 %
25 SYRINGE (ML) INTRAVENOUS ONCE
Refills: 0 | Status: DISCONTINUED | OUTPATIENT
Start: 2022-06-22 | End: 2022-06-29

## 2022-06-22 RX ORDER — HEPARIN SODIUM 5000 [USP'U]/ML
5000 INJECTION INTRAVENOUS; SUBCUTANEOUS EVERY 8 HOURS
Refills: 0 | Status: DISCONTINUED | OUTPATIENT
Start: 2022-06-22 | End: 2022-06-23

## 2022-06-22 RX ORDER — IOHEXOL 300 MG/ML
30 INJECTION, SOLUTION INTRAVENOUS ONCE
Refills: 0 | Status: DISCONTINUED | OUTPATIENT
Start: 2022-06-22 | End: 2022-06-22

## 2022-06-22 RX ORDER — INSULIN LISPRO 100/ML
VIAL (ML) SUBCUTANEOUS AT BEDTIME
Refills: 0 | Status: DISCONTINUED | OUTPATIENT
Start: 2022-06-22 | End: 2022-06-27

## 2022-06-22 RX ORDER — DEXTROSE 50 % IN WATER 50 %
12.5 SYRINGE (ML) INTRAVENOUS ONCE
Refills: 0 | Status: DISCONTINUED | OUTPATIENT
Start: 2022-06-22 | End: 2022-06-29

## 2022-06-22 RX ORDER — INSULIN LISPRO 100/ML
VIAL (ML) SUBCUTANEOUS EVERY 6 HOURS
Refills: 0 | Status: DISCONTINUED | OUTPATIENT
Start: 2022-06-22 | End: 2022-06-23

## 2022-06-22 RX ORDER — HYDROMORPHONE HYDROCHLORIDE 2 MG/ML
0.5 INJECTION INTRAMUSCULAR; INTRAVENOUS; SUBCUTANEOUS EVERY 6 HOURS
Refills: 0 | Status: DISCONTINUED | OUTPATIENT
Start: 2022-06-22 | End: 2022-06-24

## 2022-06-22 RX ORDER — SODIUM CHLORIDE 9 MG/ML
1000 INJECTION, SOLUTION INTRAVENOUS
Refills: 0 | Status: DISCONTINUED | OUTPATIENT
Start: 2022-06-22 | End: 2022-06-29

## 2022-06-22 RX ORDER — ONDANSETRON 8 MG/1
4 TABLET, FILM COATED ORAL ONCE
Refills: 0 | Status: COMPLETED | OUTPATIENT
Start: 2022-06-22 | End: 2022-06-22

## 2022-06-22 RX ORDER — SODIUM CHLORIDE 9 MG/ML
1000 INJECTION INTRAMUSCULAR; INTRAVENOUS; SUBCUTANEOUS ONCE
Refills: 0 | Status: COMPLETED | OUTPATIENT
Start: 2022-06-22 | End: 2022-06-22

## 2022-06-22 RX ORDER — ASPIRIN/CALCIUM CARB/MAGNESIUM 324 MG
81 TABLET ORAL DAILY
Refills: 0 | Status: DISCONTINUED | OUTPATIENT
Start: 2022-06-22 | End: 2022-06-29

## 2022-06-22 RX ORDER — ACETAMINOPHEN 500 MG
650 TABLET ORAL ONCE
Refills: 0 | Status: COMPLETED | OUTPATIENT
Start: 2022-06-22 | End: 2022-06-22

## 2022-06-22 RX ORDER — DEXTROSE 50 % IN WATER 50 %
15 SYRINGE (ML) INTRAVENOUS ONCE
Refills: 0 | Status: DISCONTINUED | OUTPATIENT
Start: 2022-06-22 | End: 2022-06-29

## 2022-06-22 RX ORDER — CLOPIDOGREL BISULFATE 75 MG/1
75 TABLET, FILM COATED ORAL ONCE
Refills: 0 | Status: COMPLETED | OUTPATIENT
Start: 2022-06-22 | End: 2022-06-22

## 2022-06-22 RX ORDER — LAMOTRIGINE 25 MG/1
150 TABLET, ORALLY DISINTEGRATING ORAL ONCE
Refills: 0 | Status: COMPLETED | OUTPATIENT
Start: 2022-06-22 | End: 2022-06-22

## 2022-06-22 RX ORDER — SODIUM CHLORIDE 9 MG/ML
1000 INJECTION, SOLUTION INTRAVENOUS
Refills: 0 | Status: DISCONTINUED | OUTPATIENT
Start: 2022-06-22 | End: 2022-06-23

## 2022-06-22 RX ORDER — GLUCAGON INJECTION, SOLUTION 0.5 MG/.1ML
1 INJECTION, SOLUTION SUBCUTANEOUS ONCE
Refills: 0 | Status: DISCONTINUED | OUTPATIENT
Start: 2022-06-22 | End: 2022-06-29

## 2022-06-22 RX ORDER — ONDANSETRON 8 MG/1
4 TABLET, FILM COATED ORAL EVERY 8 HOURS
Refills: 0 | Status: DISCONTINUED | OUTPATIENT
Start: 2022-06-22 | End: 2022-06-29

## 2022-06-22 RX ORDER — DIATRIZOATE MEGLUMINE 180 MG/ML
30 INJECTION, SOLUTION INTRAVESICAL ONCE
Refills: 0 | Status: COMPLETED | OUTPATIENT
Start: 2022-06-22 | End: 2022-06-22

## 2022-06-22 RX ADMIN — ONDANSETRON 4 MILLIGRAM(S): 8 TABLET, FILM COATED ORAL at 17:52

## 2022-06-22 RX ADMIN — Medication 650 MILLIGRAM(S): at 17:52

## 2022-06-22 RX ADMIN — DIATRIZOATE MEGLUMINE 30 MILLILITER(S): 180 INJECTION, SOLUTION INTRAVESICAL at 19:08

## 2022-06-22 RX ADMIN — LAMOTRIGINE 150 MILLIGRAM(S): 25 TABLET, ORALLY DISINTEGRATING ORAL at 23:58

## 2022-06-22 RX ADMIN — SODIUM CHLORIDE 1000 MILLILITER(S): 9 INJECTION INTRAMUSCULAR; INTRAVENOUS; SUBCUTANEOUS at 22:31

## 2022-06-22 RX ADMIN — CLOPIDOGREL BISULFATE 75 MILLIGRAM(S): 75 TABLET, FILM COATED ORAL at 23:58

## 2022-06-22 RX ADMIN — LAMOTRIGINE 100 MILLIGRAM(S): 25 TABLET, ORALLY DISINTEGRATING ORAL at 23:59

## 2022-06-22 RX ADMIN — Medication 81 MILLIGRAM(S): at 23:57

## 2022-06-22 RX ADMIN — SODIUM CHLORIDE 1000 MILLILITER(S): 9 INJECTION INTRAMUSCULAR; INTRAVENOUS; SUBCUTANEOUS at 17:53

## 2022-06-22 NOTE — H&P ADULT - NSHPPHYSICALEXAM_GEN_ALL_CORE
ICU Vital Signs Last 24 Hrs  T(C): 36.8 (22 Jun 2022 16:24), Max: 36.8 (22 Jun 2022 16:24)  T(F): 98.2 (22 Jun 2022 16:24), Max: 98.2 (22 Jun 2022 16:24)  HR: 84 (22 Jun 2022 16:24) (84 - 84)  BP: 146/86 (22 Jun 2022 16:24) (146/86 - 146/86)  BP(mean): --  ABP: --  ABP(mean): --  RR: 16 (22 Jun 2022 16:24) (16 - 16)  SpO2: 95% (22 Jun 2022 16:24) (95% - 95%)    Physical Exam  General: NAD, resting comfortably in bed  Pulm: Nonlabored breathing, no respiratory distress  Abd: soft, ND, mid suprapubic tenderness  Neuro: A/O x 3, CNs II-XII grossly intact, no focal deficits, normal sensation

## 2022-06-22 NOTE — H&P ADULT - NSHPLABSRESULTS_GEN_ALL_CORE
15.0   21.31 )-----------( 305      ( 22 Jun 2022 17:54 )             45.7     06-22    136  |  97  |  20  ----------------------------<  286<H>  5.0   |  28  |  0.93    Ca    9.8      22 Jun 2022 17:54    TPro  7.7  /  Alb  3.9  /  TBili  0.5  /  DBili  x   /  AST  26  /  ALT  24  /  AlkPhos  76  06-22    < from: US Abdomen Limited (06.22.22 @ 20:26) >  Gallbladder: No interval change in a 4.3 cm mobile gallstone positioned   dependently within the gallbladder lumen.    < from: CT Abdomen and Pelvis w/ Oral Cont and w/ IV Cont (06.19.22 @ 09:01) >  Cholelithiasis. No evidence of acute cholecystitis.   Normal appendix.   No bowel obstruction.    CT today with no acute findings/changes

## 2022-06-22 NOTE — ED PROVIDER NOTE - OBJECTIVE STATEMENT
71M c PMH of DM, CAD (s/p stent), bipolar disorder PSH of tonsillectomy no abdominal surgical hx p/w NBNB n/v x 5 days. pt was seen in ED 6/19/22 where us GB showed cholelithiasis. pt was sent home to followup with surgery outpatient however did not and presents today with persistent NBNB n/v. some associated abdominal pain. last BM today wnl. no f/c/cough/cp/sob/diarrhea/dysuria.     pt advocate did call in advance stating that Dr. Manolo Ramirez (Surgeon) is aware of pt and wants pt evaluated by surgery team when in ED.

## 2022-06-22 NOTE — H&P ADULT - ASSESSMENT
71M, Yiddish speaking, with pmh of T2DM with neuropathy, bipolar disorder, vertigo, CAD s/p YEHUDA x2 (6/6/22, on DAPT), cholelithiasis, and no previous abdominal surgeries who was BIBEMS with several hour h/o n/v-nonbloody, bilious emesis for the past 5 days. HDS wnl. Labs notable for WBC 21.3 (90% PMNs), and  with reamaining labs wnl, including TBili 0.5, Lactate 1.6. CT A/P unremarkable.    Admit to general surgery team 1 under Dr. Carmona  Telemetry  NPO/IVF  Cards c/s- cassie recs  T+S  Analgesics PRN  Antiemetics PRN  Cef/flagyl IV  F/u AM labs  C/w home medications - ASA/plavix  mISS  IS/OOBA  VTE PPX with SCDs and SQH

## 2022-06-22 NOTE — H&P ADULT - HISTORY OF PRESENT ILLNESS
71M, Yiddish speaking, with pmh of T2DM with neuropathy, bipolar disorder, vertigo, CAD s/p YEHUDA x2 (6/6/22, on DAPT), cholelithiasis, and no previous abdominal surgeries who was BIBEMS with several hour h/o n/v-nonbloody, bilious emesis for the past 5 days. Emesis initially gastric food contents with small amt yellow emesis at the end. Reports nausea and vomiting is associated with left sided abdominal pain that was refractory to acetaminophen. Can not recall last BMs, endorses to passign some flatus. Denies f/c, CP, SOB, cough, palpitations, melena, hematochezia, dysuria, hematuria, weakness or pain in extremities.   Of note, pt was recently diagnosed with cholelithasis 6/19/22 and discharge home with oupt f/u with Dr. Carmona.     In the ED vitals were alls wnl. Labs notable for WBC 21.3 (90% PMNs), and  with reamaining labs wnl, including TBili 0.5, Lactate 1.6. CT A/P unremarkable. RUQ US w/ 4.3cm stone.    PMH: T2DM with neuropathy, bipolar disorder, vertigo, CAD s/p YEHUDA x2 (6/6/22, on DAPT), cholelithiasis  PSH: tonsillectomy  Allergies: NKDA  Medications Clonazepam 0.5mg, Lamictal 150mg, 100mg, ASA 81, Plavix, Divalproex Na 500mg, Sitagliptin-Metformin 50-500mg.Plavix.   SH: No h/o tobacco use, No EtOH use

## 2022-06-22 NOTE — ED ADULT TRIAGE NOTE - CHIEF COMPLAINT QUOTE
Pt BIBEMS from home for evaluation of vomiting x 5 episodes that began earlier today associated w/ mild abdominal pain. Denies fever, chills, CP, SOB, diarrhea, urinary sx.

## 2022-06-22 NOTE — ED PROVIDER NOTE - CLINICAL SUMMARY MEDICAL DECISION MAKING FREE TEXT BOX
71M c PMH of DM, CAD (s/p stent), bipolar disorder PSH of tonsillectomy no abdominal surgical hx p/w NBNB n/v x 5 days with associated abdominal pain, recently dx with cholelithiasis but had not followed up yet with outpatient general surgery. On exam, VS wnl, +mid suprapubic ttp with no rebound/guarding otherwise well appearing. concern for acute cholecystitis vs pancreatitis vs uti vs other intra-abdominal pathology. low concern for sbo given hx of normal BM and recently dx cholelithiasis.   Plan:   - labs, UA  - cxr  - zofran, tylenol, ivf  - d/w general surgery who will see pt in ED  - US GB  - reassess

## 2022-06-22 NOTE — ED ADULT NURSE NOTE - OBJECTIVE STATEMENT
70 y/o male  c/o vomiting x 5 episodes that began earlier today associated w/ mild abdominal pain. Denies fever, chills, CP, SOB, diarrhea, urinary sx.

## 2022-06-23 LAB
ALBUMIN SERPL ELPH-MCNC: 2.9 G/DL — LOW (ref 3.3–5)
ALP SERPL-CCNC: 88 U/L — SIGNIFICANT CHANGE UP (ref 40–120)
ALT FLD-CCNC: 28 U/L — SIGNIFICANT CHANGE UP (ref 10–45)
ANION GAP SERPL CALC-SCNC: 12 MMOL/L — SIGNIFICANT CHANGE UP (ref 5–17)
AST SERPL-CCNC: 37 U/L — SIGNIFICANT CHANGE UP (ref 10–40)
BILIRUB SERPL-MCNC: 0.3 MG/DL — SIGNIFICANT CHANGE UP (ref 0.2–1.2)
BLD GP AB SCN SERPL QL: NEGATIVE — SIGNIFICANT CHANGE UP
BUN SERPL-MCNC: 12 MG/DL — SIGNIFICANT CHANGE UP (ref 7–23)
CALCIUM SERPL-MCNC: 9 MG/DL — SIGNIFICANT CHANGE UP (ref 8.4–10.5)
CHLORIDE SERPL-SCNC: 102 MMOL/L — SIGNIFICANT CHANGE UP (ref 96–108)
CO2 SERPL-SCNC: 24 MMOL/L — SIGNIFICANT CHANGE UP (ref 22–31)
CREAT SERPL-MCNC: 0.7 MG/DL — SIGNIFICANT CHANGE UP (ref 0.5–1.3)
EGFR: 99 ML/MIN/1.73M2 — SIGNIFICANT CHANGE UP
GLUCOSE BLDC GLUCOMTR-MCNC: 144 MG/DL — HIGH (ref 70–99)
GLUCOSE BLDC GLUCOMTR-MCNC: 162 MG/DL — HIGH (ref 70–99)
GLUCOSE BLDC GLUCOMTR-MCNC: 168 MG/DL — HIGH (ref 70–99)
GLUCOSE BLDC GLUCOMTR-MCNC: 177 MG/DL — HIGH (ref 70–99)
GLUCOSE BLDC GLUCOMTR-MCNC: 192 MG/DL — HIGH (ref 70–99)
GLUCOSE BLDC GLUCOMTR-MCNC: 245 MG/DL — HIGH (ref 70–99)
GLUCOSE SERPL-MCNC: 141 MG/DL — HIGH (ref 70–99)
HCT VFR BLD CALC: 41.2 % — SIGNIFICANT CHANGE UP (ref 39–50)
HGB BLD-MCNC: 13.5 G/DL — SIGNIFICANT CHANGE UP (ref 13–17)
MAGNESIUM SERPL-MCNC: 2.1 MG/DL — SIGNIFICANT CHANGE UP (ref 1.6–2.6)
MCHC RBC-ENTMCNC: 30.1 PG — SIGNIFICANT CHANGE UP (ref 27–34)
MCHC RBC-ENTMCNC: 32.8 GM/DL — SIGNIFICANT CHANGE UP (ref 32–36)
MCV RBC AUTO: 92 FL — SIGNIFICANT CHANGE UP (ref 80–100)
NRBC # BLD: 0 /100 WBCS — SIGNIFICANT CHANGE UP (ref 0–0)
PHOSPHATE SERPL-MCNC: 3.4 MG/DL — SIGNIFICANT CHANGE UP (ref 2.5–4.5)
PLATELET # BLD AUTO: 268 K/UL — SIGNIFICANT CHANGE UP (ref 150–400)
POTASSIUM SERPL-MCNC: 4.1 MMOL/L — SIGNIFICANT CHANGE UP (ref 3.5–5.3)
POTASSIUM SERPL-SCNC: 4.1 MMOL/L — SIGNIFICANT CHANGE UP (ref 3.5–5.3)
PROT SERPL-MCNC: 6.6 G/DL — SIGNIFICANT CHANGE UP (ref 6–8.3)
RBC # BLD: 4.48 M/UL — SIGNIFICANT CHANGE UP (ref 4.2–5.8)
RBC # FLD: 14 % — SIGNIFICANT CHANGE UP (ref 10.3–14.5)
RH IG SCN BLD-IMP: POSITIVE — SIGNIFICANT CHANGE UP
SODIUM SERPL-SCNC: 138 MMOL/L — SIGNIFICANT CHANGE UP (ref 135–145)
WBC # BLD: 16.68 K/UL — HIGH (ref 3.8–10.5)
WBC # FLD AUTO: 16.68 K/UL — HIGH (ref 3.8–10.5)

## 2022-06-23 PROCEDURE — 99231 SBSQ HOSP IP/OBS SF/LOW 25: CPT

## 2022-06-23 RX ORDER — METRONIDAZOLE 500 MG
500 TABLET ORAL EVERY 8 HOURS
Refills: 0 | Status: DISCONTINUED | OUTPATIENT
Start: 2022-06-23 | End: 2022-06-29

## 2022-06-23 RX ORDER — POLYETHYLENE GLYCOL 3350 17 G/17G
17 POWDER, FOR SOLUTION ORAL DAILY
Refills: 0 | Status: DISCONTINUED | OUTPATIENT
Start: 2022-06-23 | End: 2022-06-29

## 2022-06-23 RX ORDER — SENNA PLUS 8.6 MG/1
2 TABLET ORAL AT BEDTIME
Refills: 0 | Status: DISCONTINUED | OUTPATIENT
Start: 2022-06-23 | End: 2022-06-29

## 2022-06-23 RX ORDER — CLOPIDOGREL BISULFATE 75 MG/1
75 TABLET, FILM COATED ORAL ONCE
Refills: 0 | Status: COMPLETED | OUTPATIENT
Start: 2022-06-23 | End: 2022-06-23

## 2022-06-23 RX ORDER — LAMOTRIGINE 25 MG/1
150 TABLET, ORALLY DISINTEGRATING ORAL EVERY 24 HOURS
Refills: 0 | Status: DISCONTINUED | OUTPATIENT
Start: 2022-06-24 | End: 2022-06-29

## 2022-06-23 RX ORDER — ATORVASTATIN CALCIUM 80 MG/1
40 TABLET, FILM COATED ORAL AT BEDTIME
Refills: 0 | Status: DISCONTINUED | OUTPATIENT
Start: 2022-06-23 | End: 2022-06-29

## 2022-06-23 RX ORDER — LITHIUM CARBONATE 300 MG/1
300 TABLET, EXTENDED RELEASE ORAL AT BEDTIME
Refills: 0 | Status: DISCONTINUED | OUTPATIENT
Start: 2022-06-23 | End: 2022-06-27

## 2022-06-23 RX ORDER — CEFTRIAXONE 500 MG/1
2000 INJECTION, POWDER, FOR SOLUTION INTRAMUSCULAR; INTRAVENOUS EVERY 24 HOURS
Refills: 0 | Status: COMPLETED | OUTPATIENT
Start: 2022-06-23 | End: 2022-06-29

## 2022-06-23 RX ORDER — SERTRALINE 25 MG/1
25 TABLET, FILM COATED ORAL DAILY
Refills: 0 | Status: DISCONTINUED | OUTPATIENT
Start: 2022-06-23 | End: 2022-06-27

## 2022-06-23 RX ORDER — CLOPIDOGREL BISULFATE 75 MG/1
75 TABLET, FILM COATED ORAL DAILY
Refills: 0 | Status: DISCONTINUED | OUTPATIENT
Start: 2022-06-24 | End: 2022-06-29

## 2022-06-23 RX ORDER — SODIUM CHLORIDE 9 MG/ML
1000 INJECTION, SOLUTION INTRAVENOUS
Refills: 0 | Status: DISCONTINUED | OUTPATIENT
Start: 2022-06-23 | End: 2022-06-24

## 2022-06-23 RX ORDER — LAMOTRIGINE 25 MG/1
100 TABLET, ORALLY DISINTEGRATING ORAL EVERY 24 HOURS
Refills: 0 | Status: DISCONTINUED | OUTPATIENT
Start: 2022-06-23 | End: 2022-06-29

## 2022-06-23 RX ADMIN — Medication 4: at 02:17

## 2022-06-23 RX ADMIN — SODIUM CHLORIDE 130 MILLILITER(S): 9 INJECTION, SOLUTION INTRAVENOUS at 01:56

## 2022-06-23 RX ADMIN — HEPARIN SODIUM 5000 UNIT(S): 5000 INJECTION INTRAVENOUS; SUBCUTANEOUS at 06:53

## 2022-06-23 RX ADMIN — ATORVASTATIN CALCIUM 40 MILLIGRAM(S): 80 TABLET, FILM COATED ORAL at 22:26

## 2022-06-23 RX ADMIN — Medication 100 MILLIGRAM(S): at 01:56

## 2022-06-23 RX ADMIN — Medication 2: at 13:55

## 2022-06-23 RX ADMIN — Medication 81 MILLIGRAM(S): at 11:10

## 2022-06-23 RX ADMIN — Medication 100 MILLIGRAM(S): at 17:51

## 2022-06-23 RX ADMIN — POLYETHYLENE GLYCOL 3350 17 GRAM(S): 17 POWDER, FOR SOLUTION ORAL at 08:01

## 2022-06-23 RX ADMIN — ONDANSETRON 4 MILLIGRAM(S): 8 TABLET, FILM COATED ORAL at 13:14

## 2022-06-23 RX ADMIN — PIPERACILLIN AND TAZOBACTAM 200 GRAM(S): 4; .5 INJECTION, POWDER, LYOPHILIZED, FOR SOLUTION INTRAVENOUS at 00:21

## 2022-06-23 RX ADMIN — CLOPIDOGREL BISULFATE 75 MILLIGRAM(S): 75 TABLET, FILM COATED ORAL at 08:01

## 2022-06-23 RX ADMIN — CEFTRIAXONE 100 MILLIGRAM(S): 500 INJECTION, POWDER, FOR SOLUTION INTRAMUSCULAR; INTRAVENOUS at 02:58

## 2022-06-23 RX ADMIN — Medication 2: at 17:52

## 2022-06-23 RX ADMIN — LITHIUM CARBONATE 300 MILLIGRAM(S): 300 TABLET, EXTENDED RELEASE ORAL at 22:27

## 2022-06-23 RX ADMIN — SODIUM CHLORIDE 80 MILLILITER(S): 9 INJECTION, SOLUTION INTRAVENOUS at 15:07

## 2022-06-23 RX ADMIN — SENNA PLUS 2 TABLET(S): 8.6 TABLET ORAL at 22:26

## 2022-06-23 RX ADMIN — LAMOTRIGINE 100 MILLIGRAM(S): 25 TABLET, ORALLY DISINTEGRATING ORAL at 22:27

## 2022-06-23 RX ADMIN — Medication 100 MILLIGRAM(S): at 11:15

## 2022-06-23 NOTE — PATIENT PROFILE ADULT - FUNCTIONAL ASSESSMENT - BASIC MOBILITY 2.
Called pts daughter and advised not to increase melatonin. Advised to give us a call back to discuss other options. 2 = A lot of assistance

## 2022-06-23 NOTE — PROGRESS NOTE ADULT - ASSESSMENT
71M, Yiddish speaking, with pmh of T2DM with neuropathy, bipolar disorder, vertigo, CAD s/p YEHUDA x2 (6/6/22, on DAPT), cholelithiasis, and no previous abdominal surgeries who was BIBEMS with several hour h/o n/v-nonbloody, bilious emesis for the past 5 days. HDS wnl. Labs notable for WBC 21.3 (90% PMNs), and  with reamaining labs wnl, including TBili 0.5, Lactate 1.6. CT A/P unremarkable.    Admit to general surgery team 1 under Dr. Carmona  NPO/IVF  Pain/nausea control  C/w home medications  mISS  IS/OOBA  VTE PPX with SCDs and SQH  AM labs  71M, Yiddish speaking, with pmh of T2DM with neuropathy, bipolar disorder, vertigo, CAD s/p YEHUDA x2 (6/6/22, on DAPT), cholelithiasis, and no previous abdominal surgeries who was BIBEMS with several hour h/o n/v-nonbloody, bilious emesis for the past 5 days. HDS wnl. Labs notable for WBC 21.3 (90% PMNs), and  with reamaining labs wnl, including TBili 0.5, Lactate 1.6. CT A/P unremarkable.    Telemetry, team 1 surgery  NPO/IVF  Pain/nausea control  C/w home medications  mISS  IS/OOBA  Senna/Miralax   SCDs, ASA/Plavix  AM labs

## 2022-06-23 NOTE — PROGRESS NOTE ADULT - SUBJECTIVE AND OBJECTIVE BOX
INTERVAL HPI/OVERNIGHT EVENTS: admitted w concern of estefany    SUBJECTIVE: Pt seen and examined at bedside this am by surgery team. Feels ok this am, VSS. Pain well controlled. Denies f/n/v/cp/sob.    MEDICATIONS  (STANDING):  aspirin enteric coated 81 milliGRAM(s) Oral daily  atorvastatin 40 milliGRAM(s) Oral at bedtime  cefTRIAXone   IVPB 2000 milliGRAM(s) IV Intermittent every 24 hours  dextrose 5%. 1000 milliLiter(s) (100 mL/Hr) IV Continuous <Continuous>  dextrose 5%. 1000 milliLiter(s) (50 mL/Hr) IV Continuous <Continuous>  dextrose 50% Injectable 25 Gram(s) IV Push once  dextrose 50% Injectable 12.5 Gram(s) IV Push once  dextrose 50% Injectable 25 Gram(s) IV Push once  glucagon  Injectable 1 milliGRAM(s) IntraMuscular once  insulin lispro (ADMELOG) corrective regimen sliding scale   SubCutaneous every 6 hours  insulin lispro (ADMELOG) corrective regimen sliding scale   SubCutaneous at bedtime  lactated ringers. 1000 milliLiter(s) (130 mL/Hr) IV Continuous <Continuous>  metroNIDAZOLE  IVPB 500 milliGRAM(s) IV Intermittent every 8 hours  pantoprazole    Tablet 40 milliGRAM(s) Oral before breakfast  polyethylene glycol 3350 17 Gram(s) Oral daily  senna 2 Tablet(s) Oral at bedtime    MEDICATIONS  (PRN):  dextrose Oral Gel 15 Gram(s) Oral once PRN Blood Glucose LESS THAN 70 milliGRAM(s)/deciliter  HYDROmorphone  Injectable 0.5 milliGRAM(s) IV Push every 6 hours PRN Moderate Pain (4 - 6)  ondansetron Injectable 4 milliGRAM(s) IV Push every 8 hours PRN Nausea and/or Vomiting      Vital Signs Last 24 Hrs  T(C): 37.1 (23 Jun 2022 09:37), Max: 37.7 (23 Jun 2022 05:38)  T(F): 98.7 (23 Jun 2022 09:37), Max: 99.8 (23 Jun 2022 05:38)  HR: 85 (23 Jun 2022 09:21) (74 - 85)  BP: 125/59 (23 Jun 2022 09:21) (119/69 - 150/90)  BP(mean): --  RR: 17 (23 Jun 2022 09:21) (16 - 18)  SpO2: 98% (23 Jun 2022 09:21) (91% - 98%)    PHYSICAL EXAM:    Constitutional: A&Ox3, NAD    Respiratory: non labored breathing, no respiratory distress    Cardiovascular: NSR, RRR    Gastrointestinal: abdomen soft, moderately distended, ttp to RUQ, no rebound or guarding.    Extremities: wwp, no calf tenderness or edema. SCDs in place     I&O's Detail    22 Jun 2022 07:01  -  23 Jun 2022 07:00  --------------------------------------------------------  IN:  Total IN: 0 mL    OUT:    Voided (mL): 200 mL  Total OUT: 200 mL    Total NET: -200 mL          LABS:                        13.5   16.68 )-----------( 268      ( 23 Jun 2022 05:30 )             41.2     06-23    138  |  102  |  12  ----------------------------<  141<H>  4.1   |  24  |  0.70    Ca    9.0      23 Jun 2022 05:30  Phos  3.4     06-23  Mg     2.1     06-23    TPro  6.6  /  Alb  2.9<L>  /  TBili  0.3  /  DBili  x   /  AST  37  /  ALT  28  /  AlkPhos  88  06-23    PT/INR - ( 22 Jun 2022 17:54 )   PT: 14.3 sec;   INR: 1.20          PTT - ( 22 Jun 2022 17:54 )  PTT:34.2 sec      RADIOLOGY & ADDITIONAL STUDIES:

## 2022-06-24 LAB
ALBUMIN SERPL ELPH-MCNC: 3 G/DL — LOW (ref 3.3–5)
ALP SERPL-CCNC: 79 U/L — SIGNIFICANT CHANGE UP (ref 40–120)
ALT FLD-CCNC: 53 U/L — HIGH (ref 10–45)
ANION GAP SERPL CALC-SCNC: 7 MMOL/L — SIGNIFICANT CHANGE UP (ref 5–17)
AST SERPL-CCNC: 75 U/L — HIGH (ref 10–40)
BILIRUB SERPL-MCNC: 0.3 MG/DL — SIGNIFICANT CHANGE UP (ref 0.2–1.2)
BUN SERPL-MCNC: 9 MG/DL — SIGNIFICANT CHANGE UP (ref 7–23)
CALCIUM SERPL-MCNC: 8.7 MG/DL — SIGNIFICANT CHANGE UP (ref 8.4–10.5)
CHLORIDE SERPL-SCNC: 102 MMOL/L — SIGNIFICANT CHANGE UP (ref 96–108)
CO2 SERPL-SCNC: 27 MMOL/L — SIGNIFICANT CHANGE UP (ref 22–31)
CREAT SERPL-MCNC: 0.67 MG/DL — SIGNIFICANT CHANGE UP (ref 0.5–1.3)
EGFR: 100 ML/MIN/1.73M2 — SIGNIFICANT CHANGE UP
GLUCOSE BLDC GLUCOMTR-MCNC: 194 MG/DL — HIGH (ref 70–99)
GLUCOSE BLDC GLUCOMTR-MCNC: 199 MG/DL — HIGH (ref 70–99)
GLUCOSE BLDC GLUCOMTR-MCNC: 215 MG/DL — HIGH (ref 70–99)
GLUCOSE SERPL-MCNC: 229 MG/DL — HIGH (ref 70–99)
HCT VFR BLD CALC: 39.3 % — SIGNIFICANT CHANGE UP (ref 39–50)
HGB BLD-MCNC: 13.1 G/DL — SIGNIFICANT CHANGE UP (ref 13–17)
MAGNESIUM SERPL-MCNC: 2 MG/DL — SIGNIFICANT CHANGE UP (ref 1.6–2.6)
MCHC RBC-ENTMCNC: 30.8 PG — SIGNIFICANT CHANGE UP (ref 27–34)
MCHC RBC-ENTMCNC: 33.3 GM/DL — SIGNIFICANT CHANGE UP (ref 32–36)
MCV RBC AUTO: 92.5 FL — SIGNIFICANT CHANGE UP (ref 80–100)
NRBC # BLD: 0 /100 WBCS — SIGNIFICANT CHANGE UP (ref 0–0)
PHOSPHATE SERPL-MCNC: 3 MG/DL — SIGNIFICANT CHANGE UP (ref 2.5–4.5)
PLATELET # BLD AUTO: 275 K/UL — SIGNIFICANT CHANGE UP (ref 150–400)
POTASSIUM SERPL-MCNC: 4.1 MMOL/L — SIGNIFICANT CHANGE UP (ref 3.5–5.3)
POTASSIUM SERPL-SCNC: 4.1 MMOL/L — SIGNIFICANT CHANGE UP (ref 3.5–5.3)
PROT SERPL-MCNC: 6.5 G/DL — SIGNIFICANT CHANGE UP (ref 6–8.3)
RBC # BLD: 4.25 M/UL — SIGNIFICANT CHANGE UP (ref 4.2–5.8)
RBC # FLD: 13.9 % — SIGNIFICANT CHANGE UP (ref 10.3–14.5)
SODIUM SERPL-SCNC: 136 MMOL/L — SIGNIFICANT CHANGE UP (ref 135–145)
WBC # BLD: 14.01 K/UL — HIGH (ref 3.8–10.5)
WBC # FLD AUTO: 14.01 K/UL — HIGH (ref 3.8–10.5)

## 2022-06-24 PROCEDURE — 99231 SBSQ HOSP IP/OBS SF/LOW 25: CPT

## 2022-06-24 RX ORDER — HEPARIN SODIUM 5000 [USP'U]/ML
5000 INJECTION INTRAVENOUS; SUBCUTANEOUS EVERY 8 HOURS
Refills: 0 | Status: DISCONTINUED | OUTPATIENT
Start: 2022-06-24 | End: 2022-06-29

## 2022-06-24 RX ORDER — OXYCODONE HYDROCHLORIDE 5 MG/1
5 TABLET ORAL EVERY 6 HOURS
Refills: 0 | Status: DISCONTINUED | OUTPATIENT
Start: 2022-06-24 | End: 2022-06-29

## 2022-06-24 RX ADMIN — CLOPIDOGREL BISULFATE 75 MILLIGRAM(S): 75 TABLET, FILM COATED ORAL at 11:27

## 2022-06-24 RX ADMIN — LAMOTRIGINE 100 MILLIGRAM(S): 25 TABLET, ORALLY DISINTEGRATING ORAL at 22:29

## 2022-06-24 RX ADMIN — SODIUM CHLORIDE 50 MILLILITER(S): 9 INJECTION, SOLUTION INTRAVENOUS at 16:27

## 2022-06-24 RX ADMIN — LAMOTRIGINE 150 MILLIGRAM(S): 25 TABLET, ORALLY DISINTEGRATING ORAL at 09:43

## 2022-06-24 RX ADMIN — PANTOPRAZOLE SODIUM 40 MILLIGRAM(S): 20 TABLET, DELAYED RELEASE ORAL at 06:16

## 2022-06-24 RX ADMIN — ATORVASTATIN CALCIUM 40 MILLIGRAM(S): 80 TABLET, FILM COATED ORAL at 22:29

## 2022-06-24 RX ADMIN — POLYETHYLENE GLYCOL 3350 17 GRAM(S): 17 POWDER, FOR SOLUTION ORAL at 11:27

## 2022-06-24 RX ADMIN — HEPARIN SODIUM 5000 UNIT(S): 5000 INJECTION INTRAVENOUS; SUBCUTANEOUS at 22:30

## 2022-06-24 RX ADMIN — Medication 100 MILLIGRAM(S): at 01:44

## 2022-06-24 RX ADMIN — Medication 81 MILLIGRAM(S): at 11:27

## 2022-06-24 RX ADMIN — Medication 100 MILLIGRAM(S): at 17:52

## 2022-06-24 RX ADMIN — HEPARIN SODIUM 5000 UNIT(S): 5000 INJECTION INTRAVENOUS; SUBCUTANEOUS at 15:36

## 2022-06-24 RX ADMIN — LITHIUM CARBONATE 300 MILLIGRAM(S): 300 TABLET, EXTENDED RELEASE ORAL at 22:30

## 2022-06-24 RX ADMIN — SENNA PLUS 2 TABLET(S): 8.6 TABLET ORAL at 22:29

## 2022-06-24 RX ADMIN — Medication 100 MILLIGRAM(S): at 09:42

## 2022-06-24 RX ADMIN — SODIUM CHLORIDE 80 MILLILITER(S): 9 INJECTION, SOLUTION INTRAVENOUS at 06:16

## 2022-06-24 RX ADMIN — HEPARIN SODIUM 5000 UNIT(S): 5000 INJECTION INTRAVENOUS; SUBCUTANEOUS at 08:30

## 2022-06-24 RX ADMIN — CEFTRIAXONE 100 MILLIGRAM(S): 500 INJECTION, POWDER, FOR SOLUTION INTRAMUSCULAR; INTRAVENOUS at 00:16

## 2022-06-24 NOTE — PROGRESS NOTE ADULT - SUBJECTIVE AND OBJECTIVE BOX
INTERVAL HPI/OVERNIGHT EVENTS: MICHELLE, VSS     SUBJECTIVE: Pt seen and examined at bedside this am by surgery team. No acute complaints. Tolerating diet, pain well controlled. Pt is uncertain if had BM. Denies f/n/v/cp/sob.    MEDICATIONS  (STANDING):  aspirin enteric coated 81 milliGRAM(s) Oral daily  atorvastatin 40 milliGRAM(s) Oral at bedtime  cefTRIAXone   IVPB 2000 milliGRAM(s) IV Intermittent every 24 hours  clopidogrel Tablet 75 milliGRAM(s) Oral daily  dextrose 5% + sodium chloride 0.45%. 1000 milliLiter(s) (80 mL/Hr) IV Continuous <Continuous>  dextrose 5%. 1000 milliLiter(s) (100 mL/Hr) IV Continuous <Continuous>  dextrose 5%. 1000 milliLiter(s) (50 mL/Hr) IV Continuous <Continuous>  dextrose 50% Injectable 25 Gram(s) IV Push once  dextrose 50% Injectable 12.5 Gram(s) IV Push once  dextrose 50% Injectable 25 Gram(s) IV Push once  glucagon  Injectable 1 milliGRAM(s) IntraMuscular once  heparin   Injectable 5000 Unit(s) SubCutaneous every 8 hours  insulin lispro (ADMELOG) corrective regimen sliding scale   SubCutaneous at bedtime  lamoTRIgine 150 milliGRAM(s) Oral every 24 hours  lamoTRIgine 100 milliGRAM(s) Oral every 24 hours  lithium 300 milliGRAM(s) Oral at bedtime  metroNIDAZOLE  IVPB 500 milliGRAM(s) IV Intermittent every 8 hours  pantoprazole    Tablet 40 milliGRAM(s) Oral before breakfast  polyethylene glycol 3350 17 Gram(s) Oral daily  propranolol 40 milliGRAM(s) Oral two times a day  senna 2 Tablet(s) Oral at bedtime  sertraline 25 milliGRAM(s) Oral daily    MEDICATIONS  (PRN):  dextrose Oral Gel 15 Gram(s) Oral once PRN Blood Glucose LESS THAN 70 milliGRAM(s)/deciliter  HYDROmorphone  Injectable 0.5 milliGRAM(s) IV Push every 6 hours PRN Moderate Pain (4 - 6)  ondansetron Injectable 4 milliGRAM(s) IV Push every 8 hours PRN Nausea and/or Vomiting      Vital Signs Last 24 Hrs  T(C): 37.3 (24 Jun 2022 09:10), Max: 37.7 (24 Jun 2022 05:23)  T(F): 99.2 (24 Jun 2022 09:10), Max: 99.9 (24 Jun 2022 05:23)  HR: 67 (24 Jun 2022 08:50) (67 - 81)  BP: 135/60 (24 Jun 2022 08:50) (111/53 - 140/65)  BP(mean): 87 (24 Jun 2022 08:50) (76 - 93)  RR: 15 (24 Jun 2022 08:50) (15 - 23)  SpO2: 99% (24 Jun 2022 04:29) (97% - 99%)    PHYSICAL EXAM:    Constitutional: A&Ox3, NAD    Respiratory: non labored breathing, no respiratory distress    Cardiovascular: NSR, RRR    Gastrointestinal: abdomen soft, moderately distended, ttp to RUQ, no rebound or guarding.    Extremities: wwp, no calf tenderness or edema. SCDs in place       I&O's Detail    23 Jun 2022 07:01  -  24 Jun 2022 07:00  --------------------------------------------------------  IN:    dextrose 5% + sodium chloride 0.45%: 960 mL    IV PiggyBack: 150 mL  Total IN: 1110 mL    OUT:    Incontinent per Condom Catheter (mL): 1100 mL  Total OUT: 1100 mL    Total NET: 10 mL          LABS:                        13.5   16.68 )-----------( 268      ( 23 Jun 2022 05:30 )             41.2     06-23    138  |  102  |  12  ----------------------------<  141<H>  4.1   |  24  |  0.70    Ca    9.0      23 Jun 2022 05:30  Phos  3.4     06-23  Mg     2.1     06-23    TPro  6.6  /  Alb  2.9<L>  /  TBili  0.3  /  DBili  x   /  AST  37  /  ALT  28  /  AlkPhos  88  06-23    PT/INR - ( 22 Jun 2022 17:54 )   PT: 14.3 sec;   INR: 1.20          PTT - ( 22 Jun 2022 17:54 )  PTT:34.2 sec      RADIOLOGY & ADDITIONAL STUDIES:

## 2022-06-24 NOTE — PROGRESS NOTE ADULT - ASSESSMENT
71M, Yiddish speaking, with pmh of T2DM with neuropathy, bipolar disorder, vertigo, CAD s/p YEHUDA x2 (6/6/22, on DAPT), cholelithiasis, and no previous abdominal surgeries who was BIBEMS with several hour h/o n/v-nonbloody, bilious emesis for the past 5 days. HDS wnl. Labs notable for WBC 21.3 (90% PMNs), and  with reamaining labs wnl, including TBili 0.5, Lactate 1.6. CT A/P unremarkable.    Telemetry, team 1 surgery  NPO w/ sips/chips/IVF  Pain/nausea control  C/w home medications  mISS  IS/OOBA  Senna/miralax   SCDs, ASA/Plavix/HSQ  AM labs   71M, Yiddish speaking, with pmh of T2DM with neuropathy, bipolar disorder, vertigo, CAD s/p YEHUDA x2 (6/6/22, on DAPT), cholelithiasis, and no previous abdominal surgeries who was BIBEMS with several hour h/o n/v-nonbloody, bilious emesis for the past 5 days. HDS wnl. CT A/P unremarkable.    Telemetry, team 1 surgery  NPO w/ sips/chips/IVF  Pain/nausea control  C/w home medications  mISS  IS/OOBA  Senna/miralax   SCDs, ASA/Plavix/HSQ  AM labs

## 2022-06-25 LAB
ALBUMIN SERPL ELPH-MCNC: 3.1 G/DL — LOW (ref 3.3–5)
ALP SERPL-CCNC: 113 U/L — SIGNIFICANT CHANGE UP (ref 40–120)
ALT FLD-CCNC: 90 U/L — HIGH (ref 10–45)
ANION GAP SERPL CALC-SCNC: 9 MMOL/L — SIGNIFICANT CHANGE UP (ref 5–17)
AST SERPL-CCNC: 126 U/L — HIGH (ref 10–40)
BILIRUB SERPL-MCNC: 0.3 MG/DL — SIGNIFICANT CHANGE UP (ref 0.2–1.2)
BUN SERPL-MCNC: 11 MG/DL — SIGNIFICANT CHANGE UP (ref 7–23)
CALCIUM SERPL-MCNC: 8.7 MG/DL — SIGNIFICANT CHANGE UP (ref 8.4–10.5)
CHLORIDE SERPL-SCNC: 101 MMOL/L — SIGNIFICANT CHANGE UP (ref 96–108)
CO2 SERPL-SCNC: 26 MMOL/L — SIGNIFICANT CHANGE UP (ref 22–31)
CREAT SERPL-MCNC: 0.64 MG/DL — SIGNIFICANT CHANGE UP (ref 0.5–1.3)
EGFR: 102 ML/MIN/1.73M2 — SIGNIFICANT CHANGE UP
GLUCOSE BLDC GLUCOMTR-MCNC: 168 MG/DL — HIGH (ref 70–99)
GLUCOSE BLDC GLUCOMTR-MCNC: 195 MG/DL — HIGH (ref 70–99)
GLUCOSE SERPL-MCNC: 256 MG/DL — HIGH (ref 70–99)
HCT VFR BLD CALC: 39.4 % — SIGNIFICANT CHANGE UP (ref 39–50)
HGB BLD-MCNC: 12.8 G/DL — LOW (ref 13–17)
MAGNESIUM SERPL-MCNC: 2 MG/DL — SIGNIFICANT CHANGE UP (ref 1.6–2.6)
MCHC RBC-ENTMCNC: 29.6 PG — SIGNIFICANT CHANGE UP (ref 27–34)
MCHC RBC-ENTMCNC: 32.5 GM/DL — SIGNIFICANT CHANGE UP (ref 32–36)
MCV RBC AUTO: 91.2 FL — SIGNIFICANT CHANGE UP (ref 80–100)
NRBC # BLD: 0 /100 WBCS — SIGNIFICANT CHANGE UP (ref 0–0)
PHOSPHATE SERPL-MCNC: 3.2 MG/DL — SIGNIFICANT CHANGE UP (ref 2.5–4.5)
PLATELET # BLD AUTO: 348 K/UL — SIGNIFICANT CHANGE UP (ref 150–400)
POTASSIUM SERPL-MCNC: 4.3 MMOL/L — SIGNIFICANT CHANGE UP (ref 3.5–5.3)
POTASSIUM SERPL-SCNC: 4.3 MMOL/L — SIGNIFICANT CHANGE UP (ref 3.5–5.3)
PROT SERPL-MCNC: 6.7 G/DL — SIGNIFICANT CHANGE UP (ref 6–8.3)
RBC # BLD: 4.32 M/UL — SIGNIFICANT CHANGE UP (ref 4.2–5.8)
RBC # FLD: 13.6 % — SIGNIFICANT CHANGE UP (ref 10.3–14.5)
SODIUM SERPL-SCNC: 136 MMOL/L — SIGNIFICANT CHANGE UP (ref 135–145)
WBC # BLD: 13.93 K/UL — HIGH (ref 3.8–10.5)
WBC # FLD AUTO: 13.93 K/UL — HIGH (ref 3.8–10.5)

## 2022-06-25 PROCEDURE — 99221 1ST HOSP IP/OBS SF/LOW 40: CPT

## 2022-06-25 RX ADMIN — SERTRALINE 25 MILLIGRAM(S): 25 TABLET, FILM COATED ORAL at 13:13

## 2022-06-25 RX ADMIN — LAMOTRIGINE 100 MILLIGRAM(S): 25 TABLET, ORALLY DISINTEGRATING ORAL at 21:15

## 2022-06-25 RX ADMIN — HEPARIN SODIUM 5000 UNIT(S): 5000 INJECTION INTRAVENOUS; SUBCUTANEOUS at 06:37

## 2022-06-25 RX ADMIN — CLOPIDOGREL BISULFATE 75 MILLIGRAM(S): 75 TABLET, FILM COATED ORAL at 13:13

## 2022-06-25 RX ADMIN — ATORVASTATIN CALCIUM 40 MILLIGRAM(S): 80 TABLET, FILM COATED ORAL at 21:15

## 2022-06-25 RX ADMIN — PANTOPRAZOLE SODIUM 40 MILLIGRAM(S): 20 TABLET, DELAYED RELEASE ORAL at 06:38

## 2022-06-25 RX ADMIN — CEFTRIAXONE 100 MILLIGRAM(S): 500 INJECTION, POWDER, FOR SOLUTION INTRAMUSCULAR; INTRAVENOUS at 00:29

## 2022-06-25 RX ADMIN — HEPARIN SODIUM 5000 UNIT(S): 5000 INJECTION INTRAVENOUS; SUBCUTANEOUS at 14:48

## 2022-06-25 RX ADMIN — Medication 100 MILLIGRAM(S): at 18:00

## 2022-06-25 RX ADMIN — Medication 100 MILLIGRAM(S): at 02:43

## 2022-06-25 RX ADMIN — LITHIUM CARBONATE 300 MILLIGRAM(S): 300 TABLET, EXTENDED RELEASE ORAL at 21:15

## 2022-06-25 RX ADMIN — SENNA PLUS 2 TABLET(S): 8.6 TABLET ORAL at 21:15

## 2022-06-25 RX ADMIN — HEPARIN SODIUM 5000 UNIT(S): 5000 INJECTION INTRAVENOUS; SUBCUTANEOUS at 21:15

## 2022-06-25 RX ADMIN — Medication 81 MILLIGRAM(S): at 13:13

## 2022-06-25 RX ADMIN — LAMOTRIGINE 150 MILLIGRAM(S): 25 TABLET, ORALLY DISINTEGRATING ORAL at 10:42

## 2022-06-25 RX ADMIN — POLYETHYLENE GLYCOL 3350 17 GRAM(S): 17 POWDER, FOR SOLUTION ORAL at 13:13

## 2022-06-25 RX ADMIN — Medication 100 MILLIGRAM(S): at 10:42

## 2022-06-25 NOTE — BH CONSULTATION LIAISON ASSESSMENT NOTE - CURRENT MEDICATION
MEDICATIONS  (STANDING):  aspirin enteric coated 81 milliGRAM(s) Oral daily  atorvastatin 40 milliGRAM(s) Oral at bedtime  cefTRIAXone   IVPB 2000 milliGRAM(s) IV Intermittent every 24 hours  clopidogrel Tablet 75 milliGRAM(s) Oral daily  dextrose 5%. 1000 milliLiter(s) (100 mL/Hr) IV Continuous <Continuous>  dextrose 5%. 1000 milliLiter(s) (50 mL/Hr) IV Continuous <Continuous>  dextrose 50% Injectable 25 Gram(s) IV Push once  dextrose 50% Injectable 12.5 Gram(s) IV Push once  dextrose 50% Injectable 25 Gram(s) IV Push once  glucagon  Injectable 1 milliGRAM(s) IntraMuscular once  heparin   Injectable 5000 Unit(s) SubCutaneous every 8 hours  insulin lispro (ADMELOG) corrective regimen sliding scale   SubCutaneous at bedtime  lamoTRIgine 150 milliGRAM(s) Oral every 24 hours  lamoTRIgine 100 milliGRAM(s) Oral every 24 hours  lithium 300 milliGRAM(s) Oral at bedtime  metroNIDAZOLE  IVPB 500 milliGRAM(s) IV Intermittent every 8 hours  pantoprazole    Tablet 40 milliGRAM(s) Oral before breakfast  polyethylene glycol 3350 17 Gram(s) Oral daily  propranolol 40 milliGRAM(s) Oral two times a day  senna 2 Tablet(s) Oral at bedtime  sertraline 25 milliGRAM(s) Oral daily    MEDICATIONS  (PRN):  dextrose Oral Gel 15 Gram(s) Oral once PRN Blood Glucose LESS THAN 70 milliGRAM(s)/deciliter  ondansetron Injectable 4 milliGRAM(s) IV Push every 8 hours PRN Nausea and/or Vomiting  oxyCODONE    IR 5 milliGRAM(s) Oral every 6 hours PRN Severe Pain (7 - 10)

## 2022-06-25 NOTE — CONSULT NOTE ADULT - ASSESSMENT
INCOMPLETE  71M, Yiddish speaking, with pmh of T2DM with neuropathy, bipolar disorder, vertigo, CAD s/p YEHUDA x2 (6/6/22, on DAPT), cholelithiasis, and no previous abdominal surgeries who was BIBEMS with several hour h/o n/v-nonbloody, bilious emesis for the past 5 days. Admitted to the surgery service for non-surgical management of cholecystitis. Medicine consulted on 6/25 for shakiness that patient has had for the last 2-3 years.    #Shakiness:   Patient reports "shakiness" for the last 2-3 years. Describes it as intermittent arm tremors. Has been worked up by neurology and psychiatry in the past as a possible medication side effect vs. inner ear imbalance. At the moment, patient does not have any shakes or tremor on exam. ROS otherwise negative and physical exam unremarkable.   Symptoms likely chronic given timeline.  - Patient currently on Lamictal, Lithium, and Sertraline. Lithium level on 6/19 was below therapeutic at 0.21. Unlikely lithium toxicity given level. Recommend psychiatry consult for further medication evaluation and side effects   - Recommend neurology consult for tremors. Patient reports he has seen neurology in the past for work-up - would obtain collateral on what was previously worked up and found   - Patient reports he has been fasting over the last few days due to stomach pain. As symptoms improve, recommend encouraging increased PO intake and hydration, as per primary team and clinical status   - Continue to monitor FS (have been WNL)    INCOMPLETE  71M, Yiddish speaking, with pmh of T2DM with neuropathy, bipolar disorder, vertigo, CAD s/p YEHUDA x2 (6/6/22, on DAPT), cholelithiasis, and no previous abdominal surgeries who was BIBEMS with several hour h/o n/v-nonbloody, bilious emesis for the past 5 days. Admitted to the surgery service for non-surgical management of cholecystitis. Medicine consulted on 6/25 for shakiness that patient has had for the last 2-3 years.    #Shakiness:   Patient reports "shakiness" for the last 2-3 years. Describes it as intermittent arm tremors. Has been worked up by neurology and psychiatry in the past as a possible medication side effect vs. inner ear imbalance. At the moment, patient does not have any shakes or tremor on exam. ROS otherwise negative and physical exam unremarkable.   Symptoms likely chronic given timeline.  - Patient currently on Lamictal, Lithium, and Sertraline. Lithium level on 6/19 was below therapeutic at 0.21. Unlikely lithium toxicity given level. Recommend psychiatry consult for further medication evaluation and side effects   - Recommend neurology consult or continued outpatient follow-up for tremors. Patient reports he has seen neurology in the past for work-up - would obtain collateral on what was previously worked up and found   - Patient reports he has been fasting over the last few days due to stomach pain. As symptoms improve, recommend encouraging increased PO intake and hydration, as per primary team and clinical status   - Continue to monitor FS (have been WNL)    71M, Yiddish speaking, with pmh of T2DM with neuropathy, bipolar disorder, vertigo, CAD s/p YEHUDA x2 (6/6/22, on DAPT), cholelithiasis, and no previous abdominal surgeries who was BIBEMS with several hour h/o n/v-nonbloody, bilious emesis for the past 5 days. Admitted to the surgery service for non-surgical management of cholecystitis. Medicine consulted on 6/25 for shakiness that patient has had for the last 2-3 years.    #Shakiness:   Patient reports "shakiness" for the last 2-3 years. Describes it as intermittent arm tremors. Has been worked up by neurology and psychiatry in the past as a possible medication side effect vs. inner ear imbalance. At the moment, patient does not have any shakes or tremor on exam. ROS otherwise negative and physical exam unremarkable.   Symptoms likely chronic given timeline.  - Patient currently on Lamictal, Lithium, and Sertraline. Lithium level on 6/19 was below therapeutic at 0.21. Unlikely lithium toxicity given level. Recommend psychiatry consult for further medication evaluation and side effects   - Recommend neurology consult or continued outpatient follow-up for tremors. Patient reports he has seen neurology in the past for work-up - would obtain collateral on what was previously worked up and found   - Patient reports he has been fasting over the last few days due to stomach pain. As symptoms improve, recommend encouraging increased PO intake and hydration, as per primary team and clinical status   - Continue to monitor FS (have been WNL) for hypoglycemia     Discussed with attending and primary surgery team

## 2022-06-25 NOTE — CONSULT NOTE ADULT - ASSESSMENT
This 71y old Lt handed male, with significant cardiovascular PMH, bipolar on lithium and lamotrigine. The patient is having episodes of impaired balance and gait that lasts for a few days. Etiology is not clear. At least, one of these episodes was associated with toxic lithium levels, but not this time. Structural lesions does not fit the episodic nature of his symptoms. The age is not consistent with the hereditary types of episodic ataxia, though type o      Thank you for sharing this patient with me; please do not hesitate to contact me in case of any question.          This 71y old Lt handed male, with significant cardiovascular PMH, bipolar on lithium and lamotrigine. The patient is having episodes of impaired balance and gait that lasts for a few days. Etiology is not clear. At least, one of these episodes was associated with toxic lithium levels, but not this time. Structural lesions does not fit the episodic nature of his symptoms. The age is not consistent with the hereditary types of episodic ataxia. Probably, a toxic drug related side effect is most likely cause to explain the episodic nature.   The other possibility is if the patient is not totally normal between episodes and his situation is just worsening, this could be related to a degenerative disease such as PD, MSA or PSP with temporary worsening caused by acute event (such is the cholecystitis this time)    Plan:  -       Thank you for sharing this patient with me; please do not hesitate to contact me in case of any question.          This 71y old Lt handed male, with significant cardiovascular PMH, bipolar on lithium and lamotrigine. The patient is having episodes of impaired balance and gait that lasts for a few days. Etiology is not clear. At least, one of these episodes was associated with toxic lithium levels, but not this time. Structural lesions does not fit the episodic nature of his symptoms. The age is not consistent with the hereditary types of episodic ataxia. Probably, a toxic drug related side effect is most likely cause to explain the episodic nature.   The other possibility is if the patient is not totally normal between episodes and his situation is just worsening, this could be related to a degenerative disease such as PD, MSA or PSP with temporary worsening caused by acute event (such is the cholecystitis this time)    Plan:  - Need to a family member bring his medications to check what is he taking  - MR brain w/o  - Need to follow with movement disorders specialist as outpatient         Thank you for sharing this patient with me; please do not hesitate to contact me in case of any question.          This 71y old Lt handed male, with significant cardiovascular PMH, bipolar on lithium and lamotrigine. The patient is having episodes of impaired balance and gait that lasts for a few days. Etiology is not clear. At least, one of these episodes was associated with toxic lithium levels, but not this time. Structural lesions does not fit the episodic nature of his symptoms. The age is not consistent with the hereditary types of episodic ataxia. Probably, a toxic drug related side effect is most likely cause to explain the episodic nature.   The other possibility is if the patient is not totally normal between episodes and his situation is just worsening, this could be related to a degenerative disease such as PD, MSA or PSP with temporary worsening caused by acute event (such is the cholecystitis this time)    Plan:  - Obtain collateral about whether or not he is taking sinemet at home   - MR brain w/o   - Recommend evaluation with movement disorders specialist as outpatient         Thank you for sharing this patient with me; please do not hesitate to contact me in case of any question.

## 2022-06-25 NOTE — BH CONSULTATION LIAISON ASSESSMENT NOTE - NSBHREFERDETAILS_PSY_A_CORE_FT
Patient's family reporting a "head imbalance."  Psychiatry consulted to assess for psychiatric decompensation given a history of Bipolar Disorder.

## 2022-06-25 NOTE — BH CONSULTATION LIAISON ASSESSMENT NOTE - SUMMARY
71 year old Yiddish-speaking male, , unemployed (on SSI), domiciled with family in [place] with PMH DM2 c/b neuropathy, vertigo, CAD s/p YEHUDA x2 (6/6/22, on DAPT), cholelithiasis diagnosed on 06/19/22, no previous abdominal surgeries and PPH Bipolar Disorder, no prior inpatient psychiatric admissions, currently prescribed psychiatric medications by his PCP Dr. Norwood, no history of SA/NSSIB/Violence, no history of trauma BIBEMS with several hour with history of nausea/vomiting and bilious emesis for five days and admitted to general surgery team 1 under Dr. Carmona for management of cholelithiasis.  Psychiatry consulted given that the patient's family states he has displayed a "head imbalance" over the past week.      On evaluation, the patient is calm, cooperative, with constricted affect, good eye contact, demonstrating good behavioral control and linear thought processes.  The patient reports that his mood is "regular."  He adamantly denies SI/HI, intent, plan, AVH, he does not appear internally preoccupied on interview and no paranoia or delusions are reported or elicited.  Collateral obtained from the patient's wife indicate chronic paranoia toward family members and medical providers as well as intermittent irritability and social isolation in the setting of treatment non-compliance.  The patient's presentation is consistent with a decompensated bipolar disorder vs underlying medical etiology (e.g. supratherapeutic dosing of psychiatric medications).  The patient is at chronic elevated risk for suicide/self-harm given a history of Bipolar Disorder diagnosis, male gender, and history of treatment non-compliance but no acute risk factors are identified at this time and protective factors that mitigate this risk abound including a capacity to advocate for self, future oriented, domiciled, strong social support, willingness to engage in treatment, no history of SAs/NSSIB, and currently denying SI, intent and plan.  Collateral from the patient's family indicate that they have no acute concerns for the patient's safety or that of others and they are advocating for discharge home with outpatient psychiatric follow-up.  Given that the patient is not an acute risk to self or others, an inpatient psychiatric admission is not warranted at this time and the patient would benefit from outpatient psychiatric referral.      Plan:  # Bipolar Disorder:  - An inpatient psychiatric admission is not warranted at this time  - Recommend Lithium level, Valproic Acid Level, Lamotrigine Level  - continue home psychiatric medications sertraline 25 mg PO daily, lithium 300 mg PO qHs, lamotrigine 250 mg PO qHs for now  - [facility] Psych[facility]ry will continue to follow

## 2022-06-25 NOTE — BH CONSULTATION LIAISON ASSESSMENT NOTE - NSUNABLEASSESSPROTRISKCOMMENT_PSY_ALL_CORE
Protective Factors that mitigate this risk include a capacity to advocate for self, future oriented, domiciled, strong social support, willingness to engage in treatment, no history of SAs/NSSIB, and currently denying SI, intent and plan.

## 2022-06-25 NOTE — BH CONSULTATION LIAISON ASSESSMENT NOTE - NSBHCONSULTWRKUPYES_PSY_A_CORE
67 year old male w PMHx MS, L foot neuropathic pain admitted for sepsis secondary to UTI with acute left arm weakness. Admit to medicine. Had similar presentation with UTI in the past - resolved with treatment. F/u CT renal stone hunt to r/o obstructing stone. Continue IV rocephin. Follow up blood and urine cultures. ID consult Dr. Bone. Will hold cellcept at this time. Requested neurology evaluation. D/w patient and spouse.    Agree with H&P as outlined above, edited where appropriate. labs

## 2022-06-25 NOTE — CONSULT NOTE ADULT - ATTENDING COMMENTS
Pt. seen and examined by me earlier today; I have read Dr. Schoenfeld's consult note; I agree w/ her impression and recommendations as above; suggest Neurology and Psychiatry consults
Patient seen on 6/26/22  He's had poor balance / falls for about 2 years  Previously seen by neurology in April and thought to have parkinsonian syndrome, was recommended to start sinemet however it is not clear if he is taking it   On exam today he has mild parkinsonian features - resting tremor, cogwheeling, bradykinesia -   Would recommend starting sinemet at 25/100 TID, however patient is hesitant at this time as he is fixated on his "gas" and bowel movements and wants to wait until his GI symptoms are better prior to starting any medications  No need for MRI brain as inpatient  He can f/u with movement disorders (Dr. Alexander or Dr. Ramirez) after discharge

## 2022-06-25 NOTE — BH CONSULTATION LIAISON ASSESSMENT NOTE - NSBHCHARTREVIEWVS_PSY_A_CORE FT
Vital Signs Last 24 Hrs  T(C): 37 (25 Jun 2022 18:32), Max: 37 (25 Jun 2022 18:32)  T(F): 98.6 (25 Jun 2022 18:32), Max: 98.6 (25 Jun 2022 18:32)  HR: 60 (25 Jun 2022 20:20) (59 - 67)  BP: 134/63 (25 Jun 2022 20:20) (109/55 - 138/62)  BP(mean): 90 (25 Jun 2022 20:20) (79 - 92)  RR: 20 (25 Jun 2022 20:20) (15 - 22)  SpO2: 93% (25 Jun 2022 20:20) (93% - 98%)

## 2022-06-25 NOTE — PROGRESS NOTE ADULT - ASSESSMENT
71M, Yiddish speaking, with pmh of T2DM with neuropathy, bipolar disorder, vertigo, CAD s/p YEHUDA x2 (6/6/22, on DAPT), cholelithiasis, and no previous abdominal surgeries who was BIBEMS with several hour h/o n/v-nonbloody, bilious emesis for the past 5 days. HDS wnl. Labs notable for WBC 21.3 (90% PMNs), and  with reamaining labs wnl, including TBili 0.5, Lactate 1.6. CT A/P unremarkable.      Low fat diet CC kosher  Pain/nausea control  C/w home medications  mISS  IS/OOBA  Senna/miralax   SCDs, ASA/Plavix  AM labs

## 2022-06-25 NOTE — BH CONSULTATION LIAISON ASSESSMENT NOTE - NSCOMMENTSUICRISKFACT_PSY_ALL_CORE
The patient is at chronic elevated risk for suicide/self-harm given a history of Bipolar Disorder diagnosis, male gender, and history of treatment non-compliance.

## 2022-06-25 NOTE — BH CONSULTATION LIAISON ASSESSMENT NOTE - NSSUICPROTFACT_PSY_ALL_CORE
Responsibility to children, family, or others/Identifies reasons for living/Supportive social network of family or friends/Cultural, spiritual and/or moral attitudes against suicide/Yarsani beliefs

## 2022-06-25 NOTE — BH CONSULTATION LIAISON ASSESSMENT NOTE - PAST PSYCHOTROPIC MEDICATION
Per collateral obtained by the patient's wife, the patient has been on sertraline, thorazine, lithium, Lamictal, Depakote and Clonazepam in the past.

## 2022-06-25 NOTE — CONSULT NOTE ADULT - SUBJECTIVE AND OBJECTIVE BOX
Neurology Consult    Patient is a 71y old  Male who presents with a chief complaint of cholecystitis (25 Jun 2022 13:12) The patient was referred to neurology for gait disturbance and recurrent falls. Son and wife were at bedside and helped with the history. Pt states that her gait problem started about two years ago, when he will have episodes that he become sweaty and loss balance, and ability to walk. This will last for about few days. According to the wife, the patient is able to walk normally between the episodes. These episodes are not no loss of consciousness during the episodes. The patient try to use a walker during these episodes. No incontinence, or tongue bite.   Before this admission, he started to have one of these episodes, and still not having his balance back.   No blurry vision, diplopia, headache, neck pain, slurred speech, dysphagia, or dysphasia  The patient was seen in April when he was brought as code stroke for a gait disturbance, CTH and stroke work up was not significant. The patient was suspected to have PD and started on sinemet unfortunately, the patient is not sure if he is taking the sinemet, and not clear if it helped.   Reportedly, the patient is taking Lithium for his bipolar and the levels were toxic on April; however, there were low on this admission.          HPI:  71M, Yiddish speaking, with pmh of T2DM with neuropathy, bipolar disorder, vertigo, CAD s/p YEHUDA x2 (6/6/22, on DAPT), cholelithiasis, and no previous abdominal surgeries who was BIBEMS with several hour h/o n/v-nonbloody, bilious emesis for the past 5 days. Emesis initially gastric food contents with small amt yellow emesis at the end. Reports nausea and vomiting is associated with left sided abdominal pain that was refractory to acetaminophen. Can not recall last BMs, endorses to passign some flatus. Denies f/c, CP, SOB, cough, palpitations, melena, hematochezia, dysuria, hematuria, weakness or pain in extremities.   Of note, pt was recently diagnosed with cholelithasis 6/19/22 and discharge home with oupt f/u with Dr. Carmona.     In the ED vitals were alls wnl. Labs notable for WBC 21.3 (90% PMNs), and  with reamaining labs wnl, including TBili 0.5, Lactate 1.6. CT A/P unremarkable. RUQ US w/ 4.3cm stone.    PMH: T2DM with neuropathy, bipolar disorder, vertigo, CAD s/p YEHUDA x2 (6/6/22, on DAPT), cholelithiasis  PSH: tonsillectomy  Allergies: NKDA  Medications Clonazepam 0.5mg, Lamictal 150mg, 100mg, ASA 81, Plavix, Divalproex Na 500mg, Sitagliptin-Metformin 50-500mg.Plavix.   SH: No h/o tobacco use, No EtOH use   (22 Jun 2022 23:08)      PAST MEDICAL & SURGICAL HISTORY:  Diabetes      Bipolar disorder      Vertigo      S/P tonsillectomy          FAMILY HISTORY:  FH: type 2 diabetes    FH: bipolar disorder        Social History: (-) x 3    Allergies    No Known Allergies    Intolerances        MEDICATIONS  (STANDING):  aspirin enteric coated 81 milliGRAM(s) Oral daily  atorvastatin 40 milliGRAM(s) Oral at bedtime  cefTRIAXone   IVPB 2000 milliGRAM(s) IV Intermittent every 24 hours  clopidogrel Tablet 75 milliGRAM(s) Oral daily  dextrose 5%. 1000 milliLiter(s) (100 mL/Hr) IV Continuous <Continuous>  dextrose 5%. 1000 milliLiter(s) (50 mL/Hr) IV Continuous <Continuous>  dextrose 50% Injectable 25 Gram(s) IV Push once  dextrose 50% Injectable 12.5 Gram(s) IV Push once  dextrose 50% Injectable 25 Gram(s) IV Push once  glucagon  Injectable 1 milliGRAM(s) IntraMuscular once  heparin   Injectable 5000 Unit(s) SubCutaneous every 8 hours  insulin lispro (ADMELOG) corrective regimen sliding scale   SubCutaneous at bedtime  lamoTRIgine 150 milliGRAM(s) Oral every 24 hours  lamoTRIgine 100 milliGRAM(s) Oral every 24 hours  lithium 300 milliGRAM(s) Oral at bedtime  metroNIDAZOLE  IVPB 500 milliGRAM(s) IV Intermittent every 8 hours  pantoprazole    Tablet 40 milliGRAM(s) Oral before breakfast  polyethylene glycol 3350 17 Gram(s) Oral daily  propranolol 40 milliGRAM(s) Oral two times a day  senna 2 Tablet(s) Oral at bedtime  sertraline 25 milliGRAM(s) Oral daily    MEDICATIONS  (PRN):  dextrose Oral Gel 15 Gram(s) Oral once PRN Blood Glucose LESS THAN 70 milliGRAM(s)/deciliter  ondansetron Injectable 4 milliGRAM(s) IV Push every 8 hours PRN Nausea and/or Vomiting  oxyCODONE    IR 5 milliGRAM(s) Oral every 6 hours PRN Severe Pain (7 - 10)      Review of systems:    Constitutional: as per HPI  Eyes: No eye pain or discharge  ENMT:  No difficulty hearing; No sinus or throat pain  Neck: No pain or stiffness  Respiratory: No cough, wheezing, chills or hemoptysis  Cardiovascular: No chest pain, palpitations, shortness of breath, dyspnea on exertion  Gastrointestinal: No abdominal pain, nausea, vomiting or hematemesis; No diarrhea or constipation.   Genitourinary: No dysuria, frequency, hematuria or incontinence  Neurological: As per HPI  Skin: No rashes or lesions   Endocrine: No heat or cold intolerance; No hair loss  Musculoskeletal: No joint pain or swelling  Psychiatric: No depression, anxiety, mood swings  Heme/Lymph: No easy bruising or bleeding gums    Vital Signs Last 24 Hrs  T(C): 36.6 (25 Jun 2022 14:41), Max: 36.6 (25 Jun 2022 06:01)  T(F): 97.8 (25 Jun 2022 14:41), Max: 97.9 (25 Jun 2022 10:05)  HR: 59 (25 Jun 2022 16:30) (59 - 72)  BP: 109/55 (25 Jun 2022 16:30) (109/55 - 138/62)  BP(mean): 79 (25 Jun 2022 16:30) (79 - 92)  RR: 19 (25 Jun 2022 16:30) (15 - 24)  SpO2: 96% (25 Jun 2022 16:30) (94% - 98%)    Examination:  General:  Appearance is consistent with chronologic age.  No abnormal facies.  Gross skin survey within normal limits.    Cognitive/Language:  The patient is oriented to person, place, time and date.  Recent and remote memory intact.  Language with normal repetition, comprehension and naming.  Nondysarthric.    Eyes: intact VA, VFF.  EOMI w/o nystagmus, skew or reported double vision.  PERRL.  No ptosis/weakness of eyelid closure.    Face:  Facial sensation normal V1 - 3, no facial asymmetry.    Ears/Nose/Throat:  Hearing grossly intact b/l.  Palate elevates midline.  Tongue and uvula midline.   Motor examination: Normal tone, bulk and range of motion.  No tenderness, twitching, tremors or involuntary movements.  Formal Muscle Strength Testing: (MRC grade R/L) 5/5 UE; 5/5 LE.  No observable drift.  Reflexes:   1+ b/l biceps, triceps, brachioradialis, patella and absent Achilles.  Plantar response downgoing b/l.  Jaw jerk, Siva, clonus absent.  Sensory examination: Intact to light touch and pinprick, pain, temperature and proprioception and vibration in all extremities. (mildly decreased vibration in both feet)  Cerebellum:  FTN/HKS is abnormal on the Rt more than the Lt and worst on the Rt leg. Alternating movements are better in the Lt (Lt handed).   Gait: deferred, he was not able to sit without support     Respiratory:  no audible wheezing or inspiratory stridor.  no use of accessory muscles.   Cardiac: pulse palpable, no audible bruits  Abdomen: supple, no guarding, no TTP    Labs:   CBC Full  -  ( 25 Jun 2022 12:54 )  WBC Count : 13.93 K/uL  RBC Count : 4.32 M/uL  Hemoglobin : 12.8 g/dL  Hematocrit : 39.4 %  Platelet Count - Automated : 348 K/uL  Mean Cell Volume : 91.2 fl  Mean Cell Hemoglobin : 29.6 pg  Mean Cell Hemoglobin Concentration : 32.5 gm/dL  Auto Neutrophil # : x  Auto Lymphocyte # : x  Auto Monocyte # : x  Auto Eosinophil # : x  Auto Basophil # : x  Auto Neutrophil % : x  Auto Lymphocyte % : x  Auto Monocyte % : x  Auto Eosinophil % : x  Auto Basophil % : x    06-25    136  |  101  |  11  ----------------------------<  256<H>  4.3   |  26  |  0.64    Ca    8.7      25 Jun 2022 12:54  Phos  3.2     06-25  Mg     2.0     06-25    TPro  6.7  /  Alb  3.1<L>  /  TBili  0.3  /  DBili  x   /  AST  126<H>  /  ALT  90<H>  /  AlkPhos  113  06-25    LIVER FUNCTIONS - ( 25 Jun 2022 12:54 )  Alb: 3.1 g/dL / Pro: 6.7 g/dL / ALK PHOS: 113 U/L / ALT: 90 U/L / AST: 126 U/L / GGT: x                   [facility]  [facility]     [facility]-25-22 @ 17:53     Neurology Consult    Patient is a 71y old  Male who presents with a chief complaint of cholecystitis (25 Jun 2022 13:12) The patient was referred to neurology for gait disturbance and recurrent falls. Son and wife were at bedside and helped with the history. Pt states that her gait problem started about two years ago, when he will have episodes that he become sweaty and loss balance, and ability to walk. This will last for about few days. According to the wife, the patient is able to walk normally between the episodes. These episodes are not no loss of consciousness during the episodes. The patient try to use a walker during these episodes. No incontinence, or tongue bite.   Before this admission, he started to have one of these episodes, and still not having his balance back.   No blurry vision, diplopia, headache, neck pain, slurred speech, dysphagia, or dysphasia  The patient was seen in April when he was brought as code stroke for a gait disturbance, CTH and stroke work up was not significant. The patient was suspected to have PD and started on sinemet unfortunately, the patient is not sure if he is taking the sinemet, and not clear if it helped.   Reportedly, the patient is taking Lithium for his bipolar and the levels were toxic on April; however, there were low on this admission.          HPI:  71M, Yiddish speaking, with pmh of T2DM with neuropathy, bipolar disorder, vertigo, CAD s/p YEHUDA x2 (6/6/22, on DAPT), cholelithiasis, and no previous abdominal surgeries who was BIBEMS with several hour h/o n/v-nonbloody, bilious emesis for the past 5 days. Emesis initially gastric food contents with small amt yellow emesis at the end. Reports nausea and vomiting is associated with left sided abdominal pain that was refractory to acetaminophen. Can not recall last BMs, endorses to passign some flatus. Denies f/c, CP, SOB, cough, palpitations, melena, hematochezia, dysuria, hematuria, weakness or pain in extremities.   Of note, pt was recently diagnosed with cholelithasis 6/19/22 and discharge home with oupt f/u with Dr. Carmona.     In the ED vitals were alls wnl. Labs notable for WBC 21.3 (90% PMNs), and  with reamaining labs wnl, including TBili 0.5, Lactate 1.6. CT A/P unremarkable. RUQ US w/ 4.3cm stone.    PMH: T2DM with neuropathy, bipolar disorder, vertigo, CAD s/p YEHUDA x2 (6/6/22, on DAPT), cholelithiasis  PSH: tonsillectomy  Allergies: NKDA  Medications Clonazepam 0.5mg, Lamictal 150mg, 100mg, ASA 81, Plavix, Divalproex Na 500mg, Sitagliptin-Metformin 50-500mg.Plavix.   SH: No h/o tobacco use, No EtOH use   (22 Jun 2022 23:08)      PAST MEDICAL & SURGICAL HISTORY:  Diabetes      Bipolar disorder      Vertigo      S/P tonsillectomy          FAMILY HISTORY:  FH: type 2 diabetes    FH: bipolar disorder        Social History: (-) x 3    Allergies    No Known Allergies    Intolerances        MEDICATIONS  (STANDING):  aspirin enteric coated 81 milliGRAM(s) Oral daily  atorvastatin 40 milliGRAM(s) Oral at bedtime  cefTRIAXone   IVPB 2000 milliGRAM(s) IV Intermittent every 24 hours  clopidogrel Tablet 75 milliGRAM(s) Oral daily  dextrose 5%. 1000 milliLiter(s) (100 mL/Hr) IV Continuous <Continuous>  dextrose 5%. 1000 milliLiter(s) (50 mL/Hr) IV Continuous <Continuous>  dextrose 50% Injectable 25 Gram(s) IV Push once  dextrose 50% Injectable 12.5 Gram(s) IV Push once  dextrose 50% Injectable 25 Gram(s) IV Push once  glucagon  Injectable 1 milliGRAM(s) IntraMuscular once  heparin   Injectable 5000 Unit(s) SubCutaneous every 8 hours  insulin lispro (ADMELOG) corrective regimen sliding scale   SubCutaneous at bedtime  lamoTRIgine 150 milliGRAM(s) Oral every 24 hours  lamoTRIgine 100 milliGRAM(s) Oral every 24 hours  lithium 300 milliGRAM(s) Oral at bedtime  metroNIDAZOLE  IVPB 500 milliGRAM(s) IV Intermittent every 8 hours  pantoprazole    Tablet 40 milliGRAM(s) Oral before breakfast  polyethylene glycol 3350 17 Gram(s) Oral daily  propranolol 40 milliGRAM(s) Oral two times a day  senna 2 Tablet(s) Oral at bedtime  sertraline 25 milliGRAM(s) Oral daily    MEDICATIONS  (PRN):  dextrose Oral Gel 15 Gram(s) Oral once PRN Blood Glucose LESS THAN 70 milliGRAM(s)/deciliter  ondansetron Injectable 4 milliGRAM(s) IV Push every 8 hours PRN Nausea and/or Vomiting  oxyCODONE    IR 5 milliGRAM(s) Oral every 6 hours PRN Severe Pain (7 - 10)      Review of systems:    Constitutional: as per HPI  Eyes: No eye pain or discharge  ENMT:  No difficulty hearing; No sinus or throat pain  Neck: No pain or stiffness  Respiratory: No cough, wheezing, chills or hemoptysis  Cardiovascular: No chest pain, palpitations, shortness of breath, dyspnea on exertion  Gastrointestinal: No abdominal pain, nausea, vomiting or hematemesis; No diarrhea or constipation.   Genitourinary: No dysuria, frequency, hematuria or incontinence  Neurological: As per HPI  Skin: No rashes or lesions   Endocrine: No heat or cold intolerance; No hair loss  Musculoskeletal: No joint pain or swelling  Psychiatric: No depression, anxiety, mood swings  Heme/Lymph: No easy bruising or bleeding gums    Vital Signs Last 24 Hrs  T(C): 36.6 (25 Jun 2022 14:41), Max: 36.6 (25 Jun 2022 06:01)  T(F): 97.8 (25 Jun 2022 14:41), Max: 97.9 (25 Jun 2022 10:05)  HR: 59 (25 Jun 2022 16:30) (59 - 72)  BP: 109/55 (25 Jun 2022 16:30) (109/55 - 138/62)  BP(mean): 79 (25 Jun 2022 16:30) (79 - 92)  RR: 19 (25 Jun 2022 16:30) (15 - 24)  SpO2: 96% (25 Jun 2022 16:30) (94% - 98%)    Examination:  General:  Appearance is consistent with chronologic age.  No abnormal facies.  Gross skin survey within normal limits.    Cognitive/Language:  The patient is oriented to person, place, time and date.  Recent and remote memory intact.  Language with normal repetition, comprehension and naming.  Nondysarthric.    Eyes: intact VA, VFF.  EOMI w/o nystagmus, skew or reported double vision.  PERRL.  No ptosis/weakness of eyelid closure.    Face:  Facial sensation normal V1 - 3, no facial asymmetry.    Ears/Nose/Throat:  Hearing grossly intact b/l.  Palate elevates midline.  Tongue and uvula midline.   Motor examination: Normal bulk and range of motion.  Resting tremor with distraction worse in the left hand. Cogwheeling at wrists worse with activation and worse in the right than the left hand  Formal Muscle Strength Testing: (MRC grade R/L) 5/5 UE; 5/5 LE.  No observable drift.  Reflexes:   1+ b/l biceps, triceps, brachioradialis, patella and absent Achilles.  Plantar response downgoing b/l.  Jaw jerk, Siva, clonus absent.  Sensory examination: Intact to light touch and pinprick, pain, temperature and proprioception and vibration in all extremities. (mildly decreased vibration in both feet)  Cerebellum:  intention tremor on finger to nose without dysmetria  Gait: deferred, he was not able to sit without support     Respiratory:  no audible wheezing or inspiratory stridor.  no use of accessory muscles.   Cardiac: pulse palpable, no audible bruits  Abdomen: supple, no guarding, no TTP    Labs:   CBC Full  -  ( 25 Jun 2022 12:54 )  WBC Count : 13.93 K/uL  RBC Count : 4.32 M/uL  Hemoglobin : 12.8 g/dL  Hematocrit : 39.4 %  Platelet Count - Automated : 348 K/uL  Mean Cell Volume : 91.2 fl  Mean Cell Hemoglobin : 29.6 pg  Mean Cell Hemoglobin Concentration : 32.5 gm/dL  Auto Neutrophil # : x  Auto Lymphocyte # : x  Auto Monocyte # : x  Auto Eosinophil # : x  Auto Basophil # : x  Auto Neutrophil % : x  Auto Lymphocyte % : x  Auto Monocyte % : x  Auto Eosinophil % : x  Auto Basophil % : x    06-25    136  |  101  |  11  ----------------------------<  256<H>  4.3   |  26  |  0.64    Ca    8.7      25 Jun 2022 12:54  Phos  3.2     06-25  Mg     2.0     06-25    TPro  6.7  /  Alb  3.1<L>  /  TBili  0.3  /  DBili  x   /  AST  126<H>  /  ALT  90<H>  /  AlkPhos  113  06-25    LIVER FUNCTIONS - ( 25 Jun 2022 12:54 )  Alb: 3.1 g/dL / Pro: 6.7 g/dL / ALK PHOS: 113 U/L / ALT: 90 U/L / AST: 126 U/L / GGT: x                   Neuroimaging:  [facility]:     06-25-22 @ 17:53
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
## 2022-06-25 NOTE — PROGRESS NOTE ADULT - SUBJECTIVE AND OBJECTIVE BOX
SUBJECTIVE: Pain improved this AM. Tolerating diet. Denies chest pain, dyspnea, nausea or vomiting, passing flatus no Bm.       MEDICATIONS  (STANDING):  aspirin enteric coated 81 milliGRAM(s) Oral daily  atorvastatin 40 milliGRAM(s) Oral at bedtime  cefTRIAXone   IVPB 2000 milliGRAM(s) IV Intermittent every 24 hours  clopidogrel Tablet 75 milliGRAM(s) Oral daily  dextrose 5%. 1000 milliLiter(s) (100 mL/Hr) IV Continuous <Continuous>  dextrose 5%. 1000 milliLiter(s) (50 mL/Hr) IV Continuous <Continuous>  dextrose 50% Injectable 25 Gram(s) IV Push once  dextrose 50% Injectable 12.5 Gram(s) IV Push once  dextrose 50% Injectable 25 Gram(s) IV Push once  glucagon  Injectable 1 milliGRAM(s) IntraMuscular once  heparin   Injectable 5000 Unit(s) SubCutaneous every 8 hours  insulin lispro (ADMELOG) corrective regimen sliding scale   SubCutaneous at bedtime  lamoTRIgine 150 milliGRAM(s) Oral every 24 hours  lamoTRIgine 100 milliGRAM(s) Oral every 24 hours  lithium 300 milliGRAM(s) Oral at bedtime  metroNIDAZOLE  IVPB 500 milliGRAM(s) IV Intermittent every 8 hours  pantoprazole    Tablet 40 milliGRAM(s) Oral before breakfast  polyethylene glycol 3350 17 Gram(s) Oral daily  propranolol 40 milliGRAM(s) Oral two times a day  senna 2 Tablet(s) Oral at bedtime  sertraline 25 milliGRAM(s) Oral daily    MEDICATIONS  (PRN):  dextrose Oral Gel 15 Gram(s) Oral once PRN Blood Glucose LESS THAN 70 milliGRAM(s)/deciliter  ondansetron Injectable 4 milliGRAM(s) IV Push every 8 hours PRN Nausea and/or Vomiting  oxyCODONE    IR 5 milliGRAM(s) Oral every 6 hours PRN Severe Pain (7 - 10)      Vital Signs Last 24 Hrs  T(C): 36.6 (25 Jun 2022 06:01), Max: 37.3 (24 Jun 2022 09:10)  T(F): 97.8 (25 Jun 2022 06:01), Max: 99.2 (24 Jun 2022 09:10)  HR: 62 (25 Jun 2022 04:35) (62 - 72)  BP: 120/55 (25 Jun 2022 04:35) (118/56 - 135/62)  BP(mean): 79 (25 Jun 2022 04:35) (79 - 89)  RR: 20 (25 Jun 2022 04:35) (15 - 25)  SpO2: 97% (25 Jun 2022 04:35) (95% - 97%)    Physical Exam:  General: NAD, resting comfortably in bed  Pulmonary: Nonlabored breathing, no respiratory distress  Cardiovascular: RRR  Abdominal: soft, mild RUQ pain, no rebound or guarding,   Extremities: WWP, normal strength  Neuro: A/O x 3, CNs II-XII grossly intact, no focal deficits, normal motor/sensation  Pulses: palpable distal pulses    I&O's Summary    24 Jun 2022 07:01  -  25 Jun 2022 07:00  --------------------------------------------------------  IN: 830 mL / OUT: 1500 mL / NET: -670 mL        LABS:                        13.1   14.01 )-----------( 275      ( 24 Jun 2022 10:58 )             39.3     06-24    136  |  102  |  9   ----------------------------<  229<H>  4.1   |  27  |  0.67    Ca    8.7      24 Jun 2022 10:58  Phos  3.0     06-24  Mg     2.0     06-24    TPro  6.5  /  Alb  3.0<L>  /  TBili  0.3  /  DBili  x   /  AST  75<H>  /  ALT  53<H>  /  AlkPhos  79  06-24        CAPILLARY BLOOD GLUCOSE      POCT Blood Glucose.: 195 mg/dL (25 Jun 2022 06:52)  POCT Blood Glucose.: 199 mg/dL (24 Jun 2022 22:15)  POCT Blood Glucose.: 215 mg/dL (24 Jun 2022 11:56)    LIVER FUNCTIONS - ( 24 Jun 2022 10:58 )  Alb: 3.0 g/dL / Pro: 6.5 g/dL / ALK PHOS: 79 U/L / ALT: 53 U/L / AST: 75 U/L / GGT: x             RADIOLOGY & ADDITIONAL STUDIES:

## 2022-06-25 NOTE — BH CONSULTATION LIAISON ASSESSMENT NOTE - ADDITIONAL PSYCHIATRIC MEDICATIONS
sertraline 25 mg PO daily  lamotrigine 250 mg PO qHs  lithium 450 mg PO qHs Oral every 24 hours  clonazepam 0.5 mg PO TID

## 2022-06-25 NOTE — BH CONSULTATION LIAISON ASSESSMENT NOTE - HPI (INCLUDE ILLNESS QUALITY, SEVERITY, DURATION, TIMING, CONTEXT, MODIFYING FACTORS, ASSOCIATED SIGNS AND SYMPTOMS)
71 year old Yiddish-speaking male, , unemployed (on SSI), domiciled with family in [place] with PMH DM2 c/b neuropathy, vertigo, CAD s/p YEHUDA x2 (6/6/22, on DAPT), cholelithiasis diagnosed on 06/19/22, no previous abdominal surgeries and PPH Bipolar Disorder, no prior inpatient psychiatric admissions, currently prescribed psychiatric medications by his PCP Dr. Norwood, no history of SA/NSSIB/Violence, no history of trauma BIBEMS with several hour with history of nausea/vomiting and bilious emesis for five days and admitted to general surgery team 1 under Dr. Carmona for management of cholelithiasis.  Psychiatry consulted given that the patient's family states he has displayed a "head imbalance" over the past week.      On evaluation, the patient is calm, cooperative, with constricted affect, good eye contact, demonstrating good behavioral control and linear thought processes.  The patient reports that his mood is "regular."  He adamantly denies SI/HI, intent, plan, AVH, he does not appear internally preoccupied on interview and no paranoia or delusions are reported or elicited.  When asked about his Bipolar Diagnosis, he reports that a psychiatrist told him he had the diagnosis "many years ago" and he does not remember the circumstance during which the diagnosis was given.  He reports that he is on Depakote, Lamictal and lithium which are prescribed by his primary care provider Dr. Norwood.  The patient states that he was instructed to discontinue the Lithium but he decided he wanted to continue taking it so he lowered the dose from 900 mg PO qHs to 450 mg PO qHs.  When asked why his PCP wanted to discontinue the medication, the patient states that he does not know.  When asked if he feels safe at home, the patient states "almost safe" and then looks at his wife and states "from you."  When asked to elaborate, he states that his wife is mean to him, is constantly yelling at him, and never gives him compliments.  He states that she only sounds nice over the phone.  When asked if he would be amenable to an outpatient psychiatric referral, the patient states that he would accept one upon discharge.  The patient denies any depressive, anxious or manic symptoms.  All questions and concerns addressed.    Collateral:  Estrella Blevins (Wife): [phone] (home); [phone] (cell)  The patient's wife reports that, over the past week, the patient has had "no energy" and he has had an unstable gait and "just flops on the floor."  She also states that he has been more socially isolative and spends most of his time in his room.  She states that the patient has had intermittent episodes of such symptoms in the past and that they typically resolve on their own.  She states that the patient "had a nervous breakdown" in 1980 when he became very depressed, religiously preoccupied and paranoid.  At this time she states that the patient locked all of the doors to their house, closed all the shades, and refused to let her leave the home.  She states that he also did not sleep for two nights and then "crashed."  She denies any pressured speech, evidence of racing thoughts, impulsivity and delusions of grandeur.  She states that he was diagnosed with Bipolar Disorder at this time and was started on Thorazine.  She states that the patient was eventually transitioned to Lithium which she was originally reluctant to agree to given the needed bloodwork.  She states that her  was under the care of a psychiatrist named Dr. Barajas for two to three years and was prescribed depakote, lamictal and lithium.  She states that the patient discontinued his care with Dr. Barajas when he began to accuse her of telling the psychiatrist what medications to prescribe him.  Since then he has received psychiatric care from his PCP, Dr. Norwood.  She states that Dr. Norwood discontinued the patient's lithium but the patient continues to take the medication because "he likes it."  She states that the patient changes the dosages of his medications on his own and he is now taking 450 mg PO qHs rather than the original 900 mg PO qHs.  She states that the patient has also expressed paranoia over the past half a year that she and one of her sons have taken a life insurance policy out in his name and want him to die.  She states that he has accused her of trying to poison him on multiple occasions.  She states that the patient saw a neurologist and she was informed that he may have Parkinson's Disease.  The patient's wife states that she has no acute concern for the patient's safety or that of others if he is discharged home but she would like to determine the reason for his unsteady gait and tremors and she would like a referral to outpatient psychiatry.

## 2022-06-25 NOTE — BH CONSULTATION LIAISON ASSESSMENT NOTE - RISK ASSESSMENT
The patient is at chronic elevated risk for suicide/self-harm given a history of Bipolar Disorder diagnosis, male gender, and history of treatment non-compliance but no acute risk factors are identified at this time and protective factors that mitigate this risk abound including a capacity to advocate for self, future oriented, domiciled, strong social support, willingness to engage in treatment, no history of SAs/NSSIB, and currently denying SI, intent and plan.  Collateral from the patient's family indicate that they have no acute concerns for the patient's safety or that of others and they are advocating for discharge home with outpatient psychiatric follow-up.  Given that the patient is not an acute risk to self or others, an inpatient psychiatric admission is not warranted at this time and the patient would benefit from outpatient psychiatric referral.

## 2022-06-25 NOTE — CONSULT NOTE ADULT - SUBJECTIVE AND OBJECTIVE BOX
Medicine Consult:  DARCIE LYNN  71y  Male    71M, Yiddish speaking, with pmh of T2DM with neuropathy, bipolar disorder, vertigo, CAD s/p YEHUDA x2 (6/6/22, on DAPT), cholelithiasis, and no previous abdominal surgeries who was BIBEMS with several hour h/o n/v-nonbloody, bilious emesis for the past 5 days. HDS wnl. Labs notable for WBC 21.3 (90% PMNs), and  with remaining labs wnl, including TBili 0.5, Lactate 1.6. CT A/P unremarkable. Admitted to the surgery service for non-surgical management of cholecystitis.   Medicine consulted on 6/25 for shakiness. On interview, patient reports he has had intermittent "shakiness" for the last 2-3 years. Describes them as tremors in his arms that come and go. Denies any shakiness in the rest of his body, denies any seizure history. Patient is currently without these symptoms. He reports he has been worked up for these in the past with psychiatry and neurology. Was told that it could have been a side effect of some psychiatric medication he has been taking (on Lamictal and Lithium) or an inner ear issue per neurology. He has been on these medications for several years and doses have been adjusted.   Patient otherwise denies dizziness, light headedness, confusion, headaches, nausea, vomiting, diarrhea, chest pain, or SOB. No shakiness noted on exam.    PAST MEDICAL/SURGICAL HISTORY  PAST MEDICAL & SURGICAL HISTORY:  Diabetes      Bipolar disorder      Vertigo      S/P tonsillectomy      REVIEW OF SYSTEMS:  Negative unless mentioned above     T(C): 36.6 (06-25-22 @ 10:05), Max: 37.2 (06-24-22 @ 14:12)  HR: 61 (06-25-22 @ 09:09) (61 - 72)  BP: 115/76 (06-25-22 @ 09:09) (115/76 - 135/62)  RR: 15 (06-25-22 @ 09:09) (15 - 25)  SpO2: 98% (06-25-22 @ 09:09) (95% - 98%)  Wt(kg): --Vital Signs Last 24 Hrs  T(C): 36.6 (25 Jun 2022 10:05), Max: 37.2 (24 Jun 2022 14:12)  T(F): 97.9 (25 Jun 2022 10:05), Max: 99 (24 Jun 2022 14:12)  HR: 61 (25 Jun 2022 09:09) (61 - 72)  BP: 115/76 (25 Jun 2022 09:09) (115/76 - 135/62)  BP(mean): 92 (25 Jun 2022 09:09) (79 - 92)  RR: 15 (25 Jun 2022 09:09) (15 - 25)  SpO2: 98% (25 Jun 2022 09:09) (95% - 98%)    PHYSICAL EXAM:  GENERAL: resting in bed, in NAD.   HEAD:  Atraumatic, Normocephalic  EYES: EOMI, PERRLA, conjunctiva and sclera clear  ENMT: No tonsillar erythema, exudates, or enlargement; Moist mucous membranes, Good dentition, No lesions  NECK: Supple, No JVD, Normal thyroid  NERVOUS SYSTEM:  Alert & Oriented X3, no focal deficits. No shakiness or tremor noted.   CHEST/LUNG: Clear to percussion bilaterally; No rales, rhonchi, wheezing, or rubs  HEART: Regular rate and rhythm; No murmurs, rubs, or gallops  ABDOMEN: Soft, Nontender, Nondistended; Bowel sounds present  EXTREMITIES:  2+ Peripheral Pulses, No clubbing, cyanosis, or edema  LYMPH: No lymphadenopathy noted  SKIN: No rashes or lesions    Consultant(s) Notes Reviewed:  [x ] YES  [ ] NO  Care Discussed with Consultants/Other Providers [ x] YES  [ ] NO    LABS:  CBC   06-25-22 @ 12:54  Hematcorit 39.4  Hemoglobin 12.8  Mean Cell Hemoglobin 29.6  Platelet Count-Automated 348  RBC Count 4.32  Red Cell Distrib Width 13.6  Wbc Count 13.93      BMP  06-25-22 @ 12:54  Anion Gap. Serum 9  Blood Urea Nitrogen,Serm 11  Calcium, Total Serum 8.7  Carbon Dioxide, Serum 26  Chloride, Serum 101  Creatinine, Serum 0.64  eGFR in  --  eGFR in Non Afican American --  Gloucose, serum 256  Potassium, Serum 4.3  Sodium, Serum 136              06-24-22 @ 10:58  Anion Gap. Serum 7  Blood Urea Nitrogen,Serm 9  Calcium, Total Serum 8.7  Carbon Dioxide, Serum 27  Chloride, Serum 102  Creatinine, Serum 0.67  eGFR in  --  eGFR in Non Afican American --  Gloucose, serum 229  Potassium, Serum 4.1  Sodium, Serum 136      06-23-22 @ 05:30  Anion Gap. Serum 12  Blood Urea Nitrogen,Serm 12  Calcium, Total Serum 9.0  Carbon Dioxide, Serum 24  Chloride, Serum 102  Creatinine, Serum 0.70  eGFR in  --  eGFR in Non Afican American --  Gloucose, serum 141  Potassium, Serum 4.1  Sodium, Serum 138    06-22-22 @ 17:54  Anion Gap. Serum 11  Blood Urea Nitrogen,Serm 20  Calcium, Total Serum 9.8  Carbon Dioxide, Serum 28  Chloride, Serum 97  Creatinine, Serum 0.93  eGFR in  --  eGFR in Non Afican American --  Gloucose, serum 286  Potassium, Serum 5.0  Sodium, Serum 136      CMP  06-25-22 @ 12:54  Ruby Aminotransferase(ALT/SGPT)90  Albumin, Serum 3.1  Alkaline Phosphatase, Serum 113  Anion Gap, Serum 9  Aspartate Aminotransferase (AST/SGOT)126  Bilirubin Total, Serum 0.3  Blood Urea Nitrogen, Serum 11  Calcium,Total Serum 8.7  Carbon Dioxide, Serum 26  Chloride, Serum 101  Creatinine, Serum 0.64  eGFR if  --  eGFR if Non African American --  Glucose, Serum 256  Potassium, Serum 4.3  Protein Total, Serum 6.7  Sodium, Serum 136      06-24-22 @ 10:58  Ruby Aminotransferase(ALT/SGPT)53  Albumin, Serum 3.0  Alkaline Phosphatase, Serum 79  Anion Gap, Serum 7  Aspartate Aminotransferase (AST/SGOT)75  Bilirubin Total, Serum 0.3  Blood Urea Nitrogen, Serum 9  Calcium,Total Serum 8.7  Carbon Dioxide, Serum 27  Chloride, Serum 102  Creatinine, Serum 0.67  eGFR if  --  eGFR if Non African American --  Glucose, Serum 229  Potassium, Serum 4.1  Protein Total, Serum 6.5  Sodium, Serum 136    06-23-22 @ 05:30  Ruby Aminotransferase(ALT/SGPT)28  Albumin, Serum 2.9  Alkaline Phosphatase, Serum 88  Anion Gap, Serum 12  Aspartate Aminotransferase (AST/SGOT)37  Bilirubin Total, Serum 0.3  Blood Urea Nitrogen, Serum 12  Calcium,Total Serum 9.0  Carbon Dioxide, Serum 24  Chloride, Serum 102  Creatinine, Serum 0.70  eGFR if  --  eGFR if Non African American --  Glucose, Serum 141  Potassium, Serum 4.1  Protein Total, Serum 6.6  Sodium, Serum 138      06-22-22 @ 17:54  Ruby Aminotransferase(ALT/SGPT)24  Albumin, Serum 3.9  Alkaline Phosphatase, Serum 76  Anion Gap, Serum 11  Aspartate Aminotransferase (AST/SGOT)26  Bilirubin Total, Serum 0.5  Blood Urea Nitrogen, Serum 20  Calcium,Total Serum 9.8  Carbon Dioxide, Serum 28  Chloride, Serum 97  Creatinine, Serum 0.93  eGFR if  --  eGFR if Non African American --  Glucose, Serum 286  Potassium, Serum 5.0  Protein Total, Serum 7.7  Sodium, Serum 1    PT/INR  PT/INR  06-22-22 @ 17:54  INR 1.20  Prothrombin Time Comment --  Prothrobin Time, [id number]      Amylase/Lipase  06-22-22 @ 17:54  Amylase, Serum Total --  Lipase, Serum 11            RADIOLOGY & ADDITIONAL TESTS:    Imaging Personally Reviewed:  [ ] YES  [ ] NO

## 2022-06-26 LAB
ALBUMIN SERPL ELPH-MCNC: 2.7 G/DL — LOW (ref 3.3–5)
ALP SERPL-CCNC: 99 U/L — SIGNIFICANT CHANGE UP (ref 40–120)
ALT FLD-CCNC: 83 U/L — HIGH (ref 10–45)
ANION GAP SERPL CALC-SCNC: 10 MMOL/L — SIGNIFICANT CHANGE UP (ref 5–17)
AST SERPL-CCNC: 83 U/L — HIGH (ref 10–40)
BILIRUB SERPL-MCNC: 0.3 MG/DL — SIGNIFICANT CHANGE UP (ref 0.2–1.2)
BUN SERPL-MCNC: 11 MG/DL — SIGNIFICANT CHANGE UP (ref 7–23)
CALCIUM SERPL-MCNC: 8.8 MG/DL — SIGNIFICANT CHANGE UP (ref 8.4–10.5)
CHLORIDE SERPL-SCNC: 102 MMOL/L — SIGNIFICANT CHANGE UP (ref 96–108)
CO2 SERPL-SCNC: 26 MMOL/L — SIGNIFICANT CHANGE UP (ref 22–31)
CREAT SERPL-MCNC: 0.65 MG/DL — SIGNIFICANT CHANGE UP (ref 0.5–1.3)
EGFR: 101 ML/MIN/1.73M2 — SIGNIFICANT CHANGE UP
GLUCOSE BLDC GLUCOMTR-MCNC: 250 MG/DL — HIGH (ref 70–99)
GLUCOSE SERPL-MCNC: 202 MG/DL — HIGH (ref 70–99)
HCT VFR BLD CALC: 43.6 % — SIGNIFICANT CHANGE UP (ref 39–50)
HGB BLD-MCNC: 14.2 G/DL — SIGNIFICANT CHANGE UP (ref 13–17)
LITHIUM SERPL-MCNC: 0.11 MMOL/L — LOW (ref 0.6–1.2)
MAGNESIUM SERPL-MCNC: 1.8 MG/DL — SIGNIFICANT CHANGE UP (ref 1.6–2.6)
MCHC RBC-ENTMCNC: 29.6 PG — SIGNIFICANT CHANGE UP (ref 27–34)
MCHC RBC-ENTMCNC: 32.6 GM/DL — SIGNIFICANT CHANGE UP (ref 32–36)
MCV RBC AUTO: 90.8 FL — SIGNIFICANT CHANGE UP (ref 80–100)
NRBC # BLD: 0 /100 WBCS — SIGNIFICANT CHANGE UP (ref 0–0)
PHOSPHATE SERPL-MCNC: 4.2 MG/DL — SIGNIFICANT CHANGE UP (ref 2.5–4.5)
PLATELET # BLD AUTO: 392 K/UL — SIGNIFICANT CHANGE UP (ref 150–400)
POTASSIUM SERPL-MCNC: 4.3 MMOL/L — SIGNIFICANT CHANGE UP (ref 3.5–5.3)
POTASSIUM SERPL-SCNC: 4.3 MMOL/L — SIGNIFICANT CHANGE UP (ref 3.5–5.3)
PROT SERPL-MCNC: 6.8 G/DL — SIGNIFICANT CHANGE UP (ref 6–8.3)
RBC # BLD: 4.8 M/UL — SIGNIFICANT CHANGE UP (ref 4.2–5.8)
RBC # FLD: 13.2 % — SIGNIFICANT CHANGE UP (ref 10.3–14.5)
SODIUM SERPL-SCNC: 138 MMOL/L — SIGNIFICANT CHANGE UP (ref 135–145)
VALPROATE SERPL-MCNC: <2.8 UG/ML — LOW (ref 50–100)
WBC # BLD: 12.27 K/UL — HIGH (ref 3.8–10.5)
WBC # FLD AUTO: 12.27 K/UL — HIGH (ref 3.8–10.5)

## 2022-06-26 PROCEDURE — 99222 1ST HOSP IP/OBS MODERATE 55: CPT

## 2022-06-26 RX ADMIN — CLOPIDOGREL BISULFATE 75 MILLIGRAM(S): 75 TABLET, FILM COATED ORAL at 10:33

## 2022-06-26 RX ADMIN — ONDANSETRON 4 MILLIGRAM(S): 8 TABLET, FILM COATED ORAL at 02:49

## 2022-06-26 RX ADMIN — HEPARIN SODIUM 5000 UNIT(S): 5000 INJECTION INTRAVENOUS; SUBCUTANEOUS at 22:01

## 2022-06-26 RX ADMIN — ATORVASTATIN CALCIUM 40 MILLIGRAM(S): 80 TABLET, FILM COATED ORAL at 22:00

## 2022-06-26 RX ADMIN — Medication 100 MILLIGRAM(S): at 10:33

## 2022-06-26 RX ADMIN — CEFTRIAXONE 100 MILLIGRAM(S): 500 INJECTION, POWDER, FOR SOLUTION INTRAMUSCULAR; INTRAVENOUS at 00:01

## 2022-06-26 RX ADMIN — LITHIUM CARBONATE 300 MILLIGRAM(S): 300 TABLET, EXTENDED RELEASE ORAL at 22:01

## 2022-06-26 RX ADMIN — HEPARIN SODIUM 5000 UNIT(S): 5000 INJECTION INTRAVENOUS; SUBCUTANEOUS at 05:39

## 2022-06-26 RX ADMIN — Medication 100 MILLIGRAM(S): at 02:49

## 2022-06-26 RX ADMIN — Medication 81 MILLIGRAM(S): at 10:34

## 2022-06-26 RX ADMIN — LAMOTRIGINE 100 MILLIGRAM(S): 25 TABLET, ORALLY DISINTEGRATING ORAL at 22:01

## 2022-06-26 RX ADMIN — HEPARIN SODIUM 5000 UNIT(S): 5000 INJECTION INTRAVENOUS; SUBCUTANEOUS at 13:13

## 2022-06-26 RX ADMIN — PANTOPRAZOLE SODIUM 40 MILLIGRAM(S): 20 TABLET, DELAYED RELEASE ORAL at 05:39

## 2022-06-26 RX ADMIN — Medication 100 MILLIGRAM(S): at 17:58

## 2022-06-26 RX ADMIN — LAMOTRIGINE 150 MILLIGRAM(S): 25 TABLET, ORALLY DISINTEGRATING ORAL at 10:37

## 2022-06-26 NOTE — PROGRESS NOTE ADULT - ASSESSMENT
71M, Yiddish speaking, with pmh of T2DM with neuropathy, bipolar disorder, vertigo, CAD s/p YEHUDA x2 (6/6/22, on DAPT), cholelithiasis, and no previous abdominal surgeries who was BIBEMS with several hour h/o n/v-nonbloody, bilious emesis for the past 5 days. HDS wnl. Labs notable for WBC 21.3 (90% PMNs), and  with reamaining labs wnl, including TBili 0.5, Lactate 1.6. CT A/P unremarkable.    Low fat diet CC kosher   Pain/nausea control  C/w home medications  mISS  IS/OOBA  Senna/miralax   SCDs, ASA/Plavix  f/u neuro labs  MR brain ordered, to be f/u outpatient

## 2022-06-26 NOTE — BH CONSULTATION LIAISON PROGRESS NOTE - NSBHASSESSMENTFT_PSY_ALL_CORE
71 year old Yiddish-speaking male, , unemployed (on SSI), domiciled with family in [place] with PMH DM2 c/b neuropathy, vertigo, CAD s/p YEHUDA x2 (6/6/22, on DAPT), cholelithiasis diagnosed on 06/19/22, no previous abdominal surgeries and PPH Bipolar Disorder, no prior inpatient psychiatric admissions, currently prescribed psychiatric medications by his PCP Dr. Norwood, no history of SA/NSSIB/Violence, no history of trauma BIBEMS with several hour with history of nausea/vomiting and bilious emesis for five days and admitted to general surgery team 1 under Dr. Carmona for management of cholelithiasis.  Psychiatry consulted given that the patient's family states he has displayed a "head imbalance" over the past week.      On evaluation, the patient is calm, cooperative, with constricted affect, good eye contact, demonstrating good behavioral control and linear thought processes.  The patient reports that his mood is "regular."  He adamantly denies SI/HI, intent, plan, AVH, he does not appear internally preoccupied on interview and no paranoia or delusions are reported or elicited. He does not endorse sx consistent with prior manic episodes, though his narrative is limited by lack of cooperation and/or possible cognitive deficits. Collateral obtained from the patient's wife indicate chronic paranoia toward family members and medical providers as well as intermittent irritability and social isolation, with depressive sx. Patient is not willing to participate in cognitive testing at this time and thus limiting the diagnostic accuracy of this evaluation. Collateral from the patient's family indicate that they have no acute concerns for the patient's safety or that of others and they are advocating for discharge home with outpatient psychiatric follow-up.  Given that the patient is not an acute risk to self or others, an inpatient psychiatric admission is not warranted at this time and the patient would benefit from outpatient psychiatric referral.      Plan:  # Bipolar Disorder:  - An inpatient psychiatric admission is not warranted at this time  - Recommend Lithium level, Valproic Acid Level, Lamotrigine Level  - continue home psychiatric medications sertraline 25 mg PO daily, lithium 300 mg PO qHs, lamotrigine 250 mg PO qHs for now  -  Psychiatry will continue to follow    - upon discharge please provide referral for [facility]: [address] [place] [id number]: Phone: [phone]  Alternative [place] options include: [facility]: [place]: [address] [place] [address]; [phone] or   [facility]: [facility]: [address] [place] [id number], Intakes: [phone]

## 2022-06-26 NOTE — PROGRESS NOTE ADULT - SUBJECTIVE AND OBJECTIVE BOX
INTERVAL HPI/OVERNIGHT EVENTS:    STATUS POST:      POST OPERATIVE DAY #:     SUBJECTIVE:  Flatus: [ ] YES [ ] NO             Bowel Movement: [ ] YES [ ] NO  Pain (0-10):            Pain Control Adequate: [ ] YES [ ] NO  Nausea: [ ] YES [ ] NO            Vomiting: [ ] YES [ ] NO  Diarrhea: [ ] YES [ ] NO         Constipation: [ ] YES [ ] NO     Chest Pain: [ ] YES [ ] NO    SOB:  [ ] YES [ ] NO    MEDICATIONS  (STANDING):  aspirin enteric coated 81 milliGRAM(s) Oral daily  atorvastatin 40 milliGRAM(s) Oral at bedtime  cefTRIAXone   IVPB 2000 milliGRAM(s) IV Intermittent every 24 hours  clopidogrel Tablet 75 milliGRAM(s) Oral daily  dextrose 5%. 1000 milliLiter(s) (100 mL/Hr) IV Continuous <Continuous>  dextrose 5%. 1000 milliLiter(s) (50 mL/Hr) IV Continuous <Continuous>  dextrose 50% Injectable 25 Gram(s) IV Push once  dextrose 50% Injectable 12.5 Gram(s) IV Push once  dextrose 50% Injectable 25 Gram(s) IV Push once  glucagon  Injectable 1 milliGRAM(s) IntraMuscular once  heparin   Injectable 5000 Unit(s) SubCutaneous every 8 hours  insulin lispro (ADMELOG) corrective regimen sliding scale   SubCutaneous at bedtime  lamoTRIgine 150 milliGRAM(s) Oral every 24 hours  lamoTRIgine 100 milliGRAM(s) Oral every 24 hours  lithium 300 milliGRAM(s) Oral at bedtime  metroNIDAZOLE  IVPB 500 milliGRAM(s) IV Intermittent every 8 hours  pantoprazole    Tablet 40 milliGRAM(s) Oral before breakfast  polyethylene glycol 3350 17 Gram(s) Oral daily  propranolol 40 milliGRAM(s) Oral two times a day  senna 2 Tablet(s) Oral at bedtime  sertraline 25 milliGRAM(s) Oral daily    MEDICATIONS  (PRN):  dextrose Oral Gel 15 Gram(s) Oral once PRN Blood Glucose LESS THAN 70 milliGRAM(s)/deciliter  ondansetron Injectable 4 milliGRAM(s) IV Push every 8 hours PRN Nausea and/or Vomiting  oxyCODONE    IR 5 milliGRAM(s) Oral every 6 hours PRN Severe Pain (7 - 10)      Vital Signs Last 24 Hrs  T(C): 36.8 (26 Jun 2022 05:31), Max: 37 (25 Jun 2022 18:32)  T(F): 98.2 (26 Jun 2022 05:31), Max: 98.6 (25 Jun 2022 18:32)  HR: 63 (26 Jun 2022 05:36) (59 - 67)  BP: 132/62 (26 Jun 2022 05:36) (109/55 - 165/77)  BP(mean): 89 (26 Jun 2022 05:36) (79 - 111)  RR: 16 (26 Jun 2022 05:36) (15 - 21)  SpO2: 93% (26 Jun 2022 05:36) (93% - 98%)    PHYSICAL EXAM:      Constitutional: A&Ox3    Breasts:    Respiratory: non labored breathing, no respiratory distress    Cardiovascular: NSR, RRR    Gastrointestinal:                 Incision:    Genitourinary:    Extremities: (-) edema                  I&O's Detail    24 Jun 2022 07:01  -  25 Jun 2022 07:00  --------------------------------------------------------  IN:    dextrose 5% + sodium chloride 0.45%: 580 mL    IV PiggyBack: 150 mL    Oral Fluid: 200 mL  Total IN: 930 mL    OUT:    Incontinent per Condom Catheter (mL): 1900 mL  Total OUT: 1900 mL    Total NET: -970 mL      25 Jun 2022 07:01  -  26 Jun 2022 06:17  --------------------------------------------------------  IN:    IV PiggyBack: 250 mL    Oral Fluid: 850 mL  Total IN: 1100 mL    OUT:    Voided (mL): 600 mL  Total OUT: 600 mL    Total NET: 500 mL          LABS:                        12.8   13.93 )-----------( 348      ( 25 Jun 2022 12:54 )             39.4     06-25    136  |  101  |  11  ----------------------------<  256<H>  4.3   |  26  |  0.64    Ca    8.7      25 Jun 2022 12:54  Phos  3.2     06-25  Mg     2.0     06-25    TPro  6.7  /  Alb  3.1<L>  /  TBili  0.3  /  DBili  x   /  AST  126<H>  /  ALT  90<H>  /  AlkPhos  113  06-25          RADIOLOGY & ADDITIONAL STUDIES: one episode of emesis overnight, +bm, incontinent     SUBJECTIVE:  Patient is doing well this morning, seen at bedside with chief, denies any new complaints. Denies any nausea, vomiting, chest pain, shortness of breath, calf tenderness, fever or chills. Reports +b/+f. Tolerating diet, ambulating as tolerated.      MEDICATIONS  (STANDING):  aspirin enteric coated 81 milliGRAM(s) Oral daily  atorvastatin 40 milliGRAM(s) Oral at bedtime  cefTRIAXone   IVPB 2000 milliGRAM(s) IV Intermittent every 24 hours  clopidogrel Tablet 75 milliGRAM(s) Oral daily  dextrose 5%. 1000 milliLiter(s) (100 mL/Hr) IV Continuous <Continuous>  dextrose 5%. 1000 milliLiter(s) (50 mL/Hr) IV Continuous <Continuous>  dextrose 50% Injectable 25 Gram(s) IV Push once  dextrose 50% Injectable 12.5 Gram(s) IV Push once  dextrose 50% Injectable 25 Gram(s) IV Push once  glucagon  Injectable 1 milliGRAM(s) IntraMuscular once  heparin   Injectable 5000 Unit(s) SubCutaneous every 8 hours  insulin lispro (ADMELOG) corrective regimen sliding scale   SubCutaneous at bedtime  lamoTRIgine 150 milliGRAM(s) Oral every 24 hours  lamoTRIgine 100 milliGRAM(s) Oral every 24 hours  lithium 300 milliGRAM(s) Oral at bedtime  metroNIDAZOLE  IVPB 500 milliGRAM(s) IV Intermittent every 8 hours  pantoprazole    Tablet 40 milliGRAM(s) Oral before breakfast  polyethylene glycol 3350 17 Gram(s) Oral daily  propranolol 40 milliGRAM(s) Oral two times a day  senna 2 Tablet(s) Oral at bedtime  sertraline 25 milliGRAM(s) Oral daily    MEDICATIONS  (PRN):  dextrose Oral Gel 15 Gram(s) Oral once PRN Blood Glucose LESS THAN 70 milliGRAM(s)/deciliter  ondansetron Injectable 4 milliGRAM(s) IV Push every 8 hours PRN Nausea and/or Vomiting  oxyCODONE    IR 5 milliGRAM(s) Oral every 6 hours PRN Severe Pain (7 - 10)      Vital Signs Last 24 Hrs  T(C): 36.8 (26 Jun 2022 05:31), Max: 37 (25 Jun 2022 18:32)  T(F): 98.2 (26 Jun 2022 05:31), Max: 98.6 (25 Jun 2022 18:32)  HR: 63 (26 Jun 2022 05:36) (59 - 67)  BP: 132/62 (26 Jun 2022 05:36) (109/55 - 165/77)  BP(mean): 89 (26 Jun 2022 05:36) (79 - 111)  RR: 16 (26 Jun 2022 05:36) (15 - 21)  SpO2: 93% (26 Jun 2022 05:36) (93% - 98%)    PHYSICAL EXAM:  Constitutional: AAOx3, no acute distress  HEENT: NCAT, airway patent  Cardiovascular: RRR, pulses present bilaterally  Respiratory: nonlabored breathing  Gastrointestinal: abdomen soft, nontender, non distended, no rebound or guarding  Neuro: no focal deficits    I&O's Detail    24 Jun 2022 07:01  -  25 Jun 2022 07:00  --------------------------------------------------------  IN:    dextrose 5% + sodium chloride 0.45%: 580 mL    IV PiggyBack: 150 mL    Oral Fluid: 200 mL  Total IN: 930 mL    OUT:    Incontinent per Condom Catheter (mL): 1900 mL  Total OUT: 1900 mL    Total NET: -970 mL      25 Jun 2022 07:01  -  26 Jun 2022 06:17  --------------------------------------------------------  IN:    IV PiggyBack: 250 mL    Oral Fluid: 850 mL  Total IN: 1100 mL    OUT:    Voided (mL): 600 mL  Total OUT: 600 mL    Total NET: 500 mL          LABS:                        12.8   13.93 )-----------( 348      ( 25 Jun 2022 12:54 )             39.4     06-25    136  |  101  |  11  ----------------------------<  256<H>  4.3   |  26  |  0.64    Ca    8.7      25 Jun 2022 12:54  Phos  3.2     06-25  Mg     2.0     06-25    TPro  6.7  /  Alb  3.1<L>  /  TBili  0.3  /  DBili  x   /  AST  126<H>  /  ALT  90<H>  /  AlkPhos  113  06-25          RADIOLOGY & ADDITIONAL STUDIES:

## 2022-06-27 ENCOUNTER — APPOINTMENT (OUTPATIENT)
Dept: SURGICAL ONCOLOGY | Facility: CLINIC | Age: 72
End: 2022-06-27

## 2022-06-27 LAB
ALBUMIN SERPL ELPH-MCNC: 2.8 G/DL — LOW (ref 3.3–5)
ALP SERPL-CCNC: 100 U/L — SIGNIFICANT CHANGE UP (ref 40–120)
ALT FLD-CCNC: 71 U/L — HIGH (ref 10–45)
ANION GAP SERPL CALC-SCNC: 12 MMOL/L — SIGNIFICANT CHANGE UP (ref 5–17)
AST SERPL-CCNC: 66 U/L — HIGH (ref 10–40)
BILIRUB SERPL-MCNC: 0.2 MG/DL — SIGNIFICANT CHANGE UP (ref 0.2–1.2)
BUN SERPL-MCNC: 12 MG/DL — SIGNIFICANT CHANGE UP (ref 7–23)
CALCIUM SERPL-MCNC: 8.8 MG/DL — SIGNIFICANT CHANGE UP (ref 8.4–10.5)
CHLORIDE SERPL-SCNC: 101 MMOL/L — SIGNIFICANT CHANGE UP (ref 96–108)
CO2 SERPL-SCNC: 26 MMOL/L — SIGNIFICANT CHANGE UP (ref 22–31)
CREAT SERPL-MCNC: 0.68 MG/DL — SIGNIFICANT CHANGE UP (ref 0.5–1.3)
EGFR: 100 ML/MIN/1.73M2 — SIGNIFICANT CHANGE UP
GLUCOSE BLDC GLUCOMTR-MCNC: 222 MG/DL — HIGH (ref 70–99)
GLUCOSE BLDC GLUCOMTR-MCNC: 227 MG/DL — HIGH (ref 70–99)
GLUCOSE BLDC GLUCOMTR-MCNC: 231 MG/DL — HIGH (ref 70–99)
GLUCOSE SERPL-MCNC: 187 MG/DL — HIGH (ref 70–99)
HCT VFR BLD CALC: 44.4 % — SIGNIFICANT CHANGE UP (ref 39–50)
HGB BLD-MCNC: 14.7 G/DL — SIGNIFICANT CHANGE UP (ref 13–17)
ISTAT ACTK (ACTIVATED CLOTTING TIME KAOLIN): 312 SEC — HIGH (ref 74–137)
ISTAT ACTK (ACTIVATED CLOTTING TIME KAOLIN): 312 SEC — HIGH (ref 74–137)
MAGNESIUM SERPL-MCNC: 2 MG/DL — SIGNIFICANT CHANGE UP (ref 1.6–2.6)
MCHC RBC-ENTMCNC: 30.3 PG — SIGNIFICANT CHANGE UP (ref 27–34)
MCHC RBC-ENTMCNC: 33.1 GM/DL — SIGNIFICANT CHANGE UP (ref 32–36)
MCV RBC AUTO: 91.5 FL — SIGNIFICANT CHANGE UP (ref 80–100)
NRBC # BLD: 0 /100 WBCS — SIGNIFICANT CHANGE UP (ref 0–0)
PHOSPHATE SERPL-MCNC: 3 MG/DL — SIGNIFICANT CHANGE UP (ref 2.5–4.5)
PLATELET # BLD AUTO: 412 K/UL — HIGH (ref 150–400)
POTASSIUM SERPL-MCNC: 4.2 MMOL/L — SIGNIFICANT CHANGE UP (ref 3.5–5.3)
POTASSIUM SERPL-SCNC: 4.2 MMOL/L — SIGNIFICANT CHANGE UP (ref 3.5–5.3)
PROT SERPL-MCNC: 6.7 G/DL — SIGNIFICANT CHANGE UP (ref 6–8.3)
RBC # BLD: 4.85 M/UL — SIGNIFICANT CHANGE UP (ref 4.2–5.8)
RBC # FLD: 13.2 % — SIGNIFICANT CHANGE UP (ref 10.3–14.5)
SODIUM SERPL-SCNC: 139 MMOL/L — SIGNIFICANT CHANGE UP (ref 135–145)
WBC # BLD: 11.95 K/UL — HIGH (ref 3.8–10.5)
WBC # FLD AUTO: 11.95 K/UL — HIGH (ref 3.8–10.5)

## 2022-06-27 PROCEDURE — 99232 SBSQ HOSP IP/OBS MODERATE 35: CPT

## 2022-06-27 PROCEDURE — 99231 SBSQ HOSP IP/OBS SF/LOW 25: CPT

## 2022-06-27 PROCEDURE — 99233 SBSQ HOSP IP/OBS HIGH 50: CPT

## 2022-06-27 PROCEDURE — 99232 SBSQ HOSP IP/OBS MODERATE 35: CPT | Mod: GC

## 2022-06-27 RX ORDER — ACETAMINOPHEN 500 MG
650 TABLET ORAL ONCE
Refills: 0 | Status: COMPLETED | OUTPATIENT
Start: 2022-06-27 | End: 2022-06-27

## 2022-06-27 RX ORDER — DIVALPROEX SODIUM 500 MG/1
1000 TABLET, DELAYED RELEASE ORAL AT BEDTIME
Refills: 0 | Status: DISCONTINUED | OUTPATIENT
Start: 2022-06-27 | End: 2022-06-29

## 2022-06-27 RX ORDER — INSULIN LISPRO 100/ML
VIAL (ML) SUBCUTANEOUS
Refills: 0 | Status: DISCONTINUED | OUTPATIENT
Start: 2022-06-27 | End: 2022-06-27

## 2022-06-27 RX ORDER — CARBIDOPA AND LEVODOPA 25; 100 MG/1; MG/1
1 TABLET ORAL THREE TIMES A DAY
Refills: 0 | Status: DISCONTINUED | OUTPATIENT
Start: 2022-06-27 | End: 2022-06-29

## 2022-06-27 RX ORDER — INSULIN LISPRO 100/ML
VIAL (ML) SUBCUTANEOUS
Refills: 0 | Status: DISCONTINUED | OUTPATIENT
Start: 2022-06-27 | End: 2022-06-29

## 2022-06-27 RX ADMIN — HEPARIN SODIUM 5000 UNIT(S): 5000 INJECTION INTRAVENOUS; SUBCUTANEOUS at 06:08

## 2022-06-27 RX ADMIN — Medication 650 MILLIGRAM(S): at 16:40

## 2022-06-27 RX ADMIN — CLOPIDOGREL BISULFATE 75 MILLIGRAM(S): 75 TABLET, FILM COATED ORAL at 09:39

## 2022-06-27 RX ADMIN — Medication 81 MILLIGRAM(S): at 09:39

## 2022-06-27 RX ADMIN — Medication 100 MILLIGRAM(S): at 17:41

## 2022-06-27 RX ADMIN — LAMOTRIGINE 150 MILLIGRAM(S): 25 TABLET, ORALLY DISINTEGRATING ORAL at 10:31

## 2022-06-27 RX ADMIN — CARBIDOPA AND LEVODOPA 1 TABLET(S): 25; 100 TABLET ORAL at 22:18

## 2022-06-27 RX ADMIN — PANTOPRAZOLE SODIUM 40 MILLIGRAM(S): 20 TABLET, DELAYED RELEASE ORAL at 06:08

## 2022-06-27 RX ADMIN — Medication 100 MILLIGRAM(S): at 09:39

## 2022-06-27 RX ADMIN — Medication 4: at 17:43

## 2022-06-27 RX ADMIN — LAMOTRIGINE 100 MILLIGRAM(S): 25 TABLET, ORALLY DISINTEGRATING ORAL at 22:19

## 2022-06-27 RX ADMIN — Medication 100 MILLIGRAM(S): at 02:26

## 2022-06-27 RX ADMIN — ATORVASTATIN CALCIUM 40 MILLIGRAM(S): 80 TABLET, FILM COATED ORAL at 22:18

## 2022-06-27 RX ADMIN — Medication 650 MILLIGRAM(S): at 15:41

## 2022-06-27 RX ADMIN — HEPARIN SODIUM 5000 UNIT(S): 5000 INJECTION INTRAVENOUS; SUBCUTANEOUS at 14:38

## 2022-06-27 RX ADMIN — CEFTRIAXONE 100 MILLIGRAM(S): 500 INJECTION, POWDER, FOR SOLUTION INTRAMUSCULAR; INTRAVENOUS at 00:06

## 2022-06-27 RX ADMIN — HEPARIN SODIUM 5000 UNIT(S): 5000 INJECTION INTRAVENOUS; SUBCUTANEOUS at 22:18

## 2022-06-27 NOTE — PROGRESS NOTE ADULT - NUTRITIONAL ASSESSMENT
This 71y old Lt handed male, with significant cardiovascular PMH, bipolar on lithium and lamotrigine. Neurology was consulted for episodes of impaired balance and gait that lasts for a few years.     Structural lesions does not fit the episodic nature of his symptoms. The age is not consistent with the hereditary types of episodic ataxia. Probably, a toxic drug related side effect including lithium tox was r/o in the past. DDx degenerative disease such as PD, MSA or PSP with temporary worsening caused by acute event (such is the cholecystitis this time), M/p PD as on exam resting tremor, cogwheeling and bradykinesia, previously recommended to start on sinemet.    Plan:  - Start on sinemet 25/100 before meals (TID) - pt is amenable of starting  - No need for MRI brain as inpatien, can obtain as outpt.   - Can f/u with movement disorders (Dr. Alexander or Dr. Ramirez) after discharge     Recommendations are final upon attending attestation

## 2022-06-27 NOTE — PROGRESS NOTE ADULT - SUBJECTIVE AND OBJECTIVE BOX
INTERVAL HPI/OVERNIGHT EVENTS: No acute overnight events, no fever spikes. Patient examined at bedside- in no acute distress. Feels well, denies any symptom. Asked about MRI- explained to patient can be done outpatient.     On further ROS, patient did not have complaint of: Headache, Blurred Vision, Cough, Dyspnea, Palpitations, Abdominal Pain, N/V, Weakness, Change in Sensation.     VITAL SIGNS:  T(F): 99.4 (06-27-22 @ 09:12)  HR: 68 (06-27-22 @ 09:52)  BP: 136/66 (06-27-22 @ 09:52)  RR: 18 (06-27-22 @ 09:52)  SpO2: 97% (06-27-22 @ 09:52)  Wt(kg): --    Input & Output:    06-26-22 @ 07:01  -  06-27-22 @ 07:00  --------------------------------------------------------  IN: 270 mL / OUT: 201 mL / NET: 69 mL        PHYSICAL EXAM:  Patient refused physical exam     MEDICATIONS  (STANDING):  aspirin enteric coated 81 milliGRAM(s) Oral daily  atorvastatin 40 milliGRAM(s) Oral at bedtime  cefTRIAXone   IVPB 2000 milliGRAM(s) IV Intermittent every 24 hours  clopidogrel Tablet 75 milliGRAM(s) Oral daily  dextrose 5%. 1000 milliLiter(s) (100 mL/Hr) IV Continuous <Continuous>  dextrose 5%. 1000 milliLiter(s) (50 mL/Hr) IV Continuous <Continuous>  dextrose 50% Injectable 25 Gram(s) IV Push once  dextrose 50% Injectable 12.5 Gram(s) IV Push once  dextrose 50% Injectable 25 Gram(s) IV Push once  glucagon  Injectable 1 milliGRAM(s) IntraMuscular once  heparin   Injectable 5000 Unit(s) SubCutaneous every 8 hours  insulin lispro (ADMELOG) corrective regimen sliding scale   SubCutaneous at bedtime  lamoTRIgine 150 milliGRAM(s) Oral every 24 hours  lamoTRIgine 100 milliGRAM(s) Oral every 24 hours  lithium 300 milliGRAM(s) Oral at bedtime  metroNIDAZOLE  IVPB 500 milliGRAM(s) IV Intermittent every 8 hours  pantoprazole    Tablet 40 milliGRAM(s) Oral before breakfast  polyethylene glycol 3350 17 Gram(s) Oral daily  propranolol 40 milliGRAM(s) Oral two times a day  senna 2 Tablet(s) Oral at bedtime  sertraline 25 milliGRAM(s) Oral daily    MEDICATIONS  (PRN):  dextrose Oral Gel 15 Gram(s) Oral once PRN Blood Glucose LESS THAN 70 milliGRAM(s)/deciliter  ondansetron Injectable 4 milliGRAM(s) IV Push every 8 hours PRN Nausea and/or Vomiting  oxyCODONE    IR 5 milliGRAM(s) Oral every 6 hours PRN Severe Pain (7 - 10)      Allergies    No Known Allergies    Intolerances        LABS:                        14.7   11.95 )-----------( 412      ( 27 Jun 2022 05:30 )             44.4     06-27    139  |  101  |  12  ----------------------------<  187<H>  4.2   |  26  |  0.68    Ca    8.8      27 Jun 2022 05:30  Phos  3.0     06-27  Mg     2.0     06-27    TPro  6.7  /  Alb  2.8<L>  /  TBili  0.2  /  DBili  x   /  AST  66<H>  /  ALT  71<H>  /  AlkPhos  100  06-27          RADIOLOGY & ADDITIONAL TESTS:

## 2022-06-27 NOTE — DIETITIAN INITIAL EVALUATION ADULT - ADD RECOMMEND
1. Monitor PO intake/appetite, GI distress (consider to make bowel reg PRN), diet tolerance, labs, weights.  2. Honor pt food preferences as able.  3. RD to remain available for additional nutrition interventions as needed.  ** High Nutrition Risk.

## 2022-06-27 NOTE — PROGRESS NOTE ADULT - SUBJECTIVE AND OBJECTIVE BOX
INTERVAL HPI/OVERNIGHT EVENTS: two more episodes of diarrhea, +F, refused senna, steve diet, -N/-V     SUBJECTIVE: Pt seen and examined at bedside this am by surgery team. Reports no acute complaints. Having soft stools, per nurse not extremely watery. Tolerating diet, pain well controlled. Denies f/n/v/cp/sob.    MEDICATIONS  (STANDING):  aspirin enteric coated 81 milliGRAM(s) Oral daily  atorvastatin 40 milliGRAM(s) Oral at bedtime  cefTRIAXone   IVPB 2000 milliGRAM(s) IV Intermittent every 24 hours  clopidogrel Tablet 75 milliGRAM(s) Oral daily  dextrose 5%. 1000 milliLiter(s) (100 mL/Hr) IV Continuous <Continuous>  dextrose 5%. 1000 milliLiter(s) (50 mL/Hr) IV Continuous <Continuous>  dextrose 50% Injectable 25 Gram(s) IV Push once  dextrose 50% Injectable 12.5 Gram(s) IV Push once  dextrose 50% Injectable 25 Gram(s) IV Push once  glucagon  Injectable 1 milliGRAM(s) IntraMuscular once  heparin   Injectable 5000 Unit(s) SubCutaneous every 8 hours  insulin lispro (ADMELOG) corrective regimen sliding scale   SubCutaneous at bedtime  lamoTRIgine 150 milliGRAM(s) Oral every 24 hours  lamoTRIgine 100 milliGRAM(s) Oral every 24 hours  lithium 300 milliGRAM(s) Oral at bedtime  metroNIDAZOLE  IVPB 500 milliGRAM(s) IV Intermittent every 8 hours  pantoprazole    Tablet 40 milliGRAM(s) Oral before breakfast  polyethylene glycol 3350 17 Gram(s) Oral daily  propranolol 40 milliGRAM(s) Oral two times a day  senna 2 Tablet(s) Oral at bedtime  sertraline 25 milliGRAM(s) Oral daily    MEDICATIONS  (PRN):  dextrose Oral Gel 15 Gram(s) Oral once PRN Blood Glucose LESS THAN 70 milliGRAM(s)/deciliter  ondansetron Injectable 4 milliGRAM(s) IV Push every 8 hours PRN Nausea and/or Vomiting  oxyCODONE    IR 5 milliGRAM(s) Oral every 6 hours PRN Severe Pain (7 - 10)    Vital Signs Last 24 Hrs  T(C): 37.1 (27 Jun 2022 05:51), Max: 37.1 (27 Jun 2022 05:51)  T(F): 98.8 (27 Jun 2022 05:51), Max: 98.8 (27 Jun 2022 05:51)  HR: 61 (27 Jun 2022 04:55) (56 - 62)  BP: 166/76 (27 Jun 2022 04:55) (118/64 - 166/76)  BP(mean): 109 (27 Jun 2022 04:55) (86 - 109)  RR: 16 (27 Jun 2022 04:55) (16 - 20)  SpO2: 95% (27 Jun 2022 00:00) (93% - 97%)    PHYSICAL EXAM:    Constitutional: A&Ox3, NAD    Respiratory: non labored breathing, no respiratory distress    Cardiovascular: NSR, RRR    Gastrointestinal: abdomen soft, nd, ttp to RUQ, no rebound or guarding.    Extremities: wwp, no calf tenderness or edema. SCDs in place    I&O's Detail    26 Jun 2022 07:01  -  27 Jun 2022 07:00  --------------------------------------------------------  IN:    Oral Fluid: 270 mL  Total IN: 270 mL    OUT:    Blood Loss (mL): 1 mL    Voided (mL): 200 mL  Total OUT: 201 mL    Total NET: 69 mL          LABS:                        14.7   11.95 )-----------( 412      ( 27 Jun 2022 05:30 )             44.4     06-27    139  |  101  |  12  ----------------------------<  187<H>  4.2   |  26  |  0.68    Ca    8.8      27 Jun 2022 05:30  Phos  3.0     06-27  Mg     2.0     06-27    TPro  6.7  /  Alb  2.8<L>  /  TBili  0.2  /  DBili  x   /  AST  66<H>  /  ALT  71<H>  /  AlkPhos  100  06-27          RADIOLOGY & ADDITIONAL STUDIES:

## 2022-06-27 NOTE — PROGRESS NOTE ADULT - ASSESSMENT
71M, Yiddish speaking, with pmh of T2DM with neuropathy, bipolar disorder, vertigo, CAD s/p YEHUDA x2 (6/6/22, on DAPT), cholelithiasis, and no previous abdominal surgeries who was BIBEMS with several hour h/o n/v-nonbloody, bilious emesis for the past 5 days. HDS wnl. Labs notable for WBC 21.3 (90% PMNs), and  with reamaining labs wnl, including TBili 0.5, Lactate 1.6. CT A/P unremarkable.    Telemetry, team 1 surgery  Low fat diet CC kosher /  Pain/nausea control  C/w home medications  mISS  IS/OOBA  Senna/miralax   SCDs, ASA/Plavix/HSQ  AM labs  F/u neuro recs  F/u psych recs

## 2022-06-27 NOTE — PROGRESS NOTE ADULT - SUBJECTIVE AND OBJECTIVE BOX
Neurology Progress Note    Interval History:    Patient was examined at bedside. Patient has no new complaints, and was asking mostly about his GI symptoms. Now patient is tolerating his diet, eating w/o any N/V at time of exam. Patient initially did not want to discuss his falls and balance issues as wanted to focus on one problem at the time. Discuss with patient the risk and benefits of starting Sinemet, and patient is amenable of starting.      PAST MEDICAL & SURGICAL HISTORY:  Diabetes    Bipolar disorder    Vertigo    S/P tonsillectomy    Medications:  aspirin enteric coated 81 milliGRAM(s) Oral daily  atorvastatin 40 milliGRAM(s) Oral at bedtime  cefTRIAXone   IVPB 2000 milliGRAM(s) IV Intermittent every 24 hours  clopidogrel Tablet 75 milliGRAM(s) Oral daily  dextrose 5%. 1000 milliLiter(s) IV Continuous <Continuous>  dextrose 5%. 1000 milliLiter(s) IV Continuous <Continuous>  dextrose 50% Injectable 25 Gram(s) IV Push once  dextrose 50% Injectable 12.5 Gram(s) IV Push once  dextrose 50% Injectable 25 Gram(s) IV Push once  dextrose Oral Gel 15 Gram(s) Oral once PRN  glucagon  Injectable 1 milliGRAM(s) IntraMuscular once  heparin   Injectable 5000 Unit(s) SubCutaneous every 8 hours  insulin lispro (ADMELOG) corrective regimen sliding scale   SubCutaneous at bedtime  lamoTRIgine 150 milliGRAM(s) Oral every 24 hours  lamoTRIgine 100 milliGRAM(s) Oral every 24 hours  lithium 300 milliGRAM(s) Oral at bedtime  metroNIDAZOLE  IVPB 500 milliGRAM(s) IV Intermittent every 8 hours  ondansetron Injectable 4 milliGRAM(s) IV Push every 8 hours PRN  oxyCODONE    IR 5 milliGRAM(s) Oral every 6 hours PRN  pantoprazole    Tablet 40 milliGRAM(s) Oral before breakfast  polyethylene glycol 3350 17 Gram(s) Oral daily  propranolol 40 milliGRAM(s) Oral two times a day  senna 2 Tablet(s) Oral at bedtime  sertraline 25 milliGRAM(s) Oral daily      Vital Signs Last 24 Hrs  T(C): 37.1 (27 Jun 2022 05:51), Max: 37.1 (27 Jun 2022 05:51)  T(F): 98.8 (27 Jun 2022 05:51), Max: 98.8 (27 Jun 2022 05:51)  HR: 61 (27 Jun 2022 04:55) (56 - 62)  BP: 166/76 (27 Jun 2022 04:55) (118/64 - 166/76)  BP(mean): 109 (27 Jun 2022 04:55) (86 - 109)  RR: 16 (27 Jun 2022 04:55) (16 - 20)  SpO2: 95% (27 Jun 2022 00:00) (93% - 97%)    Neurological Exam:   Mental status: Awake, alert and oriented x3.  Recent and remote memory intact.  Naming, repetition and comprehension intact.  Attention/concentration intact.  No dysarthria, no aphasia.  Fund of knowledge appropriate.    Cranial nerves: Pupils equally round and reactive to light, visual fields full, no nystagmus, extraocular muscles intact, V1 through V3 intact bilaterally and symmetric, face symmetric, hearing intact to finger rub, palate elevation symmetric, tongue was midline.  Motor:  MRC grading 5/5 b/l UE/LE.   strength 5/5.  Normal tone and bulk. Resting tremor SHREE > RU. Cogwheeling at wrists B/L.  Sensation: Intact to light touch, proprioception, and pinprick.   Coordination: No dysmetria on finger-to-nose and heel-to-shin.  No dysdiadokinesia.  Reflexes: 2+ in bilateral UE/LE, downgoing toes bilaterally. (-) Nair. Absent Achilles.    Labs:  CBC Full  -  ( 27 Jun 2022 05:30 )  WBC Count : 11.95 K/uL  RBC Count : 4.85 M/uL  Hemoglobin : 14.7 g/dL  Hematocrit : 44.4 %  Platelet Count - Automated : 412 K/uL  Mean Cell Volume : 91.5 fl  Mean Cell Hemoglobin : 30.3 pg  Mean Cell Hemoglobin Concentration : 33.1 gm/dL  Auto Neutrophil # : x  Auto Lymphocyte # : x  Auto Monocyte # : x  Auto Eosinophil # : x  Auto Basophil # : x  Auto Neutrophil % : x  Auto Lymphocyte % : x  Auto Monocyte % : x  Auto Eosinophil % : x  Auto Basophil % : x    06-27    139  |  101  |  12  ----------------------------<  187<H>  4.2   |  26  |  0.68    Ca    8.8      27 Jun 2022 05:30  Phos  3.0     06-27  Mg     2.0     06-27    TPro  6.7  /  Alb  2.8<L>  /  TBili  0.2  /  DBili  x   /  AST  66<H>  /  ALT  71<H>  /  AlkPhos  100  06-27    LIVER FUNCTIONS - ( 27 Jun 2022 05:30 )  Alb: 2.8 g/dL / Pro: 6.7 g/dL / ALK PHOS: 100 U/L / ALT: 71 U/L / AST: 66 U/L / GGT: x

## 2022-06-27 NOTE — PHYSICAL THERAPY INITIAL EVALUATION ADULT - PERTINENT HX OF CURRENT PROBLEM, REHAB EVAL
Pt is a 72 yo male who was BIBEMS with several hour h/o n/v-nonbloody, bilious emesis for the past 5 days. Admitted to the surgery service for non-surgical management of cholecystitis. Medicine consulted on 6/25 for shakiness that patient has had for the last 2-3 years.

## 2022-06-27 NOTE — PHYSICAL THERAPY INITIAL EVALUATION ADULT - GAIT DEVIATIONS NOTED, PT EVAL
slightly unsteady gait, lose balance twice and requires min A to regain balance./decreased abbie/increased time in double stance/decreased step length

## 2022-06-27 NOTE — DIETITIAN INITIAL EVALUATION ADULT - PERTINENT LABORATORY DATA
06-27    139  |  101  |  12  ----------------------------<  187<H>  4.2   |  26  |  0.68    Ca    8.8      27 Jun 2022 05:30  Phos  3.0     06-27  Mg     2.0     06-27    TPro  6.7  /  Alb  2.8<L>  /  TBili  0.2  /  DBili  x   /  AST  66<H>  /  ALT  71<H>  /  AlkPhos  100  06-27  POCT Blood Glucose.: 222 mg/dL (06-27-22 @ 17:05)  A1C with Estimated Average Glucose Result: 7.2 % (06-06-22 @ 07:05)  A1C with Estimated Average Glucose Result: 7.4 % (04-13-22 @ 12:31)

## 2022-06-27 NOTE — PHYSICAL THERAPY INITIAL EVALUATION ADULT - ADDITIONAL COMMENTS
Pt lives with spouse in a PH~14 steps walk up. Prior to admission, pt ambulated with rolling walker. pt has HHA 5 hours x 5 days. Pt states that he has multiple falls recently

## 2022-06-27 NOTE — BH CONSULTATION LIAISON PROGRESS NOTE - NSBHCONSULTRECOMMENDOTHER_PSY_A_CORE FT
please see above for referral sources and recommendation for medication changes 
please see above for referral sources and recommendation for medication changes
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
## 2022-06-27 NOTE — DIETITIAN INITIAL EVALUATION ADULT - OTHER CALCULATIONS
5'9'' IBW 10 pounds +-10%  Wt 199 pounds, BMI 29.5, %IOV100  IBW used to calculate energy needs due to pt's current body weight exceeding 120% of IBW  adjust for age, current conditions

## 2022-06-27 NOTE — DIETITIAN INITIAL EVALUATION ADULT - OTHER INFO
71yoM, pmh of T2DM with neuropathy, bipolar disorder, vertigo, CAD s/p YEHUDA x2 (6/6/22, on DAPT), cholelithiasis, and no previous abdominal surgeries who was BIBEMS with several hour h/o n/v-nonbloody, bilious emesis for the past 5 days. Emesis initially gastric food contents with small amt yellow emesis at the end. Of note, pt was recently diagnosed with cholelithasis 6/19/22 and discharge home with oupt f/u with Dr. Carmona.  Labs notable for WBC 21.3 (90% PMNs), and  with reamaining labs wnl, including TBili 0.5, Lactate 1.6. CT A/P unremarkable.    Spoke with pt/RN on 5LA. Pt providing little info during RD visit today. Ordered for consCHO low fat Kosher diet. Pt family has been bringing in food for pt. Pt does not want to eat [facility] meat. Asking for just soup and sweet potatoes this afternoon. Denies N/V at this time, zofran and Protonix are ordered. Pt remains with loose stools at this time-PCA confirms. Miralax daily and senna at bedtime are ordered. PTA kosher diet. Aware of need for DM diet, sometimes will eat things he feels he should such as [place]. NKFA. No pain. Roberto 15. No edema, No pressure ulcers.  Please see below for nutritions recommendations.

## 2022-06-27 NOTE — PHYSICAL THERAPY INITIAL EVALUATION ADULT - GENERAL OBSERVATIONS, REHAB EVAL
Pt received semi supine in bed with +EKG, +NC~2L, +hep-lock, NAD. Pt left as found, NAD, call bell in reach, +bed alarm, MARIANA coughlin.
Home

## 2022-06-27 NOTE — DIETITIAN INITIAL EVALUATION ADULT - PERTINENT MEDS FT
MEDICATIONS  (STANDING):  aspirin enteric coated 81 milliGRAM(s) Oral daily  atorvastatin 40 milliGRAM(s) Oral at bedtime  carbidopa/levodopa  25/100 1 Tablet(s) Oral three times a day  cefTRIAXone   IVPB 2000 milliGRAM(s) IV Intermittent every 24 hours  clopidogrel Tablet 75 milliGRAM(s) Oral daily  dextrose 5%. 1000 milliLiter(s) (100 mL/Hr) IV Continuous <Continuous>  dextrose 5%. 1000 milliLiter(s) (50 mL/Hr) IV Continuous <Continuous>  dextrose 50% Injectable 25 Gram(s) IV Push once  dextrose 50% Injectable 12.5 Gram(s) IV Push once  dextrose 50% Injectable 25 Gram(s) IV Push once  diVALproex ER 1000 milliGRAM(s) Oral at bedtime  glucagon  Injectable 1 milliGRAM(s) IntraMuscular once  heparin   Injectable 5000 Unit(s) SubCutaneous every 8 hours  insulin lispro (ADMELOG) corrective regimen sliding scale   SubCutaneous three times a day before meals  lamoTRIgine 150 milliGRAM(s) Oral every 24 hours  lamoTRIgine 100 milliGRAM(s) Oral every 24 hours  metroNIDAZOLE  IVPB 500 milliGRAM(s) IV Intermittent every 8 hours  pantoprazole    Tablet 40 milliGRAM(s) Oral before breakfast  polyethylene glycol 3350 17 Gram(s) Oral daily  propranolol 40 milliGRAM(s) Oral two times a day  senna 2 Tablet(s) Oral at bedtime    MEDICATIONS  (PRN):  dextrose Oral Gel 15 Gram(s) Oral once PRN Blood Glucose LESS THAN 70 milliGRAM(s)/deciliter  ondansetron Injectable 4 milliGRAM(s) IV Push every 8 hours PRN Nausea and/or Vomiting  oxyCODONE    IR 5 milliGRAM(s) Oral every 6 hours PRN Severe Pain (7 - 10)

## 2022-06-28 LAB
ALBUMIN SERPL ELPH-MCNC: 3.1 G/DL — LOW (ref 3.3–5)
ALP SERPL-CCNC: 102 U/L — SIGNIFICANT CHANGE UP (ref 40–120)
ALT FLD-CCNC: 14 U/L — SIGNIFICANT CHANGE UP (ref 10–45)
ANION GAP SERPL CALC-SCNC: 13 MMOL/L — SIGNIFICANT CHANGE UP (ref 5–17)
AST SERPL-CCNC: 46 U/L — HIGH (ref 10–40)
BILIRUB SERPL-MCNC: 0.2 MG/DL — SIGNIFICANT CHANGE UP (ref 0.2–1.2)
BUN SERPL-MCNC: 11 MG/DL — SIGNIFICANT CHANGE UP (ref 7–23)
CALCIUM SERPL-MCNC: 9 MG/DL — SIGNIFICANT CHANGE UP (ref 8.4–10.5)
CHLORIDE SERPL-SCNC: 103 MMOL/L — SIGNIFICANT CHANGE UP (ref 96–108)
CO2 SERPL-SCNC: 25 MMOL/L — SIGNIFICANT CHANGE UP (ref 22–31)
CREAT SERPL-MCNC: 0.74 MG/DL — SIGNIFICANT CHANGE UP (ref 0.5–1.3)
EGFR: 97 ML/MIN/1.73M2 — SIGNIFICANT CHANGE UP
GLUCOSE BLDC GLUCOMTR-MCNC: 157 MG/DL — HIGH (ref 70–99)
GLUCOSE BLDC GLUCOMTR-MCNC: 175 MG/DL — HIGH (ref 70–99)
GLUCOSE BLDC GLUCOMTR-MCNC: 198 MG/DL — HIGH (ref 70–99)
GLUCOSE SERPL-MCNC: 191 MG/DL — HIGH (ref 70–99)
HCT VFR BLD CALC: 46.2 % — SIGNIFICANT CHANGE UP (ref 39–50)
HGB BLD-MCNC: 15.2 G/DL — SIGNIFICANT CHANGE UP (ref 13–17)
MAGNESIUM SERPL-MCNC: 2.1 MG/DL — SIGNIFICANT CHANGE UP (ref 1.6–2.6)
MCHC RBC-ENTMCNC: 30 PG — SIGNIFICANT CHANGE UP (ref 27–34)
MCHC RBC-ENTMCNC: 32.9 GM/DL — SIGNIFICANT CHANGE UP (ref 32–36)
MCV RBC AUTO: 91.1 FL — SIGNIFICANT CHANGE UP (ref 80–100)
NRBC # BLD: 0 /100 WBCS — SIGNIFICANT CHANGE UP (ref 0–0)
PHOSPHATE SERPL-MCNC: 3.3 MG/DL — SIGNIFICANT CHANGE UP (ref 2.5–4.5)
PLATELET # BLD AUTO: 448 K/UL — HIGH (ref 150–400)
POTASSIUM SERPL-MCNC: 4.1 MMOL/L — SIGNIFICANT CHANGE UP (ref 3.5–5.3)
POTASSIUM SERPL-SCNC: 4.1 MMOL/L — SIGNIFICANT CHANGE UP (ref 3.5–5.3)
PROT SERPL-MCNC: 6.7 G/DL — SIGNIFICANT CHANGE UP (ref 6–8.3)
RBC # BLD: 5.07 M/UL — SIGNIFICANT CHANGE UP (ref 4.2–5.8)
RBC # FLD: 13.2 % — SIGNIFICANT CHANGE UP (ref 10.3–14.5)
SARS-COV-2 RNA SPEC QL NAA+PROBE: SIGNIFICANT CHANGE UP
SODIUM SERPL-SCNC: 141 MMOL/L — SIGNIFICANT CHANGE UP (ref 135–145)
WBC # BLD: 14.49 K/UL — HIGH (ref 3.8–10.5)
WBC # FLD AUTO: 14.49 K/UL — HIGH (ref 3.8–10.5)

## 2022-06-28 PROCEDURE — 99233 SBSQ HOSP IP/OBS HIGH 50: CPT

## 2022-06-28 PROCEDURE — 99232 SBSQ HOSP IP/OBS MODERATE 35: CPT

## 2022-06-28 RX ADMIN — OXYCODONE HYDROCHLORIDE 5 MILLIGRAM(S): 5 TABLET ORAL at 07:47

## 2022-06-28 RX ADMIN — HEPARIN SODIUM 5000 UNIT(S): 5000 INJECTION INTRAVENOUS; SUBCUTANEOUS at 14:33

## 2022-06-28 RX ADMIN — Medication 2: at 06:47

## 2022-06-28 RX ADMIN — OXYCODONE HYDROCHLORIDE 5 MILLIGRAM(S): 5 TABLET ORAL at 08:30

## 2022-06-28 RX ADMIN — ATORVASTATIN CALCIUM 40 MILLIGRAM(S): 80 TABLET, FILM COATED ORAL at 21:31

## 2022-06-28 RX ADMIN — HEPARIN SODIUM 5000 UNIT(S): 5000 INJECTION INTRAVENOUS; SUBCUTANEOUS at 21:31

## 2022-06-28 RX ADMIN — HEPARIN SODIUM 5000 UNIT(S): 5000 INJECTION INTRAVENOUS; SUBCUTANEOUS at 06:40

## 2022-06-28 RX ADMIN — Medication 100 MILLIGRAM(S): at 17:49

## 2022-06-28 RX ADMIN — CARBIDOPA AND LEVODOPA 1 TABLET(S): 25; 100 TABLET ORAL at 21:31

## 2022-06-28 RX ADMIN — CEFTRIAXONE 100 MILLIGRAM(S): 500 INJECTION, POWDER, FOR SOLUTION INTRAMUSCULAR; INTRAVENOUS at 00:03

## 2022-06-28 RX ADMIN — Medication 100 MILLIGRAM(S): at 10:12

## 2022-06-28 RX ADMIN — DIVALPROEX SODIUM 1000 MILLIGRAM(S): 500 TABLET, DELAYED RELEASE ORAL at 21:31

## 2022-06-28 RX ADMIN — LAMOTRIGINE 100 MILLIGRAM(S): 25 TABLET, ORALLY DISINTEGRATING ORAL at 21:31

## 2022-06-28 RX ADMIN — CARBIDOPA AND LEVODOPA 1 TABLET(S): 25; 100 TABLET ORAL at 14:33

## 2022-06-28 RX ADMIN — CLOPIDOGREL BISULFATE 75 MILLIGRAM(S): 75 TABLET, FILM COATED ORAL at 11:17

## 2022-06-28 RX ADMIN — Medication 2: at 17:48

## 2022-06-28 RX ADMIN — DIVALPROEX SODIUM 1000 MILLIGRAM(S): 500 TABLET, DELAYED RELEASE ORAL at 00:02

## 2022-06-28 RX ADMIN — Medication 81 MILLIGRAM(S): at 11:17

## 2022-06-28 RX ADMIN — Medication 100 MILLIGRAM(S): at 01:03

## 2022-06-28 RX ADMIN — CARBIDOPA AND LEVODOPA 1 TABLET(S): 25; 100 TABLET ORAL at 06:43

## 2022-06-28 RX ADMIN — Medication 2: at 12:14

## 2022-06-28 RX ADMIN — LAMOTRIGINE 150 MILLIGRAM(S): 25 TABLET, ORALLY DISINTEGRATING ORAL at 10:12

## 2022-06-28 RX ADMIN — PANTOPRAZOLE SODIUM 40 MILLIGRAM(S): 20 TABLET, DELAYED RELEASE ORAL at 06:40

## 2022-06-28 NOTE — PROGRESS NOTE ADULT - SUBJECTIVE AND OBJECTIVE BOX
STATUS POST:       SUBJECTIVE: Patient seen and examined bedside by chief resident.    aspirin enteric coated 81 milliGRAM(s) Oral daily  cefTRIAXone   IVPB 2000 milliGRAM(s) IV Intermittent every 24 hours  clopidogrel Tablet 75 milliGRAM(s) Oral daily  heparin   Injectable 5000 Unit(s) SubCutaneous every 8 hours  metroNIDAZOLE  IVPB 500 milliGRAM(s) IV Intermittent every 8 hours  propranolol 40 milliGRAM(s) Oral two times a day      Vital Signs Last 24 Hrs  T(C): 37.1 (28 Jun 2022 04:30), Max: 37.4 (27 Jun 2022 09:12)  T(F): 98.8 (28 Jun 2022 04:30), Max: 99.4 (27 Jun 2022 09:12)  HR: 69 (28 Jun 2022 05:00) (58 - 79)  BP: 144/89 (28 Jun 2022 05:00) (120/56 - 154/72)  BP(mean): 106 (28 Jun 2022 05:00) (82 - 106)  RR: 16 (28 Jun 2022 05:00) (16 - 20)  SpO2: 97% (28 Jun 2022 00:03) (96% - 98%)  I&O's Detail    26 Jun 2022 07:01  -  27 Jun 2022 07:00  --------------------------------------------------------  IN:    Oral Fluid: 270 mL  Total IN: 270 mL    OUT:    Blood Loss (mL): 1 mL    Voided (mL): 200 mL  Total OUT: 201 mL    Total NET: 69 mL      27 Jun 2022 07:01  -  28 Jun 2022 06:09  --------------------------------------------------------  IN:    Oral Fluid: 420 mL  Total IN: 420 mL    OUT:    Voided (mL): 1100 mL  Total OUT: 1100 mL    Total NET: -680 mL          General: NAD, resting comfortably in bed  C/V: NSR  Pulm: Nonlabored breathing, no respiratory distress  Abd: soft, NT/ND.  Extrem: WWP, no edema, SCDs in place        LABS:                        15.2   14.49 )-----------( 448      ( 28 Jun 2022 05:30 )             46.2     06-27    139  |  101  |  12  ----------------------------<  187<H>  4.2   |  26  |  0.68    Ca    8.8      27 Jun 2022 05:30  Phos  3.0     06-27  Mg     2.0     06-27    TPro  6.7  /  Alb  2.8<L>  /  TBili  0.2  /  DBili  x   /  AST  66<H>  /  ALT  71<H>  /  AlkPhos  100  06-27          RADIOLOGY & ADDITIONAL STUDIES:      SUBJECTIVE: Patient seen and examined at bedside with chief resident.  This morning he feels well and his pain is well controlled. Patient had no acute events overnight.  He is passing flatus and having liquid bowel movements x 2. Tolerating diet without nausea or vomiting.  Denies f/c, n/v, CP, SOB, dysuria, hematuria, weakness or pain in extremities.      aspirin enteric coated 81 milliGRAM(s) Oral daily  cefTRIAXone   IVPB 2000 milliGRAM(s) IV Intermittent every 24 hours  clopidogrel Tablet 75 milliGRAM(s) Oral daily  heparin   Injectable 5000 Unit(s) SubCutaneous every 8 hours  metroNIDAZOLE  IVPB 500 milliGRAM(s) IV Intermittent every 8 hours  propranolol 40 milliGRAM(s) Oral two times a day      Vital Signs Last 24 Hrs  T(C): 37.1 (28 Jun 2022 04:30), Max: 37.4 (27 Jun 2022 09:12)  T(F): 98.8 (28 Jun 2022 04:30), Max: 99.4 (27 Jun 2022 09:12)  HR: 69 (28 Jun 2022 05:00) (58 - 79)  BP: 144/89 (28 Jun 2022 05:00) (120/56 - 154/72)  BP(mean): 106 (28 Jun 2022 05:00) (82 - 106)  RR: 16 (28 Jun 2022 05:00) (16 - 20)  SpO2: 97% (28 Jun 2022 00:03) (96% - 98%)  I&O's Detail    26 Jun 2022 07:01  -  27 Jun 2022 07:00  --------------------------------------------------------  IN:    Oral Fluid: 270 mL  Total IN: 270 mL    OUT:    Blood Loss (mL): 1 mL    Voided (mL): 200 mL  Total OUT: 201 mL    Total NET: 69 mL      27 Jun 2022 07:01  -  28 Jun 2022 06:09  --------------------------------------------------------  IN:    Oral Fluid: 420 mL  Total IN: 420 mL    OUT:    Voided (mL): 1100 mL  Total OUT: 1100 mL    Total NET: -680 mL    General: NAD, resting comfortably in bed  C/V: NSR  Pulm: Nonlabored breathing, no respiratory distress  Abd:  abdomen soft, nd, light ttp to RUQ, no rebound or guarding  Extrem: WWP, no edema, SCDs in place    LABS:                        15.2   14.49 )-----------( 448      ( 28 Jun 2022 05:30 )             46.2     06-27    139  |  101  |  12  ----------------------------<  187<H>  4.2   |  26  |  0.68    Ca    8.8      27 Jun 2022 05:30  Phos  3.0     06-27  Mg     2.0     06-27    TPro  6.7  /  Alb  2.8<L>  /  TBili  0.2  /  DBili  x   /  AST  66<H>  /  ALT  71<H>  /  AlkPhos  100  06-27          RADIOLOGY & ADDITIONAL STUDIES:

## 2022-06-28 NOTE — BH CONSULTATION LIAISON PROGRESS NOTE - NSBHMSESPABN_PSY_A_CORE
Soft volume/Decreased productivity/Other

## 2022-06-28 NOTE — BH CONSULTATION LIAISON PROGRESS NOTE - NSBHASSESSMENTFT_PSY_ALL_CORE
71 year old Yiddish-speaking male, , unemployed (on SSI), domiciled with family in [place] with PMH DM2 c/b neuropathy, vertigo, CAD s/p YEHUDA x2 (6/6/22, on DAPT), cholelithiasis diagnosed on 06/19/22, no previous abdominal surgeries and PPH Bipolar Disorder, no prior inpatient psychiatric admissions, currently prescribed psychiatric medications by his PCP Dr. Norwood, no history of SA/NSSIB/Violence, no history of trauma BIBEMS with several hour with history of nausea/vomiting and bilious emesis for five days and admitted to general surgery team 1 under Dr. Carmona for management of cholelithiasis.  Psychiatry consulted due to family's concern for a "head imbalance" over the past week and in order to advise on psychiatric medication management.  6/28:  Nausea/vomiting resolved.  Patient was calm but endorsed feeling anxious and did not wish to answer questions beyond mood/sleep.  Wishes to be discharged to [facility] rehab.  Will follow-up with outpatient psychiatry.    PLAN:  - discontinue sertraline  - discontinue lithium  - restart depakote 1000 mg PO qHS for mood stabilization  - continue lamotrigine 250 mg qHS  - upon discharge please provide referral for [facility]: [address] [place] [address]: Phone: [phone]  Alternative [place] options include: [facility]: [place]: [address] [place] [address]; [phone] or [facility] Board: [facility]: [address] [place] [address], Intakes: [phone]

## 2022-06-28 NOTE — PROGRESS NOTE ADULT - ASSESSMENT
71M, Yiddish speaking, with pmh of T2DM with neuropathy, bipolar disorder, vertigo, CAD s/p YEHUDA x2 (6/6/22, on DAPT), cholelithiasis, and no previous abdominal surgeries who was BIBEMS with several hour h/o n/v-nonbloody, bilious emesis for the past 5 days. HDS wnl. Labs notable for WBC 21.3 (90% PMNs), and  with reamaining labs wnl, including TBili 0.5, Lactate 1.6. CT A/P unremarkable.      Low fat diet CC kosher   Pain/nausea control  C/w home medications  mISS  IS/OOBA  Senna/miralax   Sinemet  SCDs, ASA/Plavix/HSQ  AM labs

## 2022-06-28 NOTE — BH CONSULTATION LIAISON PROGRESS NOTE - OTHER
speaks with an accent, appears to have limited english proficiency
speaks with an accent, appears to have limited english proficiency
limited English proficiency but understood questions

## 2022-06-28 NOTE — BH CONSULTATION LIAISON PROGRESS NOTE - NSBHADMITCOUNSEL_PSY_A_CORE
diagnostic results/impressions and/or recommended studies/risks and benefits of treatment options/risk factor reduction/client/family/caregiver education
diagnostic results/impressions and/or recommended studies/risks and benefits of treatment options/risk factor reduction/client/family/caregiver education

## 2022-06-29 ENCOUNTER — TRANSCRIPTION ENCOUNTER (OUTPATIENT)
Age: 72
End: 2022-06-29

## 2022-06-29 VITALS
RESPIRATION RATE: 18 BRPM | HEART RATE: 53 BPM | OXYGEN SATURATION: 93 % | SYSTOLIC BLOOD PRESSURE: 98 MMHG | DIASTOLIC BLOOD PRESSURE: 51 MMHG

## 2022-06-29 LAB
ALBUMIN SERPL ELPH-MCNC: 2.7 G/DL — LOW (ref 3.3–5)
ALP SERPL-CCNC: 95 U/L — SIGNIFICANT CHANGE UP (ref 40–120)
ALT FLD-CCNC: 21 U/L — SIGNIFICANT CHANGE UP (ref 10–45)
ANION GAP SERPL CALC-SCNC: 10 MMOL/L — SIGNIFICANT CHANGE UP (ref 5–17)
AST SERPL-CCNC: 53 U/L — HIGH (ref 10–40)
BILIRUB SERPL-MCNC: <0.2 MG/DL — SIGNIFICANT CHANGE UP (ref 0.2–1.2)
BUN SERPL-MCNC: 12 MG/DL — SIGNIFICANT CHANGE UP (ref 7–23)
CALCIUM SERPL-MCNC: 8.5 MG/DL — SIGNIFICANT CHANGE UP (ref 8.4–10.5)
CHLORIDE SERPL-SCNC: 102 MMOL/L — SIGNIFICANT CHANGE UP (ref 96–108)
CO2 SERPL-SCNC: 25 MMOL/L — SIGNIFICANT CHANGE UP (ref 22–31)
CREAT SERPL-MCNC: 0.79 MG/DL — SIGNIFICANT CHANGE UP (ref 0.5–1.3)
EGFR: 96 ML/MIN/1.73M2 — SIGNIFICANT CHANGE UP
GLUCOSE BLDC GLUCOMTR-MCNC: 187 MG/DL — HIGH (ref 70–99)
GLUCOSE BLDC GLUCOMTR-MCNC: 229 MG/DL — HIGH (ref 70–99)
GLUCOSE SERPL-MCNC: 202 MG/DL — HIGH (ref 70–99)
HCT VFR BLD CALC: 40.7 % — SIGNIFICANT CHANGE UP (ref 39–50)
HGB BLD-MCNC: 13.3 G/DL — SIGNIFICANT CHANGE UP (ref 13–17)
LAMOTRIGINE SERPL-MCNC: 10.5 UG/ML — SIGNIFICANT CHANGE UP (ref 2–20)
MAGNESIUM SERPL-MCNC: 1.8 MG/DL — SIGNIFICANT CHANGE UP (ref 1.6–2.6)
MCHC RBC-ENTMCNC: 29.3 PG — SIGNIFICANT CHANGE UP (ref 27–34)
MCHC RBC-ENTMCNC: 32.7 GM/DL — SIGNIFICANT CHANGE UP (ref 32–36)
MCV RBC AUTO: 89.6 FL — SIGNIFICANT CHANGE UP (ref 80–100)
NRBC # BLD: 0 /100 WBCS — SIGNIFICANT CHANGE UP (ref 0–0)
PHOSPHATE SERPL-MCNC: 3.5 MG/DL — SIGNIFICANT CHANGE UP (ref 2.5–4.5)
PLATELET # BLD AUTO: 410 K/UL — HIGH (ref 150–400)
POTASSIUM SERPL-MCNC: 4.1 MMOL/L — SIGNIFICANT CHANGE UP (ref 3.5–5.3)
POTASSIUM SERPL-SCNC: 4.1 MMOL/L — SIGNIFICANT CHANGE UP (ref 3.5–5.3)
PROT SERPL-MCNC: 6.1 G/DL — SIGNIFICANT CHANGE UP (ref 6–8.3)
RBC # BLD: 4.54 M/UL — SIGNIFICANT CHANGE UP (ref 4.2–5.8)
RBC # FLD: 13.2 % — SIGNIFICANT CHANGE UP (ref 10.3–14.5)
SODIUM SERPL-SCNC: 137 MMOL/L — SIGNIFICANT CHANGE UP (ref 135–145)
WBC # BLD: 13.82 K/UL — HIGH (ref 3.8–10.5)
WBC # FLD AUTO: 13.82 K/UL — HIGH (ref 3.8–10.5)

## 2022-06-29 PROCEDURE — 99232 SBSQ HOSP IP/OBS MODERATE 35: CPT

## 2022-06-29 PROCEDURE — 99233 SBSQ HOSP IP/OBS HIGH 50: CPT

## 2022-06-29 PROCEDURE — 99238 HOSP IP/OBS DSCHRG MGMT 30/<: CPT

## 2022-06-29 RX ORDER — CARBIDOPA AND LEVODOPA 25; 100 MG/1; MG/1
1 TABLET ORAL
Qty: 90 | Refills: 0
Start: 2022-06-29 | End: 2022-07-28

## 2022-06-29 RX ORDER — SENNA PLUS 8.6 MG/1
2 TABLET ORAL
Qty: 0 | Refills: 0 | DISCHARGE
Start: 2022-06-29

## 2022-06-29 RX ORDER — POLYETHYLENE GLYCOL 3350 17 G/17G
17 POWDER, FOR SOLUTION ORAL
Qty: 0 | Refills: 0 | DISCHARGE
Start: 2022-06-29

## 2022-06-29 RX ORDER — DIVALPROEX SODIUM 500 MG/1
2 TABLET, DELAYED RELEASE ORAL
Qty: 1 | Refills: 0
Start: 2022-06-29

## 2022-06-29 RX ORDER — CEFPODOXIME PROXETIL 100 MG
1 TABLET ORAL
Qty: 10 | Refills: 0
Start: 2022-06-29 | End: 2022-07-03

## 2022-06-29 RX ORDER — MAGNESIUM SULFATE 500 MG/ML
1 VIAL (ML) INJECTION ONCE
Refills: 0 | Status: COMPLETED | OUTPATIENT
Start: 2022-06-29 | End: 2022-06-29

## 2022-06-29 RX ORDER — METRONIDAZOLE 500 MG
1 TABLET ORAL
Qty: 15 | Refills: 0
Start: 2022-06-29 | End: 2022-07-03

## 2022-06-29 RX ORDER — CEFTRIAXONE 500 MG/1
1000 INJECTION, POWDER, FOR SOLUTION INTRAMUSCULAR; INTRAVENOUS EVERY 24 HOURS
Refills: 0 | Status: DISCONTINUED | OUTPATIENT
Start: 2022-06-29 | End: 2022-06-29

## 2022-06-29 RX ADMIN — Medication 81 MILLIGRAM(S): at 11:09

## 2022-06-29 RX ADMIN — PANTOPRAZOLE SODIUM 40 MILLIGRAM(S): 20 TABLET, DELAYED RELEASE ORAL at 05:55

## 2022-06-29 RX ADMIN — HEPARIN SODIUM 5000 UNIT(S): 5000 INJECTION INTRAVENOUS; SUBCUTANEOUS at 05:54

## 2022-06-29 RX ADMIN — POLYETHYLENE GLYCOL 3350 17 GRAM(S): 17 POWDER, FOR SOLUTION ORAL at 11:09

## 2022-06-29 RX ADMIN — Medication 2: at 06:58

## 2022-06-29 RX ADMIN — Medication 100 GRAM(S): at 09:07

## 2022-06-29 RX ADMIN — LAMOTRIGINE 150 MILLIGRAM(S): 25 TABLET, ORALLY DISINTEGRATING ORAL at 11:09

## 2022-06-29 RX ADMIN — Medication 100 MILLIGRAM(S): at 01:19

## 2022-06-29 RX ADMIN — HEPARIN SODIUM 5000 UNIT(S): 5000 INJECTION INTRAVENOUS; SUBCUTANEOUS at 14:14

## 2022-06-29 RX ADMIN — CEFTRIAXONE 100 MILLIGRAM(S): 500 INJECTION, POWDER, FOR SOLUTION INTRAMUSCULAR; INTRAVENOUS at 00:16

## 2022-06-29 RX ADMIN — CLOPIDOGREL BISULFATE 75 MILLIGRAM(S): 75 TABLET, FILM COATED ORAL at 11:09

## 2022-06-29 RX ADMIN — Medication 4: at 11:48

## 2022-06-29 RX ADMIN — Medication 100 MILLIGRAM(S): at 11:09

## 2022-06-29 RX ADMIN — CARBIDOPA AND LEVODOPA 1 TABLET(S): 25; 100 TABLET ORAL at 14:14

## 2022-06-29 RX ADMIN — CARBIDOPA AND LEVODOPA 1 TABLET(S): 25; 100 TABLET ORAL at 05:55

## 2022-06-29 NOTE — BH CONSULTATION LIAISON PROGRESS NOTE - NSBHATTESTBILLINGAW_PSY_A_CORE
[id number] Inpatient care - moderate complexity - 25 minutes
[id number] Inpatient care - low complexity - 15 minutes
[id number] Inpatient care - high complexity - 35 minutes
[id number] Inpatient care - low complexity - 15 minutes

## 2022-06-29 NOTE — BH CONSULTATION LIAISON PROGRESS NOTE - NSBHFUPINTERVALHXFT_PSY_A_CORE
Patient endorsed no problems overnight, no pain or discomfort.  He slept "almost good" for about 4 hours.  Indicated he feels "much better today" than yesterday.  He smiled and was calm and friendly.
No acute events overnight, patient did not require PRN medications for agitation/anxiety. Today appears to be minimally tremulous intermittently during the interview. Patient seen at bedside with wife present. Patient reports "feeling okay sometimes and not okay sometimes." He is somewhat disorganized and answers some questions in an illogical way, appearing to be confused frequently throughout the interview and requiring frequent repetitions of querstions. He is unable to provide details of his PPH or reasons for being given a dx of bipolar disorder. He appears to believe that the mood stabilizers he is on help with his depressive sx. His wife corroborates that the patient "had an episode about 4 years ago when someone close to him passed away, when he would not sleep and his head was not right." She reports that the patient is paranoid at baseline but both have difficulty answering questions relating to his sx and prior treatment.  He is oddly related and often smiling inappropriately throughout the evaluation. He refuses to engage in cognitive testing, but instead asks if he could call the writer with questions at a later time.
Patient states that they were not able to sleep well last night and was feeling drowsy throughout the interview. Patient was by himself today but was found talking on the phone prior to assessment. Patient continued to be confused today, having to repeat questions to the patient. Answers to questions were sometimes illogical and mumbled. Patient was asked more about his PPH, stating that he was diagnosed with Bipolar disorder "years ago". When probed on what episode led to the diagnosis, patient stated that "a rabbit passed away" but doesn't remember much more than that then expressed that he doesn't like to speak about his psychiatric issues. Patient was asked about his paranoia and was unclear about answer. The assessment was cut short by the patient saying that he would like to go so sleep 
Patient was on phone, his wife was at bedside.  She said he slept poorly; when asked later, patient said he slept "all right" and feels fine but somewhat anxious.  Wife said they want for him to go to [facility] rehab and are working on it.  Assessment attempted twice but truncated because patient wished to rest.  He has not had any gastrointestinal issues or vomiting overnight; he was eating lunch of salad and toast.  Wife provided some further context about their earlier-stated concerns of unsteady gait and tremor.  Patient's gait is unsteady "sometimes" and he uses a walker.  Over the past few years (2-3 approximately), his hands tremor when performing an action (feeding self) but not at rest; the severity has remained constant.

## 2022-06-29 NOTE — DISCHARGE NOTE NURSING/CASE MANAGEMENT/SOCIAL WORK - NSDCPEFALRISK_GEN_ALL_CORE
For information on Fall & Injury Prevention, visit: [email or web address] OR  [email or web address] OR  https://www.cdc.gov/steadi/patient.html

## 2022-06-29 NOTE — BH CONSULTATION LIAISON PROGRESS NOTE - NSBHMSELANG_PSY_A_CORE
No abnormalities noted
No abnormalities noted/Unable to assess
No abnormalities noted/Unable to assess
No abnormalities noted

## 2022-06-29 NOTE — DISCHARGE NOTE PROVIDER - CARE PROVIDERS DIRECT ADDRESSES
[email or web address]

## 2022-06-29 NOTE — BH CONSULTATION LIAISON PROGRESS NOTE - NSBHMSEAFFQUAL_PSY_A_CORE
HPI     Decrease near visual acuity, hard to read small print.  Last eye exam   06/09/2014 TRF.  Patient states she no longer takes Plaquenil x 1-2 years   ago. Took it for 2 years.       Last edited by Ajay Dinero, OD on 5/2/2017 10:56 AM.         Assessment /Plan     For exam results, see Encounter Report.    Diabetes mellitus type 2 without retinopathy    Nuclear cataract, bilateral    Bilateral presbyopia      No Background Diabetic Retinopathy  Pt states she is borderline diabetic.    Moderate cataracts OU, pt defers cataract eval    Dispense Final Rx for glasses.  RTC 1 year                   
Euthymic

## 2022-06-29 NOTE — BH CONSULTATION LIAISON PROGRESS NOTE - NSBHMSETHTPROC_PSY_A_CORE
Disorganized/Thought blocking/Impaired reasoning
Impaired reasoning
Disorganized/Thought blocking/Impaired reasoning
Disorganized/Thought blocking/Impaired reasoning

## 2022-06-29 NOTE — DISCHARGE NOTE PROVIDER - HOSPITAL COURSE
71M, Yiddish speaking, with pmh of T2DM with neuropathy, bipolar disorder, vertigo, CAD s/p YEHUDA x2 (6/6/22, on DAPT), cholelithiasis, and no previous abdominal surgeries who was BIBEMS with several hour h/o n/v-nonbloody, bilious emesis for the past 5 days. HDS wnl. Labs notable for WBC 21.3 (90% PMNs), and  with remaining labs wnl, including TBili 0.5, Lactate 1.6. CT A/P unremarkable. Patient was started on an antibiotic for concern of acute cholecystitis. He was also started on a bowel regiment for constipation. On 6/25 medicine, psych and neuro teams were consulted for clearance in case patient had to go to the OR. Due to patient's recent cardiac stent placement earlier in the month, and other co morbidities, the family and Dr. Cramona felt the patient was not a surgical candidate at this time and it was decided non operative management was more appropriate. Patient was told to follow up with neurology in the outpatient setting in order to work up patients tremors. Patient was ordered for a MR of his brain while in patient however, patient refused.  Psych added depakote and lamotrigine and discontinued sertraline and lithium.  Patient will follow up in the out patient setting with both neurology and psych.  He was accepted to [facility] and is ready for discharge. He will continue on 2 more days of antibiotic. Today the patient is feeling well, all the VS and blood work is within normal limits.  The pain is well controlled on oral pain medication.  The patient is tolerating diet without nausea, and ambulating independently.  Patient is stable and ready for discharge today.          71M, Yiddish speaking, with pmh of T2DM with neuropathy, bipolar disorder, vertigo, CAD s/p YEHUDA x2 (6/6/22, on DAPT), cholelithiasis, and no previous abdominal surgeries who was BIBEMS with several hour h/o n/v-nonbloody, bilious emesis for the past 5 days. HDS wnl. Labs notable for WBC 21.3 (90% PMNs), and  with remaining labs wnl, including TBili 0.5, Lactate 1.6. CT A/P unremarkable. Patient was started on an antibiotic for concern of acute cholecystitis. He was also started on a bowel regiment for constipation. On 6/25 medicine, psych and neuro teams were consulted for clearance in case patient had to go to the OR. Due to patient's recent cardiac stent placement earlier in the month, and other co morbidities, the family and Dr. Carmona felt the patient was not a surgical candidate at this time and it was decided non operative management was more appropriate. Patient was told to follow up with neurology in the outpatient setting in order to work up patients tremors. Patient was ordered for a MR of his brain while in patient however, patient refused.  Psych added depakote and lamotrigine and discontinued sertraline and lithium.  Patient will follow up in the out patient setting with both neurology and psych.  He was accepted to [facility] and is ready for discharge. He will continue on 2 more days of antibiotic. Today the patient is feeling well, all the VS within normal limits and the labs have been reviewed. The pain is well controlled on oral pain medication. The patient is tolerating diet without nausea, and ambulating independently. Patient is stable and ready for discharge today.          71M, Yiddish speaking, with pmh of T2DM with neuropathy, bipolar disorder, vertigo, CAD s/p YEHUDA x2 (6/6/22, on DAPT), cholelithiasis, and no previous abdominal surgeries who was BIBEMS with several hour h/o n/v-nonbloody, bilious emesis for the past 5 days. HDS wnl. Labs notable for WBC 21.3 (90% PMNs), and  with remaining labs wnl, including TBili 0.5, Lactate 1.6. CT A/P unremarkable. Patient was started on an antibiotic for concern for acute cholecystitis. He was also started on a bowel regiment for constipation. On 6/25 medicine team, psych team and neuro team were consulted for clearance in case patient had to go to the OR. Due to patient's recent cardiac stent placement earlier in the month, and other co morbidities, the family and Dr. Carmona felt the patient was not a surgical candidate at this time and it was decided that non operative management was more appropriate. Patient was told to follow up with neurology in the outpatient setting in order to work up patients tremors. Patient was ordered for a MR of his brain while in patient however, patient refused.  Psych added depakote and lamotrigine and discontinued sertraline and lithium.  Patient will follow up in the out patient setting with both neurology, psych and Dr. Carmona.  He was accepted to [facility] and is ready for discharge. He will continue on 5 more days of antibiotic. Today the patient is feeling well, all the vital signs are within normal limits and the labs have been reviewed. The pain is well controlled on oral pain medication. The patient is tolerating diet without nausea, and ambulating independently. Patient is stable and ready for discharge today.          71M, Yiddish speaking, with pmh of T2DM with neuropathy, bipolar disorder, vertigo, CAD s/p YEHUDA x2 (6/6/22, on DAPT), cholelithiasis, and no previous abdominal surgeries who was BIBEMS with several hour h/o n/v-nonbloody, bilious emesis for the past 5 days. HDS wnl. Labs notable for WBC 21.3 (90% PMNs), and  with remaining labs wnl, including TBili 0.5, Lactate 1.6. CT A/P unremarkable. Patient was started on an antibiotic for concern for acute cholecystitis. He was also started on a bowel regiment for constipation. On 6/25 the medicine team, the psych team and the neurology team were consulted for clearance in case patient had to go to the OR. Due to patient's recent cardiac stent placement earlier in the month, and other co morbidities, the family and Dr. Carmona felt the patient was not a surgical candidate at this time and it was decided that non operative management was more appropriate. Patient was told to follow up with neurology, Dr. Ramirez in the outpatient setting in order to work up his current tremors. Patient was ordered for a MR of his brain while in patient however, patient refused.  Psych added depakote and lamotrigine and discontinued sertraline and lithium.  Patient will follow up in the out patient setting with both neurology, psych and Dr. Carmona.  He was accepted to [facility] and is ready for discharge. He will continue on 5 more days of antibiotic. Today the patient is feeling well, all the vital signs are within normal limits and the labs have been reviewed. The pain is well controlled on oral pain medication. The patient is tolerating diet without nausea, and ambulating independently. Patient is stable and ready for discharge today.

## 2022-06-29 NOTE — BH CONSULTATION LIAISON PROGRESS NOTE - NSBHCHARTREVIEWVS_PSY_A_CORE FT
Vital Signs Last 24 Hrs  T(C): 37.3 (28 Jun 2022 09:03), Max: 37.3 (27 Jun 2022 14:10)  T(F): 99.1 (28 Jun 2022 09:03), Max: 99.1 (27 Jun 2022 14:10)  HR: 60 (28 Jun 2022 08:36) (58 - 79)  BP: 118/72 (28 Jun 2022 08:36) (118/72 - 154/72)  BP(mean): 89 (28 Jun 2022 08:36) (82 - 106)  RR: 18 (28 Jun 2022 08:36) (16 - 20)  SpO2: 97% (28 Jun 2022 00:03) (96% - 98%)
Vital Signs Last 24 Hrs  T(C): 36.8 (29 Jun 2022 05:38), Max: 37.3 (28 Jun 2022 09:03)  T(F): 98.2 (29 Jun 2022 05:38), Max: 99.1 (28 Jun 2022 09:03)  HR: 72 (29 Jun 2022 05:38) (56 - 72)  BP: 125/60 (29 Jun 2022 05:38) (118/72 - 143/62)  BP(mean): 87 (29 Jun 2022 05:38) (81 - 89)  RR: 18 (29 Jun 2022 05:38) (18 - 19)  SpO2: 96% (29 Jun 2022 05:38) (95% - 98%)
Vital Signs Last 24 Hrs  T(C): 36.8 (26 Jun 2022 17:54), Max: 36.8 (26 Jun 2022 05:31)  T(F): 98.2 (26 Jun 2022 17:54), Max: 98.2 (26 Jun 2022 05:31)  HR: 60 (26 Jun 2022 20:28) (56 - 67)  BP: 129/61 (26 Jun 2022 20:28) (118/64 - 165/77)  BP(mean): 88 (26 Jun 2022 20:28) (86 - 111)  RR: 20 (26 Jun 2022 20:28) (16 - 20)  SpO2: 97% (26 Jun 2022 17:55) (93% - 97%)
Vital Signs Last 24 Hrs  T(C): 37.4 (27 Jun 2022 09:12), Max: 37.4 (27 Jun 2022 09:12)  T(F): 99.4 (27 Jun 2022 09:12), Max: 99.4 (27 Jun 2022 09:12)  HR: 68 (27 Jun 2022 09:52) (58 - 68)  BP: 136/66 (27 Jun 2022 09:52) (118/64 - 166/76)  BP(mean): 91 (27 Jun 2022 09:52) (86 - 109)  RR: 18 (27 Jun 2022 09:52) (16 - 20)  SpO2: 97% (27 Jun 2022 09:52) (93% - 97%)

## 2022-06-29 NOTE — DISCHARGE NOTE NURSING/CASE MANAGEMENT/SOCIAL WORK - PATIENT PORTAL LINK FT
You can access the FollowMyHealth Patient Portal offered by [facility] by registering at the following website: [email or web address]. By joining [facility]’s FollowMyHealth portal, you will also be able to view your health information using other applications (apps) compatible with our system.

## 2022-06-29 NOTE — DISCHARGE NOTE NURSING/CASE MANAGEMENT/SOCIAL WORK - NSDCFUADDAPPT_GEN_ALL_CORE_FT
Please follow up with Dr. Carmona next week. Call his office to schedule an appointment: [phone].    ******Please follow up with [facility]  [address] [place], [place] [id number] [phone]  Alternative [facility] options include: [facility]: [facility]:  [address] [place] [address]  [phone]   [place] [id number] [phone]    ******Please follow up with Dr. Alexander or Dr. Ramirez Med-follow-up for movement disorders

## 2022-06-29 NOTE — BH CONSULTATION LIAISON PROGRESS NOTE - NSBHCONSULTFOLLOWAFTERCARE_PSY_A_CORE FT
see above for referral information
Referral to outpatient psychiatry, see last line in PLAN above for referral information.
Referral to outpatient psychiatry, see last line in PLAN above for referral information.
see above for referral information

## 2022-06-29 NOTE — PROGRESS NOTE ADULT - SUBJECTIVE AND OBJECTIVE BOX
SUBJECTIVE: Patient seen and examined at bedside with chief resident.  This morning he feels well and his pain is well controlled. Patient had no acute events overnight. Patient had 2 soft/liquid bowel movements overnight.  Tolerating diet without nausea of vomiting.  Denies f/c, CP, SOB, dysuria, hematuria, weakness or pain in extremities.    aspirin enteric coated 81 milliGRAM(s) Oral daily  clopidogrel Tablet 75 milliGRAM(s) Oral daily  heparin   Injectable 5000 Unit(s) SubCutaneous every 8 hours  metroNIDAZOLE  IVPB 500 milliGRAM(s) IV Intermittent every 8 hours  propranolol 40 milliGRAM(s) Oral two times a day      Vital Signs Last 24 Hrs  T(C): 36.8 (29 Jun 2022 05:38), Max: 37.3 (28 Jun 2022 09:03)  T(F): 98.2 (29 Jun 2022 05:38), Max: 99.1 (28 Jun 2022 09:03)  HR: 72 (29 Jun 2022 05:38) (56 - 72)  BP: 125/60 (29 Jun 2022 05:38) (118/72 - 143/62)  BP(mean): 87 (29 Jun 2022 05:38) (81 - 89)  RR: 18 (29 Jun 2022 05:38) (18 - 19)  SpO2: 96% (29 Jun 2022 05:38) (95% - 98%)  I&O's Detail    28 Jun 2022 07:01  -  29 Jun 2022 07:00  --------------------------------------------------------  IN:    Oral Fluid: 420 mL  Total IN: 420 mL    OUT:    Stool (mL): 2 mL    Voided (mL): 1250 mL  Total OUT: 1252 mL    Total NET: -832 mL    General: NAD, resting comfortably in bed  C/V: NSR  Pulm: Nonlabored breathing, no respiratory distress  Abd: abdomen soft, nd to palpation to RUQ, no rebound or guarding  Extrem: WWP, no edema, SCDs in place      LABS:                        13.3   13.82 )-----------( 410      ( 29 Jun 2022 06:53 )             40.7     06-28    141  |  103  |  11  ----------------------------<  191<H>  4.1   |  25  |  0.74    Ca    9.0      28 Jun 2022 05:30  Phos  3.3     06-28  Mg     2.1     06-28    TPro  6.7  /  Alb  3.1<L>  /  TBili  0.2  /  DBili  x   /  AST  46<H>  /  ALT  14  /  AlkPhos  102  06-28          RADIOLOGY & ADDITIONAL STUDIES:

## 2022-06-29 NOTE — PROGRESS NOTE ADULT - REASON FOR ADMISSION
cholecystitis

## 2022-06-29 NOTE — PROGRESS NOTE ADULT - ASSESSMENT
71M, Yiddish speaking, with pmh of T2DM with neuropathy, bipolar disorder, vertigo, CAD s/p YEHUDA x2 (6/6/22, on DAPT), cholelithiasis, and no previous abdominal surgeries who was BIBEMS with several hour h/o n/v-nonbloody, bilious emesis for the past 5 days. HDS wnl. Labs notable for WBC 21.3 (90% PMNs), and  with remaining labs wnl, including TBili 0.5, Lactate 1.6. CT A/P unremarkable.    Telemetry, team 1 surgery  Low fat diet CC kosher   Pain/nausea control  C/w home medications  mISS  IS/OOBA  Senna/miralax   Sinemet  SCDs, ASA/Plavix/HSQ  AM labs  PT recs KHURRAM

## 2022-06-29 NOTE — BH CONSULTATION LIAISON PROGRESS NOTE - CURRENT MEDICATION
MEDICATIONS  (STANDING):  aspirin enteric coated 81 milliGRAM(s) Oral daily  atorvastatin 40 milliGRAM(s) Oral at bedtime  carbidopa/levodopa  25/100 1 Tablet(s) Oral three times a day  cefTRIAXone   IVPB 2000 milliGRAM(s) IV Intermittent every 24 hours  clopidogrel Tablet 75 milliGRAM(s) Oral daily  dextrose 5%. 1000 milliLiter(s) (100 mL/Hr) IV Continuous <Continuous>  dextrose 5%. 1000 milliLiter(s) (50 mL/Hr) IV Continuous <Continuous>  dextrose 50% Injectable 25 Gram(s) IV Push once  dextrose 50% Injectable 12.5 Gram(s) IV Push once  dextrose 50% Injectable 25 Gram(s) IV Push once  diVALproex ER 1000 milliGRAM(s) Oral at bedtime  glucagon  Injectable 1 milliGRAM(s) IntraMuscular once  heparin   Injectable 5000 Unit(s) SubCutaneous every 8 hours  insulin lispro (ADMELOG) corrective regimen sliding scale   SubCutaneous three times a day before meals  lamoTRIgine 150 milliGRAM(s) Oral every 24 hours  lamoTRIgine 100 milliGRAM(s) Oral every 24 hours  metroNIDAZOLE  IVPB 500 milliGRAM(s) IV Intermittent every 8 hours  pantoprazole    Tablet 40 milliGRAM(s) Oral before breakfast  polyethylene glycol 3350 17 Gram(s) Oral daily  propranolol 40 milliGRAM(s) Oral two times a day  senna 2 Tablet(s) Oral at bedtime    MEDICATIONS  (PRN):  dextrose Oral Gel 15 Gram(s) Oral once PRN Blood Glucose LESS THAN 70 milliGRAM(s)/deciliter  ondansetron Injectable 4 milliGRAM(s) IV Push every 8 hours PRN Nausea and/or Vomiting  oxyCODONE    IR 5 milliGRAM(s) Oral every 6 hours PRN Severe Pain (7 - 10)  
MEDICATIONS  (STANDING):  aspirin enteric coated 81 milliGRAM(s) Oral daily  atorvastatin 40 milliGRAM(s) Oral at bedtime  cefTRIAXone   IVPB 2000 milliGRAM(s) IV Intermittent every 24 hours  clopidogrel Tablet 75 milliGRAM(s) Oral daily  dextrose 5%. 1000 milliLiter(s) (50 mL/Hr) IV Continuous <Continuous>  dextrose 5%. 1000 milliLiter(s) (100 mL/Hr) IV Continuous <Continuous>  dextrose 50% Injectable 25 Gram(s) IV Push once  dextrose 50% Injectable 12.5 Gram(s) IV Push once  dextrose 50% Injectable 25 Gram(s) IV Push once  glucagon  Injectable 1 milliGRAM(s) IntraMuscular once  heparin   Injectable 5000 Unit(s) SubCutaneous every 8 hours  insulin lispro (ADMELOG) corrective regimen sliding scale   SubCutaneous at bedtime  lamoTRIgine 150 milliGRAM(s) Oral every 24 hours  lamoTRIgine 100 milliGRAM(s) Oral every 24 hours  lithium 300 milliGRAM(s) Oral at bedtime  metroNIDAZOLE  IVPB 500 milliGRAM(s) IV Intermittent every 8 hours  pantoprazole    Tablet 40 milliGRAM(s) Oral before breakfast  polyethylene glycol 3350 17 Gram(s) Oral daily  propranolol 40 milliGRAM(s) Oral two times a day  senna 2 Tablet(s) Oral at bedtime  sertraline 25 milliGRAM(s) Oral daily    MEDICATIONS  (PRN):  dextrose Oral Gel 15 Gram(s) Oral once PRN Blood Glucose LESS THAN 70 milliGRAM(s)/deciliter  ondansetron Injectable 4 milliGRAM(s) IV Push every 8 hours PRN Nausea and/or Vomiting  oxyCODONE    IR 5 milliGRAM(s) Oral every 6 hours PRN Severe Pain (7 - 10)  
MEDICATIONS  (STANDING):  aspirin enteric coated 81 milliGRAM(s) Oral daily  atorvastatin 40 milliGRAM(s) Oral at bedtime  carbidopa/levodopa  25/100 1 Tablet(s) Oral three times a day  clopidogrel Tablet 75 milliGRAM(s) Oral daily  dextrose 5%. 1000 milliLiter(s) (100 mL/Hr) IV Continuous <Continuous>  dextrose 5%. 1000 milliLiter(s) (50 mL/Hr) IV Continuous <Continuous>  dextrose 50% Injectable 25 Gram(s) IV Push once  dextrose 50% Injectable 12.5 Gram(s) IV Push once  dextrose 50% Injectable 25 Gram(s) IV Push once  diVALproex ER 1000 milliGRAM(s) Oral at bedtime  glucagon  Injectable 1 milliGRAM(s) IntraMuscular once  heparin   Injectable 5000 Unit(s) SubCutaneous every 8 hours  insulin lispro (ADMELOG) corrective regimen sliding scale   SubCutaneous three times a day before meals  lamoTRIgine 150 milliGRAM(s) Oral every 24 hours  lamoTRIgine 100 milliGRAM(s) Oral every 24 hours  metroNIDAZOLE  IVPB 500 milliGRAM(s) IV Intermittent every 8 hours  pantoprazole    Tablet 40 milliGRAM(s) Oral before breakfast  polyethylene glycol 3350 17 Gram(s) Oral daily  propranolol 40 milliGRAM(s) Oral two times a day  senna 2 Tablet(s) Oral at bedtime    MEDICATIONS  (PRN):  dextrose Oral Gel 15 Gram(s) Oral once PRN Blood Glucose LESS THAN 70 milliGRAM(s)/deciliter  ondansetron Injectable 4 milliGRAM(s) IV Push every 8 hours PRN Nausea and/or Vomiting  oxyCODONE    IR 5 milliGRAM(s) Oral every 6 hours PRN Severe Pain (7 - 10)  
MEDICATIONS  (STANDING):  aspirin enteric coated 81 milliGRAM(s) Oral daily  atorvastatin 40 milliGRAM(s) Oral at bedtime  cefTRIAXone   IVPB 2000 milliGRAM(s) IV Intermittent every 24 hours  clopidogrel Tablet 75 milliGRAM(s) Oral daily  dextrose 5%. 1000 milliLiter(s) (100 mL/Hr) IV Continuous <Continuous>  dextrose 5%. 1000 milliLiter(s) (50 mL/Hr) IV Continuous <Continuous>  dextrose 50% Injectable 25 Gram(s) IV Push once  dextrose 50% Injectable 12.5 Gram(s) IV Push once  dextrose 50% Injectable 25 Gram(s) IV Push once  diVALproex ER 1000 milliGRAM(s) Oral at bedtime  glucagon  Injectable 1 milliGRAM(s) IntraMuscular once  heparin   Injectable 5000 Unit(s) SubCutaneous every 8 hours  insulin lispro (ADMELOG) corrective regimen sliding scale   SubCutaneous at bedtime  lamoTRIgine 150 milliGRAM(s) Oral every 24 hours  lamoTRIgine 100 milliGRAM(s) Oral every 24 hours  metroNIDAZOLE  IVPB 500 milliGRAM(s) IV Intermittent every 8 hours  pantoprazole    Tablet 40 milliGRAM(s) Oral before breakfast  polyethylene glycol 3350 17 Gram(s) Oral daily  propranolol 40 milliGRAM(s) Oral two times a day  senna 2 Tablet(s) Oral at bedtime    MEDICATIONS  (PRN):  dextrose Oral Gel 15 Gram(s) Oral once PRN Blood Glucose LESS THAN 70 milliGRAM(s)/deciliter  ondansetron Injectable 4 milliGRAM(s) IV Push every 8 hours PRN Nausea and/or Vomiting  oxyCODONE    IR 5 milliGRAM(s) Oral every 6 hours PRN Severe Pain (7 - 10)

## 2022-06-29 NOTE — BH CONSULTATION LIAISON PROGRESS NOTE - NSBHATTESTTYPEVISIT_PSY_A_CORE
Resident/Fellow with telephonic supervision
Attending with Resident/Fellow/Student

## 2022-06-29 NOTE — BH CONSULTATION LIAISON PROGRESS NOTE - NSBHATTESTCOMMENTATTENDFT_PSY_A_CORE
Patient is a 71M, English and Yiddish speaking with PPH of bipolar disorder with pmh of T2DM with neuropathy, vertigo, CAD s/p YEHUDA x2 (6/6/22, on DAPT), cholelithiasis, and no previous abdominal surgeries who was BIBEMS with several hour h/o n/v-nonbloody, bilious emesis for the past 5 days. Admitted to the surgery service for non-surgical management of cholecystitis. Psychiatry was consulted for medication management. Pt is know to the writer from other medical admissions. He was recommended during prior admissions to stop the lithium due to concern of toxicity, interactions and tremors and stop clonazepam (not restarted during this admission). According to pt and his wife, pt didn't follow the recommendations and restarted taking both lithium and clonazepam and has been having episodes of "shakiness" and "falling down" (per wife). Plan;- continue current treatment with depakote and lamotrigine; - sertraline and lithium were d/arabella; pt is not a candidate for benzodiazepines. 
Patient is a 71M, English and Yiddish speaking with PPH of bipolar disorder with pmh of T2DM with neuropathy, vertigo, CAD s/p YEHUDA x2 (6/6/22, on DAPT), cholelithiasis, and no previous abdominal surgeries who was BIBEMS with several hour h/o n/v-nonbloody, bilious emesis for the past 5 days. Admitted to the surgery service for non-surgical management of cholecystitis. Psychiatry was consulted for medication management. Pt is know to the writer from other medical admissions. He was recommended during prior admissions to stop the lithium due to concern of toxicity, interactions and tremors and stop clonazepam (not restarted during this admission). According to pt and his wife, pt didn't follow the recommendations and restarted taking both lithium and clonazepam and has been having episodes of "shakiness" and "falling down" (per wife). Plan;- continue current treatment with depakote and lamotrigine; - sertraline and lithium were d/arabella; pt is not a candidate for benzodiazepines. 
Patient is a 71M, English and Yiddish speaking with PPH of bipolar disorder with pmh of T2DM with neuropathy, vertigo, CAD s/p YEHUDA x2 (6/6/22, on DAPT), cholelithiasis, and no previous abdominal surgeries who was BIBEMS with several hour h/o n/v-nonbloody, bilious emesis for the past 5 days. Admitted to the surgery service for non-surgical management of cholecystitis. Psychiatry was consulted for medication management. Pt is know to the writer from other medical admissions. He was recommended during prior admissions to stop the lithium due to concern of toxicity, interactions and tremors and stop clonazepam (not restarted during this admission). According to pt and his wife, pt didn't follow the recommendations and restarted taking both lithium and clonazepam and has been having episodes of "shakiness" and "falling down" (per wife). He currently presents disoriented to time and with irritable mood. Plan:  -restart home depakote at 1000mg po qhs ; -stop lithium and sertraline (both discontinued in the past); -continue lamotrigine at home dosage; -CL to follow.

## 2022-06-29 NOTE — DISCHARGE NOTE NURSING/CASE MANAGEMENT/SOCIAL WORK - FLU SEASON?
Yes... anticipated discharge recommendation/risk reduction/prevention/functional limitations in following categories

## 2022-06-29 NOTE — DISCHARGE NOTE PROVIDER - NSDCFUADDAPPT_GEN_ALL_CORE_FT
Please follow up with Dr. Carmona next week. Call his office to schedule an appointment: [phone].    ******Please follow up with [facility]  [address] [place], [place] [address] [phone]  Alternative [place] options include: [facility]: [place]:  [address] [place] [address]  [phone]   [place] [id number] [phone]    ******Please follow up with Dr. Alexander or Dr. Ramirez Med-follow-up for movement disorders

## 2022-06-29 NOTE — DISCHARGE NOTE PROVIDER - NSDCMRMEDTOKEN_GEN_ALL_CORE_FT
Aspirin Enteric Coated 81 mg oral delayed release tablet: 1 tab(s) orally once a day  atorvastatin 40 mg oral tablet: 1 tab(s) orally once a day (at bedtime)  Cardiac Rehabilitation: 3x / week for 12 weeks for diagnosis of Coronary Artery Disease (cardiologist: Dr. Jose M Nascimento).   clopidogrel 75 mg oral tablet: 1 tab(s) orally once a day  Depakote  mg oral tablet, extended release: 1 tab(s) orally every 8 hours  famotidine 40 mg oral tablet: 1 tab(s) orally once a day   KlonoPIN 0.5 mg oral tablet: 1 tab(s) orally 3 times a day  lamoTRIgine 100 mg oral tablet: 1 tab(s) orally once a day (at bedtime)  lamoTRIgine 150 mg oral tablet: 1 tab(s) orally once a day (in the morning)  lithium 300 mg oral tablet, extended release: 2 tab(s) orally once a day (at bedtime)  meclizine 12.5 mg oral tablet: 1 tab(s) orally 3 times a day  pantoprazole 40 mg oral delayed release tablet: 1 tab(s) orally once a day   propranolol 40 mg oral tablet: 1 tab(s) orally 2 times a day  sitagliptin-metformin 50 mg-500 mg oral tablet: 1 tab(s) orally 2 times a day; RESTART ON THURSDAY, 6/9/22.   Tylenol 500 mg oral tablet: 1 tab(s) orally every 4 hours  Zofran 4 mg oral tablet: 1 tab(s) orally once a day, As Needed  Zoloft 25 mg oral tablet: 1.5 tab(s) orally once a day   Aspirin Enteric Coated 81 mg oral delayed release tablet: 1 tab(s) orally once a day  atorvastatin 40 mg oral tablet: 1 tab(s) orally once a day (at bedtime)  Cardiac Rehabilitation: 3x / week for 12 weeks for diagnosis of Coronary Artery Disease (cardiologist: Dr. Jose M Nascimento).   cefpodoxime 200 mg oral tablet: 1 tab(s) orally 2 times a day MDD:2  clopidogrel 75 mg oral tablet: 1 tab(s) orally once a day  Depakote  mg oral tablet, extended release: 2 tab(s) orally once a day (at bedtime)   famotidine 40 mg oral tablet: 1 tab(s) orally once a day   lamoTRIgine 100 mg oral tablet: 1 tab(s) orally once a day (at bedtime)  lamoTRIgine 150 mg oral tablet: 1 tab(s) orally once a day (in the morning)  metroNIDAZOLE 500 mg oral tablet: 1 tab(s) orally every 8 hours MDD:3  pantoprazole 40 mg oral delayed release tablet: 1 tab(s) orally once a day   polyethylene glycol 3350 oral powder for reconstitution: 17 gram(s) orally once a day  propranolol 40 mg oral tablet: 1 tab(s) orally 2 times a day  senna leaf extract oral tablet: 2 tab(s) orally once a day (at bedtime)  Sinemet 25 mg-100 mg oral tablet: 1 tab(s) orally 3 times a day MDD:3  sitagliptin-metformin 50 mg-500 mg oral tablet: 1 tab(s) orally 2 times a day; RESTART ON THURSDAY, 6/9/22.   Zofran 4 mg oral tablet: 1 tab(s) orally once a day, As Needed

## 2022-06-29 NOTE — DISCHARGE NOTE PROVIDER - CARE PROVIDER_API CALL
Manolo Carmona)  Surgery  [address] [address]  Phone: [phone]  Fax: [phone]  Follow Up Time:     All Ramirez)  Neurology  [address] [place] [address]  Phone: [phone]  Fax: [phone]  Follow Up Time:

## 2022-06-29 NOTE — BH CONSULTATION LIAISON PROGRESS NOTE - NSICDXBHPRIMARYDX_PSY_ALL_CORE
Bipolar disorder   F31.9  

## 2022-06-29 NOTE — DISCHARGE NOTE PROVIDER - INSTRUCTIONS
Please follow a low fat diet.      Please follow a low fat diet.     PLEASE FINISH ANTIBIOTIC UNTIL COMPLETE! Cefpodoxime and Flagyl

## 2022-07-11 DIAGNOSIS — Z83.3 FAMILY HISTORY OF DIABETES MELLITUS: ICD-10-CM

## 2022-07-11 DIAGNOSIS — Z53.20 PROCEDURE AND TREATMENT NOT CARRIED OUT BECAUSE OF PATIENT'S DECISION FOR UNSPECIFIED REASONS: ICD-10-CM

## 2022-07-11 DIAGNOSIS — G20 PARKINSON'S DISEASE: ICD-10-CM

## 2022-07-11 DIAGNOSIS — K59.00 CONSTIPATION, UNSPECIFIED: ICD-10-CM

## 2022-07-11 DIAGNOSIS — Z79.02 LONG TERM (CURRENT) USE OF ANTITHROMBOTICS/ANTIPLATELETS: ICD-10-CM

## 2022-07-11 DIAGNOSIS — F41.1 GENERALIZED ANXIETY DISORDER: ICD-10-CM

## 2022-07-11 DIAGNOSIS — Z79.82 LONG TERM (CURRENT) USE OF ASPIRIN: ICD-10-CM

## 2022-07-11 DIAGNOSIS — F31.9 BIPOLAR DISORDER, UNSPECIFIED: ICD-10-CM

## 2022-07-11 DIAGNOSIS — Z20.822 CONTACT WITH AND (SUSPECTED) EXPOSURE TO COVID-19: ICD-10-CM

## 2022-07-11 DIAGNOSIS — K80.00 CALCULUS OF GALLBLADDER WITH ACUTE CHOLECYSTITIS WITHOUT OBSTRUCTION: ICD-10-CM

## 2022-07-11 DIAGNOSIS — Z53.09 PROCEDURE AND TREATMENT NOT CARRIED OUT BECAUSE OF OTHER CONTRAINDICATION: ICD-10-CM

## 2022-07-11 DIAGNOSIS — Z79.84 LONG TERM (CURRENT) USE OF ORAL HYPOGLYCEMIC DRUGS: ICD-10-CM

## 2022-07-11 DIAGNOSIS — Z95.5 PRESENCE OF CORONARY ANGIOPLASTY IMPLANT AND GRAFT: ICD-10-CM

## 2022-07-11 DIAGNOSIS — Z81.8 FAMILY HISTORY OF OTHER MENTAL AND BEHAVIORAL DISORDERS: ICD-10-CM

## 2022-07-11 DIAGNOSIS — D72.829 ELEVATED WHITE BLOOD CELL COUNT, UNSPECIFIED: ICD-10-CM

## 2022-07-11 DIAGNOSIS — R29.6 REPEATED FALLS: ICD-10-CM

## 2022-07-11 DIAGNOSIS — I25.10 ATHEROSCLEROTIC HEART DISEASE OF NATIVE CORONARY ARTERY WITHOUT ANGINA PECTORIS: ICD-10-CM

## 2022-07-11 DIAGNOSIS — Z91.14 PATIENT'S OTHER NONCOMPLIANCE WITH MEDICATION REGIMEN: ICD-10-CM

## 2022-07-11 DIAGNOSIS — E11.40 TYPE 2 DIABETES MELLITUS WITH DIABETIC NEUROPATHY, UNSPECIFIED: ICD-10-CM

## 2022-10-06 NOTE — ED ADULT NURSE NOTE - NS PRO PASSIVE SMOKE EXP
10/6/2022 at 1541 PM, patient returns for reading of Tuberculin Skin Test placed on Right forearm date of 10/4/2022, at 1451 PM.  Reading is 0 mm induration.  Patient given completed paper work.    Documented by: SIDNEY Vazquez   No

## 2022-12-28 NOTE — ED PROVIDER NOTE - CPE EDP GASTRO NORM
Annual Wellness Exam  Chito Baez      SUBJECTIVE:     Shan Tanner is a 46 year old male presenting for annual wellness exam.    Concerns: denies any concerns    Routine Health Maintenance  -Diet: has healthy, balanced meals, fruits and vegetables, lots of water throughout the day, 2-4cups of coffee daily  -Exercise: running, weights 4d week     -Work: [employer]   -Safety: denies any concerns of safety at home or workpalce  -Substance use: denies  -Alcohol: twice per week, 2beers  -Smoking: denies  -Sexual health: sexually active, monogamous relationship, wife  -Colon cancer screening: last colonoscopy done in 10/2022 due to [facility] of colon cancer, repeat recommended in 5 years  -Mental health: denies depression or anxiety           Date 8/10/2022 12/20/2021 5/3/2021 9/1/2020   Adult PHQ 2 Score 0 0 0 0   Adult PHQ 2 Interpretation No further screening needed No further screening needed No further screening needed No further screening needed             Patient's medications, allergies, past medical, surgical, social and family histories were reviewed and updated as appropriate.      PAST MEDICAL HISTORY:  No past medical history on file.    MEDICATIONS:  No current outpatient medications on file.     No current facility-administered medications for this visit.       ALLERGIES:  ALLERGIES:  No Known Allergies    PAST SURGICAL HISTORY:  Past Surgical History:   Procedure Laterality Date   • No past surgeries         FAMILY HISTORY:  Family History   Problem Relation Age of Onset   • Colon Polyps Mother 50   • Cancer, Colon Maternal Grandmother 59       SOCIAL HISTORY:  Social History     Tobacco Use   • Smoking status: Never   • Smokeless tobacco: Never   Substance Use Topics   • Alcohol use: Yes   • Drug use: Never       OBJECTIVE  Vitals:    12/28/22 1042   BP: 123/82   BP Location: RUE - Right upper extremity   Patient Position: Sitting   Cuff Size: Large Adult   Pulse: (!) 55   Resp: 18   Temp: 97.8  °F (36.6 °C)   TempSrc: Temporal   SpO2: 99%   Weight: 92.1 kg (203 lb)       Physical Exam  Vitals reviewed.   Constitutional:       General: He is not in acute distress.     Appearance: He is not toxic-appearing.   HENT:      Head: Normocephalic and atraumatic.      Right Ear: External ear normal.      Left Ear: External ear normal.      Nose:      Comments: Mask in place 2/2 COVID precautions     Mouth/Throat:      Comments: Mask in place 2/2 COVID precautions  Eyes:      Extraocular Movements: Extraocular movements intact.      Conjunctiva/sclera: Conjunctivae normal.      Pupils: Pupils are equal, round, and reactive to light.   Cardiovascular:      Rate and Rhythm: Normal rate and regular rhythm.      Heart sounds: Normal heart sounds.   Pulmonary:      Effort: Pulmonary effort is normal. No respiratory distress.      Breath sounds: Normal breath sounds.   Abdominal:      General: Abdomen is flat. There is no distension.      Palpations: Abdomen is soft.      Tenderness: There is no abdominal tenderness.   Musculoskeletal:      Right lower leg: No edema.      Left lower leg: No edema.   Skin:     General: Skin is warm.   Neurological:      Mental Status: He is alert and oriented to person, place, and time.      Motor: No weakness.      Comments: Spontaneously moving all extremities     Psychiatric:         Mood and Affect: Mood normal.         Thought Content: Thought content normal.           ASSESSMENT/PLAN  Shan Tanner is a 46 year old male presenting for annual wellness exam.    Routine Health Maintenance  -Vitals reviewed with patient, reassuring. HR likely due to exercise/running activities. Exam overall reassuring.   -STI Screening: declined complete panel, agreeable to checking HIV and Hep C  -Hep C and HIV Screening: ordered  -Colorectal cancer screening: last colonoscopy in 10/2022 due to [facility] of colon cancer, repeat recommended in 5 years  -Immunizations - Tdap, Flu, COVID UTD  -Labs today per  orders  -Diet and exercise counseling provided today      Problem List Items Addressed This Visit    None  Visit Diagnoses     Health maintenance examination    -  Primary    Relevant Orders    LIPID PANEL WITH REFLEX (Completed)    COMPREHENSIVE METABOLIC PANEL (Completed)    GLYCOHEMOGLOBIN (Completed)    Routine screening for STI (sexually transmitted infection)        Relevant Orders    HEPATITIS SEROLOGY PANEL CHRONIC WITH REFLEX HCV PCR (Completed)    HIV 1/HIV 2 ANTIGEN/ANTIBODY SCREEN (Completed)          Follow Up: annually and PRN      Discussed with attending      Chito Baez DO  PGY-2  Family Medicine     normal...

## 2023-02-09 NOTE — PATIENT PROFILE ADULT - DO YOU FEEL UNSAFE AT HOME, WORK, OR SCHOOL?
Neurology    Attempted to see the patient multiple times this morning. Initially when I saw him he had been extubated. Was clearly awake. Communicating some. Needed frequent suctioning. Later a rapid response was called for VT. Tried to see again though on BIPAP w/ multiple medical personnel at the bedside. Decided to defer neurologic exam at the moment. Plan would still be for a brain MRI when stable and EEG. Call w/ any questions or concerns. Thank you.      Shahram Varela NP  [address] [place] [address] [address] Neurology no

## 2023-02-18 NOTE — ED ADULT NURSE NOTE - CAS TRG GENERAL AIRWAY, MLM
Patent Rituxan Counseling:  I discussed with the patient the risks of Rituxan infusions. Side effects can include infusion reactions, severe drug rashes including mucocutaneous reactions, reactivation of latent hepatitis and other infections and rarely progressive multifocal leukoencephalopathy.  All of the patient's questions and concerns were addressed.

## 2023-02-24 ENCOUNTER — NON-APPOINTMENT (OUTPATIENT)
Age: 73
End: 2023-02-24

## 2023-02-24 ENCOUNTER — APPOINTMENT (OUTPATIENT)
Dept: COLORECTAL SURGERY | Facility: CLINIC | Age: 73
End: 2023-02-24
Payer: MEDICARE

## 2023-02-24 VITALS
DIASTOLIC BLOOD PRESSURE: 75 MMHG | WEIGHT: 178 LBS | HEIGHT: 69 IN | BODY MASS INDEX: 26.36 KG/M2 | TEMPERATURE: 98.1 F | HEART RATE: 70 BPM | SYSTOLIC BLOOD PRESSURE: 119 MMHG

## 2023-02-24 DIAGNOSIS — K52.9 NONINFECTIVE GASTROENTERITIS AND COLITIS, UNSPECIFIED: ICD-10-CM

## 2023-02-24 PROCEDURE — 99204 OFFICE O/P NEW MOD 45 MIN: CPT | Mod: 25

## 2023-02-24 PROCEDURE — 45330 DIAGNOSTIC SIGMOIDOSCOPY: CPT

## 2023-02-24 NOTE — PHYSICAL EXAM
[Abdomen Masses] : No abdominal masses [Abdomen Tenderness] : ~T No ~M abdominal tenderness [Normal] : was normal [de-identified] : Midline incision.  Areas of granulation tissue treated with silver nitrate.  Ileostomy functional. [de-identified] : No masses.  Moderate diffuse tenderness.  Palpable clip felt at 4 cm from anal verge [FreeTextEntry1] : The risks , benefits and alternatives of the procedure were reviewed with the patient. The patient consents to the planned procedure.\par \par The flexible sigmoidoscope was passed through the anus into the rectum. The scope was passed to 20    cm from the anal verge.\par \par The findings revealed:\par \par Petechia and moderate diversion proctitis.  Evidence of 2 endoclips.  Liquid brown fluid noted.

## 2023-02-24 NOTE — HISTORY OF PRESENT ILLNESS
Continue Regimen: Tretinoin .05% cream to the face qhs, Minocycline 100 mg qd-bid with food, Tacrolimus .1% ointment qhs, and sulfur bar soap for daily cleanser Hide Neutrogena Products: No [FreeTextEntry1] : 73 yo M presents for 2nd opinion of ileostomy closure\par PMH: DMII, HTN, Depression, anxiety\par PSH open subtotal colectomy w/ end ileostomy\par \par Outside records reviewed:\par 8/11/22-9/23/22  Hospitalized at [facility] for C. diff colitis and sepsis. Pt had previously been hospitalized for likely infectious colitis, treated w/ oral abx and presented to [facility] ER one week later c/o diarrhea and abdominal pain. Noted to be tachycardic, hypotensive. WBC 33, CT showed cholecystitis. C.Diff positive., s/p IV vaco/zosyn/flagyl and PO vanco\par s/p ex lap, open subtotal colectomy, Ethan's pouch w/ end ileostomy w/ Dr. Radha Ghotra 8/12/22\par Hospital course c/b respiratory failure 2/2 moderate pleural effusions, rectal stump bleed on 9/1/22, s/p multiple transfusions w/o identifiable source on flex sig, followed by CT a/p w/ IR embolization of middle rectal artery, difficulty to extubate post IR. Recurrent rectal stump bleed on 9/7\par Of note, abd US on 8/22/22 noted resolution of cholecystitis. Notable for cholelithiasis w/o evidence of acute cholecystitis\par Discharged to [facility] on 9/23/22 w/ wound vac\par \par Pt may have seen surgeon once in ambulatory setting. Reports wound never fully healed and continues to have VNS three times per week who along w/ pt's son does wound care. Ostomy appliance changed weekly. Pt observes ostomy care but is not independent with it.\par c/o redness and discomfort surrounding stoma. Son and nurse has been applying powder to the area\par \par BH: empties ostomy three times daily, typically watery. Eats some vegetables and fruits, but reports he watches his sugar and doesn't eat too much starch. Denies use of fiber supplementation or antidiarrheal medication and reports little fluid intake\par  Action 3: Continue Detail Level: Zone

## 2023-02-24 NOTE — ASSESSMENT
[FreeTextEntry1] : I had extensive discussion with the patient and his wife–45 minutes regarding the treatment options.  Given his significant associated hospital course from his prior treatment as well as his underlying comorbidities I have counseled them regarding continued surgical treatment–continued ileostomy.  I have outlined to them that a closure of ileostomy would expose him to a significant associated risk of surgery including heart or lung complications as well as the association with postoperative altered bowel function including increased bowel frequency and potential incontinence.  Advised trial of antidiarrheal agents.\par \par Follow-up with enterostomal therapy for additional concerns regarding ileostomy management.

## 2023-02-27 ENCOUNTER — NON-APPOINTMENT (OUTPATIENT)
Age: 73
End: 2023-02-27

## 2023-02-27 DIAGNOSIS — R10.10 UPPER ABDOMINAL PAIN, UNSPECIFIED: ICD-10-CM

## 2023-02-27 DIAGNOSIS — E11.9 TYPE 2 DIABETES MELLITUS W/OUT COMPLICATIONS: ICD-10-CM

## 2023-02-27 DIAGNOSIS — I10 ESSENTIAL (PRIMARY) HYPERTENSION: ICD-10-CM

## 2023-02-27 DIAGNOSIS — Z78.9 OTHER SPECIFIED HEALTH STATUS: ICD-10-CM

## 2023-02-27 DIAGNOSIS — F41.9 ANXIETY DISORDER, UNSPECIFIED: ICD-10-CM

## 2023-02-27 DIAGNOSIS — F32.A ANXIETY DISORDER, UNSPECIFIED: ICD-10-CM

## 2023-02-27 DIAGNOSIS — Z86.19 PERSONAL HISTORY OF OTHER INFECTIOUS AND PARASITIC DISEASES: ICD-10-CM

## 2023-03-20 ENCOUNTER — APPOINTMENT (OUTPATIENT)
Dept: COLORECTAL SURGERY | Facility: CLINIC | Age: 73
End: 2023-03-20

## 2023-03-23 NOTE — ED ADULT TRIAGE NOTE - BP NONINVASIVE SYSTOLIC (MM HG)
Twila Nguyen DO  Authorized by: Twila Nguyen [phone] = No assist / stand by assistance

## 2023-03-24 RX ORDER — LAMOTRIGINE 25 MG/1
1 TABLET, ORALLY DISINTEGRATING ORAL
Qty: 0 | Refills: 0 | DISCHARGE

## 2023-03-24 RX ORDER — SERTRALINE 25 MG/1
1.5 TABLET, FILM COATED ORAL
Qty: 0 | Refills: 0 | DISCHARGE

## 2023-03-24 RX ORDER — PROPRANOLOL HCL 160 MG
1 CAPSULE, EXTENDED RELEASE 24HR ORAL
Qty: 0 | Refills: 0 | DISCHARGE

## 2023-03-24 RX ORDER — LITHIUM CARBONATE 300 MG/1
1 TABLET, EXTENDED RELEASE ORAL
Qty: 0 | Refills: 0 | DISCHARGE

## 2023-03-24 RX ORDER — SITAGLIPTIN AND METFORMIN HYDROCHLORIDE 500; 50 MG/1; MG/1
1 TABLET, FILM COATED ORAL
Qty: 0 | Refills: 0 | DISCHARGE

## 2023-03-24 RX ORDER — METFORMIN HYDROCHLORIDE 850 MG/1
1 TABLET ORAL
Qty: 0 | Refills: 0 | DISCHARGE

## 2023-03-24 RX ORDER — DIVALPROEX SODIUM 500 MG/1
1 TABLET, DELAYED RELEASE ORAL
Qty: 0 | Refills: 0 | DISCHARGE

## 2023-03-24 RX ORDER — CLONAZEPAM 1 MG
1 TABLET ORAL
Qty: 0 | Refills: 0 | DISCHARGE

## 2023-03-24 RX ORDER — ONDANSETRON 8 MG/1
1 TABLET, FILM COATED ORAL
Qty: 0 | Refills: 0 | DISCHARGE

## 2023-03-24 RX ORDER — MECLIZINE HCL 12.5 MG
1 TABLET ORAL
Qty: 0 | Refills: 0 | DISCHARGE

## 2023-03-24 RX ORDER — ASPIRIN/CALCIUM CARB/MAGNESIUM 324 MG
1 TABLET ORAL
Qty: 0 | Refills: 0 | DISCHARGE

## 2023-03-24 RX ORDER — SERTRALINE 25 MG/1
1 TABLET, FILM COATED ORAL
Qty: 0 | Refills: 0 | DISCHARGE

## 2023-03-24 RX ORDER — ACETAMINOPHEN 500 MG
1 TABLET ORAL
Qty: 0 | Refills: 0 | DISCHARGE

## 2023-03-24 RX ORDER — LITHIUM CARBONATE 300 MG/1
2 TABLET, EXTENDED RELEASE ORAL
Qty: 0 | Refills: 0 | DISCHARGE

## 2023-05-09 NOTE — ED ADULT TRIAGE NOTE - SOURCE OF INFORMATION
What Type Of Note Output Would You Prefer (Optional)?: Standard Output
How Severe Are Your Spot(S)?: moderate
Have Your Spot(S) Been Treated In The Past?: has not been treated
Hpi Title: Evaluation of Skin Lesions
Patient/EMS

## 2023-05-12 NOTE — H&P ADULT - NSHPPOAPULMEMBOLUS_GEN_A_CORE
In an effort to ensure that our patients LiveWell, a Team Member has reviewed your chart and identified an opportunity to provide the best care possible. An attempt was made to discuss or schedule overdue Preventive or Disease Management screening.     The Outcome was Contact was not made, letter/portal message sent If you have any questions or need help with scheduling, contact your primary care provider.. Care Gaps include Breast Cancer Screening.       no

## 2023-06-12 NOTE — ED PROVIDER NOTE - HIV OFFER
Urine obtained via clean catch for UA. Sample sent to lab.   Previously Declined (within the last year)

## 2023-06-28 NOTE — PROGRESS NOTE ADULT - ASSESSMENT
71M, Yiddish speaking, with pmh of T2DM with neuropathy, bipolar disorder, vertigo, CAD s/p YEHUDA x2 (6/6/22, on DAPT), cholelithiasis, and no previous abdominal surgeries who was BIBEMS with several hour h/o n/v-nonbloody, bilious emesis for the past 5 days. Admitted to the surgery service for non-surgical management of cholecystitis. Medicine consulted on 6/25 for shakiness that patient has had for the last 2-3 years.    #Tremots  Patient reports "shakiness" for the last 2-3 years. Describes it as intermittent arm tremors. Has been worked up by neurology and psychiatry in the past as a possible medication side effect vs. inner ear imbalance. At the moment, patient does not have any shakes or tremor on exam. ROS otherwise negative and physical exam unremarkable.   Symptoms likely chronic given timeline.    -Psych and Neuro recs appreciated- anti-psychotics as per psychiatry   -Neuro rec  to start on sinemet 25/100 before meals (TID)   - No need for MRI brain as inpatien, can obtain as outpt.   - Outpatient follow-up with movement disorders (Dr. Alexander or Dr. Ramirez) after discharge     Discussed with attending and primary team  Medicine will sign off, thank you for this consult. Please call back with any questions   71M, Yiddish speaking, with pmh of T2DM with neuropathy, bipolar disorder, vertigo, CAD s/p YEHUDA x2 (6/6/22, on DAPT), cholelithiasis, and no previous abdominal surgeries who was BIBEMS with several hour h/o n/v-nonbloody, bilious emesis for the past 5 days. Admitted to the surgery service for non-surgical management of cholecystitis. Medicine consulted on 6/25 for shakiness that patient has had for the last 2-3 years.    #Tremors  Patient reports "shakiness" for the last 2-3 years. Describes it as intermittent arm tremors. Has been worked up by neurology and psychiatry in the past as a possible medication side effect vs. inner ear imbalance. At the moment, patient does not have any shakes or tremor on exam. ROS otherwise negative and physical exam unremarkable.   Symptoms likely chronic given timeline.    -Psych and Neuro recs appreciated- anti-psychotics as per psychiatry   -Neuro rec  to start on sinemet 25/100 before meals (TID)   - No need for MRI brain as inpatien, can obtain as outpt.   - Outpatient follow-up with movement disorders (Dr. Alexander or Dr. Ramirez) after discharge     Discussed with attending and primary team  Medicine will sign off, thank you for this consult. Please call back with any questions   71M, Yiddish speaking, with pmh of T2DM with neuropathy, bipolar disorder, vertigo, CAD s/p YEHUDA x2 (6/6/22, on DAPT), cholelithiasis, and no previous abdominal surgeries who was BIBEMS with several hour h/o n/v-nonbloody, bilious emesis for the past 5 days. Admitted to the surgery service for non-surgical management of cholecystitis. Medicine consulted on 6/25 for shakiness that patient has had for the last 2-3 years.    #Tremors  Patient reports "shakiness" for the last 2-3 years. Describes it as intermittent arm tremors. Has been worked up by neurology and psychiatry in the past as a possible medication side effect vs. inner ear imbalance. At the moment, patient does not have any shakes or tremor on exam. ROS otherwise negative and physical exam unremarkable.   Symptoms likely chronic given timeline.    -Psych and Neuro recs appreciated- anti-psychotics as per psychiatry   -Neuro rec  to start on sinemet 25/100 before meals (TID)   -per neuro,  No need for MRI brain as inpatient, can obtain as outpt.   - Outpatient follow-up with movement disorders (Dr. Alexander or Dr. Ramirez) after discharge     Discussed with attending and primary team  Medicine will sign off, thank you for this consult. Please call back with any questions   Infliximab Counseling:  I discussed with the patient the risks of infliximab including but not limited to myelosuppression, immunosuppression, autoimmune hepatitis, demyelinating diseases, lymphoma, and serious infections.  The patient understands that monitoring is required including a PPD at baseline and must alert us or the primary physician if symptoms of infection or other concerning signs are noted.

## 2023-07-20 NOTE — ED ADULT NURSE NOTE - NSINTERVENTIONOPT_GEN_ALL_ED
no rashes , no suspicious lesions , no areas of discoloration , no jaundice present , good turgor
Hourly Rounding/Enhanced Supervision

## 2023-10-06 NOTE — ED ADULT TRIAGE NOTE - PATIENT ON (OXYGEN DELIVERY METHOD)
"Subjective   Patient ID: Hima Mesa is a 22 y.o. male who presents for follow up.    HPI  Pt states he got a DUI last February. In 4/2023, he had 2 kidney stones and was given percocet, which he got \"hooked\" on. He completed [facility], where Prozac was increased to 60mg PO daily and he was started on Remeron 15mg PO at bedtime and Strattera 50mg PO at bedtime. He states he now lives in sober living through [facility] and is the youngest person there.    Pt states he ran out of medication that was prescribed through [facility] 2 weeks ago and is currently off of medication. He continues to do 4 meetings per week, including NA and AA. He feels like he has learned about himself, able to do things that he did not think he could before.    Review of Systems   Psychiatric/Behavioral:  Negative for agitation, confusion, hallucinations and suicidal ideas.    All other systems reviewed and are negative.        Objective   Physical Exam  Psychiatric:         Attention and Perception: Attention and perception normal.         Mood and Affect: Mood and affect normal.         Speech: Speech normal.         Behavior: Behavior normal. Behavior is cooperative.         Thought Content: Thought content normal. Thought content does not include homicidal or suicidal ideation.         Cognition and Memory: Cognition normal.         Judgment: Judgment normal.       Lab Review:   not applicable    Assessment/Plan   Diagnoses and all orders for this visit:  Anxiety  -     mirtazapine (Remeron) 15 mg tablet; Take 1 tablet (15 mg) by mouth once daily at bedtime.  ADHD (attention deficit hyperactivity disorder), combined type  Alcohol use disorder, moderate, dependence (CMS/HCC)  Cannabis use disorder, severe, dependence (CMS/HCC)  Amphetamine use disorder, moderate (CMS/HCC)    Holding fluoxetine 60mg PO daily and Strattera 50mg PO daily as pt has been off of these medications for 2+ weeks as he ran out and the IOP did not refill " them. Plan to restart remeron 15mg PO at bedtime as pt reports he found this medication to be most helpful and has been having sleep issues, which compound executive functioning difficulties.   room air

## 2023-10-25 NOTE — ED ADULT TRIAGE NOTE - IDEAL BODY WEIGHT(KG)
Date Scheduled:  11/27/2023   Earlier Appt Requested[place]    Physician Requested: Dr Mak Banuelos   PCP:  Dr Fung  [phone]  Referred by:   PCP  Reason for visit: Ascending aorta dilatation   High coronary artery calcium score   Family history of early CAD  
Noted  
72

## 2023-12-30 ENCOUNTER — EMERGENCY (EMERGENCY)
Facility: HOSPITAL | Age: 73
LOS: 1 days | Discharge: HOME | End: 2023-12-30
Attending: EMERGENCY MEDICINE | Admitting: EMERGENCY MEDICINE
Payer: MEDICARE

## 2023-12-30 VITALS
SYSTOLIC BLOOD PRESSURE: 122 MMHG | RESPIRATION RATE: 18 BRPM | OXYGEN SATURATION: 98 % | HEART RATE: 63 BPM | TEMPERATURE: 98 F | DIASTOLIC BLOOD PRESSURE: 73 MMHG

## 2023-12-30 VITALS
SYSTOLIC BLOOD PRESSURE: 111 MMHG | HEART RATE: 58 BPM | DIASTOLIC BLOOD PRESSURE: 67 MMHG | RESPIRATION RATE: 18 BRPM | OXYGEN SATURATION: 98 % | TEMPERATURE: 98 F

## 2023-12-30 DIAGNOSIS — I25.10 ATHEROSCLEROTIC HEART DISEASE OF NATIVE CORONARY ARTERY WITHOUT ANGINA PECTORIS: ICD-10-CM

## 2023-12-30 DIAGNOSIS — Z87.19 PERSONAL HISTORY OF OTHER DISEASES OF THE DIGESTIVE SYSTEM: ICD-10-CM

## 2023-12-30 DIAGNOSIS — R11.0 NAUSEA: ICD-10-CM

## 2023-12-30 DIAGNOSIS — Z79.02 LONG TERM (CURRENT) USE OF ANTITHROMBOTICS/ANTIPLATELETS: ICD-10-CM

## 2023-12-30 DIAGNOSIS — R10.9 UNSPECIFIED ABDOMINAL PAIN: ICD-10-CM

## 2023-12-30 DIAGNOSIS — Z90.89 ACQUIRED ABSENCE OF OTHER ORGANS: Chronic | ICD-10-CM

## 2023-12-30 DIAGNOSIS — Z90.49 ACQUIRED ABSENCE OF OTHER SPECIFIED PARTS OF DIGESTIVE TRACT: ICD-10-CM

## 2023-12-30 DIAGNOSIS — Z93.2 ILEOSTOMY STATUS: ICD-10-CM

## 2023-12-30 DIAGNOSIS — F31.9 BIPOLAR DISORDER, UNSPECIFIED: ICD-10-CM

## 2023-12-30 DIAGNOSIS — R19.7 DIARRHEA, UNSPECIFIED: ICD-10-CM

## 2023-12-30 DIAGNOSIS — E11.40 TYPE 2 DIABETES MELLITUS WITH DIABETIC NEUROPATHY, UNSPECIFIED: ICD-10-CM

## 2023-12-30 PROBLEM — R42 DIZZINESS AND GIDDINESS: Chronic | Status: ACTIVE | Noted: 2022-04-12

## 2023-12-30 PROBLEM — E11.9 TYPE 2 DIABETES MELLITUS WITHOUT COMPLICATIONS: Chronic | Status: ACTIVE | Noted: 2020-07-15

## 2023-12-30 LAB
ALBUMIN SERPL ELPH-MCNC: 4.3 G/DL — SIGNIFICANT CHANGE UP (ref 3.3–5)
ALBUMIN SERPL ELPH-MCNC: 4.3 G/DL — SIGNIFICANT CHANGE UP (ref 3.3–5)
ALP SERPL-CCNC: 86 U/L — SIGNIFICANT CHANGE UP (ref 40–120)
ALP SERPL-CCNC: 86 U/L — SIGNIFICANT CHANGE UP (ref 40–120)
ALT FLD-CCNC: 24 U/L — SIGNIFICANT CHANGE UP (ref 10–45)
ALT FLD-CCNC: 24 U/L — SIGNIFICANT CHANGE UP (ref 10–45)
ANION GAP SERPL CALC-SCNC: 10 MMOL/L — SIGNIFICANT CHANGE UP (ref 5–17)
ANION GAP SERPL CALC-SCNC: 10 MMOL/L — SIGNIFICANT CHANGE UP (ref 5–17)
APPEARANCE UR: CLEAR — SIGNIFICANT CHANGE UP
APPEARANCE UR: CLEAR — SIGNIFICANT CHANGE UP
APTT BLD: 36 SEC — HIGH (ref 24.5–35.6)
APTT BLD: 36 SEC — HIGH (ref 24.5–35.6)
AST SERPL-CCNC: 27 U/L — SIGNIFICANT CHANGE UP (ref 10–40)
AST SERPL-CCNC: 27 U/L — SIGNIFICANT CHANGE UP (ref 10–40)
BASOPHILS # BLD AUTO: 0.05 K/UL — SIGNIFICANT CHANGE UP (ref 0–0.2)
BASOPHILS # BLD AUTO: 0.05 K/UL — SIGNIFICANT CHANGE UP (ref 0–0.2)
BASOPHILS NFR BLD AUTO: 0.6 % — SIGNIFICANT CHANGE UP (ref 0–2)
BASOPHILS NFR BLD AUTO: 0.6 % — SIGNIFICANT CHANGE UP (ref 0–2)
BILIRUB SERPL-MCNC: 0.2 MG/DL — SIGNIFICANT CHANGE UP (ref 0.2–1.2)
BILIRUB SERPL-MCNC: 0.2 MG/DL — SIGNIFICANT CHANGE UP (ref 0.2–1.2)
BILIRUB UR-MCNC: NEGATIVE — SIGNIFICANT CHANGE UP
BILIRUB UR-MCNC: NEGATIVE — SIGNIFICANT CHANGE UP
BUN SERPL-MCNC: 22 MG/DL — SIGNIFICANT CHANGE UP (ref 7–23)
BUN SERPL-MCNC: 22 MG/DL — SIGNIFICANT CHANGE UP (ref 7–23)
CALCIUM SERPL-MCNC: 9.9 MG/DL — SIGNIFICANT CHANGE UP (ref 8.4–10.5)
CALCIUM SERPL-MCNC: 9.9 MG/DL — SIGNIFICANT CHANGE UP (ref 8.4–10.5)
CHLORIDE SERPL-SCNC: 101 MMOL/L — SIGNIFICANT CHANGE UP (ref 96–108)
CHLORIDE SERPL-SCNC: 101 MMOL/L — SIGNIFICANT CHANGE UP (ref 96–108)
CO2 SERPL-SCNC: 28 MMOL/L — SIGNIFICANT CHANGE UP (ref 22–31)
CO2 SERPL-SCNC: 28 MMOL/L — SIGNIFICANT CHANGE UP (ref 22–31)
COLOR SPEC: SIGNIFICANT CHANGE UP
COLOR SPEC: SIGNIFICANT CHANGE UP
CREAT SERPL-MCNC: 1.14 MG/DL — SIGNIFICANT CHANGE UP (ref 0.5–1.3)
CREAT SERPL-MCNC: 1.14 MG/DL — SIGNIFICANT CHANGE UP (ref 0.5–1.3)
DIFF PNL FLD: NEGATIVE — SIGNIFICANT CHANGE UP
DIFF PNL FLD: NEGATIVE — SIGNIFICANT CHANGE UP
EGFR: 68 ML/MIN/1.73M2 — SIGNIFICANT CHANGE UP
EGFR: 68 ML/MIN/1.73M2 — SIGNIFICANT CHANGE UP
EOSINOPHIL # BLD AUTO: 0.14 K/UL — SIGNIFICANT CHANGE UP (ref 0–0.5)
EOSINOPHIL # BLD AUTO: 0.14 K/UL — SIGNIFICANT CHANGE UP (ref 0–0.5)
EOSINOPHIL NFR BLD AUTO: 1.8 % — SIGNIFICANT CHANGE UP (ref 0–6)
EOSINOPHIL NFR BLD AUTO: 1.8 % — SIGNIFICANT CHANGE UP (ref 0–6)
GLUCOSE SERPL-MCNC: 161 MG/DL — HIGH (ref 70–99)
GLUCOSE SERPL-MCNC: 161 MG/DL — HIGH (ref 70–99)
GLUCOSE UR QL: NEGATIVE MG/DL — SIGNIFICANT CHANGE UP
GLUCOSE UR QL: NEGATIVE MG/DL — SIGNIFICANT CHANGE UP
HCT VFR BLD CALC: 36.8 % — LOW (ref 39–50)
HCT VFR BLD CALC: 36.8 % — LOW (ref 39–50)
HGB BLD-MCNC: 12.3 G/DL — LOW (ref 13–17)
HGB BLD-MCNC: 12.3 G/DL — LOW (ref 13–17)
IMM GRANULOCYTES NFR BLD AUTO: 0.3 % — SIGNIFICANT CHANGE UP (ref 0–0.9)
IMM GRANULOCYTES NFR BLD AUTO: 0.3 % — SIGNIFICANT CHANGE UP (ref 0–0.9)
INR BLD: 0.94 — SIGNIFICANT CHANGE UP (ref 0.85–1.18)
INR BLD: 0.94 — SIGNIFICANT CHANGE UP (ref 0.85–1.18)
KETONES UR-MCNC: ABNORMAL MG/DL
KETONES UR-MCNC: ABNORMAL MG/DL
LACTATE SERPL-SCNC: 1.6 MMOL/L — SIGNIFICANT CHANGE UP (ref 0.5–2)
LACTATE SERPL-SCNC: 1.6 MMOL/L — SIGNIFICANT CHANGE UP (ref 0.5–2)
LEUKOCYTE ESTERASE UR-ACNC: NEGATIVE — SIGNIFICANT CHANGE UP
LEUKOCYTE ESTERASE UR-ACNC: NEGATIVE — SIGNIFICANT CHANGE UP
LIDOCAIN IGE QN: 124 U/L — HIGH (ref 7–60)
LIDOCAIN IGE QN: 124 U/L — HIGH (ref 7–60)
LYMPHOCYTES # BLD AUTO: 3.55 K/UL — HIGH (ref 1–3.3)
LYMPHOCYTES # BLD AUTO: 3.55 K/UL — HIGH (ref 1–3.3)
LYMPHOCYTES # BLD AUTO: 45.2 % — HIGH (ref 13–44)
LYMPHOCYTES # BLD AUTO: 45.2 % — HIGH (ref 13–44)
MAGNESIUM SERPL-MCNC: 2 MG/DL — SIGNIFICANT CHANGE UP (ref 1.6–2.6)
MAGNESIUM SERPL-MCNC: 2 MG/DL — SIGNIFICANT CHANGE UP (ref 1.6–2.6)
MCHC RBC-ENTMCNC: 30.6 PG — SIGNIFICANT CHANGE UP (ref 27–34)
MCHC RBC-ENTMCNC: 30.6 PG — SIGNIFICANT CHANGE UP (ref 27–34)
MCHC RBC-ENTMCNC: 33.4 GM/DL — SIGNIFICANT CHANGE UP (ref 32–36)
MCHC RBC-ENTMCNC: 33.4 GM/DL — SIGNIFICANT CHANGE UP (ref 32–36)
MCV RBC AUTO: 91.5 FL — SIGNIFICANT CHANGE UP (ref 80–100)
MCV RBC AUTO: 91.5 FL — SIGNIFICANT CHANGE UP (ref 80–100)
MONOCYTES # BLD AUTO: 0.65 K/UL — SIGNIFICANT CHANGE UP (ref 0–0.9)
MONOCYTES # BLD AUTO: 0.65 K/UL — SIGNIFICANT CHANGE UP (ref 0–0.9)
MONOCYTES NFR BLD AUTO: 8.3 % — SIGNIFICANT CHANGE UP (ref 2–14)
MONOCYTES NFR BLD AUTO: 8.3 % — SIGNIFICANT CHANGE UP (ref 2–14)
NEUTROPHILS # BLD AUTO: 3.45 K/UL — SIGNIFICANT CHANGE UP (ref 1.8–7.4)
NEUTROPHILS # BLD AUTO: 3.45 K/UL — SIGNIFICANT CHANGE UP (ref 1.8–7.4)
NEUTROPHILS NFR BLD AUTO: 43.8 % — SIGNIFICANT CHANGE UP (ref 43–77)
NEUTROPHILS NFR BLD AUTO: 43.8 % — SIGNIFICANT CHANGE UP (ref 43–77)
NITRITE UR-MCNC: NEGATIVE — SIGNIFICANT CHANGE UP
NITRITE UR-MCNC: NEGATIVE — SIGNIFICANT CHANGE UP
NRBC # BLD: 0 /100 WBCS — SIGNIFICANT CHANGE UP (ref 0–0)
NRBC # BLD: 0 /100 WBCS — SIGNIFICANT CHANGE UP (ref 0–0)
PH UR: 5.5 — SIGNIFICANT CHANGE UP (ref 5–8)
PH UR: 5.5 — SIGNIFICANT CHANGE UP (ref 5–8)
PLATELET # BLD AUTO: 224 K/UL — SIGNIFICANT CHANGE UP (ref 150–400)
PLATELET # BLD AUTO: 224 K/UL — SIGNIFICANT CHANGE UP (ref 150–400)
POTASSIUM SERPL-MCNC: 4.5 MMOL/L — SIGNIFICANT CHANGE UP (ref 3.5–5.3)
POTASSIUM SERPL-MCNC: 4.5 MMOL/L — SIGNIFICANT CHANGE UP (ref 3.5–5.3)
POTASSIUM SERPL-SCNC: 4.5 MMOL/L — SIGNIFICANT CHANGE UP (ref 3.5–5.3)
POTASSIUM SERPL-SCNC: 4.5 MMOL/L — SIGNIFICANT CHANGE UP (ref 3.5–5.3)
PROT SERPL-MCNC: 6.8 G/DL — SIGNIFICANT CHANGE UP (ref 6–8.3)
PROT SERPL-MCNC: 6.8 G/DL — SIGNIFICANT CHANGE UP (ref 6–8.3)
PROT UR-MCNC: SIGNIFICANT CHANGE UP MG/DL
PROT UR-MCNC: SIGNIFICANT CHANGE UP MG/DL
PROTHROM AB SERPL-ACNC: 10.7 SEC — SIGNIFICANT CHANGE UP (ref 9.5–13)
PROTHROM AB SERPL-ACNC: 10.7 SEC — SIGNIFICANT CHANGE UP (ref 9.5–13)
RBC # BLD: 4.02 M/UL — LOW (ref 4.2–5.8)
RBC # BLD: 4.02 M/UL — LOW (ref 4.2–5.8)
RBC # FLD: 13.8 % — SIGNIFICANT CHANGE UP (ref 10.3–14.5)
RBC # FLD: 13.8 % — SIGNIFICANT CHANGE UP (ref 10.3–14.5)
SODIUM SERPL-SCNC: 139 MMOL/L — SIGNIFICANT CHANGE UP (ref 135–145)
SODIUM SERPL-SCNC: 139 MMOL/L — SIGNIFICANT CHANGE UP (ref 135–145)
SP GR SPEC: 1.03 — SIGNIFICANT CHANGE UP (ref 1–1.03)
SP GR SPEC: 1.03 — SIGNIFICANT CHANGE UP (ref 1–1.03)
UROBILINOGEN FLD QL: 1 MG/DL — SIGNIFICANT CHANGE UP (ref 0.2–1)
UROBILINOGEN FLD QL: 1 MG/DL — SIGNIFICANT CHANGE UP (ref 0.2–1)
WBC # BLD: 7.86 K/UL — SIGNIFICANT CHANGE UP (ref 3.8–10.5)
WBC # BLD: 7.86 K/UL — SIGNIFICANT CHANGE UP (ref 3.8–10.5)
WBC # FLD AUTO: 7.86 K/UL — SIGNIFICANT CHANGE UP (ref 3.8–10.5)
WBC # FLD AUTO: 7.86 K/UL — SIGNIFICANT CHANGE UP (ref 3.8–10.5)

## 2023-12-30 PROCEDURE — 93005 ELECTROCARDIOGRAM TRACING: CPT

## 2023-12-30 PROCEDURE — 81003 URINALYSIS AUTO W/O SCOPE: CPT

## 2023-12-30 PROCEDURE — 96374 THER/PROPH/DIAG INJ IV PUSH: CPT | Mod: XU

## 2023-12-30 PROCEDURE — 74177 CT ABD & PELVIS W/CONTRAST: CPT | Mod: 26,MA

## 2023-12-30 PROCEDURE — 85730 THROMBOPLASTIN TIME PARTIAL: CPT

## 2023-12-30 PROCEDURE — 80053 COMPREHEN METABOLIC PANEL: CPT

## 2023-12-30 PROCEDURE — 99285 EMERGENCY DEPT VISIT HI MDM: CPT | Mod: FS

## 2023-12-30 PROCEDURE — 36415 COLL VENOUS BLD VENIPUNCTURE: CPT

## 2023-12-30 PROCEDURE — 93010 ELECTROCARDIOGRAM REPORT: CPT

## 2023-12-30 PROCEDURE — 99285 EMERGENCY DEPT VISIT HI MDM: CPT | Mod: 25

## 2023-12-30 PROCEDURE — 83690 ASSAY OF LIPASE: CPT

## 2023-12-30 PROCEDURE — 85610 PROTHROMBIN TIME: CPT

## 2023-12-30 PROCEDURE — 83735 ASSAY OF MAGNESIUM: CPT

## 2023-12-30 PROCEDURE — 85025 COMPLETE CBC W/AUTO DIFF WBC: CPT

## 2023-12-30 PROCEDURE — 74177 CT ABD & PELVIS W/CONTRAST: CPT | Mod: MA

## 2023-12-30 PROCEDURE — 83605 ASSAY OF LACTIC ACID: CPT

## 2023-12-30 RX ORDER — IOHEXOL 300 MG/ML
30 INJECTION, SOLUTION INTRAVENOUS ONCE
Refills: 0 | Status: COMPLETED | OUTPATIENT
Start: 2023-12-30 | End: 2023-12-30

## 2023-12-30 RX ORDER — ONDANSETRON 8 MG/1
4 TABLET, FILM COATED ORAL ONCE
Refills: 0 | Status: COMPLETED | OUTPATIENT
Start: 2023-12-30 | End: 2023-12-30

## 2023-12-30 RX ADMIN — ONDANSETRON 4 MILLIGRAM(S): 8 TABLET, FILM COATED ORAL at 03:21

## 2023-12-30 RX ADMIN — IOHEXOL 30 MILLILITER(S): 300 INJECTION, SOLUTION INTRAVENOUS at 03:21

## 2023-12-30 NOTE — ED PROVIDER NOTE - PATIENT PORTAL LINK FT
You can access the FollowMyHealth Patient Portal offered by [facility] by registering at the following website: [email or web address]. By joining [employer]’s FollowMyHealth portal, you will also be able to view your health information using other applications (apps) compatible with our system. You can access the FollowMyHealth Patient Portal offered by [facility] by registering at the following website: [email or web address]. By joining [employer]’s FollowMyHealth portal, you will also be able to view your health information using other applications (apps) compatible with our system.

## 2023-12-30 NOTE — ED ADULT TRIAGE NOTE - ARRIVAL INFO ADDITIONAL COMMENTS
pt c/o 1 hour of abd pain with nausea and diarrhea in colostomy.    hx of intestinal infection and resection with colostomy.

## 2023-12-30 NOTE — ED ADULT NURSE NOTE - NSICDXPASTMEDICALHX_GEN_ALL_CORE_FT
PAST MEDICAL HISTORY:  Bipolar disorder     Depression     Diabetes     Type 2 diabetes mellitus without complication, without long-term current use of insulin     Vertigo

## 2023-12-30 NOTE — ED PROVIDER NOTE - CLINICAL SUMMARY MEDICAL DECISION MAKING FREE TEXT BOX
history of T2DM with neuropathy, bipolar disorder, vertigo, CAD s/p YEHUDA x2 (6/6/22, on DAPT), diff colitis and cholecystectomy s/p ex lap, open subtotal colectomy, Ethan's pouch w/ end ileostomy, here w one hour of diffuse abd cramping, nausea and noticed liquid stools in his ostomy. no blood in ostomy.   pt well appearing, stable vitals, exam reassuring - no abd tenderness, no blood in ostomy bag  suspect gerd, gastritis, pud, possible viral illness.   low suspicion acute intra-abdominal pathology w/o abd tenderness and symptom onset 1 hour ago  screening ecg although doubt acs.   plan - labs, ua, ctap  antiemetics prn  serial abd exams

## 2023-12-30 NOTE — ED PROVIDER NOTE - OBJECTIVE STATEMENT
73 yr old male, history of 73 yr old male, history of T2DM with neuropathy, bipolar disorder, vertigo, CAD s/p YEHUDA x2 (6/6/22, on DAPT), diff colitis and cholecystectomy s/p ex lap, open subtotal colectomy, Ethan's pouch w/ end ileostomy, presents to the Emergency Department w abdominal pain. pt w one hour of diffuse abd cramping, nausea and noticed liquid stools in his ostomy. no blood in ostomy.   no fever, uri sx, chest pain, sob, urinary symptoms. pt is concerned he has food poisoning and wants a CT scan to look for it.

## 2023-12-30 NOTE — ED PROVIDER NOTE - ATTENDING APP SHARED VISIT CONTRIBUTION OF CARE
73M c/o upper abd pain, nausea, loose stool for past hour. no vomiting. no fevers. a few months ago pt had colostomy for infection.   gen- nad  heent- ncat  abd - soft, nt, nd  ext -wwp  neuro -alert, hope  abd pain, nausea, afebrile, no guarding, no rebound, pending CT

## 2023-12-30 NOTE — ED ADULT NURSE NOTE - NSFALLRISKINTERV_ED_ALL_ED
Assistance OOB with selected safe patient handling equipment if applicable/Assistance with ambulation/Communicate fall risk and risk factors to all staff, patient, and family/Monitor gait and stability/Provide patient with walking aids/Provide visual cue: yellow wristband, yellow gown, etc/Reinforce activity limits and safety measures with patient and family/Call bell, personal items and telephone in reach/Instruct patient to call for assistance before getting out of bed/chair/stretcher/Non-slip footwear applied when patient is off stretcher/[place] to call system/Physically safe environment - no spills, clutter or unnecessary equipment/Purposeful Proactive Rounding/Room/bathroom lighting operational, light cord in reach Assistance OOB with selected safe patient handling equipment if applicable/Assistance with ambulation/Communicate fall risk and risk factors to all staff, patient, and family/Monitor gait and stability/Provide patient with walking aids/Provide visual cue: yellow wristband, yellow gown, etc/Reinforce activity limits and safety measures with patient and family/Call bell, personal items and telephone in reach/Instruct patient to call for assistance before getting out of bed/chair/stretcher/Non-slip footwear applied when patient is off stretcher/[place] to call system/Physically safe environment - no spills, clutter or unnecessary equipment/Purposeful Proactive Rounding/Room/bathroom lighting operational, light cord in reach

## 2023-12-30 NOTE — ED PROVIDER NOTE - NSFOLLOWUPINSTRUCTIONS_ED_ALL_ED_FT
Your results are on the pages to follow. We are unsure of exactly what is causing your symptoms but at this time, no other emergency testing is indicated in the ER today - we have determined that there is not a life threatening cause of your symptoms at this time.    Follow up with your Primary Care Doctor within 1 week for continued evaluation and to ensure your symptoms have resolved.      Return to this Emergency Department if you have any persistent, worsening, or new symptoms including severe abdominal pain, uncontrollable nausea/vomiting, severe diarrhea or inability to have a bowel movement, very dark/black stools or blood in your stool, burning with urination, high fever >102 degrees F, or any other serious concerns.

## 2023-12-30 NOTE — ED PROVIDER NOTE - PHYSICAL EXAMINATION
Constitutional : Well appearing, non-toxic, no acute distress. awake, alert, oriented to person, place, time/situation.  Head : head normocephalic, atraumatic  EENMT : eyes clear bilaterally, PERRL, EOMI. airway patent. moist mucous membranes. neck supple.  Cardiac : Normal rate, regular rhythm. No murmur appreciated, no LE edema.  Resp : Breath sounds clear and equal bilaterally. Respirations even and unlabored.   Gastro : abdomen soft, nontender, nondistended. no rebound or guarding. R sided ostomy w/ loose stools, nonbloody. nontender surrounding ostomy. no redness surrounding.   MSK :  range of motion is not limited, no muscle or joint tenderness  Back : No evidence of trauma. No spinal or CVA tenderness.  Vasc : Extremities warm and well perfused. 2+ radial and DP pulses. cap refill <2 seconds  Neuro : Alert and oriented, CNII-XII grossly intact, no focal deficits, no motor or sensory deficits.  Skin : Skin normal color for race, warm, dry and intact. No evidence of rash.  Psych : Alert and oriented to person, place, time/situation. normal mood and affect. no apparent risk to self or others.

## 2023-12-30 NOTE — ED PROVIDER NOTE - PROGRESS NOTE DETAILS
lipase 124. labs otherwise unremarkable  UA w/o infection  CT w/o acute findings.   pain controlled, pt sleeping. rpt abd exam benign. tolerating po. ok for dc.     All results reviewed with the patient verbally. Discharge plan and return precautions d/w pt who verbalized understanding and agrees with plan. All questions answered. Vitals WNL. Ready for d/c.

## 2023-12-30 NOTE — ED ADULT NURSE NOTE - NSFALLUNIVINTERV_ED_ALL_ED
Bed/Stretcher in lowest position, wheels locked, appropriate side rails in place/Call bell, personal items and telephone in reach/Instruct patient to call for assistance before getting out of bed/chair/stretcher/Non-slip footwear applied when patient is off stretcher/[place] to call system/Physically safe environment - no spills, clutter or unnecessary equipment/Purposeful proactive rounding/Room/bathroom lighting operational, light cord in reach Bed/Stretcher in lowest position, wheels locked, appropriate side rails in place/Call bell, personal items and telephone in reach/Instruct patient to call for assistance before getting out of bed/chair/stretcher/Non-slip footwear applied when patient is off stretcher/[place] to call system/Physically safe environment - no spills, clutter or unnecessary equipment/Purposeful proactive rounding/Room/bathroom lighting operational, light cord in reach

## 2023-12-30 NOTE — ED ADULT NURSE NOTE - NSICDXFAMILYHX_GEN_ALL_CORE_FT
FAMILY HISTORY:  FH: bipolar disorder  FH: type 2 diabetes  No pertinent family history in first degree relatives, cad

## 2023-12-30 NOTE — ED ADULT NURSE NOTE - OBJECTIVE STATEMENT
73yM presents to ED endorsing chest pain, loose stools, abd pain, and some nausea. Pt has colostomy bag. AAOx3. Ambulates by self. 73yM presents to ED endorsing chest pain, loose stools, abd pain, and some nausea. Pt has colostomy bag. AAOx3. Ambulates with walker or assistance.

## 2024-01-02 NOTE — PHYSICAL THERAPY INITIAL EVALUATION ADULT - PHYSICAL ASSIST/NONPHYSICAL ASSIST, REHAB EVAL
Marycruz Walkeros needs to be seen for an appointment before further refills are given.  Last appointment with this provider was over 12 months ago.  =TMR   verbal cues/1 person assist

## 2024-02-07 NOTE — ED PROVIDER NOTE - CONSULTING PHYSICIAN
[facility] [place]  [address]  [facility] [address]  Dept: [phone]  Loc: [phone]    Patient:  Mikal Vasquez  YOB: 1961  Date: 2/7/general surgery

## 2024-02-14 NOTE — PROGRESS NOTE ADULT - ATTENDING COMMENTS
I saw him last admission and there is still significant parkinsonism on exam.  after discussion this morning he is more agreeable to start carbidopa-levodopa 25/100 TID.  appears his condition frequently worsens in the setting of metabolic stresses    start carbidopa-levodopa 25/100 TID  should f/u as outpatient in 4-6 weeks to consider dose adjustments  PT/OT  rest of medical mgmt per primary team    please re-consult if further assistance is needed
- workup of tremors per neurology consult   - defer management of antipsychotics to psychiatry consult   Thank you for inviting us to participate in the care of this patient. Medicine consult to sign off - please feel free to call us back with any questions or concerns.
Apixaban/Eliquis - Compliance/Apixaban/Eliquis - Dietary Advice/Apixaban/Eliquis - Follow up monitoring/Apixaban/Eliquis - Potential for adverse drug reactions and interactions

## 2024-04-20 NOTE — ED ADULT TRIAGE NOTE - BEFAST BALANCE
Encounter Date: 2024       History     Chief Complaint   Patient presents with    Shortness of Breath    Chills     Pt reports generalized fatigue with SOB x 4 days.      Mirian Melo is a 62 y.o. female with a history of HTN who presents to the ED complaining of shortness of breath, cough, and congestion. Patient reports she started having generalized body aches, fatigue, and congestion 1 week ago. Over the last 4 days her symptoms have worsened and she started to feel short of breath. She also reports bilateral eye and ear itching/ stuffiness. Has been using left over antibiotic ear drops but they are not helping. She denies chest pain, palpitations, abdominal pain, N/V/D.    Of note, pt was treated at an urgent care 3 weeks ago for similar symptoms. Was prescribed a medrol dose pack which she states helped, but symptoms returned 1 week later.     The history is provided by the patient.     Review of patient's allergies indicates:   Allergen Reactions    Clindamycin     Penicillins      Past Medical History:   Diagnosis Date    Hypertension      Past Surgical History:   Procedure Laterality Date     SECTION      x2    HERNIA REPAIR      TONSILLECTOMY       Family History   Problem Relation Name Age of Onset    Diabetes Mother      Thyroid disease Mother       Social History     Tobacco Use    Smoking status: Every Day     Current packs/day: 0.50     Average packs/day: 0.5 packs/day for 44.3 years (22.2 ttl pk-yrs)     Types: Cigarettes     Start date:     Smokeless tobacco: Never   Substance Use Topics    Alcohol use: Not Currently    Drug use: Not Currently     Review of Systems   Constitutional:  Positive for chills. Negative for fever.   HENT:  Positive for congestion, ear pain and postnasal drip.    Respiratory:  Positive for cough and shortness of breath.    Cardiovascular:  Negative for chest pain, palpitations and leg swelling.   Gastrointestinal:  Negative for abdominal pain and nausea.    Genitourinary:  Negative for dysuria and frequency.   Musculoskeletal:  Negative for arthralgias and back pain.   Skin:  Negative for rash and wound.   Neurological:  Negative for dizziness and weakness.   Hematological:  Does not bruise/bleed easily.       Physical Exam     Initial Vitals [04/20/24 0907]   BP Pulse Resp Temp SpO2   (!) 177/103 84 16 98.2 °F (36.8 °C) 100 %      MAP       --         Physical Exam    Nursing note and vitals reviewed.  Constitutional: She appears well-developed and well-nourished. No distress.   HENT:   Head: Normocephalic and atraumatic.   Right Ear: No drainage or swelling. Tympanic membrane is not erythematous and not bulging. A middle ear effusion is present.   Left Ear: No drainage or swelling. Tympanic membrane is not erythematous and not bulging. A middle ear effusion is present.   Nose: Rhinorrhea present.   Mouth/Throat: Posterior oropharyngeal erythema present. No oropharyngeal exudate.   Eyes: EOM are normal. No scleral icterus.   Neck: Neck supple.   Normal range of motion.  Cardiovascular:  Normal rate and regular rhythm.           No murmur heard.  Pulmonary/Chest: Breath sounds normal. No respiratory distress. She has no wheezes.   Abdominal: Abdomen is soft. Bowel sounds are normal. She exhibits no distension. There is no abdominal tenderness.   Musculoskeletal:         General: No tenderness. Normal range of motion.      Cervical back: Normal range of motion and neck supple.     Neurological: She is alert and oriented to person, place, and time. No cranial nerve deficit.   Skin: Skin is warm and dry. Capillary refill takes less than 2 seconds. No erythema.   Psychiatric: She has a normal mood and affect. Her behavior is normal. Judgment and thought content normal.         ED Course   Procedures  Labs Reviewed   COMPREHENSIVE METABOLIC PANEL - Abnormal; Notable for the following components:       Result Value    Glucose Level 136 (*)     Blood Urea Nitrogen 8.5 (*)      Protein Total 7.7 (*)     Globulin 3.8 (*)     Albumin/Globulin Ratio 1.0 (*)     All other components within normal limits   COVID/FLU A&B PCR - Normal    Narrative:     The Xpert Xpress SARS-CoV-2/FLU/RSV plus is a rapid, multiplexed real-time PCR test intended for the simultaneous qualitative detection and differentiation of SARS-CoV-2, Influenza A, Influenza B, and respiratory syncytial virus (RSV) viral RNA in either nasopharyngeal swab or nasal swab specimens.         TROPONIN I - Normal   CBC W/ AUTO DIFFERENTIAL    Narrative:     The following orders were created for panel order CBC auto differential.  Procedure                               Abnormality         Status                     ---------                               -----------         ------                     CBC with Differential[[id number]]                           Final result                 Please view results for these tests on the individual orders.   CBC WITH DIFFERENTIAL   EXTRA TUBES    Narrative:     The following orders were created for panel order EXTRA TUBES.  Procedure                               Abnormality         Status                     ---------                               -----------         ------                     Light Blue Top Hold[[id number]]                             In process                 Red Top Hold[[id number]]                                    In process                 Gold Top Hold[[id number]]                                   In process                   Please view results for these tests on the individual orders.   LIGHT BLUE TOP HOLD   RED TOP HOLD   GOLD TOP HOLD        ECG Results              EKG 12-lead (In process)        Collection Time Result Time QRS Duration OHS QTC Calculation    04/20/24 09:33:30 04/20/24 09:36:35 80 424                     In process by Interface, Lab In [facility] (04/20/24 09:36:41)                   Narrative:    Test Reason : R06.02,    Vent. Rate : 075 BPM      Atrial Rate : 075 BPM     P-R Int : 156 ms          QRS Dur : 080 ms      QT Int : 380 ms       P-R-T Axes : 056 -36 099 degrees     QTc Int : 424 ms    Normal sinus rhythm  Left axis deviation  Nonspecific T wave abnormality  Abnormal ECG  When compared with ECG of 10-MAR-2023 03:35,  Criteria for Anteroseptal infarct are no longer Present  T wave inversion less evident in Lateral leads    Referred By:             Confirmed By:                                   Imaging Results              X-Ray Chest AP Portable (Final result)  Result time 04/20/24 10:37:28      Final result by Williams Ramos MD (04/20/24 10:37:28)                   Impression:      No acute findings.      Electronically signed by: Williams Ramos  Date:    04/20/2024  Time:    10:37               Narrative:    EXAMINATION:  XR CHEST AP PORTABLE    CLINICAL HISTORY:  Shortness of breath    COMPARISON:  17 March 2023    FINDINGS:  Frontal view of the chest was obtained. Heart is not significantly enlarged.  Lungs are grossly clear.  There is no pneumothorax.                                       Medications   benzonatate capsule 200 mg (200 mg Oral Given 4/20/24 0951)     Medical Decision Making  Differential: COVID, flu, sinusitis, pneumonia    ED management: HDS and afebrile. Sinus congestion and tenderness on exam. Bilateral middle ear effusions. EKG without acute ischemic changes. Troponin negative. CXR without consolidation. Pt was treated at an urgent care 3 weeks ago for similar symptoms. Was prescribed a medrol dose pack which she states helped, but symptoms returned 1 week later. Due to findings and symptoms >2 weeks will treat with antibiotics for suspected bacterial sinusitis. She has penicillin allergy so will prescribe azithromycin. Instructed to follow up with PCP in 3 days. ED return precautions given. She verbalized understanding. All questions answered.     Amount and/or Complexity of Data Reviewed  Labs: ordered. Decision-making  details documented in ED Course.  Radiology: ordered. Decision-making details documented in ED Course.  ECG/medicine tests:  Decision-making details documented in ED Course.    Risk  Prescription drug management.               ED Course as of 04/20/24 1049   Sat Apr 20, 2024   1008 EKG 12-lead  NSR, no acute ischemic changes.  [KD]   1024 Troponin I: <0.010 [KD]   1040 X-Ray Chest AP Portable  No acute findings.  [KD]      ED Course User Index  [KD] Patricia oDnovan PA-C                           Clinical Impression:  Final diagnoses:  [R06.02] Shortness of breath  [R05.9] Cough  [J01.90, B96.89] Acute bacterial sinusitis (Primary)          ED Disposition Condition    Discharge Stable          ED Prescriptions       Medication Sig Dispense Start Date End Date Auth. Provider    azithromycin (Z-YADIRA) 250 MG tablet Take 2 tablets by mouth on day 1; Take 1 tablet by mouth on days 2-5 6 tablet 4/20/2024 4/25/2024 Patricia Donovan PA-C    methylPREDNISolone (MEDROL DOSEPACK) 4 mg tablet use as directed 21 each 4/20/2024 5/11/2024 Patricia Donovan PA-C    benzonatate (TESSALON) 100 MG capsule Take 1 capsule (100 mg total) by mouth 3 (three) times daily as needed for Cough. 20 capsule 4/20/2024 4/30/2024 Patricia Donovan PA-C          Follow-up Information       Follow up With Specialties Details Why Contact Info    [facility] - Emergency Dept Emergency Medicine  If symptoms worsen [address] [facility] [address] [phone]    Zamzam Luu FNP Nurse Practitioner In 3 days Hospital follow up [address] [facility] [address]  [phone]               Patricia Donovan PA-C  04/20/24 4249     Yes

## 2024-07-26 NOTE — ED ADULT NURSE NOTE - NSSUHOSCREENINGYN_ED_ALL_ED
After your delivery - signs and symptoms to watch for:  Fever - Oral temperature greater than 100.4 degrees Fahrenheit  Foul-smelling vaginal discharge  Headache unrelieved by \"pain meds\"  Difficulty urinating  Breasts reddened, hard, hot to the touch  Nipple discharge which is foul-smelling or contains pus  Increased pain at the site of the laceration  Difficulty breathing with or without chest pain  New calf pain especially if only on one side  Sudden, continuing increased vaginal bleeding with or without clots  Unrelieved feelings of:  Inability to cope  Sadness  Anxiety  Lack of interest in baby  Insomnia  Crying     What to do at home:  See patient education handouts for full information  Resume activity gradually   Don't lift anything heavier than baby and carrier until okayed by your Physician or Midwife  No sex until approved by your Physician  Take care of yourself by sleeping/resting as much as possible  Eat regular nutritious meals  Let someone else care for you, your baby, and housework as much as possible   Take pain medication as prescribed whenever you need them  To avoid/relieve constipation take stool softeners if advised   Drink lots of water/fruit juices  Increase fiber in your diet    Refer to \"Your Guide to Postpartum and  Care\" for further instructions (Received by patient during this admission)    For Lactation Support  Breastfeeding Consultations/Lactation Warm Line  [phone]                                             Want to Say “Thank You” to a Nurse?  The JENNYFER Award® was created in memory of ALONSO Dong by his family to say thank you to bedside nurses who provide an outstanding level of care.    Submit a nomination using any method below.     OR    [email or web address]  Or visit the Resource section   on your [employer] russell            Yes - the patient is able to be screened

## 2024-08-25 NOTE — DISCHARGE NOTE ADULT - CAREGIVER NAME
SUBJECTIVE:  Remains intermittently agitated. Intermittently hypoxic on gas and Spo2 monitor. Became hypotensive and needed low dose NE; was around time of increasing precedex dose. Started on Oriana at 20 ppm last night. Worsening hypernatremia, FWF increased. Opens eyes, not consistently following commands.     ASSESSMENT:   Terrance Hidalgo is a 25 year old male who presents with URI symptoms and hypoxia on 8/19. He has a reported pmhx of autism, HLD. He was intubated on 8/19 in the setting of acute hypoxic respiratory failure, likely 2/2 new severe CM of unknown etiology. Myocarditis considered given elevated inflammatory markers, endomyocardial biopsy negative, viral panel negative. CICU following for vent management.     RECOMMENDATIONS:  - Continue FiO2 wean  - Wean off versed today, ok to increase precedex  - Wean pressors, consider vaso instead of NE given elevated PAP (vasoconstriction?) when NE is on  - Drop Oriana to 10 ppm. Repeat ABG 1 hour after. If stable PaO2 drop to 5 ppm  - Continue FWF, increase if Na increases  - Diuresis per primary, appears volume up. Suggest addition of metolazone given hypernatremia.   - Consider standing K repletion on top of RN managed   - Continue PPI and peridex.   - Daily CXR  - Serial ABGs  - Continue ppx lovenox  - Abx per ID/primary  - Consider rheumatology work-up to rule out vasculitis/other autoimmune disorders given significant pulmonary involvement    Discussed with attending, Dr. Clarice Hernández, PGY-6  Cardiovascular Disease Fellow    August 25, 2024    Physical Exam   Temp: 97.9  F (36.6  C) Temp src: Bladder BP: (!) 81/52 Pulse: 103   Resp: 16 SpO2: 96 % O2 Device: Mechanical Ventilator    Vital Signs with Ranges  Temp:  [95.5  F (35.3  C)-99  F (37.2  C)] 97.9  F (36.6  C)  Pulse:  [] 103  Resp:  [0-23] 16  BP: (81-82)/(52-70) 81/52  MAP:  [54 mmHg-93 mmHg] 73 mmHg  Arterial Line BP: ()/(48-81) 94/63  FiO2 (%):  [40 %-50 %] 50 %  SpO2:  [90  %-100 %] 96 %  217 lbs 2.45 oz    GEN: NAD, intubated  HEENT: no icterus  CV: RRR, no obvious murmurs  CHEST: Mechanical breath sounds  ABD: soft, NT/ND, NABS  EXT: Trace BLE edema, warm  NEURO: sedated, opens eyes and moves all extremities.   PSYCH: unable to assess    Data   Recent Labs   Lab 08/25/24  1204 08/25/24  1202 08/25/24  1021 08/25/24  0943 08/25/24  0504 08/25/24  0421 08/24/24  2235 08/24/24  2138   WBC  --   --   --  11.6*  --  16.3*  --  10.4   HGB  --   --   --  11.9*  --  11.9*  --  12.0*   MCV  --   --   --  92  --  91  --  91   PLT  --   --   --  229  --  289  --  256   *  --   --  162*  158*  --  156*   < >  --    POTASSIUM 3.7  --   --  3.4  --  4.2   < >  --    CHLORIDE 114*  --   --  116*  --  119*   < >  --    CO2 30*  --   --  32*  --  25   < >  --    BUN 43.9*  --   --  43.7*  --  47.8*   < >  --    CR 0.87  --   --  0.85  --  0.89   < >  --    ANIONGAP 12  --   --  10  --  12   < >  --    ALEXANDER 8.5*  --   --  8.4*  --  8.2*   < >  --    * 106* 153* 156*   < > 192*  222*   < >  --    ALBUMIN  --   --   --  3.7  --  3.5   < >  --    PROTTOTAL  --   --   --  6.2*  --  5.9*   < >  --    BILITOTAL  --   --   --  0.4  --  0.3   < >  --    ALKPHOS  --   --   --  73  --  67   < >  --    ALT  --   --   --  205*  --  163*   < >  --    AST  --   --   --  112*  --  65*   < >  --     < > = values in this interval not displayed.       Recent Results (from the past 24 hour(s))   XR Chest Port 1 View    Narrative    Exam: XR CHEST PORT 1 VIEW, 8/25/2024 3:56 AM    Indication: intubated, cardiogenic shock    Comparison: X-ray chest 8/24/2024, multiple priors.    Findings:   Frontal radiograph of the chest, 0142 hours. Endotracheal tube tip is  in the midthoracic trachea approximately 5.7 cm above the mauri.  Stable placement of the left IJ Hemet-Sha catheter, right IJ central  venous catheter, and gastric tube. There is been interval advancement  of the esophageal temperature probe into the  distal esophagus.    The trachea is midline. Heart size and shape is stable continue low  lung volumes with hazy basilar opacities. Small bilateral effusions.  No new dense consolidations. No definitive pneumothorax.      Impression    Impression:   1. Slight advancement of the esophageal temperature probe into the  distal esophagus. Remaining support devices are stable.  2. Hazy basilar and retrocardiac opacities suggestive of basilar  atelectasis. With superimposed small layering bilateral effusions.    I have personally reviewed the examination and initial interpretation  and I agree with the findings.    AZEEM ALEJO MD         SYSTEM ID:  [id number]     TTE 8/21/24:  Interpretation Summary  Tachycardia at 144BPM during study.  Mild left ventricular dilation is present.LVIDd 58mm.  Biplane LVEF is 16%.  Severe hypokinesis of mid to apical RV free wall.  Pulmonary artery systolic pressure is normal.  The inferior vena cava is normal.  No pericardial effusion is present.  There is no prior study for direct comparison.    RHC 8/21/24:  Conclusion         Right sided filling pressures are normal.    Left sided filling pressures are mildly elevated.    Mild elevated pulmonary hypertension.    Reduced cardiac output level.    Successful endomyocardial biopsy. Results pending.    Fluoroscopy was used to visualize the left internal jugular vein.     - Elevated left sided filling pressures with mildly reduced cardiac index   - Uncomplicated EMB with 4 samples collected         Hemodynamics    RIGHT HEART CATHETERIZATION:    === Off pressors vented Fio2 80% ====    /71/87 mmHg  RA mean of 6 mmHg with a V wave of 8 mmhg  PA 30/23/26 mmHg  PCWP 18 mmHg with a V wave of 20 mmhg  [place]ne CO/CI 5.0/2.1 L/min/m2   PVR 1.6 GROVE   SVR 1270 dynes/sec/cm-5  PA sat 70%   Hgb 14.6 g/dL        Right sided filling pressures are normal. Left sided filling pressures are mildly elevated. Mild elevated pulmonary hypertension. Reduced  cardiac output level.        Estrella Ford

## 2024-09-04 ENCOUNTER — EMERGENCY (EMERGENCY)
Facility: HOSPITAL | Age: 74
LOS: 1 days | Discharge: HOME | End: 2024-09-04
Attending: EMERGENCY MEDICINE | Admitting: EMERGENCY MEDICINE
Payer: MEDICARE

## 2024-09-04 VITALS
RESPIRATION RATE: 16 BRPM | OXYGEN SATURATION: 98 % | TEMPERATURE: 98 F | DIASTOLIC BLOOD PRESSURE: 67 MMHG | SYSTOLIC BLOOD PRESSURE: 109 MMHG | HEART RATE: 58 BPM

## 2024-09-04 VITALS
RESPIRATION RATE: 17 BRPM | HEART RATE: 60 BPM | DIASTOLIC BLOOD PRESSURE: 62 MMHG | TEMPERATURE: 97 F | OXYGEN SATURATION: 97 % | SYSTOLIC BLOOD PRESSURE: 110 MMHG

## 2024-09-04 DIAGNOSIS — Z90.89 ACQUIRED ABSENCE OF OTHER ORGANS: Chronic | ICD-10-CM

## 2024-09-04 DIAGNOSIS — Z93.3 COLOSTOMY STATUS: ICD-10-CM

## 2024-09-04 DIAGNOSIS — R19.7 DIARRHEA, UNSPECIFIED: ICD-10-CM

## 2024-09-04 DIAGNOSIS — E11.9 TYPE 2 DIABETES MELLITUS WITHOUT COMPLICATIONS: ICD-10-CM

## 2024-09-04 DIAGNOSIS — Z90.49 ACQUIRED ABSENCE OF OTHER SPECIFIED PARTS OF DIGESTIVE TRACT: ICD-10-CM

## 2024-09-04 DIAGNOSIS — I25.10 ATHEROSCLEROTIC HEART DISEASE OF NATIVE CORONARY ARTERY WITHOUT ANGINA PECTORIS: ICD-10-CM

## 2024-09-04 DIAGNOSIS — Z87.19 PERSONAL HISTORY OF OTHER DISEASES OF THE DIGESTIVE SYSTEM: ICD-10-CM

## 2024-09-04 DIAGNOSIS — F31.9 BIPOLAR DISORDER, UNSPECIFIED: ICD-10-CM

## 2024-09-04 DIAGNOSIS — R11.0 NAUSEA: ICD-10-CM

## 2024-09-04 DIAGNOSIS — E66.3 OVERWEIGHT: ICD-10-CM

## 2024-09-04 LAB
ALBUMIN SERPL ELPH-MCNC: 4.3 G/DL — SIGNIFICANT CHANGE UP (ref 3.3–5)
ALP SERPL-CCNC: 85 U/L — SIGNIFICANT CHANGE UP (ref 40–120)
ALT FLD-CCNC: 32 U/L — SIGNIFICANT CHANGE UP (ref 10–45)
ANION GAP SERPL CALC-SCNC: 9 MMOL/L — SIGNIFICANT CHANGE UP (ref 5–17)
AST SERPL-CCNC: 36 U/L — SIGNIFICANT CHANGE UP (ref 10–40)
BASOPHILS # BLD AUTO: 0.05 K/UL — SIGNIFICANT CHANGE UP (ref 0–0.2)
BASOPHILS NFR BLD AUTO: 0.6 % — SIGNIFICANT CHANGE UP (ref 0–2)
BILIRUB SERPL-MCNC: 0.2 MG/DL — SIGNIFICANT CHANGE UP (ref 0.2–1.2)
BUN SERPL-MCNC: 23 MG/DL — SIGNIFICANT CHANGE UP (ref 7–23)
C DIFF GDH STL QL: NEGATIVE — SIGNIFICANT CHANGE UP
C DIFF GDH STL QL: SIGNIFICANT CHANGE UP
CALCIUM SERPL-MCNC: 9.4 MG/DL — SIGNIFICANT CHANGE UP (ref 8.4–10.5)
CHLORIDE SERPL-SCNC: 102 MMOL/L — SIGNIFICANT CHANGE UP (ref 96–108)
CO2 SERPL-SCNC: 26 MMOL/L — SIGNIFICANT CHANGE UP (ref 22–31)
CREAT SERPL-MCNC: 1.28 MG/DL — SIGNIFICANT CHANGE UP (ref 0.5–1.3)
EGFR: 59 ML/MIN/1.73M2 — LOW
EOSINOPHIL # BLD AUTO: 0.07 K/UL — SIGNIFICANT CHANGE UP (ref 0–0.5)
EOSINOPHIL NFR BLD AUTO: 0.9 % — SIGNIFICANT CHANGE UP (ref 0–6)
GLUCOSE SERPL-MCNC: 110 MG/DL — HIGH (ref 70–99)
HCT VFR BLD CALC: 44.5 % — SIGNIFICANT CHANGE UP (ref 39–50)
HGB BLD-MCNC: 14.3 G/DL — SIGNIFICANT CHANGE UP (ref 13–17)
IMM GRANULOCYTES NFR BLD AUTO: 0.3 % — SIGNIFICANT CHANGE UP (ref 0–0.9)
LYMPHOCYTES # BLD AUTO: 3.22 K/UL — SIGNIFICANT CHANGE UP (ref 1–3.3)
LYMPHOCYTES # BLD AUTO: 41.2 % — SIGNIFICANT CHANGE UP (ref 13–44)
MAGNESIUM SERPL-MCNC: 2 MG/DL — SIGNIFICANT CHANGE UP (ref 1.6–2.6)
MCHC RBC-ENTMCNC: 30.3 PG — SIGNIFICANT CHANGE UP (ref 27–34)
MCHC RBC-ENTMCNC: 32.1 GM/DL — SIGNIFICANT CHANGE UP (ref 32–36)
MCV RBC AUTO: 94.3 FL — SIGNIFICANT CHANGE UP (ref 80–100)
MONOCYTES # BLD AUTO: 0.6 K/UL — SIGNIFICANT CHANGE UP (ref 0–0.9)
MONOCYTES NFR BLD AUTO: 7.7 % — SIGNIFICANT CHANGE UP (ref 2–14)
NEUTROPHILS # BLD AUTO: 3.85 K/UL — SIGNIFICANT CHANGE UP (ref 1.8–7.4)
NEUTROPHILS NFR BLD AUTO: 49.3 % — SIGNIFICANT CHANGE UP (ref 43–77)
NRBC # BLD: 0 /100 WBCS — SIGNIFICANT CHANGE UP (ref 0–0)
PHOSPHATE SERPL-MCNC: 4.2 MG/DL — SIGNIFICANT CHANGE UP (ref 2.5–4.5)
PLATELET # BLD AUTO: 236 K/UL — SIGNIFICANT CHANGE UP (ref 150–400)
POTASSIUM SERPL-MCNC: 4.4 MMOL/L — SIGNIFICANT CHANGE UP (ref 3.5–5.3)
POTASSIUM SERPL-SCNC: 4.4 MMOL/L — SIGNIFICANT CHANGE UP (ref 3.5–5.3)
PROT SERPL-MCNC: 7.4 G/DL — SIGNIFICANT CHANGE UP (ref 6–8.3)
RBC # BLD: 4.72 M/UL — SIGNIFICANT CHANGE UP (ref 4.2–5.8)
RBC # FLD: 13.7 % — SIGNIFICANT CHANGE UP (ref 10.3–14.5)
SODIUM SERPL-SCNC: 137 MMOL/L — SIGNIFICANT CHANGE UP (ref 135–145)
WBC # BLD: 7.81 K/UL — SIGNIFICANT CHANGE UP (ref 3.8–10.5)
WBC # FLD AUTO: 7.81 K/UL — SIGNIFICANT CHANGE UP (ref 3.8–10.5)

## 2024-09-04 PROCEDURE — 87449 NOS EACH ORGANISM AG IA: CPT

## 2024-09-04 PROCEDURE — 96374 THER/PROPH/DIAG INJ IV PUSH: CPT

## 2024-09-04 PROCEDURE — 36415 COLL VENOUS BLD VENIPUNCTURE: CPT

## 2024-09-04 PROCEDURE — 85025 COMPLETE CBC W/AUTO DIFF WBC: CPT

## 2024-09-04 PROCEDURE — 87324 CLOSTRIDIUM AG IA: CPT

## 2024-09-04 PROCEDURE — 83735 ASSAY OF MAGNESIUM: CPT

## 2024-09-04 PROCEDURE — 84100 ASSAY OF PHOSPHORUS: CPT

## 2024-09-04 PROCEDURE — 99284 EMERGENCY DEPT VISIT MOD MDM: CPT

## 2024-09-04 PROCEDURE — 99284 EMERGENCY DEPT VISIT MOD MDM: CPT | Mod: 25

## 2024-09-04 PROCEDURE — 80053 COMPREHEN METABOLIC PANEL: CPT

## 2024-09-04 RX ORDER — ONDANSETRON 2 MG/ML
4 INJECTION, SOLUTION INTRAMUSCULAR; INTRAVENOUS ONCE
Refills: 0 | Status: COMPLETED | OUTPATIENT
Start: 2024-09-04 | End: 2024-09-04

## 2024-09-04 RX ORDER — SODIUM CHLORIDE 9 MG/ML
500 INJECTION INTRAMUSCULAR; INTRAVENOUS; SUBCUTANEOUS ONCE
Refills: 0 | Status: COMPLETED | OUTPATIENT
Start: 2024-09-04 | End: 2024-09-04

## 2024-09-04 RX ADMIN — SODIUM CHLORIDE 500 MILLILITER(S): 9 INJECTION INTRAMUSCULAR; INTRAVENOUS; SUBCUTANEOUS at 08:48

## 2024-09-04 RX ADMIN — ONDANSETRON 4 MILLIGRAM(S): 2 INJECTION, SOLUTION INTRAMUSCULAR; INTRAVENOUS at 08:48

## 2024-09-04 NOTE — ED PROVIDER NOTE - NSFOLLOWUPINSTRUCTIONS_ED_ALL_ED_FT
Can take tylenol 650mg every 6hrs as needed for pain.    Stay well hydrated.    Return for fevers, persistent vomit, worsening pain, worsening breathing, worsening lightheaded.    Follow up with primary doctor within 1-2 days.     Follow up with gastroenterologist. Can call [phone] to schedule appointment.     Diarrhea    Diarrhea is frequent loose or watery bowel movements that has many causes. Diarrhea can make you feel weak and cause you to become dehydrated. Diarrhea typically lasts 2–3 days, but can last longer if it is a sign of something more serious. Drink clear fluids to prevent dehydration. Eat bland, easy-to-digest foods as tolerated.     SEEK IMMEDIATE MEDICAL CARE IF YOU HAVE ANY OF THE FOLLOWING SYMPTOMS: high fevers, lightheadedness/dizziness, chest pain, black or bloody stools, shortness of breath, severe abdominal or back pain, or any signs of dehydration.

## 2024-09-04 NOTE — ED ADULT TRIAGE NOTE - CHIEF COMPLAINT QUOTE
Pt. presents to the ED for diarrhea x 3 days. Pt. has a hx of c-diff and sts. that he feels like he may have it again.

## 2024-09-04 NOTE — ED ADULT NURSE NOTE - NSFALLUNIVINTERV_ED_ALL_ED
Bed/Stretcher in lowest position, wheels locked, appropriate side rails in place/Call bell, personal items and telephone in reach/Instruct patient to call for assistance before getting out of bed/chair/stretcher/Non-slip footwear applied when patient is off stretcher/[place] to call system/Physically safe environment - no spills, clutter or unnecessary equipment/Purposeful proactive rounding/Room/bathroom lighting operational, light cord in reach

## 2024-09-04 NOTE — ED PROVIDER NOTE - PATIENT PORTAL LINK FT
You can access the FollowMyHealth Patient Portal offered by [facility] by registering at the following website: [email or web address]. By joining [facility]’s FollowMyHealth portal, you will also be able to view your health information using other applications (apps) compatible with our system.

## 2024-09-04 NOTE — ED PROVIDER NOTE - PROGRESS NOTE DETAILS
[place]: Labs grossly wnl, c-dif negative, remains well appearing w/o systemic symptoms, benign abdomen. Discussed importance of outpt follow up and return precautions. Clinically no indication for further emergent ED workup or hospitalization at this time. Stable for dc, outpt f/u.

## 2024-09-04 NOTE — ED ADULT NURSE NOTE - OBJECTIVE STATEMENT
Pt A&Ox4 and able to speak in complete sentences. Pt even and unlabored and unlabored with equal chest rise and fall. Pt arrived d/t worsening diarrhea stool from colostomy bag. Pt hx T2DM, neuropathy, vertigo, colectomy. Pt denies numbness, tingling, sob, fevers, chills, n, v, cp.

## 2024-09-04 NOTE — ED PROVIDER NOTE - CLINICAL SUMMARY MEDICAL DECISION MAKING FREE TEXT BOX
74M PMH T2DM w/ neuropathy, bipolar disorder, vertigo, CAD s/p YEHUDA x2 (6/6/22), cholecystectomy, c-diff colitis  s/p ex lap, open subtotal colectomy, Ethan's pouch w/ end ileostomy (pt states this was done at [facility] ~1yr ago), now p/w increased liquidy output from ostomy x3 days. Concerned he may have c-dif again so came to ED. Minimal intermittent nausea. No other systemic symptoms.   Vitals wnl, exam as above.   ddx: Low suspicion but possible c-dif.   Labs, IVF/attempt symptom control, c-dif, reassess.

## 2024-09-04 NOTE — ED PROVIDER NOTE - IV ALTEPLASE EXCL ABS HIDDEN
Patients procedure was moved from DATE: 02/10 to DATE: 03/03. 03/03 procedure moved to 03/10, ARRIVAL TIME IS: 10:30am. No New medications started since originally being scheduled. Surgery Scheduler notified of change in date and time. Inbasket message was put in referal for IAR's. New updated pre-procedure instructions were mailed to patient.  Verbal understanding from patient of new date and arrival time.      show

## 2024-09-04 NOTE — ED PROVIDER NOTE - OBJECTIVE STATEMENT
74M PMH T2DM w/ neuropathy, bipolar disorder, vertigo, CAD s/p YEHUDA x2 (6/6/22), cholecystectomy, c-diff colitis  s/p ex lap, open subtotal colectomy, Ethan's pouch w/ end ileostomy (pt states this was done at [facility] ~1yr ago), now p/w increased liquidy output from ostomy x3 days. Concerned he may have c-dif again so came to ED. Minimal intermittent nausea. No other systemic symptoms.   Denies f/c, lightheaded, SOB/CP, URI symptoms, vomiting, abd pain, urinary complaints, LE pain/swelling, black/bloody stool, recent antibiotics.

## 2024-10-24 NOTE — ED ADULT NURSE NOTE - BEFORE THE ILLNESS OR INJURY
[FreeTextEntry1] : Alert, oriented, well, pleasant.  2x 0.4cm clean granulation with 2mm rim erythema, right forearm and right dorsum hand. No evidence infection. Start petrolatum and dressing twice daily for 2 weeks. F/u if pain or swelling.  Contact dermatitis has resolved. Cutaneous exam Bret 15.
No

## 2024-12-26 NOTE — ED ADULT NURSE NOTE - NSFALLRSKHARMRISK_ED_ALL_ED
[facility] [address] [place] [address]                              CONSULTATION      PATIENT NAME: DIEUDONNE MCGUIRE               : 1961  MED REC NO: [id number]                      ROOM:   ACCOUNT NO: [id number]                       ADMIT DATE: 2024  PROVIDER: Eugenio Silva MD      CONSULT DATE: 2024    REFERRING PHYSICIAN:  MARY KATE FITZGERALD    HISTORY OF PRESENT ILLNESS:  The patient is a 63-year-old lady with multiple medical problems including COPD; chronic respiratory failure, on home oxygen; bronchiectasis, who was admitted through the emergency room with complaints of increasing shortness of breath, cough productive of yellowish phlegm, and hemoptysis.  She denied any fever or chills.  She denied any nausea or vomiting.  She denied any chest pains.  Her hemoptysis has almost resolved since admission.  In the emergency room, she was given breathing treatments, started on antibiotics.  She was also found to be in acute on chronic respiratory failure with increased pCO2 and reduced pH.    PAST MEDICAL HISTORY:  Significant for COPD; chronic respiratory failure, on home oxygen; bronchiectasis; obstructive sleep apnea.    PAST SURGICAL HISTORY:  Remarkable for  section, cholecystectomy, pacemaker insertion.    FAMILY HISTORY:  Reveals that her mother had thyroid cancer.    SOCIAL HISTORY:  Reveals that she quit smoking, but has history of smoking in the past.  No history of alcohol or drug abuse.    MEDICATIONS:  Reviewed.    ALLERGIES:  SHE HAS NO KNOWN ALLERGIES.      REVIEW OF SYSTEMS:  Ten to fourteen-point review of systems was reviewed and is negative except for what is mentioned in history of present illness.    PHYSICAL EXAMINATION:  GENERAL:  The patient is alert, oriented x3, in no acute respiratory distress.  VITAL SIGNS:  Her blood pressure is 136/104 mmHg, pulse of 92 per minute,  no

## 2025-03-11 NOTE — BH CONSULTATION LIAISON ASSESSMENT NOTE - MSE OPTIONS
Morning, just finished Verenice's preop. Note that due to her copd/oxygen dependence she is at elevated risk of general anesthesia. Also, she stated that she is getting \"part of her toe removed due to a ulcer.\" Could you clarify, I see surgery was for another cause, no ulcer mentioned? Thanks, Turner
Structured MSE

## 2025-03-18 NOTE — ED ADULT NURSE NOTE - ISAR VISION
Detail Level: Detailed Depth Of Biopsy: dermis Was A Bandage Applied: Yes Size Of Lesion In Cm: 0 Biopsy Type: H and E Biopsy Method: Dermablade Anesthesia Type: 1% lidocaine without epinephrine Anesthesia Volume In Cc: 0.5 Hemostasis: Jose Daniel's Wound Care: Petrolatum Dressing: bandage Destruction After The Procedure: No Type Of Destruction Used: Curettage Curettage Text: The wound bed was treated with curettage after the biopsy was performed. Cryotherapy Text: The wound bed was treated with cryotherapy after the biopsy was performed. Electrodesiccation Text: The wound bed was treated with electrodesiccation after the biopsy was performed. Electrodesiccation And Curettage Text: The wound bed was treated with electrodesiccation and curettage after the biopsy was performed. Silver Nitrate Text: The wound bed was treated with silver nitrate after the biopsy was performed. Lab: -94 Medical Necessity Information: It is in your best interest to select a reason for this procedure from the list below. All of these items fulfill various CMS LCD requirements except the new and changing color options. Consent: Written consent was obtained and risks were reviewed including but not limited to scarring, infection, bleeding, scabbing, incomplete removal, nerve damage and allergy to anesthesia. Post-Care Instructions: I reviewed with the patient in detail post-care instructions. Patient is to keep the biopsy site dry overnight, and then clean with vinegar water mixture(1 tbsp white vinegar with 1 pint warm water) once daily and apply Polysporin or Vaseline daily until healed. Notification Instructions: Patient will be notified of biopsy results. However, patient instructed to call the office if not contacted within 2 weeks. Billing Type: Third-Party Bill Information: Selecting Yes will display possible errors in your note based on the variables you have selected. This validation is only offered as a suggestion for you. PLEASE NOTE THAT THE VALIDATION TEXT WILL BE REMOVED WHEN YOU FINALIZE YOUR NOTE. IF YOU WANT TO FAX A PRELIMINARY NOTE YOU WILL NEED TO TOGGLE THIS TO 'NO' IF YOU DO NOT WANT IT IN YOUR FAXED NOTE. No

## 2025-05-07 NOTE — ED PROVIDER NOTE - PRO INTERPRETER NEED 2
Odette German is a 50 year old male who presents for Blood Pressure and Office Visit    HPI  The patient presents for a follow-up visit between his annual physicals.    He reports overall good health, with the exception of a headache experienced yesterday, which he attributes to consuming spicy Mexican food. He monitors his blood pressure at home and recalls his last reading on 11/27/2024 being 127/85. He does not have a history of hypertension and does not take any medications for it.    He uses his CPAP machine intermittently, not on a nightly basis. He has achieved significant weight loss, dropping from 240 pounds in 11/2024 to 224 pounds currently, through dietary modifications and regular physical activity. His diet includes smoothies, bananas, and other fruits and vegetables, while avoiding starchy foods. He walks approximately 4 miles daily.    He has not taken his vitamin D supplements for the past 1.5 months due to a lack of supply and reports feeling sluggish at work. His hemoglobin A1c was 5.5 six months ago, indicating normal blood sugar levels.    Review of Systems   Constitutional:  Positive for fatigue.   Respiratory:  Negative for shortness of breath.    Cardiovascular:  Negative for chest pain.   Neurological:  Negative for dizziness.       Current Outpatient Medications   Medication Sig   • Cholecalciferol (vitamin D) 50 mcg (2,000 units) capsule Take 1 capsule by mouth daily.   • fluticasone (FLONASE) 50 MCG/ACT nasal spray Spray 2 sprays in each nostril daily.     No current facility-administered medications for this visit.         Objective   Vitals:    05/07/25 1208 05/07/25 1236   BP: (!) 137/94 (!) 141/96   Pulse: 82    Resp: 16    Temp: 97.3 °F (36.3 °C)    SpO2: 97%    Weight: 101.9 kg (224 lb 10.4 oz)    Height: 6' 5\" (1.956 m)    PainSc:  0    BMI (Calculated): 26.64      Physical Exam  Vitals and nursing note reviewed.   Cardiovascular:      Rate and Rhythm: Normal rate and  regular rhythm.      Heart sounds: Normal heart sounds.   Pulmonary:      Effort: Pulmonary effort is normal. No respiratory distress.      Breath sounds: Normal breath sounds. No wheezing, rhonchi or rales.   Psychiatric:         Mood and Affect: Mood normal.         Respiratory: Clear to auscultation, no wheezing, rales or rhonchi  Cardiovascular: Regular rate and rhythm, no murmurs, rubs, or gallops    Labs   - Hemoglobin A1c: 5.5%   - Vitamin D: 13 ng/mL    Assessment & Plan   1. Elevated blood pressure.  - Blood pressure readings have been consistently below 140/90, indicating effective management without the need for medication.  - Lifestyle modifications, including weight loss, have contributed to this positive outcome.  - Advised to continue monitoring blood pressure at home twice a week and record the readings.  - The nurse will reassess blood pressure during the next visit in 1 month for the second hepatitis B vaccine.    2. Sleep apnea.  - Reports feeling sluggish at work.  - Continued use of the CPAP machine is recommended to help with energy levels.    3. Hyperlipidemia.  - Managing hyperlipidemia through dietary changes.  - A cholesterol panel will be ordered before the next visit to assess the effectiveness of these changes.    4. Prediabetes.  - A1c levels have normalized, indicating improved glucose control.  - An A1c test will be conducted before the annual physical to monitor prediabetes status.    5. Prostate cancer screening.  - A PSA test has been ordered to be conducted annually.  - This will be done before the next physical.    6. Colon cancer screening.  - Reminded to schedule a screening colonoscopy.  - Contact information for scheduling has been provided.  - If no polyps are found, the next colonoscopy will be in 10 years.    7. Vitamin D deficiency.  - Advised to continue taking vitamin D supplements and a One-A-Day multivitamin.  - Vitamin D levels will be checked before the next  physical in 6 months.    8. Health maintenance.  - Up to date with COVID-19 and flu shots.  - Tetanus was done in 2021 and is good for 10 years.  - Hepatitis B screening was negative, but there are no titers for hepatitis B.  - Advised to get the hepatitis B vaccine updated, which consists of 2 shots 1 month apart. The first shot will be administered today, and the second shot will be given in 1 month.    Follow-up  The patient will follow up in 6 months for his annual physical.    1. Elevated blood pressure reading without diagnosis of hypertension  2. Obstructive sleep apnea  3. Prediabetes  -     Comprehensive Metabolic Panel; Future  -     Lipid Panel With Reflex; Future  -     CBC with Automated Differential; Future  -     Glycohemoglobin; Future  4. Hyperlipidemia, unspecified hyperlipidemia type  -     Comprehensive Metabolic Panel; Future  -     Lipid Panel With Reflex; Future  -     CBC with Automated Differential; Future  5. Vitamin D deficiency  -     Vitamin D -25 Hydroxy; Future  6. Screening for prostate cancer  -     PSA; Future  7. Need for hepatitis B vaccination  -     HEP B (HEPLISAV-B)  -     SERVICE TO INTERNAL MEDICINE      .Refer to orders.  Medical compliance with plan discussed and risks of non-compliance reviewed.  Patient education completed on disease process, etiology & prognosis.  Proper usage and side effects of medications reviewed & discussed.  Patient understands and agrees with the plan.  Return to clinic as clinically indicated as discussed with patient who verbalized understanding of the plan and is in agreement with the plan.    Dr. Doyle Bailon       English

## 2025-06-02 ENCOUNTER — EMERGENCY (EMERGENCY)
Facility: HOSPITAL | Age: 75
LOS: 1 days | End: 2025-06-02
Attending: EMERGENCY MEDICINE | Admitting: EMERGENCY MEDICINE
Payer: MEDICARE

## 2025-06-02 VITALS
WEIGHT: 175.05 LBS | DIASTOLIC BLOOD PRESSURE: 71 MMHG | HEART RATE: 61 BPM | OXYGEN SATURATION: 98 % | SYSTOLIC BLOOD PRESSURE: 117 MMHG | RESPIRATION RATE: 18 BRPM | TEMPERATURE: 98 F | HEIGHT: 67 IN

## 2025-06-02 DIAGNOSIS — Z90.89 ACQUIRED ABSENCE OF OTHER ORGANS: Chronic | ICD-10-CM

## 2025-06-02 PROCEDURE — 99285 EMERGENCY DEPT VISIT HI MDM: CPT

## 2025-06-02 PROCEDURE — 93010 ELECTROCARDIOGRAM REPORT: CPT

## 2025-06-03 VITALS
HEART RATE: 59 BPM | TEMPERATURE: 98 F | SYSTOLIC BLOOD PRESSURE: 124 MMHG | RESPIRATION RATE: 18 BRPM | OXYGEN SATURATION: 97 % | DIASTOLIC BLOOD PRESSURE: 74 MMHG

## 2025-06-03 LAB
ALBUMIN SERPL ELPH-MCNC: 3.8 G/DL — SIGNIFICANT CHANGE UP (ref 3.3–5)
ALP SERPL-CCNC: 74 U/L — SIGNIFICANT CHANGE UP (ref 40–120)
ALT FLD-CCNC: 25 U/L — SIGNIFICANT CHANGE UP (ref 10–45)
ANION GAP SERPL CALC-SCNC: 7 MMOL/L — SIGNIFICANT CHANGE UP (ref 5–17)
AST SERPL-CCNC: 27 U/L — SIGNIFICANT CHANGE UP (ref 10–40)
BASOPHILS # BLD AUTO: 0.05 K/UL — SIGNIFICANT CHANGE UP (ref 0–0.2)
BASOPHILS NFR BLD AUTO: 0.6 % — SIGNIFICANT CHANGE UP (ref 0–2)
BILIRUB SERPL-MCNC: 0.2 MG/DL — SIGNIFICANT CHANGE UP (ref 0.2–1.2)
BUN SERPL-MCNC: 20 MG/DL — SIGNIFICANT CHANGE UP (ref 7–23)
CALCIUM SERPL-MCNC: 9 MG/DL — SIGNIFICANT CHANGE UP (ref 8.4–10.5)
CHLORIDE SERPL-SCNC: 108 MMOL/L — SIGNIFICANT CHANGE UP (ref 96–108)
CO2 SERPL-SCNC: 30 MMOL/L — SIGNIFICANT CHANGE UP (ref 22–31)
CREAT SERPL-MCNC: 1.34 MG/DL — HIGH (ref 0.5–1.3)
EGFR: 56 ML/MIN/1.73M2 — LOW
EGFR: 56 ML/MIN/1.73M2 — LOW
EOSINOPHIL # BLD AUTO: 0.14 K/UL — SIGNIFICANT CHANGE UP (ref 0–0.5)
EOSINOPHIL NFR BLD AUTO: 1.7 % — SIGNIFICANT CHANGE UP (ref 0–6)
GLUCOSE SERPL-MCNC: 157 MG/DL — HIGH (ref 70–99)
HCT VFR BLD CALC: 41.1 % — SIGNIFICANT CHANGE UP (ref 39–50)
HGB BLD-MCNC: 13.4 G/DL — SIGNIFICANT CHANGE UP (ref 13–17)
IMM GRANULOCYTES NFR BLD AUTO: 0.4 % — SIGNIFICANT CHANGE UP (ref 0–0.9)
LYMPHOCYTES # BLD AUTO: 3.15 K/UL — SIGNIFICANT CHANGE UP (ref 1–3.3)
LYMPHOCYTES # BLD AUTO: 37.6 % — SIGNIFICANT CHANGE UP (ref 13–44)
MAGNESIUM SERPL-MCNC: 2 MG/DL — SIGNIFICANT CHANGE UP (ref 1.6–2.6)
MCHC RBC-ENTMCNC: 30.9 PG — SIGNIFICANT CHANGE UP (ref 27–34)
MCHC RBC-ENTMCNC: 32.6 G/DL — SIGNIFICANT CHANGE UP (ref 32–36)
MCV RBC AUTO: 94.9 FL — SIGNIFICANT CHANGE UP (ref 80–100)
MONOCYTES # BLD AUTO: 0.74 K/UL — SIGNIFICANT CHANGE UP (ref 0–0.9)
MONOCYTES NFR BLD AUTO: 8.8 % — SIGNIFICANT CHANGE UP (ref 2–14)
NEUTROPHILS # BLD AUTO: 4.26 K/UL — SIGNIFICANT CHANGE UP (ref 1.8–7.4)
NEUTROPHILS NFR BLD AUTO: 50.9 % — SIGNIFICANT CHANGE UP (ref 43–77)
NRBC BLD AUTO-RTO: 0 /100 WBCS — SIGNIFICANT CHANGE UP (ref 0–0)
PLATELET # BLD AUTO: 224 K/UL — SIGNIFICANT CHANGE UP (ref 150–400)
POTASSIUM SERPL-MCNC: 4.7 MMOL/L — SIGNIFICANT CHANGE UP (ref 3.5–5.3)
POTASSIUM SERPL-SCNC: 4.7 MMOL/L — SIGNIFICANT CHANGE UP (ref 3.5–5.3)
PROT SERPL-MCNC: 6.6 G/DL — SIGNIFICANT CHANGE UP (ref 6–8.3)
RBC # BLD: 4.33 M/UL — SIGNIFICANT CHANGE UP (ref 4.2–5.8)
RBC # FLD: 14.1 % — SIGNIFICANT CHANGE UP (ref 10.3–14.5)
SODIUM SERPL-SCNC: 145 MMOL/L — SIGNIFICANT CHANGE UP (ref 135–145)
TROPONIN T, HIGH SENSITIVITY RESULT: 10 NG/L — SIGNIFICANT CHANGE UP (ref 0–51)
TROPONIN T, HIGH SENSITIVITY RESULT: 11 NG/L — SIGNIFICANT CHANGE UP (ref 0–51)
WBC # BLD: 8.37 K/UL — SIGNIFICANT CHANGE UP (ref 3.8–10.5)
WBC # FLD AUTO: 8.37 K/UL — SIGNIFICANT CHANGE UP (ref 3.8–10.5)

## 2025-06-03 PROCEDURE — 85025 COMPLETE CBC W/AUTO DIFF WBC: CPT

## 2025-06-03 PROCEDURE — 93005 ELECTROCARDIOGRAM TRACING: CPT

## 2025-06-03 PROCEDURE — 84484 ASSAY OF TROPONIN QUANT: CPT

## 2025-06-03 PROCEDURE — 99285 EMERGENCY DEPT VISIT HI MDM: CPT | Mod: 25

## 2025-06-03 PROCEDURE — 36415 COLL VENOUS BLD VENIPUNCTURE: CPT

## 2025-06-03 PROCEDURE — 71045 X-RAY EXAM CHEST 1 VIEW: CPT

## 2025-06-03 PROCEDURE — 71045 X-RAY EXAM CHEST 1 VIEW: CPT | Mod: 26

## 2025-06-03 PROCEDURE — 83735 ASSAY OF MAGNESIUM: CPT

## 2025-06-03 PROCEDURE — 80053 COMPREHEN METABOLIC PANEL: CPT

## 2025-06-03 NOTE — ED ADULT NURSE NOTE - OBJECTIVE STATEMENT
74yroM c/o chest pressure and numbness to R hand x today ( around 2230pm) . Pt reports they began to feel "numbness in their right hand and then they felt chest pressure." Pt took a clonazepam and felt better afterward. Pt endorses feeling/sensation in their R hand, pt also has baseline strength in hand. Pt reports that its possible it was his anxiety. pmhx of bipolar disorder and hartmans pouch with ileostomy. Pt denies sob, fever/chills, vision changes, headache. Pt ambulatory with steady gait.

## 2025-06-03 NOTE — ED PROVIDER NOTE - OBJECTIVE STATEMENT
74M PMH T2DM w/ neuropathy, bipolar disorder, vertigo, CAD s/p YEHUDA x2 (6/6/22), cholecystectomy, c-diff colitis  s/p ex lap, open subtotal colectomy, Ethan's pouch w/ end ileostomy (pt states this was done at [facility] 2023) here w/ Right hand numbness and chest pressure which he states got worse at 10:30 PM prior to arrival.  Patient states he took home clonazepam and now right hand numbness described as right hand decrease sensation and strength is improved and he is back to baseline, states that it was from  the right hand into wrist not radiating up arm not associated with facial droop and feels like it could have been his anxiety.  Chest pressure is also improved.  Has no associated slurred speech facial droop dizziness difficulty ambulating.

## 2025-06-03 NOTE — ED PROVIDER NOTE - CLINICAL SUMMARY MEDICAL DECISION MAKING FREE TEXT BOX
74M PMH T2DM w/ neuropathy, bipolar disorder, vertigo, CAD s/p YEHUDA x2 (6/6/22), cholecystectomy, c-diff colitis  s/p ex lap, open subtotal colectomy, Ethan's pouch w/ end ileostomy (pt states this was done at [facility] here with chest pressure and right hand numbness similar to prior anxiety/neuropathy improved with clonazepam is neurovascularly and neurologically intact will evaluate for atypical ACS anxiety chest pain   plan for CBC CMP troponin EKG reassessment   EKG normal sinus rhythm 58 normal QTc leftward axis no acute STEMI nonspecific T wave inversion lead III

## 2025-06-03 NOTE — ED PROVIDER NOTE - DISCHARGE DATE
PROCEDURES:  Incision and drainage of bone abscess or osteomyelitis of foot 24-Jan-2025 17:00:23  Kathi Nunez   03-Jun-2025

## 2025-06-03 NOTE — ED PROVIDER NOTE - PROGRESS NOTE DETAILS
initial troponin at baseline and negative will repeat patient well-appearing pain-free will DC second troponin remains unchanged trop 2 neg stable for dc trop 2 neg, patient without complaints feels well would like to go home shared decision making with patient and wife stable for dc

## 2025-06-03 NOTE — ED PROVIDER NOTE - PATIENT PORTAL LINK FT
You can access the FollowMyHealth Patient Portal offered by [facility] by registering at the following website: [email or web address]. By joining [employer]’s FollowMyHealth portal, you will also be able to view your health information using other applications (apps) compatible with our system.

## 2025-06-03 NOTE — ED PROVIDER NOTE - NSFOLLOWUPCLINICS_GEN_ALL_ED_FT
Cardiology at [facility]  Cardiology  [address] [place] [address], [address] [place] [address]  Phone: [phone]  Fax:

## 2025-06-03 NOTE — ED ADULT NURSE NOTE - NSFALLUNIVINTERV_ED_ALL_ED
Bed/Stretcher in lowest position, wheels locked, appropriate side rails in place/Call bell, personal items and telephone in reach/Instruct patient to call for assistance before getting out of bed/chair/stretcher/Non-slip footwear applied when patient is off stretcher/[place] to call system/Physically safe environment - no spills, clutter or unnecessary equipment/Purposeful proactive rounding/Room/bathroom lighting operational, light cord in reach

## 2025-06-03 NOTE — ED PROVIDER NOTE - PHYSICAL EXAMINATION
VITAL SIGNS: I have reviewed nursing notes and confirm.  CONSTITUTIONAL: Well appearing, in no acute distress.   SKIN:  warm and dry, no acute rash.   HEAD:  normocephalic, atraumatic.  EYES: EOM intact; conjunctiva and sclera clear.  ENT: No nasal discharge; airway clear.   NECK: Supple.  CARD: S1, S2, Regular rate and rhythm.   RESP:  Clear to auscultation b/l, no wheezes, rales or rhonchi.  ABD: Normal bowel sounds; soft; non-distended; non-tender; no guarding/ rebound.  EXT: Normal ROM. No peripheral edema. Pulses intact and equal b/l.  NEURO: Alert, oriented, grossly unremarkable,NIH stroke scale 0 C5-T1 intact bilateral upper/lower extremities 5+ strength upper extremities  strength 5+ no focal deficits  PSYCH: Cooperative, mood and affect appropriate.

## 2025-06-06 DIAGNOSIS — E11.40 TYPE 2 DIABETES MELLITUS WITH DIABETIC NEUROPATHY, UNSPECIFIED: ICD-10-CM

## 2025-06-06 DIAGNOSIS — I25.10 ATHEROSCLEROTIC HEART DISEASE OF NATIVE CORONARY ARTERY WITHOUT ANGINA PECTORIS: ICD-10-CM

## 2025-06-06 DIAGNOSIS — R07.89 OTHER CHEST PAIN: ICD-10-CM

## 2025-06-06 DIAGNOSIS — Z95.5 PRESENCE OF CORONARY ANGIOPLASTY IMPLANT AND GRAFT: ICD-10-CM

## 2025-06-06 DIAGNOSIS — R20.0 ANESTHESIA OF SKIN: ICD-10-CM

## 2025-06-16 ENCOUNTER — EMERGENCY (EMERGENCY)
Facility: HOSPITAL | Age: 75
LOS: 1 days | End: 2025-06-16
Attending: EMERGENCY MEDICINE | Admitting: EMERGENCY MEDICINE
Payer: MEDICARE

## 2025-06-16 VITALS
OXYGEN SATURATION: 98 % | HEIGHT: 67 IN | WEIGHT: 169.98 LBS | TEMPERATURE: 98 F | HEART RATE: 62 BPM | DIASTOLIC BLOOD PRESSURE: 60 MMHG | SYSTOLIC BLOOD PRESSURE: 120 MMHG | RESPIRATION RATE: 18 BRPM

## 2025-06-16 DIAGNOSIS — Z90.89 ACQUIRED ABSENCE OF OTHER ORGANS: Chronic | ICD-10-CM

## 2025-06-16 PROCEDURE — 99283 EMERGENCY DEPT VISIT LOW MDM: CPT

## 2025-06-16 PROCEDURE — 82962 GLUCOSE BLOOD TEST: CPT

## 2025-06-16 NOTE — ED PROVIDER NOTE - PHYSICAL EXAMINATION
VITAL SIGNS: I have reviewed nursing notes and confirm.  CONSTITUTIONAL: Well appearing, in no acute distress.   SKIN:  warm and dry, no acute rash.   HEAD:  normocephalic, atraumatic.  EYES: EOM intact; conjunctiva and sclera clear.  ENT: No nasal discharge; airway clear.   NECK: Supple.  CARD: S1, S2, Regular rate and rhythm.   RESP:  Clear to auscultation b/l, no wheezes, rales or rhonchi.  ABD: Normal bowel sounds; soft; non-distended; non-tender; no guarding/ rebound.  EXT: Normal ROM. No peripheral edema. Pulses intact and equal b/l.Right lower extremity DP TP intact no foot drop chronic venous stasis no ulcerations able to ambulate full sensation L4-S1 equal to left lower extremity 5+ strength  NEURO: Alert, oriented, grossly unremarkable  PSYCH: Cooperative, mood and affect appropriate.

## 2025-06-16 NOTE — ED PROVIDER NOTE - PATIENT PORTAL LINK FT
You can access the FollowMyHealth Patient Portal offered by [facility] by registering at the following website: [email or web address]. By joining [employer]’s FollowMyHealth portal, you will also be able to view your health information using other applications (apps) compatible with our system.

## 2025-06-16 NOTE — ED PROVIDER NOTE - NSFOLLOWUPCLINICS_GEN_ALL_ED_FT
[facility]  Neurology  [address] [place] [address]  Phone: [phone]  Fax: [phone]

## 2025-06-16 NOTE — ED PROVIDER NOTE - CLINICAL SUMMARY MEDICAL DECISION MAKING FREE TEXT BOX
74M PMH T2DM w/ neuropathy, bipolar disorder, vertigo, CAD s/p YEHUDA x2 (6/6/22), cholecystectomy, c-diff colitis  s/p ex lap, open subtotal colectomy, Ethan's pouch w/ end ileostomy (pt states this was done at [facility] 2023) here w/Paresthesia and anxiety plan for BGL and reassurance no further workup advised for neurology follow-up patient is neurovascularly intact.

## 2025-06-16 NOTE — ED ADULT NURSE NOTE - NSFALLUNIVINTERV_ED_ALL_ED
Bed/Stretcher in lowest position, wheels locked, appropriate side rails in place/Call bell, personal items and telephone in reach/Instruct patient to call for assistance before getting out of bed/chair/stretcher/Non-slip footwear applied when patient is off stretcher/[place] to call system/Physically safe environment - no spills, clutter or unnecessary equipment/Purposeful proactive rounding/Room/bathroom lighting operational, light cord in reach
the EKG is unchanged from prior EKG

## 2025-06-16 NOTE — ED PROVIDER NOTE - NSFOLLOWUPINSTRUCTIONS_ED_ALL_ED_FT
Paresthesia    AMBULATORY CARE:    Paresthesia is numbness, tingling, or burning. It can happen in any part of your body, but usually occurs in your legs, feet, arms, or hands.    Common signs and symptoms:    No feeling in the affected area    A feeling of pins and needles    An electric shock feeling    Heaviness    Trouble moving the affected area    A feeling that something is crawling under your skin    A feeling of burning or of cold in the affected area  Seek care immediately if:    You have severe pain along with numbness and tingling.    Your legs suddenly become cold. You have trouble moving your legs, and they ache.    You have increased weakness in a part of your body.    You have uncontrolled movements.  Contact your healthcare provider if:    Your symptoms do not improve.    You have symptoms in more than one part of your body.    You have questions or concerns about your condition or care.  Treatment will depend on what is causing your paresthesia. You may need to increase the amount of vitamin B in your blood. Your healthcare provider may change or stop a medicine you are taking that is causing your symptoms. Permanent paresthesia may be helped with nerve medicine. If you have diabetes, your healthcare provider or diabetes specialist can help you control your blood sugar levels. Your provider may recommend a splint or surgery if you have paresthesia caused by carpal tunnel syndrome.    Manage paresthesia:    Protect the area from injury. You may injure or burn yourself if you lose feeling in the area. Be careful when you touch anything that could be hot. Wear sturdy shoes to protect your feet. Ask about other ways to protect yourself.    Go to physical or occupational therapy if directed. Your provider may recommend therapy if you have a condition such as carpal tunnel syndrome. A physical therapist can teach you exercises to help strengthen the area or increase your ability to move it. An occupational therapist can help you find new ways to do your daily activities.    Manage health conditions that can cause paresthesia. Work with your diabetes specialist if you have uncontrolled diabetes. A dietitian or your healthcare provider can help you create a meal plan if you have low vitamin B levels. Your provider can help you manage your health if you have multiple sclerosis or you had a stroke. It is important to manage health conditions to stop paresthesia or prevent it from getting worse.  Follow up with your healthcare provider or neurologist as directed: Your healthcare provider may refer you to a specialist. Write down your questions so you remember to ask them during your visits.

## 2025-06-16 NOTE — ED ADULT TRIAGE NOTE - WEIGHT IN LBS
It was prescribed for diabetic care.  I would need to see her again and test her A1c to see if she still requires the medication.  I can order the A1c and she can go ahead and have that lab completed and then follow-up with me about 5 to 7 days later in the clinic.  I will put in the order.  It would be best to fast if possible.  Water and medications are fine   169.9

## 2025-06-16 NOTE — ED PROVIDER NOTE - OBJECTIVE STATEMENT
74M PMH T2DM w/ neuropathy, bipolar disorder, vertigo, CAD s/p YEHUDA x2 (6/6/22), cholecystectomy, c-diff colitis  s/p ex lap, open subtotal colectomy, Ethan's pouch w/ end ileostomy (pt states this was done at [facility] 2023) here w/ Right lower extremity numbness which began prior to arrival.  Patient states that he feels "numb" from the middle of his right leg just below the kneecap down to his foot described as a altered sensation.  Cannot exactly state what numbness means to him.  Denies focal weakness slurred speech.   does have neuropathy and states he has had the same feeling in his hands before.  Also has anxiety thinks anxiety may be contributing.  Refuses to see psychiatry as per his wife and neurology.  Denies chest pain shortness of breath today.  No fever chills cough.

## 2025-06-16 NOTE — ED PROVIDER NOTE - PROGRESS NOTE DETAILS
patient states  that after speaking to me he no longer feels what he was feeling earlier and feels significantly improved would like to go home DC with neurology follow-up BGL within normal limits likely with paresthesia and acute on chronic  neuropathy neurovascularly intact neuro intact patient states  that after speaking to me he no longer feels what he was feeling earlier and feels significantly improved would like to go home, Wife states that he needs to take his clonazepam which she has not been taking. DC with neurology follow-up BGL within normal limits likely with paresthesia and acute on chronic  neuropathy neurovascularly intact neuro intact

## 2025-06-19 DIAGNOSIS — R20.0 ANESTHESIA OF SKIN: ICD-10-CM

## 2025-06-19 DIAGNOSIS — I25.10 ATHEROSCLEROTIC HEART DISEASE OF NATIVE CORONARY ARTERY WITHOUT ANGINA PECTORIS: ICD-10-CM

## 2025-06-19 DIAGNOSIS — Z95.5 PRESENCE OF CORONARY ANGIOPLASTY IMPLANT AND GRAFT: ICD-10-CM

## 2025-06-19 DIAGNOSIS — F31.9 BIPOLAR DISORDER, UNSPECIFIED: ICD-10-CM

## 2025-06-19 DIAGNOSIS — E11.42 TYPE 2 DIABETES MELLITUS WITH DIABETIC POLYNEUROPATHY: ICD-10-CM

## 2025-07-10 NOTE — PATIENT PROFILE ADULT - NSPRONUTRITIONRISK_GEN_A_NUR
Spoke to pt regarding SVT Ablation tomorrow.  Aware of arrival time of 1300 and where to check in  Made aware to Have No solids 8 hours prior to arrival time  May have Clear liquids up until 2 hours prior to arrival  Discussed clear liquids allowed ( 7 up, ginger ale, chicken broth, apple juice, water, coffee with no cream or sugar)   Pt may have sips of water for am meds    Discussed meds to be held- METOPROLOL     Oral diabetes meds: NO  Insulin: NO    SGLT2 Inhibitors: NO  - Invokana (canagliflozin), Farxiga (dapagliflozin), Jardiance (empagliflozin), Steglatro (ertugliflozin), Sitagliptin (Januvia)  - hold 3-4 days prior to procedure    GLP-1 Agonists: NO  - Byetta (exenitide), Victoza (liraglutide), Ozempic, Wegovy, Rybelsus (semaglutide), Trulicity (dulaglutide), Mounjaro (tirzepatide), Bydureon (Exenatide ER), Adlyxin (Lixisendatide)   - (Weekly dosing, hold GLP-1 agonists 7 days before procedure)  - (Daily or BID dosing, hold GLP-1 agonists day before and day of procedure)  - (Oral semaglutide, hold 7 days before procedure due to long half-life)    Diuretic: LASIX-HOLD AM    Pt will driving down and will stay overnight-Okay per Dr Rosenthal  Pt aware that He will go home with an AVS/Discharge instructions that include wound care, lifting restrictions, what to take for pain, activity restrictions, Phone numbers to call if needed  Pt voiced understanding and stated they have no further questions at this time.  G[place]      
No indicators present

## 2025-07-29 ENCOUNTER — EMERGENCY (EMERGENCY)
Facility: HOSPITAL | Age: 75
LOS: 1 days | End: 2025-07-29
Admitting: EMERGENCY MEDICINE
Payer: MEDICARE

## 2025-07-29 VITALS
HEIGHT: 67 IN | RESPIRATION RATE: 16 BRPM | HEART RATE: 65 BPM | WEIGHT: 179.9 LBS | SYSTOLIC BLOOD PRESSURE: 116 MMHG | TEMPERATURE: 99 F | DIASTOLIC BLOOD PRESSURE: 56 MMHG | OXYGEN SATURATION: 96 %

## 2025-07-29 DIAGNOSIS — Z90.89 ACQUIRED ABSENCE OF OTHER ORGANS: Chronic | ICD-10-CM

## 2025-07-29 PROCEDURE — 93971 EXTREMITY STUDY: CPT

## 2025-07-29 PROCEDURE — 73560 X-RAY EXAM OF KNEE 1 OR 2: CPT | Mod: 26,RT

## 2025-07-29 PROCEDURE — 93971 EXTREMITY STUDY: CPT | Mod: 26,RT

## 2025-07-29 PROCEDURE — 99284 EMERGENCY DEPT VISIT MOD MDM: CPT | Mod: 25

## 2025-07-29 PROCEDURE — 99285 EMERGENCY DEPT VISIT HI MDM: CPT

## 2025-07-29 PROCEDURE — 73560 X-RAY EXAM OF KNEE 1 OR 2: CPT

## 2025-07-29 NOTE — ED PROVIDER NOTE - PROGRESS NOTE DETAILS
XR w/o acute findings  doppler negative  ambulatory w steady gait, neurovascularly intact.     All results reviewed with the patient verbally. Discharge plan and return precautions d/w pt who verbalized understanding and agrees with plan. All questions answered. Vitals WNL. Ready for d/c. XR w/o acute findings  US prelim negative.   while waiting for formal US read pt requesting discharge repeatedly, ambulatory throughout ED. requesting to be discharged. understands he is leaving without formal US read.   recommend supportive care and outpt follow up w pmd.      Discharge plan and return precautions d/w pt who verbalized understanding and agrees with plan. All questions answered. Vitals WNL. Ready for d/c.

## 2025-07-29 NOTE — ED ADULT NURSE NOTE - OBJECTIVE STATEMENT
Pt AOx4, respirations even and unlabored.   Pt p/w R knee pain and the sense that his knee gives out periodically, Pt able to loft R leg against gravity, sensation and pulses intact distal to knee. Pt reports taking six baby aspirin at home prior to calling EMS.   Pt denies SOB, chest pain, dizziness, headache, visual changes, tingling, nausea, vomiting, diarrhea.

## 2025-07-29 NOTE — ED PROVIDER NOTE - OBJECTIVE STATEMENT
75 yr old male, history of T2DM w/ neuropathy, bipolar disorder, vertigo, CAD s/p stents, cholecystectomy, c-diff colitis  s/p ex lap, open subtotal colectomy, Ethan's pouch w/ end ileostomy ([facility]), presents to the Emergency Department w leg pain. pt with R knee pain that started this evening. no injury or trauma. took klonopin and asa w/o pain relief. able to ambulate but his knee feels "weird." 75 yr old male, history of T2DM w/ neuropathy, bipolar disorder, vertigo, CAD s/p stents, cholecystectomy, c-diff colitis  s/p ex lap, open subtotal colectomy, Ethan's pouch w/ end ileostomy ([facility]), presents to the Emergency Department w leg pain. pt with R knee pain that started this evening. no injury or trauma. took klonopin and asa w/o pain relief. able to ambulate but his knee feels "weird." feels it may give out when walking. no extremity weakness / numbness. no back pain. no leg swelling. concerned he is having a stroke. no headache, vision changes, dizziness, speech or gait changes.

## 2025-07-29 NOTE — ED PROVIDER NOTE - PHYSICAL EXAMINATION
RLE - skin intact, no erythema, ecchymosis. no bony tenderness of knee. full ROM of knee seemingly w/o pain. full ROM and no tenderness RLE - skin intact, no erythema, ecchymosis. no bony tenderness of knee. full ROM of knee seemingly w/o pain. full ROM and no tenderness foot / ankle / hip. compartments soft, easily compressible w/o tenderness. no calf tenderness. strength 5/5 proximal and distal., sensation intact distally, 2+ DP pulses, cap refill <2 sec     Constitutional : non-toxic, no acute distress. awake, alert, oriented to person, place, time/situation.  Head : head normocephalic, atraumatic  EENMT : eyes clear bilaterally, PERRL. airway patent. neck supple.  Cardiac : Regular rate. Extremities warm and well perfused. 2+ radial and DP pulses. cap refill <2 seconds. no LE edema.  Resp : Respirations even and unlabored.   MSK :  range of motion is not limited. moving all extremities.  Back : No evidence of trauma.   Neuro : A&Ox3, CNII-XII grossly intact. visual fields intact, no nystagmus, normal speech and word-finding. 5/5 motor in UE and LE, sensation intact throughout. ambulating with a steady gait. normal finger to nose. negative Romberg, no pronator drift   Skin : Skin normal color for race, warm, dry and intact. No evidence of rash.  Psych : Alert and oriented to person, place, time/situation. normal mood and affect. no apparent risk to self or others.

## 2025-07-29 NOTE — ED PROVIDER NOTE - NSFOLLOWUPINSTRUCTIONS_ED_ALL_ED_FT
LEG PAIN / SWELLING    Keep your legs elevated as often as possible.   Use compressive stockings to keep the swelling down.   Avoid long periods of standing.   Take tylenol / motrin for pain    Follow up with your primary medical doctor within 1-2 weeks for further evaluation if the symptoms persist.     Return to the Emergency Department for persistent, worsening, or new symptoms including increased pain/redness/swelling/warmth of your legs, fever/chills, chest pain, shortness of breath, or any other serious concerns.

## 2025-07-29 NOTE — ED PROVIDER NOTE - PATIENT PORTAL LINK FT
You can access the FollowMyHealth Patient Portal offered by [facility] by registering at the following website: [email or web address]. By joining [employer]’s FollowMyHealth portal, you will also be able to view your health information using other applications (apps) compatible with our system.

## 2025-07-29 NOTE — ED ADULT TRIAGE NOTE - CHIEF COMPLAINT QUOTE
EMS states: "He is complaining of R knee pain for 3 hours. He took 2 Klonopin and 1 baby Aspirin in the morning and 4 prior us getting there because hes thinking hes having a stroke."

## 2025-07-29 NOTE — ED PROVIDER NOTE - CLINICAL SUMMARY MEDICAL DECISION MAKING FREE TEXT BOX
will get xray r/o acute bony injury although low suspicion w/o injury.    possible arthritic pain.   abnormal feeling no objective exam findings - possible neuropathy sx. history of T2DM w/ neuropathy, bipolar disorder, vertigo, CAD s/p stents, cholecystectomy, c-diff colitis  s/p ex lap, open subtotal colectomy, Ethan's pouch w/ end ileostomy ([facility]). with R knee pain that started this evening. no injury or trauma. took klonopin and asa w/o pain relief. able to ambulate but his knee feels "weird." feels it may give out when walking.   pt nontoxic appearing, stable vitals, exam unremarkable - no notable swelling, no skin changes, good ROM and no bony tenderness, neurovascularly intact     will get xray r/o acute bony injury although low suspicion w/o injury.    possible arthritic pain. ?ligament vs cartilage injury.   abnormal feeling no objective exam findings - possible neuropathy sx.  doppler r/o DVT although low suspicion, possible bakers cyst.   pt concerned he is having a stroke - history and exam not concerning for stroke or other emergent neuro pathology no indication for stroke workup.

## 2025-08-01 DIAGNOSIS — I25.10 ATHEROSCLEROTIC HEART DISEASE OF NATIVE CORONARY ARTERY WITHOUT ANGINA PECTORIS: ICD-10-CM

## 2025-08-01 DIAGNOSIS — M25.561 PAIN IN RIGHT KNEE: ICD-10-CM

## 2025-08-01 DIAGNOSIS — E11.40 TYPE 2 DIABETES MELLITUS WITH DIABETIC NEUROPATHY, UNSPECIFIED: ICD-10-CM

## 2025-08-01 DIAGNOSIS — Z95.5 PRESENCE OF CORONARY ANGIOPLASTY IMPLANT AND GRAFT: ICD-10-CM

## 2025-08-25 ENCOUNTER — NON-APPOINTMENT (OUTPATIENT)
Age: 75
End: 2025-08-25

## 2025-08-25 ENCOUNTER — APPOINTMENT (OUTPATIENT)
Dept: COLORECTAL SURGERY | Facility: CLINIC | Age: 75
End: 2025-08-25
Payer: MEDICARE

## 2025-08-25 VITALS
HEIGHT: 69 IN | TEMPERATURE: 97.7 F | HEART RATE: 63 BPM | DIASTOLIC BLOOD PRESSURE: 70 MMHG | SYSTOLIC BLOOD PRESSURE: 113 MMHG | BODY MASS INDEX: 23.7 KG/M2 | WEIGHT: 160 LBS

## 2025-08-25 DIAGNOSIS — K52.9 NONINFECTIVE GASTROENTERITIS AND COLITIS, UNSPECIFIED: ICD-10-CM

## 2025-08-25 PROCEDURE — 99213 OFFICE O/P EST LOW 20 MIN: CPT
